# Patient Record
Sex: MALE | Race: WHITE | Employment: OTHER | URBAN - METROPOLITAN AREA
[De-identification: names, ages, dates, MRNs, and addresses within clinical notes are randomized per-mention and may not be internally consistent; named-entity substitution may affect disease eponyms.]

---

## 2017-02-07 RX ORDER — PANTOPRAZOLE SODIUM 40 MG/1
TABLET, DELAYED RELEASE ORAL
Qty: 30 TAB | Refills: 5 | Status: SHIPPED | OUTPATIENT
Start: 2017-02-07 | End: 2017-08-16 | Stop reason: SDUPTHER

## 2017-02-23 DIAGNOSIS — I10 ESSENTIAL HYPERTENSION: ICD-10-CM

## 2017-02-23 RX ORDER — LISINOPRIL 20 MG/1
TABLET ORAL
Qty: 30 TAB | Refills: 5 | Status: SHIPPED | OUTPATIENT
Start: 2017-02-23 | End: 2017-08-21 | Stop reason: SDUPTHER

## 2017-04-17 RX ORDER — ALPRAZOLAM 0.5 MG/1
TABLET ORAL
Qty: 20 TAB | Refills: 2 | OUTPATIENT
Start: 2017-04-17 | End: 2017-11-30 | Stop reason: SDUPTHER

## 2017-05-04 ENCOUNTER — APPOINTMENT (OUTPATIENT)
Dept: GENERAL RADIOLOGY | Age: 62
End: 2017-05-04
Attending: EMERGENCY MEDICINE
Payer: COMMERCIAL

## 2017-05-04 ENCOUNTER — HOSPITAL ENCOUNTER (EMERGENCY)
Age: 62
Discharge: HOME | End: 2017-05-04
Attending: EMERGENCY MEDICINE
Payer: COMMERCIAL

## 2017-05-04 VITALS
RESPIRATION RATE: 20 BRPM | OXYGEN SATURATION: 95 % | TEMPERATURE: 99.4 F | BODY MASS INDEX: 24.59 KG/M2 | DIASTOLIC BLOOD PRESSURE: 78 MMHG | WEIGHT: 191.58 LBS | HEIGHT: 74 IN | SYSTOLIC BLOOD PRESSURE: 164 MMHG | HEART RATE: 107 BPM

## 2017-05-04 DIAGNOSIS — J18.9 CAP (COMMUNITY ACQUIRED PNEUMONIA): Primary | ICD-10-CM

## 2017-05-04 DIAGNOSIS — R50.9 FEVER, UNSPECIFIED FEVER CAUSE: ICD-10-CM

## 2017-05-04 DIAGNOSIS — R11.10 VOMITING, INTRACTABILITY OF VOMITING NOT SPECIFIED, PRESENCE OF NAUSEA NOT SPECIFIED, UNSPECIFIED VOMITING TYPE: ICD-10-CM

## 2017-05-04 LAB
ALBUMIN SERPL BCP-MCNC: 3.8 G/DL (ref 3.5–5)
ALBUMIN/GLOB SERPL: 1.2 {RATIO} (ref 1.1–2.2)
ALP SERPL-CCNC: 63 U/L (ref 45–117)
ALT SERPL-CCNC: 22 U/L (ref 12–78)
ANION GAP BLD CALC-SCNC: 8 MMOL/L (ref 5–15)
APPEARANCE UR: CLEAR
AST SERPL W P-5'-P-CCNC: 20 U/L (ref 15–37)
BACTERIA URNS QL MICRO: NEGATIVE /HPF
BASOPHILS # BLD AUTO: 0 K/UL (ref 0–0.1)
BASOPHILS # BLD: 0 % (ref 0–1)
BILIRUB SERPL-MCNC: 0.6 MG/DL (ref 0.2–1)
BILIRUB UR QL: NEGATIVE
BUN SERPL-MCNC: 21 MG/DL (ref 6–20)
BUN/CREAT SERPL: 18 (ref 12–20)
CALCIUM SERPL-MCNC: 8.4 MG/DL (ref 8.5–10.1)
CHLORIDE SERPL-SCNC: 106 MMOL/L (ref 97–108)
CO2 SERPL-SCNC: 28 MMOL/L (ref 21–32)
COLOR UR: ABNORMAL
CREAT SERPL-MCNC: 1.14 MG/DL (ref 0.7–1.3)
DIFFERENTIAL METHOD BLD: ABNORMAL
EOSINOPHIL # BLD: 0 K/UL (ref 0–0.4)
EOSINOPHIL NFR BLD: 1 % (ref 0–7)
EPITH CASTS URNS QL MICRO: ABNORMAL /LPF
ERYTHROCYTE [DISTWIDTH] IN BLOOD BY AUTOMATED COUNT: 13.2 % (ref 11.5–14.5)
FLUAV AG NPH QL IA: NEGATIVE
FLUBV AG NOSE QL IA: NEGATIVE
GLOBULIN SER CALC-MCNC: 3.2 G/DL (ref 2–4)
GLUCOSE SERPL-MCNC: 118 MG/DL (ref 65–100)
GLUCOSE UR STRIP.AUTO-MCNC: NEGATIVE MG/DL
HCT VFR BLD AUTO: 43.6 % (ref 36.6–50.3)
HGB BLD-MCNC: 14.4 G/DL (ref 12.1–17)
HGB UR QL STRIP: ABNORMAL
KETONES UR QL STRIP.AUTO: ABNORMAL MG/DL
LACTATE SERPL-SCNC: 0.7 MMOL/L (ref 0.4–2)
LEUKOCYTE ESTERASE UR QL STRIP.AUTO: NEGATIVE
LIPASE SERPL-CCNC: 48 U/L (ref 73–393)
LYMPHOCYTES # BLD AUTO: 6 % (ref 12–49)
LYMPHOCYTES # BLD: 0.3 K/UL
MCH RBC QN AUTO: 30.1 PG (ref 26–34)
MCHC RBC AUTO-ENTMCNC: 33 G/DL (ref 30–36.5)
MCV RBC AUTO: 91.2 FL (ref 80–99)
MONOCYTES # BLD: 0.2 K/UL (ref 0–1)
MONOCYTES NFR BLD AUTO: 4 % (ref 5–13)
NEUTS SEG # BLD: 3.7 K/UL (ref 1.8–8)
NEUTS SEG NFR BLD AUTO: 89 % (ref 32–75)
NITRITE UR QL STRIP.AUTO: NEGATIVE
PH UR STRIP: 7 [PH] (ref 5–8)
PLATELET # BLD AUTO: 133 K/UL (ref 150–400)
POTASSIUM SERPL-SCNC: 3.9 MMOL/L (ref 3.5–5.1)
PROT SERPL-MCNC: 7 G/DL (ref 6.4–8.2)
PROT UR STRIP-MCNC: ABNORMAL MG/DL
RBC # BLD AUTO: 4.78 M/UL (ref 4.1–5.7)
RBC #/AREA URNS HPF: ABNORMAL /HPF (ref 0–5)
RBC MORPH BLD: ABNORMAL
SODIUM SERPL-SCNC: 142 MMOL/L (ref 136–145)
SP GR UR REFRACTOMETRY: 1.01 (ref 1–1.03)
UA: UC IF INDICATED,UAUC: ABNORMAL
UROBILINOGEN UR QL STRIP.AUTO: 1 EU/DL (ref 0.2–1)
WBC # BLD AUTO: 4.2 K/UL (ref 4.1–11.1)
WBC URNS QL MICRO: ABNORMAL /HPF (ref 0–4)

## 2017-05-04 PROCEDURE — 80053 COMPREHEN METABOLIC PANEL: CPT | Performed by: EMERGENCY MEDICINE

## 2017-05-04 PROCEDURE — 99285 EMERGENCY DEPT VISIT HI MDM: CPT

## 2017-05-04 PROCEDURE — 94640 AIRWAY INHALATION TREATMENT: CPT

## 2017-05-04 PROCEDURE — 74011250636 HC RX REV CODE- 250/636: Performed by: EMERGENCY MEDICINE

## 2017-05-04 PROCEDURE — 81001 URINALYSIS AUTO W/SCOPE: CPT | Performed by: EMERGENCY MEDICINE

## 2017-05-04 PROCEDURE — 96375 TX/PRO/DX INJ NEW DRUG ADDON: CPT

## 2017-05-04 PROCEDURE — 96374 THER/PROPH/DIAG INJ IV PUSH: CPT

## 2017-05-04 PROCEDURE — 74011250637 HC RX REV CODE- 250/637: Performed by: EMERGENCY MEDICINE

## 2017-05-04 PROCEDURE — 87804 INFLUENZA ASSAY W/OPTIC: CPT | Performed by: EMERGENCY MEDICINE

## 2017-05-04 PROCEDURE — 85025 COMPLETE CBC W/AUTO DIFF WBC: CPT | Performed by: EMERGENCY MEDICINE

## 2017-05-04 PROCEDURE — 83605 ASSAY OF LACTIC ACID: CPT | Performed by: EMERGENCY MEDICINE

## 2017-05-04 PROCEDURE — 83690 ASSAY OF LIPASE: CPT | Performed by: EMERGENCY MEDICINE

## 2017-05-04 PROCEDURE — 36415 COLL VENOUS BLD VENIPUNCTURE: CPT | Performed by: EMERGENCY MEDICINE

## 2017-05-04 PROCEDURE — 96361 HYDRATE IV INFUSION ADD-ON: CPT

## 2017-05-04 PROCEDURE — 77030013140 HC MSK NEB VYRM -A

## 2017-05-04 PROCEDURE — 87040 BLOOD CULTURE FOR BACTERIA: CPT | Performed by: EMERGENCY MEDICINE

## 2017-05-04 PROCEDURE — 71020 XR CHEST PA LAT: CPT

## 2017-05-04 PROCEDURE — 74011000250 HC RX REV CODE- 250: Performed by: EMERGENCY MEDICINE

## 2017-05-04 RX ORDER — ACETAMINOPHEN 500 MG
1000 TABLET ORAL
Status: COMPLETED | OUTPATIENT
Start: 2017-05-04 | End: 2017-05-04

## 2017-05-04 RX ORDER — LEVOFLOXACIN 750 MG/1
750 TABLET ORAL
Status: COMPLETED | OUTPATIENT
Start: 2017-05-04 | End: 2017-05-04

## 2017-05-04 RX ORDER — PROMETHAZINE HYDROCHLORIDE 25 MG/1
25 TABLET ORAL
Qty: 12 TAB | Refills: 0 | Status: SHIPPED | OUTPATIENT
Start: 2017-05-04 | End: 2017-05-30 | Stop reason: ALTCHOICE

## 2017-05-04 RX ORDER — LEVOFLOXACIN 750 MG/1
750 TABLET ORAL DAILY
Qty: 5 TAB | Refills: 0 | Status: SHIPPED | OUTPATIENT
Start: 2017-05-04 | End: 2017-05-09

## 2017-05-04 RX ORDER — ONDANSETRON 2 MG/ML
8 INJECTION INTRAMUSCULAR; INTRAVENOUS
Status: COMPLETED | OUTPATIENT
Start: 2017-05-04 | End: 2017-05-04

## 2017-05-04 RX ORDER — KETOROLAC TROMETHAMINE 30 MG/ML
30 INJECTION, SOLUTION INTRAMUSCULAR; INTRAVENOUS
Status: COMPLETED | OUTPATIENT
Start: 2017-05-04 | End: 2017-05-04

## 2017-05-04 RX ORDER — ALBUTEROL SULFATE 90 UG/1
2 AEROSOL, METERED RESPIRATORY (INHALATION)
Qty: 1 INHALER | Refills: 0 | Status: SHIPPED | OUTPATIENT
Start: 2017-05-04 | End: 2017-05-30 | Stop reason: ALTCHOICE

## 2017-05-04 RX ADMIN — ACETAMINOPHEN 1000 MG: 500 TABLET, FILM COATED ORAL at 07:36

## 2017-05-04 RX ADMIN — SODIUM CHLORIDE 1000 ML: 900 INJECTION, SOLUTION INTRAVENOUS at 06:20

## 2017-05-04 RX ADMIN — SODIUM CHLORIDE 1000 ML: 900 INJECTION, SOLUTION INTRAVENOUS at 07:40

## 2017-05-04 RX ADMIN — ONDANSETRON 8 MG: 2 INJECTION INTRAMUSCULAR; INTRAVENOUS at 06:20

## 2017-05-04 RX ADMIN — LEVOFLOXACIN 750 MG: 750 TABLET, FILM COATED ORAL at 07:36

## 2017-05-04 RX ADMIN — ALBUTEROL SULFATE 1 DOSE: 2.5 SOLUTION RESPIRATORY (INHALATION) at 07:40

## 2017-05-04 RX ADMIN — KETOROLAC TROMETHAMINE 30 MG: 30 INJECTION, SOLUTION INTRAMUSCULAR at 06:22

## 2017-05-04 NOTE — DISCHARGE INSTRUCTIONS
Pneumonia: Care Instructions  Your Care Instructions    Pneumonia is an infection of the lungs. Most cases are caused by infections from bacteria or viruses. Pneumonia may be mild or very severe. If it is caused by bacteria, you will be treated with antibiotics. It may take a few weeks to a few months to recover fully from pneumonia, depending on how sick you were and whether your overall health is good. Follow-up care is a key part of your treatment and safety. Be sure to make and go to all appointments, and call your doctor if you are having problems. Its also a good idea to know your test results and keep a list of the medicines you take. How can you care for yourself at home? · Take your antibiotics exactly as directed. Do not stop taking the medicine just because you are feeling better. You need to take the full course of antibiotics. · Take your medicines exactly as prescribed. Call your doctor if you think you are having a problem with your medicine. · Get plenty of rest and sleep. You may feel weak and tired for a while, but your energy level will improve with time. · To prevent dehydration, drink plenty of fluids, enough so that your urine is light yellow or clear like water. Choose water and other caffeine-free clear liquids until you feel better. If you have kidney, heart, or liver disease and have to limit fluids, talk with your doctor before you increase the amount of fluids you drink. · Take care of your cough so you can rest. A cough that brings up mucus from your lungs is common with pneumonia. It is one way your body gets rid of the infection. But if coughing keeps you from resting or causes severe fatigue and chest-wall pain, talk to your doctor. He or she may suggest that you take a medicine to reduce the cough. · Use a vaporizer or humidifier to add moisture to your bedroom. Follow the directions for cleaning the machine. · Do not smoke or allow others to smoke around you.  Smoke will make your cough last longer. If you need help quitting, talk to your doctor about stop-smoking programs and medicines. These can increase your chances of quitting for good. · Take an over-the-counter pain medicine, such as acetaminophen (Tylenol), ibuprofen (Advil, Motrin), or naproxen (Aleve). Read and follow all instructions on the label. · Do not take two or more pain medicines at the same time unless the doctor told you to. Many pain medicines have acetaminophen, which is Tylenol. Too much acetaminophen (Tylenol) can be harmful. · If you were given a spirometer to measure how well your lungs are working, use it as instructed. This can help your doctor tell how your recovery is going. · To prevent pneumonia in the future, talk to your doctor about getting a flu vaccine (once a year) and a pneumococcal vaccine (one time only for most people). When should you call for help? Call 911 anytime you think you may need emergency care. For example, call if:  · You have severe trouble breathing. Call your doctor now or seek immediate medical care if:  · You cough up dark brown or bloody mucus (sputum). · You have new or worse trouble breathing. · You are dizzy or lightheaded, or you feel like you may faint. Watch closely for changes in your health, and be sure to contact your doctor if:  · You have a new or higher fever. · You are coughing more deeply or more often. · You are not getting better after 2 days (48 hours). · You do not get better as expected. Where can you learn more? Go to http://haydee-trina.info/. Enter [id number] in the search box to learn more about \"Pneumonia: Care Instructions. \"  Current as of: May 23, 2016  Content Version: 11.2  © 8215-9910 [employer] Care instructions adapted under license by [employer] (which disclaims liability or warranty for this information).  If you have questions about a medical condition or this instruction, always ask your healthcare professional. [place] [address] any warranty or liability for your use of this information. Nausea and Vomiting: Care Instructions  Your Care Instructions    When you are nauseated, you may feel weak and sweaty and notice a lot of saliva in your mouth. Nausea often leads to vomiting. Most of the time you do not need to worry about nausea and vomiting, but they can be signs of other illnesses. Two common causes of nausea and vomiting are stomach flu and food poisoning. Nausea and vomiting from viral stomach flu will usually start to improve within 24 hours. Nausea and vomiting from food poisoning may last from 12 to 48 hours. The doctor has checked you carefully, but problems can develop later. If you notice any problems or new symptoms, get medical treatment right away. Follow-up care is a key part of your treatment and safety. Be sure to make and go to all appointments, and call your doctor if you are having problems. It's also a good idea to know your test results and keep a list of the medicines you take. How can you care for yourself at home? · To prevent dehydration, drink plenty of fluids, enough so that your urine is light yellow or clear like water. Choose water and other caffeine-free clear liquids until you feel better. If you have kidney, heart, or liver disease and have to limit fluids, talk with your doctor before you increase the amount of fluids you drink. · Rest in bed until you feel better. · When you are able to eat, try clear soups, mild foods, and liquids until all symptoms are gone for 12 to 48 hours. Other good choices include dry toast, crackers, cooked cereal, and gelatin dessert, such as Jell-O. When should you call for help? Call 911 anytime you think you may need emergency care. For example, call if:  · You passed out (lost consciousness).   Call your doctor now or seek immediate medical care if:  · You have symptoms of dehydration, such as:  ¨ Dry eyes and a dry mouth. ¨ Passing only a little dark urine. ¨ Feeling thirstier than usual.  · You have new or worsening belly pain. · You have a new or higher fever. · You vomit blood or what looks like coffee grounds. Watch closely for changes in your health, and be sure to contact your doctor if:  · You have ongoing nausea and vomiting. · Your vomiting is getting worse. · Your vomiting lasts longer than 2 days. · You are not getting better as expected. Where can you learn more? Go to http://haydee-trina.info/. Enter 25 [id number] in the search box to learn more about \"Nausea and Vomiting: Care Instructions. \"  Current as of: May 27, 2016  Content Version: 11.2  © 1776-8355 [employer]. Care instructions adapted under license by [employer] (which disclaims liability or warranty for this information). If you have questions about a medical condition or this instruction, always ask your healthcare professional. [place] [address] any warranty or liability for your use of this information. We hope that we have addressed all of your medical concerns. The examination and treatment you received in the Emergency Department were for an emergent problem and were not intended as complete care. It is important that you follow up with your healthcare provider(s) for ongoing care. If your symptoms worsen or do not improve as expected, and you are unable to reach your usual health care provider(s), you should return to the Emergency Department. Today's healthcare is undergoing tremendous change, and patient satisfaction surveys are one of the many tools to assess the quality of medical care. You may receive a survey from the [facility] regarding your experience in the Emergency Department. I hope that your experience has been completely positive, particularly the medical care that I provided.   As such, please participate in the survey; anything less than excellent does not meet my expectations or intentions. [facility] and [facility] participate in nationally recognized quality of care measures. If your blood pressure is greater than 120/80, as reported below, we urge that you seek medical care to address the potential of high blood pressure, commonly known as hypertension. Hypertension can be hereditary or can be caused by certain medical conditions, pain, stress, or \"white coat syndrome. \"       Please make an appointment with your health care provider(s) for follow up of your Emergency Department visit. VITALS:   Patient Vitals for the past 8 hrs:   Temp Pulse Resp BP SpO2   05/04/17 0645 - (!) 107 - 149/77 92 %   05/04/17 0633 - - - - 90 %   05/04/17 0632 - (!) 106 20 155/75 90 %   05/04/17 0553 99.4 °F (37.4 °C) (!) 104 16 162/77 92 %          Thank you for allowing us to provide you with medical care today. We realize that you have many choices for your emergency care needs. Please choose us in the future for any continued health care needs. Adarsh Hernandez, [address] [place] [address] [address]   Office: [phone]            Recent Results (from the past 24 hour(s))   INFLUENZA A & B AG (RAPID TEST)    Collection Time: 05/04/17  5:59 AM   Result Value Ref Range    Influenza A Antigen NEGATIVE  NEG      Influenza B Antigen NEGATIVE  NEG     CBC WITH AUTOMATED DIFF    Collection Time: 05/04/17  5:59 AM   Result Value Ref Range    WBC 4.2 4.1 - 11.1 K/uL    RBC 4.78 4.10 - 5.70 M/uL    HGB 14.4 12.1 - 17.0 g/dL    HCT 43.6 36.6 - 50.3 %    MCV 91.2 80.0 - 99.0 FL    MCH 30.1 26.0 - 34.0 PG    MCHC 33.0 30.0 - 36.5 g/dL    RDW 13.2 11.5 - 14.5 %    PLATELET 583 (L) 182 - 400 K/uL    NEUTROPHILS 89 (H) 32 - 75 %    LYMPHOCYTES 6 (L) 12 - 49 %    MONOCYTES 4 (L) 5 - 13 %    EOSINOPHILS 1 0 - 7 %    BASOPHILS 0 0 - 1 %    ABS.  NEUTROPHILS 3.7 1.8 - 8.0 K/UL    ABS. LYMPHOCYTES 0.3 K/UL    ABS. MONOCYTES 0.2 0.0 - 1.0 K/UL    ABS. EOSINOPHILS 0.0 0.0 - 0.4 K/UL    ABS. BASOPHILS 0.0 0.0 - 0.1 K/UL    DF AUTOMATED      RBC COMMENTS NORMOCYTIC, NORMOCHROMIC     METABOLIC PANEL, COMPREHENSIVE    Collection Time: 05/04/17  5:59 AM   Result Value Ref Range    Sodium 142 136 - 145 mmol/L    Potassium 3.9 3.5 - 5.1 mmol/L    Chloride 106 97 - 108 mmol/L    CO2 28 21 - 32 mmol/L    Anion gap 8 5 - 15 mmol/L    Glucose 118 (H) 65 - 100 mg/dL    BUN 21 (H) 6 - 20 MG/DL    Creatinine 1.14 0.70 - 1.30 MG/DL    BUN/Creatinine ratio 18 12 - 20      GFR est AA >60 >60 ml/min/1.73m2    GFR est non-AA >60 >60 ml/min/1.73m2    Calcium 8.4 (L) 8.5 - 10.1 MG/DL    Bilirubin, total 0.6 0.2 - 1.0 MG/DL    ALT (SGPT) 22 12 - 78 U/L    AST (SGOT) 20 15 - 37 U/L    Alk. phosphatase 63 45 - 117 U/L    Protein, total 7.0 6.4 - 8.2 g/dL    Albumin 3.8 3.5 - 5.0 g/dL    Globulin 3.2 2.0 - 4.0 g/dL    A-G Ratio 1.2 1.1 - 2.2     LACTIC ACID, PLASMA    Collection Time: 05/04/17  5:59 AM   Result Value Ref Range    Lactic acid 0.7 0.4 - 2.0 MMOL/L   LIPASE    Collection Time: 05/04/17  5:59 AM   Result Value Ref Range    Lipase 48 (L) 73 - 393 U/L   URINALYSIS W/ REFLEX CULTURE    Collection Time: 05/04/17  6:19 AM   Result Value Ref Range    Color YELLOW/STRAW      Appearance CLEAR CLEAR      Specific gravity 1.015 1.003 - 1.030      pH (UA) 7.0 5.0 - 8.0      Protein TRACE (A) NEG mg/dL    Glucose NEGATIVE  NEG mg/dL    Ketone TRACE (A) NEG mg/dL    Bilirubin NEGATIVE  NEG      Blood TRACE (A) NEG      Urobilinogen 1.0 0.2 - 1.0 EU/dL    Nitrites NEGATIVE  NEG      Leukocyte Esterase NEGATIVE  NEG      WBC 0-4 0 - 4 /hpf    RBC 0-5 0 - 5 /hpf    Epithelial cells FEW FEW /lpf    Bacteria NEGATIVE  NEG /hpf    UA:UC IF INDICATED CULTURE NOT INDICATED BY UA RESULT CNI         Xr Chest Pa Lat    Result Date: 5/4/2017  Chest PA and lateral History: Fever and cough Comparison: 1/1/2014 Findings:   The lungs are well expanded. No focal consolidation, pleural effusion, or pneumothorax. The cardiomediastinal silhouette is unremarkable. There is chronic deformity of several right-sided ribs. There is multilevel mild spondylosis in the visualized spine. Impression: No acute cardiopulmonary process.

## 2017-05-04 NOTE — ED NOTES
Pt and spouse given discharge instructions by Dr Julio Lance, he verbalizes an understanding, pt stable at time of discharge ambulates to lobby with spouse

## 2017-05-04 NOTE — ED NOTES
Plan of care explained to pt and all questions answered. Pt given blanket and pillow for comfort. Wife at beside.

## 2017-05-04 NOTE — ED NOTES
Pt returned from xray. Pulse Ox noted at 88-89 % on room air. Dr Carlos Herman made aware.  O2 NC placed at 2L per MD order
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
## 2017-05-04 NOTE — ED NOTES
Pt signed out to me by Dr. George Reyna. Pt with cough, fever, vomiting. Coarse breath sounds in LLL. CXR reviewed by myself with airspace process on LLL on lateral xray. Will tx for pneumonia. Oxygen sats >92% after neb treatment. VSS. Mild tachycardia after neb. Given tylenol for low grade fever. Pt nontoxic, no increased wob or accessory muscle use. F/u with pcp or return to ED for worsening symptoms. Given levaquin in ED. Will d/c with levaquin, phenergan and albuterol.

## 2017-05-05 ENCOUNTER — PATIENT OUTREACH (OUTPATIENT)
Dept: INTERNAL MEDICINE CLINIC | Age: 62
End: 2017-05-05

## 2017-05-05 ENCOUNTER — PATIENT OUTREACH (OUTPATIENT)
Dept: OTHER | Age: 62
End: 2017-05-05

## 2017-05-05 NOTE — PROGRESS NOTES
RUPA NOTE:     Mr. Tamara Rosario  has been contacted regarding recent ED visit for LLL Pnx. Verified  and address for HIPAA security. Explained role and verified PCP. Discharge medications were reviewed with the patient by telephone. Mr. Tamara Rosario reports that he has been sick this week and saw some progression of illness with SOB when working and \"just not feeling right. \"        Date of ED Admission:    2017   Facility patient visited:   [facility]   Reason for Visit:   Fever/ SOB   LLL Pnx   Reviewed scripts given by ED? Yes :  Taking levoquin. Hasn't needed phenergan and has used albuterol only a few times. Able to obtain prescribed medication? Yes   How is the patient feeling? Pt stated about the same, but no worse. Able to lie down without SOB. Does the patient have any questions or problems? Not at this time   Any barriers with transportation? no   Follow-up Appointment date: Yes -       Reviewed Discharge instructions and Red Flags:  He is drinking fluids/ hydrating, but not eating well. Suggested soups and soft foods. Discussed temperature sensitivities with cold and reactive airway. He has a humidifier system in his home, I suggested increasing humidity for airway. His wife works in his PCP's office and will coordinate for office visit Monday. Provided patient with my name and contact information and instructed patient if there are any question to call. No further needs at this time. Next outreach - Wed 5/10      ED Prescriptions   Medication Sig Dispense Start Date End Date Auth. Provider   levoFLOXacin (LEVAQUIN) 750 mg tablet Take 1 Tab by mouth daily for 5 days. 5 Tab 2017 Jackie Sharp MD   promethazine (PHENERGAN) 25 mg tablet Take 1 Tab by mouth every six (6) hours as needed for Nausea.  12 Tab 2017  Jackie Sharp MD   albuterol (PROVENTIL HFA, VENTOLIN HFA, PROAIR HFA) 90 mcg/actuation inhaler Take 2 Puffs by inhalation every four (4) hours as needed for Wheezing.  1 Inhaler 5/4/2017  Celestino Lisa MD   Follow-up Information   Follow up With Details Comments [address], [place] Schedule an appointment as soon as possible for a visit in 1 day  [address] [place] [address]   [phone]

## 2017-05-05 NOTE — PROGRESS NOTES
NNTOCED Call    Patient discharged on 5/4/17 from [facility]. Diagnoses   CAP (community acquired pneumonia)    Fever, unspecified fever cause    Vomiting, intractability of vomiting not specified, presence of nausea not specified, unspecified vomiting type      RUPA call was completed by below nurse, see note in [address]. This NN will f/u at a later time. Rajinder Mims, RN HQLEP Benefit Case Mgt       Future Appointments      Provider Sarah Crawley   5/8/2017 10:15 AM Benitez France MD [facility]     This note will not be viewable in [address]

## 2017-05-10 LAB
BACTERIA SPEC CULT: NORMAL
BACTERIA SPEC CULT: NORMAL
SERVICE CMNT-IMP: NORMAL
SERVICE CMNT-IMP: NORMAL

## 2017-05-11 ENCOUNTER — PATIENT OUTREACH (OUTPATIENT)
Dept: OTHER | Age: 62
End: 2017-05-11

## 2017-05-12 ENCOUNTER — HOSPITAL ENCOUNTER (OUTPATIENT)
Dept: MRI IMAGING | Age: 62
Discharge: HOME | End: 2017-05-12
Attending: ORTHOPAEDIC SURGERY
Payer: COMMERCIAL

## 2017-05-12 DIAGNOSIS — M75.121 COMPLETE TEAR OF RIGHT ROTATOR CUFF: ICD-10-CM

## 2017-05-12 PROCEDURE — 73221 MRI JOINT UPR EXTREM W/O DYE: CPT

## 2017-05-25 ENCOUNTER — PATIENT OUTREACH (OUTPATIENT)
Dept: OTHER | Age: 62
End: 2017-05-25

## 2017-05-25 NOTE — PROGRESS NOTES
Patient on report as with discharge from ED visit for pneumonia. Mr. Ranulfo Singleton reports that he is doing well, no breathing problems, no fever. He is planning for rotator cuff repair on 6/29 and hopes for an earlier surgical date due to his work load in the summer. He has no identified needs. Asked about follow up with his PCP, but he declines. He will go for a pre-op exam for his shoulder surgery. He is fine with me following him after his next surgical event.

## 2017-05-30 ENCOUNTER — OFFICE VISIT (OUTPATIENT)
Dept: INTERNAL MEDICINE CLINIC | Age: 62
End: 2017-05-30

## 2017-05-30 ENCOUNTER — HOSPITAL ENCOUNTER (OUTPATIENT)
Dept: GENERAL RADIOLOGY | Age: 62
Discharge: HOME | End: 2017-05-30
Attending: INTERNAL MEDICINE
Payer: COMMERCIAL

## 2017-05-30 VITALS
TEMPERATURE: 98.1 F | DIASTOLIC BLOOD PRESSURE: 79 MMHG | WEIGHT: 192 LBS | BODY MASS INDEX: 24.64 KG/M2 | SYSTOLIC BLOOD PRESSURE: 135 MMHG | HEIGHT: 74 IN | OXYGEN SATURATION: 98 % | HEART RATE: 78 BPM | RESPIRATION RATE: 16 BRPM

## 2017-05-30 DIAGNOSIS — J15.9 COMMUNITY ACQUIRED BACTERIAL PNEUMONIA: ICD-10-CM

## 2017-05-30 DIAGNOSIS — J15.9 COMMUNITY ACQUIRED BACTERIAL PNEUMONIA: Primary | ICD-10-CM

## 2017-05-30 DIAGNOSIS — F51.01 PRIMARY INSOMNIA: ICD-10-CM

## 2017-05-30 PROCEDURE — 71020 XR CHEST PA LAT: CPT

## 2017-05-30 RX ORDER — TRAZODONE HYDROCHLORIDE 50 MG/1
50 TABLET ORAL
Qty: 30 TAB | Refills: 4 | Status: SHIPPED | OUTPATIENT
Start: 2017-05-30 | End: 2017-06-27

## 2017-05-30 NOTE — PROGRESS NOTES
HISTORY OF PRESENT ILLNESS  Missy Meléndez is a 62 y.o. male. HPI  Aiyana De Leon had a terrible cough and fever May 4th, went to ER. Chest xray was negative, but clinically was felt to have pneumonia. He was treated with Levaquin 500 mg daily for five days and given Albuterol. He feels much better. He has minimal cough. No fever, no shortness of breath, no wheezing. He's trying to get clearance for rotator cuff surgery with Dr. Zena Ramirez and needs a follow up chest xray. He is no longer needing cough syrup. He sees Dr. Fernando Levine and is five years sober. He notes Zoloft did not help with sleep and would like alternative. Will try Trazodone. Non addicting. Review of Systems   Constitutional: Negative. Negative for chills, diaphoresis, fever and malaise/fatigue. HENT: Negative for congestion, ear discharge, ear pain, nosebleeds and sore throat. Eyes: Negative for pain and discharge. Respiratory: Negative for cough, hemoptysis, sputum production, shortness of breath and wheezing. Cardiovascular: Negative for chest pain. Neurological: Negative for headaches. Psychiatric/Behavioral: Negative for depression. The patient has insomnia. Physical Exam   Constitutional: He appears well-developed and well-nourished. HENT:   Head: Normocephalic. Right Ear: Tympanic membrane, external ear and ear canal normal.   Left Ear: Tympanic membrane, external ear and ear canal normal.   Nose: Nose normal. Right sinus exhibits no maxillary sinus tenderness and no frontal sinus tenderness. Left sinus exhibits no maxillary sinus tenderness and no frontal sinus tenderness. Mouth/Throat: Oropharynx is clear and moist and mucous membranes are normal. No oropharyngeal exudate. Eyes: Conjunctivae are normal. Pupils are equal, round, and reactive to light. Right eye exhibits no discharge. Left eye exhibits no discharge. Neck: Normal range of motion. Neck supple.    Cardiovascular: Normal rate, regular rhythm and normal heart sounds. Pulmonary/Chest: Breath sounds normal. No respiratory distress. He has no wheezes. He has no rales. Lymphadenopathy:     He has no cervical adenopathy. Nursing note and vitals reviewed. ASSESSMENT and PLAN  Cindy Rothman was seen today for cough. Diagnoses and all orders for this visit:    Community acquired bacterial pneumonia-treated may 4-now all better clinically  Suspect fine for rotator cuff surgery-will check cxr to be sure  -     XR CHEST PA LAT; Future    Primary insomnia  -     traZODone (DESYREL) 50 mg tablet; Take 1 Tab by mouth nightly.

## 2017-05-30 NOTE — MR AVS SNAPSHOT
Visit Information Date & Time Provider Department Dept. Phone Encounter #  
 5/30/2017  9:15 AM Melinda Barrios MD [facility] [address] [id number] Upcoming Health Maintenance Date Due Hepatitis C Screening 1955 COLONOSCOPY 9/11/1974 ZOSTER VACCINE AGE 60> 9/11/2016 INFLUENZA AGE 9 TO ADULT 8/1/2017 DTaP/Tdap/Td series (2 - Td) 10/17/2024 Allergies as of 5/30/2017  Review Complete On: 5/30/2017 By: Eddi Feliciano LPN Severity Noted Reaction Type Reactions Codeine  04/12/2012    Rash Current Immunizations  Reviewed on 11/29/2016 Name Date Influenza Vaccine (Quad) PF 11/29/2016 Influenza Vaccine PF 10/17/2014 Tdap 10/17/2014 Not reviewed this visit You Were Diagnosed With   
  
 Codes Comments Community acquired bacterial pneumonia    -  Primary ICD-10-CM: J15.9 ICD-9-CM: 482.9 Primary insomnia     ICD-10-CM: F51.01 
ICD-9-CM: 307.42 Vitals BP Pulse Temp Resp Height(growth percentile) Weight(growth percentile) 135/79 (BP 1 Location: Left arm, BP Patient Position: Sitting) 78 98.1 °F (36.7 °C) (Oral) 16 6' 2\" (1.88 m) 192 lb (87.1 kg) SpO2 BMI Smoking Status 98% 24.65 kg/m2 Never Smoker Vitals History BMI and BSA Data Body Mass Index Body Surface Area  
 24.65 kg/m 2 2.13 m 2 [facility] [address] [facility] [phone] Your Updated Medication List  
  
   
This list is accurate as of: 5/30/17  9:49 AM.  Always use your most recent med list.  
  
  
  
  
 ALPRAZolam 0.5 mg tablet Commonly known as:  XANAX  
TAKE 1 TABLET BY MOUTH EVERY 8 HOURS AS NEEDED FOR ANXIETY  
  
 atorvastatin 10 mg tablet Commonly known as:  LIPITOR  
TAKE ONE TABLET BY MOUTH EVERY DAY  
  
 lisinopril 20 mg tablet Commonly known as:  Litzy Zaragoza  
 TAKE ONE TABLET BY MOUTH EVERY DAY  
  
 pantoprazole 40 mg tablet Commonly known as:  PROTONIX  
TAKE 1 TABLET BY MOUTH EVERY DAY  
  
 SUBOXONE 8-2 mg Film sublingaul film Generic drug:  buprenorphine-naloxone  
by SubLINGual route daily. testosterone cypionate 100 mg/mL injection 100 mg/mL by IntraMUSCular route every seven (7) days. traZODone 50 mg tablet Commonly known as:  Krzysztof Cushing Take 1 Tab by mouth nightly. VIAGRA 100 mg tablet Generic drug:  sildenafil citrate Take 1 Tab by mouth as needed. Prescriptions Sent to Pharmacy Refills  
 traZODone (DESYREL) 50 mg tablet 4 Sig: Take 1 Tab by mouth nightly. Class: Normal  
 Pharmacy: [address] #: [phone] Route: Oral  
  
To-Do List   
 05/30/2017 Imaging:  XR CHEST PA LAT [facility] SERVICES! Dear Richard Richards: Thank you for requesting a [email or web address] Textbook [employer] account. Our records indicate that you have previously registered for a [email or web address] Textbook Time account but its currently inactive. Please call our [email or web address] support line at [phone]. Additional Information If you have questions, please visit the Frequently Asked Questions section of the [email or web address] Textbook [employer] website at [email or web address] Remember, [email or web address] is NOT to be used for urgent needs. For medical emergencies, dial 911. Now available from your iPhone and Android! Please provide this summary of care documentation to your next provider. Your primary care clinician is listed as Lorene Hawkins. If you have any questions after today's visit, please call [phone].

## 2017-06-05 ENCOUNTER — PATIENT OUTREACH (OUTPATIENT)
Dept: OTHER | Age: 62
End: 2017-06-05

## 2017-06-05 NOTE — PROGRESS NOTES
Resolving current episode (Transitions of care complete). No further ED/UC or hospital admissions within 30 days post discharge. Patient attended follow-up appointment with PCP 5/31. FU CXR clear. Final call with Mr. Peter Pisano finds him well. Offered extended CM support. Mr. Peter Pisano has no self identified CM needs at this time. Low Risk with illness burden noted as 588. Anticipating rotator cuff repair as outpatient surgery on 6/29. He will expect my calls as FU postoperative RUPA. support.

## 2017-06-19 DIAGNOSIS — E78.5 DYSLIPIDEMIA, GOAL LDL BELOW 100: ICD-10-CM

## 2017-06-19 RX ORDER — ATORVASTATIN CALCIUM 10 MG/1
TABLET, FILM COATED ORAL
Qty: 30 TAB | Refills: 0 | Status: SHIPPED | OUTPATIENT
Start: 2017-06-19 | End: 2017-08-21 | Stop reason: SDUPTHER

## 2017-06-27 ENCOUNTER — HOSPITAL ENCOUNTER (OUTPATIENT)
Dept: PREADMISSION TESTING | Age: 62
Discharge: HOME | End: 2017-06-27
Payer: COMMERCIAL

## 2017-06-27 VITALS
HEIGHT: 74 IN | HEART RATE: 88 BPM | OXYGEN SATURATION: 99 % | SYSTOLIC BLOOD PRESSURE: 154 MMHG | RESPIRATION RATE: 16 BRPM | BODY MASS INDEX: 24.17 KG/M2 | TEMPERATURE: 98.3 F | DIASTOLIC BLOOD PRESSURE: 80 MMHG | WEIGHT: 188.31 LBS

## 2017-06-27 LAB
ALBUMIN SERPL BCP-MCNC: 4.1 G/DL (ref 3.5–5)
ALBUMIN/GLOB SERPL: 1.5 {RATIO} (ref 1.1–2.2)
ALP SERPL-CCNC: 64 U/L (ref 45–117)
ALT SERPL-CCNC: 23 U/L (ref 12–78)
ANION GAP BLD CALC-SCNC: 4 MMOL/L (ref 5–15)
AST SERPL W P-5'-P-CCNC: 15 U/L (ref 15–37)
ATRIAL RATE: 61 BPM
BASOPHILS # BLD AUTO: 0 K/UL (ref 0–0.1)
BASOPHILS # BLD: 0 % (ref 0–1)
BILIRUB SERPL-MCNC: 0.6 MG/DL (ref 0.2–1)
BUN SERPL-MCNC: 19 MG/DL (ref 6–20)
BUN/CREAT SERPL: 17 (ref 12–20)
CALCIUM SERPL-MCNC: 8.7 MG/DL (ref 8.5–10.1)
CALCULATED P AXIS, ECG09: 50 DEGREES
CALCULATED R AXIS, ECG10: 53 DEGREES
CALCULATED T AXIS, ECG11: 44 DEGREES
CHLORIDE SERPL-SCNC: 104 MMOL/L (ref 97–108)
CO2 SERPL-SCNC: 31 MMOL/L (ref 21–32)
CREAT SERPL-MCNC: 1.13 MG/DL (ref 0.7–1.3)
DIAGNOSIS, 93000: NORMAL
EOSINOPHIL # BLD: 0.2 K/UL (ref 0–0.4)
EOSINOPHIL NFR BLD: 5 % (ref 0–7)
ERYTHROCYTE [DISTWIDTH] IN BLOOD BY AUTOMATED COUNT: 12.4 % (ref 11.5–14.5)
GLOBULIN SER CALC-MCNC: 2.8 G/DL (ref 2–4)
GLUCOSE SERPL-MCNC: 86 MG/DL (ref 65–100)
HCT VFR BLD AUTO: 47.8 % (ref 36.6–50.3)
HGB BLD-MCNC: 15.7 G/DL (ref 12.1–17)
LYMPHOCYTES # BLD AUTO: 27 % (ref 12–49)
LYMPHOCYTES # BLD: 1.3 K/UL (ref 0.8–3.5)
MCH RBC QN AUTO: 30 PG (ref 26–34)
MCHC RBC AUTO-ENTMCNC: 32.8 G/DL (ref 30–36.5)
MCV RBC AUTO: 91.2 FL (ref 80–99)
MONOCYTES # BLD: 0.6 K/UL (ref 0–1)
MONOCYTES NFR BLD AUTO: 11 % (ref 5–13)
NEUTS SEG # BLD: 2.8 K/UL (ref 1.8–8)
NEUTS SEG NFR BLD AUTO: 57 % (ref 32–75)
P-R INTERVAL, ECG05: 164 MS
PLATELET # BLD AUTO: 192 K/UL (ref 150–400)
POTASSIUM SERPL-SCNC: 4.9 MMOL/L (ref 3.5–5.1)
PROT SERPL-MCNC: 6.9 G/DL (ref 6.4–8.2)
Q-T INTERVAL, ECG07: 404 MS
QRS DURATION, ECG06: 104 MS
QTC CALCULATION (BEZET), ECG08: 406 MS
RBC # BLD AUTO: 5.24 M/UL (ref 4.1–5.7)
SODIUM SERPL-SCNC: 139 MMOL/L (ref 136–145)
VENTRICULAR RATE, ECG03: 61 BPM
WBC # BLD AUTO: 5 K/UL (ref 4.1–11.1)

## 2017-06-27 NOTE — H&P
PAT Pre-Op History & Physical    Patient: Tommy Pereira                  MRN: [id number]          SSN: [id number]  YOB: 1955          Age: 62 y.o. Sex: male                Subjective:   Patient is a 62 y.o.  male who presents with history of right shoulder pain that began 3-4 months ago. Patient states at that time he slipped and caught himself with his right arm and felt immediate discomfort in his right shoulder. Rates his right shoulder pain 10/10 at times and describes it as an intermittent ache- worse at night when trying to sleep. Patient is left hand dominant. MRI of right shoulder done 5/12/2017 showed:    Diffuse bursal surface tearing of the supraspinatus and anterior infraspinatus with probable small full-thickness tear of the mid supraspinatus with less than 1 cm of retraction. There is edema in both muscles and mild atrophy of the infraspinatus. Has failed steroid injections and NSAIDs. Patient has a history of narcotic abuse so does not take narcotic pain medication. Had previous right shoulder arthroscopic procedure in 2007. The patient was evaluated in the surgeon's office and it was determined that the most appropriate plan of care is to proceed with surgical intervention.   Patient's PCP José Miguel Gómez MD      Past Medical History:   Diagnosis Date    Abnormal cardiovascular stress test 2011?    abnormal stress test, cath normal per pt    Arthritis     Chronic back pain     s/p surgery    Chronic pain     spinal stenosis    GERD (gastroesophageal reflux disease)     Hypertension     Hypogonadism, male 8/21/2013    total 97, free 1.8  5/13/13    Opiate addiction ([facility] [address]     Dr. Byers Jobs disorder     panic attack (last attack 3 month ago), anxiety    Thoracic back pain 8/21/2013      Past Surgical History:   Procedure Laterality Date    HX BLEPHAROPLASTY  2011    HX COLONOSCOPY      Dr. Elisa Andrade?    HX HEART CATHETERIZATION 2011?    normal    HX ORTHOPAEDIC  2014    right knee scope    HX ORTHOPAEDIC  2007    right shoulder scope    HX ORTHOPAEDIC  1990    right wrist    HX ORTHOPAEDIC  2015    left knee scopes x 5    HX ORTHOPAEDIC Right 2015    ankle surger      Prior to Admission medications    Medication Sig Start Date End Date Taking? Authorizing Provider   atorvastatin (LIPITOR) 10 mg tablet TAKE ONE TABLET BY MOUTH EVERY DAY 6/19/17  Yes Emerita Beebe MD   ALPRAZolam Emily Nest) 0.5 mg tablet TAKE 1 TABLET BY MOUTH EVERY 8 HOURS AS NEEDED FOR ANXIETY 4/17/17  Yes Gina Childs MD   lisinopril (PRINIVIL, ZESTRIL) 20 mg tablet TAKE ONE TABLET BY MOUTH EVERY DAY 2/23/17  Yes Gina Childs MD   pantoprazole (PROTONIX) 40 mg tablet TAKE 1 TABLET BY MOUTH EVERY DAY 2/7/17  Yes Gina Childs MD   buprenorphine-naloxone (SUBOXONE) 8-2 mg film sublingaul film by SubLINGual route daily. Instructed to stop by Luigi Pap. Yes Historical Provider   VIAGRA 100 mg tablet Take 1 Tab by mouth as needed. 9/14/16  Yes Gina Childs MD   testosterone cypionate 100 mg/mL injection 100 mg/mL by IntraMUSCular route every seven (7) days. Every Wednesday   Yes Historical Provider     Current Outpatient Prescriptions   Medication Sig    atorvastatin (LIPITOR) 10 mg tablet TAKE ONE TABLET BY MOUTH EVERY DAY    ALPRAZolam (XANAX) 0.5 mg tablet TAKE 1 TABLET BY MOUTH EVERY 8 HOURS AS NEEDED FOR ANXIETY    lisinopril (PRINIVIL, ZESTRIL) 20 mg tablet TAKE ONE TABLET BY MOUTH EVERY DAY    pantoprazole (PROTONIX) 40 mg tablet TAKE 1 TABLET BY MOUTH EVERY DAY    buprenorphine-naloxone (SUBOXONE) 8-2 mg film sublingaul film by SubLINGual route daily. Instructed to stop by Luigi Pap.  VIAGRA 100 mg tablet Take 1 Tab by mouth as needed.  testosterone cypionate 100 mg/mL injection 100 mg/mL by IntraMUSCular route every seven (7) days.  Every Wednesday     No current facility-administered medications for this encounter. Allergies   Allergen Reactions    Codeine Rash      Social History   Substance Use Topics    Smoking status: Never Smoker    Smokeless tobacco: Never Used    Alcohol use 1.2 oz/week     2 Glasses of wine per week      History   Drug Use No     Comment: 5 years - Percocet     Family History   Problem Relation Age of Onset    Heart Disease Father 78     CABG    Diabetes Sister     Heart Disease Sister      afib    No Known Problems Brother     No Known Problems Brother          Review of Systems    Patient denies difficulty swallowing, mouth sores, or loose teeth. Patient denies any recent dental procedures or any planned prior to surgery. Patient denies chest pain, tightness, pain radiating down left arm, palpitations. Denies dizziness, visual disturbances, or lightheadedness. Patient denies shortness of breath, wheezing, cough, fever, or chills. Patient denies diarrhea, constipation, or abdominal pain. Patient denies urinary problems including dysuria, hesitancy, urgency, or incontinence. Denies skin breakdown, rashes, insect bites or open area. Objective:   Patient Vitals for the past 24 hrs:   Temp Pulse Resp BP SpO2   17 1023 - - - 154/80 -   17 0926 98.3 °F (36.8 °C) 88 16 177/83 99 %     Temp (24hrs), Av.3 °F (36.8 °C), Min:98.3 °F (36.8 °C), Max:98.3 °F (36.8 °C)    Body mass index is 24.18 kg/(m^2). Wt Readings from Last 1 Encounters:   17 85.4 kg (188 lb 5 oz)        Physical Exam:     General: Pleasant,  cooperative, no apparent distress, appears stated age. Eyes: Conjunctivae/corneas clear. EOMs intact. Nose: Nares normal.   Mouth/Throat: Lips, mucosa, and tongue normal. Teeth and gums normal.   Neck: Supple, symmetrical, trachea midline. Back: Symmetric   Lungs: Clear to auscultation bilaterally. Heart: Regular rate and rhythm, S1, S2 normal. No murmur, click, rub or gallop. Abdomen: Soft, non-tender.  Bowel sounds normal. No distention. Musculoskeletal:  Right shoulder ROM limited by discomfort. Extremities:  Extremities normal, atraumatic, no cyanosis or edema. Calves                                 supple, non tender to palpation. Pulses: 2+ and symmetric bilateral upper extremities. Cap. refill <2 seconds   Skin: Skin color, texture, turgor normal.  No rashes or lesions. Neurologic: CN II-XII grossly intact. Alert and oriented x3. Labs:   Recent Results (from the past 72 hour(s))   CBC WITH AUTOMATED DIFF    Collection Time: 06/27/17 10:17 AM   Result Value Ref Range    WBC 5.0 4.1 - 11.1 K/uL    RBC 5.24 4.10 - 5.70 M/uL    HGB 15.7 12.1 - 17.0 g/dL    HCT 47.8 36.6 - 50.3 %    MCV 91.2 80.0 - 99.0 FL    MCH 30.0 26.0 - 34.0 PG    MCHC 32.8 30.0 - 36.5 g/dL    RDW 12.4 11.5 - 14.5 %    PLATELET 749 273 - 195 K/uL    NEUTROPHILS 57 32 - 75 %    LYMPHOCYTES 27 12 - 49 %    MONOCYTES 11 5 - 13 %    EOSINOPHILS 5 0 - 7 %    BASOPHILS 0 0 - 1 %    ABS. NEUTROPHILS 2.8 1.8 - 8.0 K/UL    ABS. LYMPHOCYTES 1.3 0.8 - 3.5 K/UL    ABS. MONOCYTES 0.6 0.0 - 1.0 K/UL    ABS. EOSINOPHILS 0.2 0.0 - 0.4 K/UL    ABS. BASOPHILS 0.0 0.0 - 0.1 K/UL   METABOLIC PANEL, COMPREHENSIVE    Collection Time: 06/27/17 10:17 AM   Result Value Ref Range    Sodium 139 136 - 145 mmol/L    Potassium 4.9 3.5 - 5.1 mmol/L    Chloride 104 97 - 108 mmol/L    CO2 31 21 - 32 mmol/L    Anion gap 4 (L) 5 - 15 mmol/L    Glucose 86 65 - 100 mg/dL    BUN 19 6 - 20 MG/DL    Creatinine 1.13 0.70 - 1.30 MG/DL    BUN/Creatinine ratio 17 12 - 20      GFR est AA >60 >60 ml/min/1.73m2    GFR est non-AA >60 >60 ml/min/1.73m2    Calcium 8.7 8.5 - 10.1 MG/DL    Bilirubin, total 0.6 0.2 - 1.0 MG/DL    ALT (SGPT) 23 12 - 78 U/L    AST (SGOT) 15 15 - 37 U/L    Alk.  phosphatase 64 45 - 117 U/L    Protein, total 6.9 6.4 - 8.2 g/dL    Albumin 4.1 3.5 - 5.0 g/dL    Globulin 2.8 2.0 - 4.0 g/dL    A-G Ratio 1.5 1.1 - 2.2     EKG, 12 LEAD, INITIAL    Collection Time: 06/27/17 10:42 AM Result Value Ref Range    Ventricular Rate 61 BPM    Atrial Rate 61 BPM    P-R Interval 164 ms    QRS Duration 104 ms    Q-T Interval 404 ms    QTC Calculation (Bezet) 406 ms    Calculated P Axis 50 degrees    Calculated R Axis 53 degrees    Calculated T Axis 44 degrees    Diagnosis       Normal sinus rhythm  Normal ECG  When compared with ECG of 11-MAY-2016 10:22,  MANUAL COMPARISON REQUIRED, DATA IS UNCONFIRMED         Assessment:     OA RIGHT SHOULDER, ROTATOR CUFF TEAR    Plan:     Scheduled for right arthroscopic rotator cuff repair, subacromial decompression, distal clavicle excision. CMP, CBC, and EKG done- results reviewed: unremarkable.      Zacarias Cunha NP

## 2017-06-27 NOTE — PERIOP NOTES
[facility]  PREOPERATIVE INSTRUCTIONS    Surgery Date:   6/29/17  Surgery arrival time given by surgeon: NO   If no,[facility] [address] staff will call you between 4 PM- 8 PM the day before surgery with your arrival time. If your surgery is on a Monday, we will call you the preceding Friday. Please call [phone] after 8 PM if you did not receive your arrival time. 1. Please report at the designated time to the 2[address] [facility]. Bring your insurance card, photo identification, and any copayment ( if applicable). 2. You must have a responsible adult to drive you home. You need to have a responsible adult to stay with you the first 24 hours after surgery if you are going home the same day of your surgery and you should not drive a car for 24 hours following your surgery. 3. Nothing to eat or drink after midnight the night before surgery. This includes no water, gum, mints, coffee, juice, etc.  Please note special instructions, if applicable, below for medications. 4. MEDICATIONS TO TAKE THE MORNING OF SURGERY WITH A SIP OF WATER: Protonix  5. No alcoholic beverages 24 hours before or after your surgery. 6. If you are being admitted to the hospital,please leave personal belongings/luggage in your car until you have an assigned hospital room number. 7. Stop Aspirin and/or any non-steroidal anti-inflammatory drugs (i.e. Ibuprofen, Naproxen, Advil, Aleve) as directed by your surgeon. You may take Tylenol. Stop herbal supplements 1 week prior to  surgery. 8. If you are currently taking Plavix, Coumadin,or any other blood-thinning/anticoagulant medication contact your surgeon for instructions. 9. Please wear comfortable clothes. Wear your glasses instead of contacts. We ask that all money, jewelry and valuables be left at home. Wear no make up, particularly mascara, the day of surgery. 10.  All body piercings, rings,and jewelry need to be removed and left at home. Please wear your hair loose or down. Please no pony-tails, buns, or any metal hair accessories. If you shower the morning of surgery, please do not apply any lotions, powders, or deodorants afterwards. Do not shave any body area within 24 hours of your surgery. 11. Please follow all instructions to avoid any potential surgical cancellation. 12.  Should your physical condition change, (i.e. fever, cold, flu, etc.) please notify your surgeon as soon as possible. 13. It is important to be on time. If a situation occurs where you may be delayed, please call: / [phone] on the day of surgery. 14. The Preadmission Testing staff can be reached at 21 [phone]. .  15. Special instructions: free  parking,     The patient was contacted  in person. He  verbalize  understanding of all instructions does not  need reinforcement.

## 2017-06-28 ENCOUNTER — ANESTHESIA EVENT (OUTPATIENT)
Dept: SURGERY | Age: 62
End: 2017-06-28
Payer: COMMERCIAL

## 2017-06-28 NOTE — PERIOP NOTES
Orders for surgery have not been signed by surgeon. Spoke with Brice Dubon from Dr. Molly Levine' office regarding this. Dr. Molly Levine to sign orders the morning of surgery.   DOS: 6/29/2017

## 2017-06-29 ENCOUNTER — HOSPITAL ENCOUNTER (OUTPATIENT)
Age: 62
Setting detail: OUTPATIENT SURGERY
Discharge: HOME | End: 2017-06-29
Attending: ORTHOPAEDIC SURGERY | Admitting: ORTHOPAEDIC SURGERY
Payer: COMMERCIAL

## 2017-06-29 ENCOUNTER — ANESTHESIA (OUTPATIENT)
Dept: SURGERY | Age: 62
End: 2017-06-29
Payer: COMMERCIAL

## 2017-06-29 VITALS
WEIGHT: 183.42 LBS | BODY MASS INDEX: 23.54 KG/M2 | TEMPERATURE: 98.5 F | OXYGEN SATURATION: 98 % | SYSTOLIC BLOOD PRESSURE: 138 MMHG | DIASTOLIC BLOOD PRESSURE: 68 MMHG | HEART RATE: 85 BPM | HEIGHT: 74 IN | RESPIRATION RATE: 12 BRPM

## 2017-06-29 PROCEDURE — 76060000062 HC AMB SURG ANES 1 TO 1.5 HR: Performed by: ORTHOPAEDIC SURGERY

## 2017-06-29 PROCEDURE — 77030011930 HC WND ARTHRO ABLT S&N -C: Performed by: ORTHOPAEDIC SURGERY

## 2017-06-29 PROCEDURE — 77030002916 HC SUT ETHLN J&J -A: Performed by: ORTHOPAEDIC SURGERY

## 2017-06-29 PROCEDURE — 77030006878 HC BLD SHV FLL RAD S&N -C: Performed by: ORTHOPAEDIC SURGERY

## 2017-06-29 PROCEDURE — 77030013837 HC NERV BLK KT BBMI -B

## 2017-06-29 PROCEDURE — 77030018673: Performed by: ORTHOPAEDIC SURGERY

## 2017-06-29 PROCEDURE — 77030020143 HC AIRWY LARYN INTUB CGAS -A: Performed by: ANESTHESIOLOGY

## 2017-06-29 PROCEDURE — 74011250636 HC RX REV CODE- 250/636

## 2017-06-29 PROCEDURE — 77030029710 HC ANCHR SUT JGERKNT BIOM -E: Performed by: ORTHOPAEDIC SURGERY

## 2017-06-29 PROCEDURE — 77030019974 HC FRCP GRSP SUT J&J -C: Performed by: ORTHOPAEDIC SURGERY

## 2017-06-29 PROCEDURE — 74011250636 HC RX REV CODE- 250/636: Performed by: ANESTHESIOLOGY

## 2017-06-29 PROCEDURE — 64415 NJX AA&/STRD BRCH PLXS IMG: CPT

## 2017-06-29 PROCEDURE — 77030032490 HC SLV COMPR SCD KNE COVD -B: Performed by: ORTHOPAEDIC SURGERY

## 2017-06-29 PROCEDURE — 76210000034 HC AMBSU PH I REC 0.5 TO 1 HR: Performed by: ORTHOPAEDIC SURGERY

## 2017-06-29 PROCEDURE — 77030010507 HC ADH SKN DERMBND J&J -B

## 2017-06-29 PROCEDURE — 77030018835 HC SOL IRR LR ICUM -A: Performed by: ORTHOPAEDIC SURGERY

## 2017-06-29 PROCEDURE — 77030010816: Performed by: ORTHOPAEDIC SURGERY

## 2017-06-29 PROCEDURE — 74011000250 HC RX REV CODE- 250: Performed by: ANESTHESIOLOGY

## 2017-06-29 PROCEDURE — 77030011640 HC PAD GRND REM COVD -A: Performed by: ORTHOPAEDIC SURGERY

## 2017-06-29 PROCEDURE — 77030020782 HC GWN BAIR PAWS FLX 3M -B

## 2017-06-29 PROCEDURE — 77030008496 HC TBNG ARTHSC IRR S&N -B: Performed by: ORTHOPAEDIC SURGERY

## 2017-06-29 PROCEDURE — 74011250636 HC RX REV CODE- 250/636: Performed by: ORTHOPAEDIC SURGERY

## 2017-06-29 PROCEDURE — 77030014154 HC WRP CLD THER BREG -C: Performed by: ORTHOPAEDIC SURGERY

## 2017-06-29 PROCEDURE — 77030016677 HC BUR SHV ABRD S&N -B: Performed by: ORTHOPAEDIC SURGERY

## 2017-06-29 PROCEDURE — 77030032497 HC WRP SHLDR WO BGS SOLM -B

## 2017-06-29 PROCEDURE — 76030000001 HC AMB SURG OR TIME 1 TO 1.5: Performed by: ORTHOPAEDIC SURGERY

## 2017-06-29 PROCEDURE — 77030013234 HC TRACT SHLDR KT BIOM -B: Performed by: ORTHOPAEDIC SURGERY

## 2017-06-29 PROCEDURE — 76210000057 HC AMBSU PH II REC 1 TO 1.5 HR: Performed by: ORTHOPAEDIC SURGERY

## 2017-06-29 DEVICE — ANCHOR SUT DIA2.9MM NO2 MAXBRAID DBL LD JUGGERKNOT: Type: IMPLANTABLE DEVICE | Site: SHOULDER | Status: FUNCTIONAL

## 2017-06-29 RX ORDER — SODIUM CHLORIDE 0.9 % (FLUSH) 0.9 %
5-10 SYRINGE (ML) INJECTION EVERY 8 HOURS
Status: DISCONTINUED | OUTPATIENT
Start: 2017-06-29 | End: 2017-06-29 | Stop reason: HOSPADM

## 2017-06-29 RX ORDER — ONDANSETRON 2 MG/ML
INJECTION INTRAMUSCULAR; INTRAVENOUS AS NEEDED
Status: DISCONTINUED | OUTPATIENT
Start: 2017-06-29 | End: 2017-06-29 | Stop reason: HOSPADM

## 2017-06-29 RX ORDER — MIDAZOLAM HYDROCHLORIDE 1 MG/ML
INJECTION, SOLUTION INTRAMUSCULAR; INTRAVENOUS AS NEEDED
Status: DISCONTINUED | OUTPATIENT
Start: 2017-06-29 | End: 2017-06-29 | Stop reason: HOSPADM

## 2017-06-29 RX ORDER — HYDROMORPHONE HYDROCHLORIDE 1 MG/ML
.25-1 INJECTION, SOLUTION INTRAMUSCULAR; INTRAVENOUS; SUBCUTANEOUS
Status: DISCONTINUED | OUTPATIENT
Start: 2017-06-29 | End: 2017-06-29 | Stop reason: HOSPADM

## 2017-06-29 RX ORDER — ROPIVACAINE HYDROCHLORIDE 5 MG/ML
INJECTION, SOLUTION EPIDURAL; INFILTRATION; PERINEURAL AS NEEDED
Status: DISCONTINUED | OUTPATIENT
Start: 2017-06-29 | End: 2017-06-29 | Stop reason: HOSPADM

## 2017-06-29 RX ORDER — SODIUM CHLORIDE, SODIUM LACTATE, POTASSIUM CHLORIDE, CALCIUM CHLORIDE 600; 310; 30; 20 MG/100ML; MG/100ML; MG/100ML; MG/100ML
100 INJECTION, SOLUTION INTRAVENOUS CONTINUOUS
Status: DISCONTINUED | OUTPATIENT
Start: 2017-06-29 | End: 2017-06-29 | Stop reason: HOSPADM

## 2017-06-29 RX ORDER — PROPOFOL 10 MG/ML
INJECTION, EMULSION INTRAVENOUS AS NEEDED
Status: DISCONTINUED | OUTPATIENT
Start: 2017-06-29 | End: 2017-06-29 | Stop reason: HOSPADM

## 2017-06-29 RX ORDER — SODIUM CHLORIDE 0.9 % (FLUSH) 0.9 %
5-10 SYRINGE (ML) INJECTION AS NEEDED
Status: DISCONTINUED | OUTPATIENT
Start: 2017-06-29 | End: 2017-06-29 | Stop reason: HOSPADM

## 2017-06-29 RX ORDER — DEXAMETHASONE SODIUM PHOSPHATE 4 MG/ML
INJECTION, SOLUTION INTRA-ARTICULAR; INTRALESIONAL; INTRAMUSCULAR; INTRAVENOUS; SOFT TISSUE AS NEEDED
Status: DISCONTINUED | OUTPATIENT
Start: 2017-06-29 | End: 2017-06-29 | Stop reason: HOSPADM

## 2017-06-29 RX ORDER — CEFAZOLIN SODIUM IN 0.9 % NACL 2 G/50 ML
2 INTRAVENOUS SOLUTION, PIGGYBACK (ML) INTRAVENOUS ONCE
Status: COMPLETED | OUTPATIENT
Start: 2017-06-29 | End: 2017-06-29

## 2017-06-29 RX ORDER — FENTANYL CITRATE 50 UG/ML
INJECTION, SOLUTION INTRAMUSCULAR; INTRAVENOUS AS NEEDED
Status: DISCONTINUED | OUTPATIENT
Start: 2017-06-29 | End: 2017-06-29 | Stop reason: HOSPADM

## 2017-06-29 RX ORDER — ACETAMINOPHEN 325 MG/1
650 TABLET ORAL
COMMUNITY
End: 2017-06-29

## 2017-06-29 RX ORDER — LIDOCAINE HYDROCHLORIDE 10 MG/ML
0.1 INJECTION, SOLUTION EPIDURAL; INFILTRATION; INTRACAUDAL; PERINEURAL AS NEEDED
Status: DISCONTINUED | OUTPATIENT
Start: 2017-06-29 | End: 2017-06-29 | Stop reason: HOSPADM

## 2017-06-29 RX ADMIN — MIDAZOLAM HYDROCHLORIDE 1 MG: 1 INJECTION, SOLUTION INTRAMUSCULAR; INTRAVENOUS at 11:51

## 2017-06-29 RX ADMIN — BUPIVACAINE HYDROCHLORIDE 6 ML/HR: 7.5 INJECTION, SOLUTION EPIDURAL; RETROBULBAR at 14:14

## 2017-06-29 RX ADMIN — MIDAZOLAM HYDROCHLORIDE 1 MG: 1 INJECTION, SOLUTION INTRAMUSCULAR; INTRAVENOUS at 11:50

## 2017-06-29 RX ADMIN — SODIUM CHLORIDE, SODIUM LACTATE, POTASSIUM CHLORIDE, AND CALCIUM CHLORIDE 100 ML/HR: 600; 310; 30; 20 INJECTION, SOLUTION INTRAVENOUS at 10:33

## 2017-06-29 RX ADMIN — FENTANYL CITRATE 25 MCG: 50 INJECTION, SOLUTION INTRAMUSCULAR; INTRAVENOUS at 12:45

## 2017-06-29 RX ADMIN — FENTANYL CITRATE 50 MCG: 50 INJECTION, SOLUTION INTRAMUSCULAR; INTRAVENOUS at 11:52

## 2017-06-29 RX ADMIN — FENTANYL CITRATE 50 MCG: 50 INJECTION, SOLUTION INTRAMUSCULAR; INTRAVENOUS at 12:36

## 2017-06-29 RX ADMIN — FENTANYL CITRATE 50 MCG: 50 INJECTION, SOLUTION INTRAMUSCULAR; INTRAVENOUS at 11:46

## 2017-06-29 RX ADMIN — DEXAMETHASONE SODIUM PHOSPHATE 8 MG: 4 INJECTION, SOLUTION INTRA-ARTICULAR; INTRALESIONAL; INTRAMUSCULAR; INTRAVENOUS; SOFT TISSUE at 12:51

## 2017-06-29 RX ADMIN — MIDAZOLAM HYDROCHLORIDE 2 MG: 1 INJECTION, SOLUTION INTRAMUSCULAR; INTRAVENOUS at 11:46

## 2017-06-29 RX ADMIN — ONDANSETRON 4 MG: 2 INJECTION INTRAMUSCULAR; INTRAVENOUS at 13:44

## 2017-06-29 RX ADMIN — CEFAZOLIN 2 G: 1 INJECTION, POWDER, FOR SOLUTION INTRAMUSCULAR; INTRAVENOUS; PARENTERAL at 12:44

## 2017-06-29 RX ADMIN — LIDOCAINE HYDROCHLORIDE 0.1 ML: 10 INJECTION, SOLUTION EPIDURAL; INFILTRATION; INTRACAUDAL; PERINEURAL at 10:33

## 2017-06-29 RX ADMIN — FENTANYL CITRATE 50 MCG: 50 INJECTION, SOLUTION INTRAMUSCULAR; INTRAVENOUS at 13:59

## 2017-06-29 RX ADMIN — MIDAZOLAM HYDROCHLORIDE 1 MG: 1 INJECTION, SOLUTION INTRAMUSCULAR; INTRAVENOUS at 11:49

## 2017-06-29 RX ADMIN — SODIUM CHLORIDE, SODIUM LACTATE, POTASSIUM CHLORIDE, AND CALCIUM CHLORIDE: 600; 310; 30; 20 INJECTION, SOLUTION INTRAVENOUS at 13:08

## 2017-06-29 RX ADMIN — PROPOFOL 200 MG: 10 INJECTION, EMULSION INTRAVENOUS at 12:37

## 2017-06-29 RX ADMIN — ROPIVACAINE HYDROCHLORIDE 30 ML: 5 INJECTION, SOLUTION EPIDURAL; INFILTRATION; PERINEURAL at 11:58

## 2017-06-29 NOTE — ANESTHESIA PROCEDURE NOTES
Peripheral Block    Start time: 6/29/2017 11:46 AM  End time: 6/29/2017 12:00 PM  Performed by: Juan Siddiqi  Authorized by: Juan Siddiqi       Pre-procedure: Indications: at surgeon's request and post-op pain management    Preanesthetic Checklist: patient identified, risks and benefits discussed, site marked, timeout performed, anesthesia consent given and patient being monitored    Timeout Time: 11:46          Block Type:   Block Type:   Interscalene  Laterality:  Right  Monitoring:  Continuous pulse ox, frequent vital sign checks, heart rate, responsive to questions and oxygen  Injection Technique:  Continuous  Procedures: ultrasound guided    Patient Position: supine  Prep: chlorhexidine    Location:  Interscalene  Needle Type:  Tuohy  Needle Gauge:  18 G  Needle Localization:  Nerve stimulator and ultrasound guidance  Medication Injected:  0.5%  ropivacaine  Volume (mL):  30    Assessment:  Number of attempts:  1  Injection Assessment:  Incremental injection every 5 mL, local visualized surrounding nerve on ultrasound, negative aspiration for blood and no intravascular symptoms  Patient tolerance:  Patient tolerated the procedure well with no immediate complications

## 2017-06-29 NOTE — PERIOP NOTES
Reviewed DC instructions with pt and wife including ice packs, shoulder immobilizer and on-Q. Both verbalize understanding; taking po without c/o N/V, no c/o pain. VSS.

## 2017-06-29 NOTE — H&P
Date of Surgery Update:  Luna Pierre was seen and examined. History and physical has been reviewed. The patient has been examined.  There have been no significant clinical changes since the completion of the originally dated History and Physical.    Signed By: Belvie Schwab, MD     June 29, 2017 11:51 AM

## 2017-06-29 NOTE — BRIEF OP NOTE
BRIEF OPERATIVE NOTE    Date of Procedure: 6/29/2017   Preoperative Diagnosis: OA RIGHT SHOULDER, ROTATOR CUFF TEAR  Postoperative Diagnosis: OA RIGHT SHOULDER, ROTATOR CUFF TEAR, impingement, AC joint OA, labral tear, bursitis  Procedure: Procedure(s):  RIGHT ARTHROSCOPIC ROTATOR CUFF REPAIR, SUBACROMIAL DECOMPRESSION, DISTAL CLAVICLE EXCISION  (GEN W/BLK), extensive debridement    Surgeon: Isabella Bucio MD  Assistant(s): Yin Hanks PA-C, ATC  Anesthesia: General   Estimated Blood Loss: minimal  Specimens: * No specimens in log *   Findings: RCT  Complications: None  Implants:   Implant Name Type Inv.  Item Serial No.  Lot No. LRB No. Used Action   ANCHOR SUT JUGGERKNOT 2.9MM --  - SN/A   ANCHOR SUT JUGGERKNOT 2.9MM --  N/A [employer] [id number] 3 Implanted

## 2017-06-29 NOTE — IP AVS SNAPSHOT
Ricocarissa Lagosgordon 
 
 
 [address] [place] [address] 
[phone] Patient: Arcenio Chew MRN: [id number] You are allergic to the following Allergen Reactions Codeine Rash Recent Documentation Height Weight BMI Smoking Status 1.88 m 83.2 kg 23.55 kg/m2 Never Smoker Emergency Contacts Name Discharge Info Relation Home Work Mobile [facility] DISCHARGE CAREGIVER [3] Spouse [3]   [phone] About your hospitalization You were admitted on:  June 29, 2017 You last received care in the:  [facility] You were discharged on:  June 29, 2017 Unit phone number:  [phone] Why you were hospitalized Your primary diagnosis was:  Not on File Providers Seen During Your Hospitalizations Provider Role Specialty Primary office phone Vladimir Be MD Attending Provider Orthopedic Surgery [phone] Your Primary Care Physician (PCP) Primary Care Physician Office Phone Office Fax Liza Pereira [phone] Follow-up Information Follow up With Details Comments Contact Info MD [place] [address] [address] [place] [address] 
[phone] Current Discharge Medication List  
  
CONTINUE these medications which have NOT CHANGED Dose & Instructions Dispensing Information Comments Morning Noon Evening Bedtime ADVIL PM PO Your last dose was: Your next dose is: Take  by mouth. Refills:  0 ALPRAZolam 0.5 mg tablet Commonly known as:  Adilia Bates Your last dose was: Your next dose is: TAKE 1 TABLET BY MOUTH EVERY 8 HOURS AS NEEDED FOR ANXIETY Quantity:  20 Tab Refills:  2  
     
   
   
   
  
 atorvastatin 10 mg tablet Commonly known as:  LIPITOR Your last dose was: Your next dose is: TAKE ONE TABLET BY MOUTH EVERY DAY Quantity:  30 Tab Refills:  0  
     
   
   
   
  
 lisinopril 20 mg tablet Commonly known as:  London Dougie Your last dose was: Your next dose is: TAKE ONE TABLET BY MOUTH EVERY DAY Quantity:  30 Tab Refills:  5  
     
   
   
   
  
 pantoprazole 40 mg tablet Commonly known as:  PROTONIX Your last dose was: Your next dose is: TAKE 1 TABLET BY MOUTH EVERY DAY Quantity:  30 Tab Refills:  5 SUBOXONE 8-2 mg Film sublingaul film Generic drug:  buprenorphine-naloxone Your last dose was: Your next dose is:    
   
   
 by SubLINGual route daily. Instructed to stop by Darleen Funk. Refills:  0  
     
   
   
   
  
 testosterone cypionate 100 mg/mL injection Your last dose was: Your next dose is:    
   
   
 Dose:  100 mg/mL 100 mg/mL by IntraMUSCular route every seven (7) days. Every Wednesday Refills:  0 VIAGRA 100 mg tablet Generic drug:  sildenafil citrate Your last dose was: Your next dose is:    
   
   
 Dose:  100 mg Take 1 Tab by mouth as needed. Quantity:  8 Tab Refills:  5 Pt's plan will only cover 8 tabs per 30 days STOP taking these medications TYLENOL 325 mg tablet Generic drug:  acetaminophen Discharge Instructions Arthroscopic Rotator Cuff Repair Discharge Instructions Dr. Oanh Patel Please take the time to review the following instructions before you leave the hospital and use them as guidelines during your recovery from surgery. If you have any questions, you may contact my office at [phone]. Wound Care / Dressing Change: You may change your dressings as needed.  Beginning 2 days after you are discharged from the hospital, you should change your dressings daily. A big, bulky dressing isnt necessary as long as there is no drainage from the incisions. You can put a band-aid or piece of gauze over each incision. Do not apply antibiotic ointment to your incisions. Yarelis  / Bathing: You may shower 2 days after your surgery. Your dressing may be removed for showering. You may get your incisions wet in the shower. Do not vigorously scrub your incisions. Apply a clean, dry dressing after you have dried your incisions. Do not take a bath or get into a swimming pool or [employer] until you follow up with Dr. Jenni Llamas. Do not soak your incisions under water. Sling: 
Keep your arm in the immobilizer/sling at all times except when showering, changing your clothes, or doing the exercises shown to you by Dr. Jenni Llamas or your physical/occupational therapist prior to your discharge from the hospital. Keep your arm at your side when changing your clothes and showering. Do not move it away from your body. Ice and Elevation: 
Continue ice consistently for 48 hours after surgery. After 48 hours, you should ice 3 times per day for 20 minutes at a time for the next 5 days. After 1 week from surgery, you may use ice as needed for pain. Diet: 
You may advance your regular diet as tolerated. Increase your clear liquid intake for the next 2-3 days. Medications: 
1. You will be given a prescriptions for pain medication when you are discharged from the hospital. Please use the medication as prescribed. Pain medications may cause constipation. Please take over the counter stool softeners while you are taking narcotic pain medication. Stool softeners, warm prune juice, and increasing your fluid and fiber intake can help prevent constipation. Do not take laxatives.   Other possible side effects of pain medications are dizziness, headache, nausea, vomiting, and urinary retention. Discontinue the pain medication if you develop itching, rash, shortness of breath, or difficulties swallowing. If these symptoms become severe or arent relieved by discontinuing the medication, you should seek immediate medical attention. Refills of pain medication are authorized during office hours only (8AM  5PM Monday through Friday) 2. If you were prescribed Percocet/Oxycodone, Norco/Lortab/Vicodin/Hydrocodone or Dilaudid/Hydromorphone you must have a written prescription. These medications cannot be leagally called into the pharmacy. 3. Do not take Tylenol/Acetaminophen in addition to your pain medication, as most pain medications already contain this. Do not exceed 3000mg of Tylenol/Acetaminophen per day. 4. You may resume the medications you were taking prior to your surgery. Pain medication may change the effects of any antidepressant medication you may be taking. If you have any questions about possible interactions between your regular medication and the pain medication, you should consult the physician who prescribes your regular medications. Follow up appointment: 
Please follow up at your scheduled appointment 7-10 days from the day of your surgery. If you do not already have an appointment, please call our office at [phone] for your follow up appointment. Your appointment will likely be with Kristina Hernandez PA-C. Aruna Richard assists Dr. Thomas Quick in surgery and will be able to review your operation and answer your questions. Physical Therapy: 
You MUST begin physical therapy 2-3 days after surgery. If you already have a therapy appointment, please be sure to attend your sessions as scheduled. If you do not have physical therapy scheduled, please call Dr. Mavis Spence office to set up your first appointment as soon as possible. Important Signs and Symptoms: If any of the following signs and symptoms occurs, you should contact Dr. Pennie Jeronimo office. Please be advised if a problem arises which you feel requires immediate medical attention or you are unable to contact Dr. Pennie Jeronimo office, you should seek immediate medical attention. Signs and symptoms to watch for include: 1. A sudden increase in swelling and/or redness or warmth at the area of your surgery which isnt relieved by rest, ice, and elevation. 2. Oral temperature greater than 101 degrees for 12 hours or more which isnt relieved by an increase in fluid intake and takingTylenol. 3. Excessive drainage from your incisions, or drainage which hasnt stopped by 72 hours after your surgery. 4. Calf pain, fever, chills, shortness of breath, chest pain, nausea, vomiting or other signs and symptoms which are of concern to you. DISCHARGE SUMMARY from your Nurse The following personal items collected during your admission are returned to you:  
Dental Appliance: Dental Appliances: None Vision: Visual Aid: None Hearing Aid:   
Jewelry: Jewelry: None Clothing: Clothing: Pants, Footwear, Shirt, Socks, Undergarments, [employer] Other Valuables: Other Valuables: None Valuables sent to safe:   
 
PATIENT INSTRUCTIONS: 
 
After general anesthesia or intravenous sedation, for 24 hours or while taking prescription Narcotics: · Limit your activities · Do not drive and operate hazardous machinery · Do not make important personal or business decisions · Do  not drink alcoholic beverages · If you have not urinated within 8 hours after discharge, please contact your surgeon on call. Report the following to your surgeon: 
· Excessive pain, swelling, redness or odor of or around the surgical area · Temperature over 100.5 · Nausea and vomiting lasting longer than 4 hours or if unable to take medications · Any signs of decreased circulation or nerve impairment to extremity: change in color, persistent  numbness, tingling, coldness or increase pain · Any questions [address] Breathing deep and coughing are very important exercises to do after surgery. Deep breathing and coughing open the little air tubes and air sacks in your lungs. You take deep breaths every day. You may not even notice - it is just something you do when you sigh or yawn. It is a natural exercise you do to keep these air passages open. After surgery, take deep breaths and cough, on purpose. Coughing and deep breathing help prevent bronchitis and pneumonia after surgery. If you had chest or belly surgery, use a pillow as a \"hug carrie\" and hold it tightly to your chest or belly when you cough. DIRECTIONS: 
6. Take 10 to 15 slow deep breaths every hour while awake. 7. Breathe in deeply, and hold it for 2 seconds. 8. Exhale slowly through puckered lips, like blowing up a balloon. 9. After every 4th or 5th deep breath, hug your pillow to your chest or belly and give a hard, deep cough. Yes, it will probably hurt. But doing this exercise is very important part of healing after surgery. Take your pain medicine to help you do this exercise without too much pain. IF YOU HAVE BEEN DIAGNOSED WITH SLEEP APNEA, PLEASE USE YOUR SLEEIFP APNEA DEVICE OR CPAP MACHINE WHEN YOU INTEND TO NAP AFTER TAKING PAIN MEDICATION. Ankle Pumps Ankle pumps increase the circulation of oxygenated blood to your lower extremities and decrease your risk for circulation problems such as blood clots. They also stretch the muscles, tendons and ligaments in your foot and ankle, and prevent joint contracture in the ankle and foot, especially after surgeries on the legs. It is important to do ankle pump exercises regularly after surgery because immobility increases your risk for developing a blood clot. Your doctor may also have you take an Aspirin for the next few days as well. If your doctor did not ask you to take an Aspirin, consult with him before starting Aspirin therapy on your own. Slowly point your foot forward, feeling the muscles on the top of your lower leg stretch, and hold this position for 5 seconds. Next, pull your foot back toward you as far as possible, stretching the calf muscles, and hold that position for 5 seconds. Repeat with the other foot. Perform 10 repetitions every hour while awake for both ankles if possible (down and then up with the foot once is one repetition). You should feel gentle stretching of the muscles in your lower leg when doing this exercise. If you feel pain, or your range of motion is limited, don't  Push too hard. Only go the limit your joint and muscles will let you go. If you have increasing pain, progressively worsening leg warmth or swelling, STOP the exercise and call your doctor. Below is information about the medications your doctor is prescribing after your visit: 
 
 
 
[facility] MEDICATION AND SIDE EFFECT GUIDE 
 
 The [address] [facility] [place] EFFECT GUIDE was provided to the PATIENT AND CARE PROVIDER. Information provided includes instruction about drug purpose and common side effects for the following medications: 
 
· PERCOCET · ASPIRIN Your Peripheral Nerve Catheter Patient Information The anesthesiology team has provided for your pain control through a technique called regional anesthesia. As the name implies, anesthesia (decreased or no pain, sensation, or motor control) has been provided to a specific region, whether that be an arm or a leg. How does this happen?  you might ask. While you were sleepy, the anesthesia provider provided medicine to a specific group of nerves either in the neck/shoulder region or in the groin and/or buttock region, similar to the way a dentist might numb a tooth (teeth.)  They typically use an ultrasound machine to know where the medicine is placed in relation to the nerves they wish to numb up or block.   In some cases a catheter is also placed through the needle before the needle is pulled out of the skin so that after surgery, medicine may be continually infused to provide continued pain relief. What this means is that for the next few hours, you should expect to have a numb limb. Below are some things we wish for you to read and be familiar with concerning your anesthetized limb. Caring for Your Peripheral Nerve Catheter ? Avoid pulling or tugging on the catheter as this may dislodge or change the placement location of the catheter which can decrease its effectiveness. ? Do not soak the dressing covering the catheter. Besides the risk of loosing the dressing and increasing the chances of an infection, this can also result in dislodging the catheter and decreasing its effectiveness. Keep the catheter site dry while bathing. ? Avoid kinking or pinching the catheter and tubing ? The Flow Dial is set for a specific rate prescribed by the anesthesiologist.  DO NOT remove the zip-tie and adjust the rate. Check Your Dressing Site ? If the dressing is wet but you are still experiencing pain relief, simply reinforce the site with additional gauze. DO NOT remove the original dressing. ? If the dressing is wet and you are not getting adequate pain relief, page the anesthesiologist on call; the number is at the bottom of the On-Q® C-Bloc® information. (Note the catheter location for a shoulder/arm block.) Caring For a Blocked Body Part General Considerations: ? The numbness may last up to 24 hours ? You must protect your arm or leg. The blocked extremity is numb, weak, and difficult for your brain to locate and communicate with. To do this you should: 
o Pay attention to the position of the blocked limb at all times. o Be very careful when placing hot or cod items on a numb limb. You could cause tissue damage like burns or frostbite if you are unable to feel temperature. Carefully follow your discharge instructions regarding the use of these therapies in you post-operative care. o Carefully pad the affected limb. Normally we continually move and adjust the position of our bodies without thinking about it. This movement and continuous repositioning helps to prevent injuries from immobility. When a limb is numb it still requires this care 
o Be extremely careful not to bump or hit the numb body part. This can result in an unrecognized injury, at lease until the blocked limb wakes up. It can also result in worse pain of your already post-surgical limb. Arm/Shoulder Blocks: 
? You may experience a droopy eyelid, nasal stuffiness, and redness of the eye after receiving an arm/shoulder block. This is called Valentes Syndrome, and is very common. There is no need for concern.   You may also experience mild hoarseness, but all of these symptoms should resolve within 24 hours. ? Some patients may experience mild shortness of breath. A sitting position will help alleviate this and it should resolve within 24 hours. If you experience significant or progressive worsening of the shortness of breath, close the clamp on the pump tubing (see picture above, found between the [facility]; note arrow) and seek medical attention immediately. Knee/Foot Blocks: 
? DO NOT use the blocked leg to walk, balance or support yourself. Your leg will not be able to balance your weight properly while any part of your leg is numb. You are at a RISK for FALLING. ? Be careful not to drag your foot along the ground or stub your toes. What Should I Call About? If the following occur, CLOSE THE CLAMP IMMEDIATELY AND CALL 911! 
? Severe Shortness of Breath ? Seizures If you develop any of the following, you should close the clamp on the tubing IMMEDIATELY and call the anesthesiologist on-call (that number is found at the end of these instructions.) ? Numb Lips ? Increased anxiety ? Redness, pain, or swelling around the catheter site where it enters the skin ? Metallic taste in mouth ? Shortness of breath ? Ringing in the ears ? Sudden unexplained sleepiness ? Dizziness ? Tremors, shaking, twitching Removing Your Catheter Ambulatory Surgery patients discontinue (remove) the catheter at home once the pump reservoir is empty and you can feel the hard center core. Depending on the prescribed set rate, this usually happens in approximately 1.5 to 2.5 days. Removing the catheter is very easy to do. Generally the most uncomfortable part is removing the dressing which secures the catheter to your skin. To remove the catheter, follow these steps: 
1. Wash your hands for 15 seconds with warm, soapy water and dry on a clean towel.  
2. Gently remove the clear dressing and tape that cover the catheter on your skin. 3. Firmly grasp the catheter 5-10cm (2 to 4 inches) from where the catheter enters the skin. 4. In a smooth and gentle way, slowly pull on the catheter. Often there is a dot of skin glue right at the site where the catheter enters the skin. You may need to break the bond of the glue, but once done the catheter should come out with little to no resistance. DO NOT TRY TO REMOVE IT WITH FORCE. If the catheter had a lot of resistance to being removed, call the anesthesiologist on-call for further instructions. 5. The catheter tip is colored blue or black. Check to see that the tip of the catheter is present. If it isnt. call the anesthesiologist on-call. 6. The catheter and medication delivery pump are all single use and can be discarded in the trash. 7. Apply a small bandage if necessary. A small amount of fluid or blood from the catheter site is normal and nothing to be greatly concerned about. If it continues for more than 24 hours notify the anesthesiologist on-call. You will want to continue to monitor the catheter site until healed for signs of infection (refer to the nursing instructions regarding wound care.) 8. Wash your hands. Contact Phone Numbers CALL 911 IN CASE OF SEIZURE OR BREATHING EMERGENCY. For questions concerning the On-Q pump or its operation, call the On-Q 24 hour clinical hotline at  
[phone]. For all other non-emergency inquiries call the "[employer]"  at 
[phone] and ask for the anesthesiologist on call to be paged. Discharge Orders None Introducing [facility] SERVICES! Dear Cyndi Ramos: Thank you for requesting a [employer] account. Our records indicate that you have previously registered for a [employer] account but its currently inactive. Please call our [employer] support line at [phone]. Additional Information If you have questions, please visit the Frequently Asked Questions section of the [employer] website at [email or web address] Remember, MyChart is NOT to be used for urgent needs. For medical emergencies, dial 911. Now available from your iPhone and Android! General Information Please provide this summary of care documentation to your next provider. Patient Signature:  ____________________________________________________________ Date:  ____________________________________________________________  
  
Ramos Hayden Provider Signature:  ____________________________________________________________ Date:  ____________________________________________________________

## 2017-06-29 NOTE — ANESTHESIA POSTPROCEDURE EVALUATION
Post-Anesthesia Evaluation and Assessment    Patient: Maryann Lennon MRN: [id number]  SSN: [id number]    YOB: 1955  Age: 62 y.o. Sex: male       Cardiovascular Function/Vital Signs  Visit Vitals    /69    Pulse 85    Temp 37.3 °C (99.2 °F)    Resp 11    Ht 6' 2\" (1.88 m)    Wt 83.2 kg (183 lb 6.8 oz)    SpO2 94%    BMI 23.55 kg/m2       Patient is status post general anesthesia for Procedure(s):  RIGHT ARTHROSCOPIC ROTATOR CUFF REPAIR, SUBACROMIAL DECOMPRESSION, DISTAL CLAVICLE EXCISION  (GEN W/BLK). Nausea/Vomiting: None    Postoperative hydration reviewed and adequate. Pain:  Pain Scale 1: Numeric (0 - 10) (06/29/17 1425)  Pain Intensity 1: 0 (06/29/17 1425)   Managed    Neurological Status:   Neuro (WDL): Exceptions to WDL (06/29/17 1425)  Neuro  Neurologic State: Alert;Eyes open spontaneously (06/29/17 1425)  Orientation Level: Oriented to person;Oriented to place;Oriented to situation (06/29/17 1425)  Cognition: Follows commands (06/29/17 1425)  Speech: Clear (06/29/17 1425)  LUE Motor Response: Purposeful;Spontaneous  (06/29/17 1410)  LLE Motor Response: Purposeful;Spontaneous  (06/29/17 1410)  RUE Motor Response: Spontaneous ; Purposeful;Weak (06/29/17 1425)  RLE Motor Response: Purposeful;Spontaneous  (06/29/17 1410)   At baseline    Mental Status and Level of Consciousness: Arousable    Pulmonary Status:   O2 Device: Room air (06/29/17 1425)   Adequate oxygenation and airway patent    Complications related to anesthesia: None    Post-anesthesia assessment completed.  No concerns    Signed By: Maryellen Stock MD     June 29, 2017

## 2017-06-29 NOTE — OP NOTES
ARTHROSCOPIC ROTATOR CUFF REPAIR OP NOTE       Date of Procedure: 6/29/2017   Preoperative Diagnosis: OA RIGHT SHOULDER, ROTATOR CUFF TEAR  Postoperative Diagnosis: OA RIGHT SHOULDER, ROTATOR CUFF TEAR    Procedure: Procedure(s):  RIGHT ARTHROSCOPIC ROTATOR CUFF REPAIR, SUBACROMIAL DECOMPRESSION, DISTAL CLAVICLE EXCISION  (GEN W/BLK)  Surgeon: Fouzia Guerrero MD  Assistant(s): Celia Hogue PA-C, ATC  Anesthesia: General   Estimated Blood Loss: <50cc  Specimens: * No specimens in log *   Complications: None  Implants:  Implant Name Type Inv. Item Serial No.  Lot No. LRB No. Used Action   ANCHOR SUT JUGGERKNOT 2.9MM --  - SN/A   ANCHOR SUT JUGGERKNOT 2.9MM --  N/A [employer] [facility] [id number] Right 3 Implanted         INDICATIONS:  Roma Carlson  is a 62 y.o., male  who has complained of a long history of shoulder pain. After failure of conservative treatment the patient presents for definitive operative care. DESCRIPTION OF PROCEDURE:  After being informed of the risks and benefits, which include, but are not limited to, bleeding, infection, neurovascular damage, wound complications, pain and stiffness in the shoulder, the patient consented for the procedure. After adequate general anesthesia, the involved shoulder was prepped and draped in a sterile fashion. A standard posterior arthroscopic portal was established and the shoulder was serially inspected. The subscapularis revealed Degenerative fraying of the intraarticular portion The labrum revealed Degenerative fraying of the superior labrum. The humeral head articular surface revealed No significant abnormalities. The glenoid articular surface revealed No significant abnormalities. The superior rotator cuff revealed a tear involving the supraspinatus and infraspinatus  The posterior rotator cuff revealed Degenerative fraying involving less than 50% of the tendon thickness.  All intraarticular abnormalities were debrided appropriately and articular cartilage lesions were appropriately stabilized if present. The biceps tendon was degeneratively frayed and debrided. The scope was then placed in the subacromial space where a lateral portal was established and a bursectomy was performed. The tear was debrided and the tuberosity was abraded to a bleeding bed of bone. A superior stab incision was then made and a corkscrew anchor was inserted at the articular margin. Using a Nevaiser portal, the sutures were passed through the medial aspect of the cuff in a mattress fashion. These sutures were then shuttled out of the anterior portal.  A second anchor was placed anterolaterally, the sutures were passed in a simple fashion and then tied down using a modified Wolf knot. Subsequent anchors were placed laterally and the sutures were passed and tied in a similar fashion until a water tight lateral repair was completed. The medial sutures were then tied down finalizing the repair. The patient had an impingement lesion and the scope was then placed laterally and a bur posteriorly and a subacromial decompression was performed using the cutting block technique. The distal clavicle was visualized and A distal claviculectomy was performed based on the preoperative history  of AC joint tenderness and well_visualized distal clavicular osteoarthritic changes. A final bursectomy was performed and the scope was removed. The wounds were closed. Sterile dressings were applied and the patient was awakened and taken to the recovery room in stable condition.

## 2017-06-29 NOTE — PERIOP NOTES
Pt on Suboxone SL daily for opiate treatment - Stopped 2d prior (SEE MED REC) and Treatment MD aware per pt regarding surgery, and pt states he is aware of treatment plan for post op pain conrol - will notify treatment MD if post op pain opiates change per treatment plan/contract.

## 2017-06-29 NOTE — PERIOP NOTES
Post-Block/Spinal Note    Post Pain Block / Spinal report received from  [facility],  Anesthesia RN. Patient is status-post    Interscalene  Block   With    regional nerve catheter. Regional nerve catheter (if applicable) dressed and capped - note LDA. Patient on assessment is   Awake and alert. Airway is patent on  Supplemental O2 at  2LPM.    Respiratory pattern is:    Even, Non-labored. Sensory below block   Intact. Motor below block      Intact   -  Dr Catarino Blake aware and will reassess after more time has passed in hopes the block will strengthen/deepen    Patient on monitor; note vital signs recorded in doc flow-sheet. Bed in low position, side rails up, call bell in reach,     family at bedside   .

## 2017-06-29 NOTE — DISCHARGE INSTRUCTIONS
Arthroscopic Rotator Cuff Repair  Discharge Instructions  Dr. Walker Bass    Please take the time to review the following instructions before you leave the hospital and use them as guidelines during your recovery from surgery. If you have any questions, you may contact my office at [phone]. Wound Care / Dressing Change: You may change your dressings as needed. Beginning 2 days after you are discharged from the hospital, you should change your dressings daily. A big, bulky dressing isnt necessary as long as there is no drainage from the incisions. You can put a band-aid or piece of gauze over each incision. Do not apply antibiotic ointment to your incisions. Sharren Litter / Bathing: You may shower 2 days after your surgery. Your dressing may be removed for showering. You may get your incisions wet in the shower. Do not vigorously scrub your incisions. Apply a clean, dry dressing after you have dried your incisions. Do not take a bath or get into a swimming pool or Jacuzzi until you follow up with Dr. Henriette Osler. Do not soak your incisions under water. Sling:  Keep your arm in the immobilizer/sling at all times except when showering, changing your clothes, or doing the exercises shown to you by Dr. Henriette Osler or your physical/occupational therapist prior to your discharge from the hospital. Keep your arm at your side when changing your clothes and showering. Do not move it away from your body. Ice and Elevation:  Continue ice consistently for 48 hours after surgery. After 48 hours, you should ice 3 times per day for 20 minutes at a time for the next 5 days. After 1 week from surgery, you may use ice as needed for pain. Diet:  You may advance your regular diet as tolerated. Increase your clear liquid intake for the next 2-3 days. Medications:  1.  You will be given a prescriptions for pain medication when you are discharged from the hospital. Please use the medication as prescribed. Pain medications may cause constipation. Please take over the counter stool softeners while you are taking narcotic pain medication. Stool softeners, warm prune juice, and increasing your fluid and fiber intake can help prevent constipation. Do not take laxatives. Other possible side effects of pain medications are dizziness, headache, nausea, vomiting, and urinary retention. Discontinue the pain medication if you develop itching, rash, shortness of breath, or difficulties swallowing. If these symptoms become severe or arent relieved by discontinuing the medication, you should seek immediate medical attention. Refills of pain medication are authorized during office hours only   (8AM - 5PM Monday through Friday)    2. If you were prescribed Percocet/Oxycodone, Norco/Lortab/Vicodin/Hydrocodone or Dilaudid/Hydromorphone you must have a written prescription. These medications cannot be leagally called into the pharmacy. 3. Do not take Tylenol/Acetaminophen in addition to your pain medication, as most pain medications already contain this. Do not exceed 3000mg of Tylenol/Acetaminophen per day. 4. You may resume the medications you were taking prior to your surgery. Pain medication may change the effects of any antidepressant medication you may be taking. If you have any questions about possible interactions between your regular medication and the pain medication, you should consult the physician who prescribes your regular medications. Follow up appointment:  Please follow up at your scheduled appointment 7-10 days from the day of your surgery. If you do not already have an appointment, please call our office at [phone] for your follow up appointment. Your appointment will likely be with DELFINO Lacey assists Dr. Erfem Kay in surgery and will be able to review your operation and answer your questions.     Physical Therapy:  You MUST begin physical therapy 2-3 days after surgery. If you already have a therapy appointment, please be sure to attend your sessions as scheduled. If you do not have physical therapy scheduled, please call Dr. Mark Duran office to set up your first appointment as soon as possible. Important Signs and Symptoms:  If any of the following signs and symptoms occurs, you should contact Dr. Mark Duran office. Please be advised if a problem arises which you feel requires immediate medical attention or you are unable to contact Dr. Mark Duran office, you should seek immediate medical attention. Signs and symptoms to watch for include:    1. A sudden increase in swelling and/or redness or warmth at the area of your surgery which isnt relieved by rest, ice, and elevation. 2. Oral temperature greater than 101 degrees for 12 hours or more which isnt relieved by an increase in fluid intake and takingTylenol. 3. Excessive drainage from your incisions, or drainage which hasnt stopped by 72 hours after your surgery. 4. Calf pain, fever, chills, shortness of breath, chest pain, nausea, vomiting or other signs and symptoms which are of concern to you. DISCHARGE SUMMARY from your Nurse    The following personal items collected during your admission are returned to you:   Dental Appliance: Dental Appliances: None  Vision: Visual Aid: None  Hearing Aid:    Jewelry: Jewelry: None  Clothing: Clothing: Pants, Footwear, Shirt, Socks, Undergarments, [place]  Other Valuables: Other Valuables: None  Valuables sent to safe:      PATIENT INSTRUCTIONS:    After general anesthesia or intravenous sedation, for 24 hours or while taking prescription Narcotics:  · Limit your activities  · Do not drive and operate hazardous machinery  · Do not make important personal or business decisions  · Do  not drink alcoholic beverages  · If you have not urinated within 8 hours after discharge, please contact your surgeon on call.     Report the following to your surgeon:  · Excessive pain, swelling, redness or odor of or around the surgical area  · Temperature over 100.5  · Nausea and vomiting lasting longer than 4 hours or if unable to take medications  · Any signs of decreased circulation or nerve impairment to extremity: change in color, persistent  numbness, tingling, coldness or increase pain  · Any questions    COUGH AND DEEP BREATHE    Breathing deep and coughing are very important exercises to do after surgery. Deep breathing and coughing open the little air tubes and air sacks in your lungs. You take deep breaths every day. You may not even notice - it is just something you do when you sigh or yawn. It is a natural exercise you do to keep these air passages open. After surgery, take deep breaths and cough, on purpose. Coughing and deep breathing help prevent bronchitis and pneumonia after surgery. If you had chest or belly surgery, use a pillow as a \"hug carrie\" and hold it tightly to your chest or belly when you cough. DIRECTIONS:  6. Take 10 to 15 slow deep breaths every hour while awake. 7. Breathe in deeply, and hold it for 2 seconds. 8. Exhale slowly through puckered lips, like blowing up a balloon. 9. After every 4th or 5th deep breath, hug your pillow to your chest or belly and give a hard, deep cough. Yes, it will probably hurt. But doing this exercise is very important part of healing after surgery. Take your pain medicine to help you do this exercise without too much pain. IF YOU HAVE BEEN DIAGNOSED WITH SLEEP APNEA, PLEASE USE YOUR SLEEIFP APNEA DEVICE OR CPAP MACHINE WHEN YOU INTEND TO NAP AFTER TAKING PAIN MEDICATION. Ankle Pumps    Ankle pumps increase the circulation of oxygenated blood to your lower extremities and decrease your risk for circulation problems such as blood clots.  They also stretch the muscles, tendons and ligaments in your foot and ankle, and prevent joint contracture in the ankle and foot, especially after surgeries on the legs. It is important to do ankle pump exercises regularly after surgery because immobility increases your risk for developing a blood clot. Your doctor may also have you take an Aspirin for the next few days as well. If your doctor did not ask you to take an Aspirin, consult with him before starting Aspirin therapy on your own. Slowly point your foot forward, feeling the muscles on the top of your lower leg stretch, and hold this position for 5 seconds. Next, pull your foot back toward you as far as possible, stretching the calf muscles, and hold that position for 5 seconds. Repeat with the other foot. Perform 10 repetitions every hour while awake for both ankles if possible (down and then up with the foot once is one repetition). You should feel gentle stretching of the muscles in your lower leg when doing this exercise. If you feel pain, or your range of motion is limited, don't  Push too hard. Only go the limit your joint and muscles will let you go. If you have increasing pain, progressively worsening leg warmth or swelling, STOP the exercise and call your doctor. Below is information about the medications your doctor is prescribing after your visit:    Other information in your discharge envelope:  []     PRESCRIPTIONS  []     PHYSICAL THERAPY PRESCRIPTION  []     APPOINTMENT CARDS  []     Regional Anesthesia Pamphlet for block or block with On-Q Catheter from Anesthesia Service  []     Medical device information sheets/pamphlets from their    []     School/work excuse note. []     /parent work excuse note. These are general instructions for a healthy lifestyle:    *  Please give a list of your current medications to your Primary Care Provider.   *  Please update this list whenever your medications are discontinued, doses are      changed, or new medications (including over-the-counter products) are added. *  Please carry medication information at all times in case of emergency situations. About Smoking  No smoking / No tobacco products / Avoid exposure to second hand smoke    Surgeon General's Warning:  Quitting smoking now greatly reduces serious risk to your health. Obesity, smoking, and sedentary lifestyle greatly increases your risk for illness and disease. A healthy diet, regular physical exercise & weight monitoring are important for maintaining a healthy lifestyle. Congestive Heart Failure  You may be retaining fluid if you have a history of heart failure or if you experience any of the following symptoms:  Weight gain of 3 pounds or more overnight or 5 pounds in a week, increased swelling in our hands or feet or shortness of breath while lying flat in bed. Please call your doctor as soon as you notice any of these symptoms; do not wait until your next office visit. Recognize signs and symptoms of STROKE:  F - face looks uneven  A - arms unable to move or move even  S - speech slurred or non-existent  T - time-call 911 as soon as signs and symptoms begin-DO NOT go         Back to bed or wait to see if you get better-TIME IS BRAIN. Warning signs of HEART ATTACK  Call 911 if you have these symptoms    · Chest discomfort. Most heart attacks involve discomfort in the center of the chest that lasts more than a few minutes, or that goes away and comes back. It can feel like uncomfortable pressure, squeezing, fullness, or pain. · Discomfort in other areas of the upper body. Symptoms can include pain or discomfort in one or both        Arms, the back, neck, jaw, or stomach. ·  Shortness of breath with or without chest discomfort. · Other signs may include breaking out in a cold sweat, nausea, or lightheadedness    Don't wait more than five minutes to call 911 - MINUTES MATTER! Fast action can save your life.   Calling 911 is almost always the fastest way to get lifesaving treatment. Emergency Medical Services staff can begin treatment when they arrive - up to an hour sooner than if someone gets to the hospital by car. DARIEL MANRIQUE MEDICATION AND SIDE EFFECT GUIDE    The Karla Anderson MEDICATION AND SIDE EFFECT GUIDE was provided to the PATIENT AND CARE PROVIDER. Information provided includes instruction about drug purpose and common side effects for the following medications:    · PERCOCET   · ASPIRIN                                       Your Peripheral Nerve Catheter  Patient Information    The anesthesiology team has provided for your pain control through a technique called regional anesthesia. As the name implies, anesthesia (decreased or no pain, sensation, or motor control) has been provided to a specific region, whether that be an arm or a leg. How does this happen?  you might ask. While you were sleepy, the anesthesia provider provided medicine to a specific group of nerves either in the neck/shoulder region or in the groin and/or buttock region, similar to the way a dentist might numb a tooth (teeth.)  They typically use an ultrasound machine to know where the medicine is placed in relation to the nerves they wish to numb up or block.   In some cases a catheter is also placed through the needle before the needle is pulled out of the skin so that after surgery, medicine may be continually infused to provide continued pain relief. What this means is that for the next few hours, you should expect to have a numb limb. Below are some things we wish for you to read and be familiar with concerning your anesthetized limb. Caring for Your Peripheral Nerve Catheter   Avoid pulling or tugging on the catheter as this may dislodge or change the placement location of the catheter which can decrease its effectiveness.  Do not soak the dressing covering the catheter.  Besides the risk of loosing the dressing and increasing the chances of an infection, this can also result in dislodging the catheter and decreasing its effectiveness. Keep the catheter site dry while bathing.  Avoid kinking or pinching the catheter and tubing   The Flow Dial is set for a specific rate prescribed by the anesthesiologist.  DO NOT remove the zip-tie and adjust the rate. Check Your Dressing Site   If the dressing is wet but you are still experiencing pain relief, simply reinforce the site with additional gauze. DO NOT remove the original dressing.  If the dressing is wet and you are not getting adequate pain relief, page the anesthesiologist on call; the number is at the bottom of the On-Q® C-Bloc® information. (Note the catheter location for a shoulder/arm block.)    Caring For a Blocked Body Part  General Considerations:   The numbness may last up to 24 hours   You must protect your arm or leg. The blocked extremity is numb, weak, and difficult for your brain to locate and communicate with. To do this you should:  o Pay attention to the position of the blocked limb at all times. o Be very careful when placing hot or cod items on a numb limb. You could cause tissue damage like burns or frostbite if you are unable to feel temperature. Carefully follow your discharge instructions regarding the use of these therapies in you post-operative care. o Carefully pad the affected limb. Normally we continually move and adjust the position of our bodies without thinking about it. This movement and continuous repositioning helps to prevent injuries from immobility. When a limb is numb it still requires this care  o Be extremely careful not to bump or hit the numb body part. This can result in an unrecognized injury, at lease until the blocked limb wakes up. It can also result in worse pain of your already post-surgical limb.      Arm/Shoulder Blocks:   You may experience a droopy eyelid, nasal stuffiness, and redness of the eye after receiving an arm/shoulder block. This is called Valentes Syndrome, and is very common. There is no need for concern. You may also experience mild hoarseness, but all of these symptoms should resolve within 24 hours.  Some patients may experience mild shortness of breath. A sitting position will help alleviate this and it should resolve within 24 hours. If you experience significant or progressive worsening of the shortness of breath, close the clamp on the pump tubing (see picture above, found between the [facility]; note arrow) and seek medical attention immediately. Knee/Foot Blocks:   DO NOT use the blocked leg to walk, balance or support yourself. Your leg will not be able to balance your weight properly while any part of your leg is numb. You are at a RISK for FALLING.  Be careful not to drag your foot along the ground or stub your toes. What Should I Call About? If the following occur, CLOSE THE CLAMP IMMEDIATELY AND CALL 911!  Severe Shortness of Breath   Seizures  If you develop any of the following, you should close the clamp on the tubing IMMEDIATELY and call the anesthesiologist on-call (that number is found at the end of these instructions.)   Numb Lips   Increased anxiety   Redness, pain, or swelling around the catheter site where it enters the skin   Metallic taste in mouth   Shortness of breath   Ringing in the ears   Sudden unexplained sleepiness   Dizziness   Tremors, shaking, twitching    Removing Your Catheter  Ambulatory Surgery patients discontinue (remove) the catheter at home once the pump reservoir is empty and you can feel the hard center core. Depending on the prescribed set rate, this usually happens in approximately 1.5 to 2.5 days. Removing the catheter is very easy to do. Generally the most uncomfortable part is removing the dressing which secures the catheter to your skin. To remove the catheter, follow these steps:  1.  Wash your hands for 15 seconds with warm, soapy water and dry on a clean towel. 2. Gently remove the clear dressing and tape that cover the catheter on your skin. 3. Firmly grasp the catheter 5-10cm (2 to 4 inches) from where the catheter enters the skin. 4. In a smooth and gentle way, slowly pull on the catheter. Often there is a dot of skin glue right at the site where the catheter enters the skin. You may need to break the bond of the glue, but once done the catheter should come out with little to no resistance. DO NOT TRY TO REMOVE IT WITH FORCE. If the catheter had a lot of resistance to being removed, call the anesthesiologist on-call for further instructions. 5. The catheter tip is colored blue or black. Check to see that the tip of the catheter is present. If it isnt. call the anesthesiologist on-call. 6. The catheter and medication delivery pump are all single use and can be discarded in the trash. 7. Apply a small bandage if necessary. A small amount of fluid or blood from the catheter site is normal and nothing to be greatly concerned about. If it continues for more than 24 hours notify the anesthesiologist on-call. You will want to continue to monitor the catheter site until healed for signs of infection (refer to the nursing instructions regarding wound care.)  8. Wash your hands. Contact Phone Numbers  CALL 911 IN CASE OF SEIZURE OR BREATHING EMERGENCY. For questions concerning the On-Q pump or its operation, call the On-Q 24 hour clinical hotline at   [phone]. For all other non-emergency inquiries call the [facility]   at  [phone] and ask for the anesthesiologist on call to be paged.

## 2017-06-30 ENCOUNTER — PATIENT OUTREACH (OUTPATIENT)
Dept: OTHER | Age: 62
End: 2017-06-30

## 2017-06-30 NOTE — PROGRESS NOTES
RUPA NOTE:     Mr. Raymond Tucker  has been contacted regarding recent outpatient surgical stay for rotator cuff repair. Prior to surgery, Mr. Raymond Tucker gave verbal permission to talk to his wife postop. Has askeed me to use phone number [phone]. Mrs. Warren answered and verified  and address for HIPAA security. Explained role and verified PCP. Discharge medications were reviewed with the patient by telephone. Pain level is high today. Periferal pain block is in place and bulb is deflating. Ice is in place  as ordered. No position change has been attempted. Advised to prop with pillows to raise head and move off of the surgical side. Mrs. Raymond Tucker will attempt this. Call is into the office re: Pain management. Advil will be taken after we end the call. No HH is ordered, but plan is to start PT next week. Mrs. Raymond Tucker is concerned that he cannot drive and she will be back to work. Encouraged her to ask about home health when office calls back re: pain level. Date of  Admission:    2017   Facility patient visited:  [facility]   Reason for Visit:   Elective rotator cuff repair. Reviewed scripts given by ED? Yes      Able to obtain prescribed medication? Yes   How is the patient feeling? Pain level is high, but tolerating food and drink well. Exprlained that surgical pain is unavoidable, but can be managed. Encouraged breathing, position change, changing ice more frequently. Does the patient have any questions or problems? Not at this time   Any barriers with transportation? No - wife will drive. Concerned re: next week when she returns to work. Follow-up Appointment date: Yes       Reviewed Discharge instructions:      1. Red Flags:    2. A sudden increase in swelling and/or redness or warmth at the area of your surgery which isnt relieved by rest, ice, and elevation.   3. Oral temperature greater than 101 degrees for 12 hours or more which isnt relieved by an increase in fluid intake and takingTylenol. 4. Excessive drainage from your incisions, or drainage which hasnt stopped by 72 hours after your surgery. 5. Calf pain, fever, chills, shortness of breath, chest pain, nausea, vomiting or other signs and symptoms which are of concern to you.       Provided patient with my name and contact information and instructed patient if there are any question to call. No further needs at this time. Next call in one week. DC instructions:    Dr. Edgardo Nieves     Please take the time to review the following instructions before you leave the hospital and use them as guidelines during your recovery from surgery. If you have any questions, you may contact my office at [phone].     Wound Care / Dressing Change: You may change your dressings as needed. Beginning 2 days after you are discharged from the hospital, you should change your dressings daily. A big, bulky dressing isnt necessary as long as there is no drainage from the incisions. You can put a band-aid or piece of gauze over each incision. Do not apply antibiotic ointment to your incisions.      Showering / Bathing: You may shower 2 days after your surgery. Your dressing may be removed for showering. You may get your incisions wet in the shower. Do not vigorously scrub your incisions. Apply a clean, dry dressing after you have dried your incisions. Do not take a bath or get into a swimming pool or [employer] until you follow up with Dr. Mumtaz Sierra. Do not soak your incisions under water.      Sling:  Keep your arm in the immobilizer/sling at all times except when showering, changing your clothes, or doing the exercises shown to you by Dr. Mumtaz Sierra or your physical/occupational therapist prior to your discharge from the hospital. Keep your arm at your side when changing your clothes and showering. Do not move it away from your body.      Ice and Elevation:  Continue ice consistently for 48 hours after surgery.  After 48 hours, you should ice 3 times per day for 20 minutes at a time for the next 5 days. After 1 week from surgery, you may use ice as needed for pain.         Diet:  You may advance your regular diet as tolerated. Increase your clear liquid intake for the next 2-3 days.

## 2017-07-06 ENCOUNTER — PATIENT OUTREACH (OUTPATIENT)
Dept: OTHER | Age: 62
End: 2017-07-06

## 2017-07-06 NOTE — PROGRESS NOTES
RUPA follow up. Patient on with surgical stay for right rotator cuff repair  2017. Verified  and address for HIPAA security. Mr. Sterling Martinez reports no problems in healing from surgery. Sutures C/D/I  Therapy in progress,  COLE SILVA appt tomorrow. Pain is well controlled. No identified needs or concerns. Chart review:  PT apt for tomorrow . Mr. Sterling Martinez has my contact information for any further needs or health concerns. Will follow for [facility] services.

## 2017-07-07 ENCOUNTER — HOSPITAL ENCOUNTER (OUTPATIENT)
Dept: PHYSICAL THERAPY | Age: 62
Discharge: HOME | End: 2017-07-07
Payer: COMMERCIAL

## 2017-07-07 PROCEDURE — 97110 THERAPEUTIC EXERCISES: CPT | Performed by: PHYSICAL THERAPIST

## 2017-07-07 PROCEDURE — 97140 MANUAL THERAPY 1/> REGIONS: CPT | Performed by: PHYSICAL THERAPIST

## 2017-07-07 PROCEDURE — 97161 PT EVAL LOW COMPLEX 20 MIN: CPT | Performed by: PHYSICAL THERAPIST

## 2017-07-07 PROCEDURE — 97016 VASOPNEUMATIC DEVICE THERAPY: CPT | Performed by: PHYSICAL THERAPIST

## 2017-07-07 NOTE — PROGRESS NOTES
[address] [facility] Christina Tyson  Phone: [phone]  Fax: [phone]    Plan of Care/ Statement of Necessity for Physical Therapy Services 2-15    Patient name: Estefani Feng  : 1955  Provider#: [id number]  Referral source: Ellie Rice MD      Medical/Treatment Diagnosis: Right shoulder pain [M25.511]     Prior Hospitalization: see medical history     Comorbidities: None  Prior Level of Function: see initial eval  Medications: Verified on Patient Summary List    Start of Care: 17      Onset Date: 17       The Plan of Care and following information is based on the information from the initial evaluation. Assessment/ key information: Patient is 8 d post-op R RTC repair with subacromial decompression. Today he demonstrated excellent progress so far along rehab protocol. ROM goals are improving very well towards phase I goals and he has little to no pain. He should continue to progress well along rehab protocol towards next surgeon f/u in 3 weeks. Evaluation Complexity History LOW Complexity : Zero comorbidities / personal factors that will impact the outcome / POC; Examination LOW Complexity : 1-2 Standardized tests and measures addressing body structure, function, activity limitation and / or participation in recreation  ;Presentation MEDIUM Complexity : Evolving with changing characteristics  ; Clinical Decision Making MEDIUM Complexity : FOTO score of 26-74  Overall Complexity Rating: LOW     Problem List: pain affecting function, decrease ROM, decrease strength, edema affecting function, decrease ADL/ functional abilitiies, decrease activity tolerance, decrease flexibility/ joint mobility and decrease transfer abilities   Treatment Plan may include any combination of the following: Therapeutic exercise, Therapeutic activities, Neuromuscular re-education, Physical agent/modality, Manual therapy, Patient education, Self Care training, Functional mobility training and Home safety training  Patient / Family readiness to learn indicated by: asking questions, trying to perform skills and interest  Persons(s) to be included in education: patient (P)  Barriers to Learning/Limitations: None  Patient Goal (s): I want to be able to get back to work as soon as possible.   Patient Self Reported Health Status: excellent  Rehabilitation Potential: excellent    Short Term Goals: To be accomplished in 8 treatments:  1. Patient will be able to achieve WNL PROM at the shoulder into flexion, abduction, and ER. 2. Patient will be able to sleep through the night in a recliner with sling with <2/10 shoulder pain. 3. Patient will be independent in his initial home exercise program focused on PROM to allow for decreased risk of developing post-op scar tissue. Long Term Goals: To be accomplished in 16 treatments:  1. Patient will be able to achieve full AROM into flexion, abduction, and ER. 2. Patient will be able to tuck in his shirt with no pain or limitation. 3. Patient will be able to drive 30 minutes with no pain or limitation. Frequency / Duration: Patient to be seen 2 times per week for 8 weeks. Patient/ Caregiver education and instruction: self care, activity modification and exercises    [x]  Plan of care has been reviewed with PASCUAL Lees, PT , DPT, OCS, Cert. ZURI   7/7/2017 11:30 AM    ________________________________________________________________________    I certify that the above Therapy Services are being furnished while the patient is under my care. I agree with the treatment plan and certify that this therapy is necessary.     [facility] Signature:____________________  Date:____________Time: _________

## 2017-07-11 ENCOUNTER — HOSPITAL ENCOUNTER (OUTPATIENT)
Dept: PHYSICAL THERAPY | Age: 62
Discharge: HOME | End: 2017-07-11
Payer: COMMERCIAL

## 2017-07-11 PROCEDURE — 97016 VASOPNEUMATIC DEVICE THERAPY: CPT

## 2017-07-11 PROCEDURE — 97140 MANUAL THERAPY 1/> REGIONS: CPT

## 2017-07-11 PROCEDURE — 97110 THERAPEUTIC EXERCISES: CPT

## 2017-07-11 NOTE — PROGRESS NOTES
PT DAILY TREATMENT NOTE 2-15    Patient Name: Dany Jay  Date:2017  : 1955  [x]  Patient  Verified  Payor: Remi Clement / Plan: Hermelindo Poster / Product Type: PPO /    In time:505p  Out time:605p  Total Treatment Time (min): 60  Visit #: 2     Treatment Area: Right shoulder pain [M25.511]    SUBJECTIVE  Pain Level (0-10 scale): 1  Any medication changes, allergies to medications, adverse drug reactions, diagnosis change, or new procedure performed?: [x] No    [] Yes (see summary sheet for update)  Subjective functional status/changes:   [] No changes reported  Patient reports his arm is feeling good today, but he has not taken it out of the sling all day. OBJECTIVE    Modality rationale: decrease edema, decrease inflammation and decrease pain to improve the patients ability to reach overhead. Min Type Additional Details    [] Estim: []Att   []Unatt        []TENS instruct                  []IFC  []Premod   []NMES                     []Other:  []w/US   []w/ice   []w/heat  Position:  Location:    []  Traction: [] Cervical       []Lumbar                       [] Prone          []Supine                       []Intermittent   []Continuous Lbs:  [] before manual  [] after manual  []w/heat    []  Ultrasound: []Continuous   [] Pulsed at:                            []1MHz   []3MHz Location:  W/cm2:    []  Paraffin         Location:  []w/heat    []  Ice     []  Heat  []  Ice massage Position:  Location:    []  Laser  []  Other: Position:  Location:   15 [x]  Vasopneumatic Device Pressure:       [x] lo [] med [] hi   Temperature:    [x] Skin assessment post-treatment:  [x]intact []redness- no adverse reaction    []redness - adverse reaction:     30 min Therapeutic Exercise:  [x] See flow sheet :   Rationale: increase ROM to improve the patients ability to reach overhead.     15 min Manual Therapy:  Shoulder PROM: flexion, abduction, ER    Rationale: decrease pain, increase ROM, increase tissue extensibility and decrease trigger points  to improve the patients ability to reach overhead. With   [] TE   [] TA   [] neuro   [] other: Patient Education: [x] Review HEP    [] Progressed/Changed HEP based on:   [] positioning   [] body mechanics   [] transfers   [] heat/ice application    [] other:      Other Objective/Functional Measures:      Pain Level (0-10 scale) post treatment: 0    ASSESSMENT/Changes in Function:     Patient will continue to benefit from skilled PT services to modify and progress therapeutic interventions, address functional mobility deficits, address ROM deficits, address strength deficits, analyze and address soft tissue restrictions, analyze and cue movement patterns, analyze and modify body mechanics/ergonomics and assess and modify postural abnormalities to attain remaining goals. []  See Plan of Care  []  See progress note/recertification  []  See Discharge Summary         Progress towards goals / Updated goals:  Patient demonstrates good tolerance with all PROM exercises. Patient able to tolerate manual PROM and required verbal cues to ensure no muscle activation.      PLAN  [x]  Upgrade activities as tolerated     [x]  Continue plan of care  []  Update interventions per flow sheet       []  Discharge due to:_  []  Other:_      Eze Pat PTA   7/11/2017  5:08 PM

## 2017-07-14 ENCOUNTER — APPOINTMENT (OUTPATIENT)
Dept: PHYSICAL THERAPY | Age: 62
End: 2017-07-14
Payer: COMMERCIAL

## 2017-07-18 ENCOUNTER — HOSPITAL ENCOUNTER (OUTPATIENT)
Dept: PHYSICAL THERAPY | Age: 62
Discharge: HOME | End: 2017-07-18
Payer: COMMERCIAL

## 2017-07-18 PROCEDURE — 97140 MANUAL THERAPY 1/> REGIONS: CPT

## 2017-07-18 PROCEDURE — 97016 VASOPNEUMATIC DEVICE THERAPY: CPT

## 2017-07-18 PROCEDURE — 97110 THERAPEUTIC EXERCISES: CPT

## 2017-07-20 ENCOUNTER — HOSPITAL ENCOUNTER (OUTPATIENT)
Dept: PHYSICAL THERAPY | Age: 62
End: 2017-07-20
Payer: COMMERCIAL

## 2017-08-01 ENCOUNTER — HOSPITAL ENCOUNTER (OUTPATIENT)
Dept: PHYSICAL THERAPY | Age: 62
Discharge: HOME | End: 2017-08-01
Payer: COMMERCIAL

## 2017-08-01 PROCEDURE — 97016 VASOPNEUMATIC DEVICE THERAPY: CPT

## 2017-08-01 PROCEDURE — 97140 MANUAL THERAPY 1/> REGIONS: CPT

## 2017-08-01 PROCEDURE — 97110 THERAPEUTIC EXERCISES: CPT

## 2017-08-01 NOTE — PROGRESS NOTES
PT DAILY TREATMENT NOTE 2-15    Patient Name: Arcenio Chew  Date:2017  : 1955  [x]  Patient  Verified  Payor: Amanda Martins / Plan: Loy Profit / Product Type: PPO /    In time: 840a  Out time:940a  Total Treatment Time (min): 60  Visit #: 4     Treatment Area: Right shoulder pain [M25.511]    SUBJECTIVE  Pain Level (0-10 scale): 1-2  Any medication changes, allergies to medications, adverse drug reactions, diagnosis change, or new procedure performed?: [x] No    [] Yes (see summary sheet for update)  Subjective functional status/changes:   [] No changes reported  Patient reports he went to the doctor on Friday and he said to continue to follow protocol. Patient states he does not have to sleep with the abduction pillow anymore and his shoulder is a little sore. OBJECTIVE    Modality rationale: decrease edema, decrease inflammation and decrease pain to improve the patients ability to reach overhead. Min Type Additional Details    [] Estim: []Att   []Unatt        []TENS instruct                  []IFC  []Premod   []NMES                     []Other:  []w/US   []w/ice   []w/heat  Position:  Location:    []  Traction: [] Cervical       []Lumbar                       [] Prone          []Supine                       []Intermittent   []Continuous Lbs:  [] before manual  [] after manual  []w/heat    []  Ultrasound: []Continuous   [] Pulsed at:                            []1MHz   []3MHz Location:  W/cm2:    []  Paraffin         Location:  []w/heat    []  Ice     []  Heat  []  Ice massage Position:  Location:    []  Laser  []  Other: Position:  Location:   15 [x]  Vasopneumatic Device Pressure:       [x] lo [] med [] hi   Temperature: 34   [x] Skin assessment post-treatment:  [x]intact []redness- no adverse reaction    []redness - adverse reaction:     30 min Therapeutic Exercise:  [x] See flow sheet :   Rationale: increase ROM to improve the patients ability to reach overhead.     15 min Manual Therapy:  Shoulder PROM: flexion, abduction, ER    Rationale: decrease pain, increase ROM, increase tissue extensibility and decrease trigger points  to improve the patients ability to reach overhead. With   [] TE   [] TA   [] neuro   [] other: Patient Education: [x] Review HEP    [] Progressed/Changed HEP based on:   [] positioning   [] body mechanics   [] transfers   [] heat/ice application    [] other:      Other Objective/Functional Measures:      Pain Level (0-10 scale) post treatment: 0    ASSESSMENT/Changes in Function:     Patient will continue to benefit from skilled PT services to modify and progress therapeutic interventions, address functional mobility deficits, address ROM deficits, address strength deficits, analyze and address soft tissue restrictions, analyze and cue movement patterns, analyze and modify body mechanics/ergonomics and assess and modify postural abnormalities to attain remaining goals. []  See Plan of Care  []  See progress note/recertification  []  See Discharge Summary         Progress towards goals / Updated goals:  Patient making great progress towards goals within protocol and continues to gain ROM.     PLAN  [x]  Upgrade activities as tolerated     [x]  Continue plan of care  []  Update interventions per flow sheet       []  Discharge due to:_  []  Other:_      Mele Bateman, PTA   8/1/2017  9:08 AM

## 2017-08-04 ENCOUNTER — APPOINTMENT (OUTPATIENT)
Dept: PHYSICAL THERAPY | Age: 62
End: 2017-08-04
Payer: COMMERCIAL

## 2017-08-04 ENCOUNTER — PATIENT OUTREACH (OUTPATIENT)
Dept: OTHER | Age: 62
End: 2017-08-04

## 2017-08-08 ENCOUNTER — APPOINTMENT (OUTPATIENT)
Dept: PHYSICAL THERAPY | Age: 62
End: 2017-08-08
Payer: COMMERCIAL

## 2017-08-10 ENCOUNTER — HOSPITAL ENCOUNTER (OUTPATIENT)
Dept: PHYSICAL THERAPY | Age: 62
Discharge: HOME | End: 2017-08-10
Payer: COMMERCIAL

## 2017-08-10 PROCEDURE — 97140 MANUAL THERAPY 1/> REGIONS: CPT

## 2017-08-10 PROCEDURE — 97016 VASOPNEUMATIC DEVICE THERAPY: CPT

## 2017-08-10 PROCEDURE — 97110 THERAPEUTIC EXERCISES: CPT

## 2017-08-10 NOTE — PROGRESS NOTES
PT DAILY TREATMENT NOTE 2-15    Patient Name: Kymberly Shah  Date:8/10/2017  : 1955  [x]  Patient  Verified  Payor: Sanya Coe / Plan: Juan Francisco Galvan / Product Type: PPO /    In time: 840a  Out time: 9:50a  Total Treatment Time (min): 70  Visit #: 5     Treatment Area: Right shoulder pain [M25.511]    SUBJECTIVE  Pain Level (0-10 scale): 0  Any medication changes, allergies to medications, adverse drug reactions, diagnosis change, or new procedure performed?: [x] No    [] Yes (see summary sheet for update)  Subjective functional status/changes:   [] No changes reported  Patient reports he started sleeping without the sling on and has occasional sharp pain. Patient reports he has been feeling good otherwise. OBJECTIVE    Modality rationale: decrease edema, decrease inflammation and decrease pain to improve the patients ability to reach overhead. Min Type Additional Details    [] Estim: []Att   []Unatt        []TENS instruct                  []IFC  []Premod   []NMES                     []Other:  []w/US   []w/ice   []w/heat  Position:  Location:    []  Traction: [] Cervical       []Lumbar                       [] Prone          []Supine                       []Intermittent   []Continuous Lbs:  [] before manual  [] after manual  []w/heat    []  Ultrasound: []Continuous   [] Pulsed at:                            []1MHz   []3MHz Location:  W/cm2:    []  Paraffin         Location:  []w/heat    []  Ice     []  Heat  []  Ice massage Position:  Location:    []  Laser  []  Other: Position:  Location:   10 [x]  Vasopneumatic Device Pressure:       [x] lo [] med [] hi   Temperature: 34   [x] Skin assessment post-treatment:  [x]intact []redness- no adverse reaction    []redness - adverse reaction:     45 min Therapeutic Exercise:  [x] See flow sheet :   Rationale: increase ROM to improve the patients ability to reach overhead.     15 min Manual Therapy:  Shoulder PROM: flexion, abduction, ER Rationale: decrease pain, increase ROM, increase tissue extensibility and decrease trigger points  to improve the patients ability to reach overhead. With   [] TE   [] TA   [] neuro   [] other: Patient Education: [x] Review HEP    [] Progressed/Changed HEP based on:   [] positioning   [] body mechanics   [] transfers   [] heat/ice application    [] other:      Other Objective/Functional Measures: Patient reports some increased soreness after new exercises with no pain throughout     Pain Level (0-10 scale) post treatment: 0    ASSESSMENT/Changes in Function:     Patient will continue to benefit from skilled PT services to modify and progress therapeutic interventions, address functional mobility deficits, address ROM deficits, address strength deficits, analyze and address soft tissue restrictions, analyze and cue movement patterns, analyze and modify body mechanics/ergonomics and assess and modify postural abnormalities to attain remaining goals. []  See Plan of Care  []  See progress note/recertification  []  See Discharge Summary         Progress towards goals / Updated goals:  Patient able to tolerate advancing exercises under therapeutic protocol with no increased pain. Patient requires verbal cues for postural awareness and proper mechanics and is making good progress towards goals following protocol.       PLAN  [x]  Upgrade activities as tolerated     [x]  Continue plan of care  []  Update interventions per flow sheet       []  Discharge due to:_  []  Other:_      Preethi Alexander PTA   8/10/2017  9:08 AM

## 2017-08-11 DIAGNOSIS — E29.1 HYPOGONADISM, MALE: ICD-10-CM

## 2017-08-11 RX ORDER — SILDENAFIL CITRATE 100 MG/1
TABLET, FILM COATED ORAL
Qty: 8 TAB | Refills: 3 | Status: SHIPPED | OUTPATIENT
Start: 2017-08-11 | End: 2017-11-20

## 2017-08-15 ENCOUNTER — HOSPITAL ENCOUNTER (OUTPATIENT)
Dept: PHYSICAL THERAPY | Age: 62
Discharge: HOME | End: 2017-08-15
Payer: COMMERCIAL

## 2017-08-15 PROCEDURE — 97016 VASOPNEUMATIC DEVICE THERAPY: CPT

## 2017-08-15 PROCEDURE — 97110 THERAPEUTIC EXERCISES: CPT

## 2017-08-15 PROCEDURE — 97140 MANUAL THERAPY 1/> REGIONS: CPT

## 2017-08-15 NOTE — PROGRESS NOTES
PT DAILY TREATMENT NOTE 2-15    Patient Name: Ave Castañeda  Date:8/15/2017  : 1955  [x]  Patient  Verified  Payor: Jose Payer / Plan: Maurice Mendez / Product Type: PPO /    In time: 840a  Out time: 9:55a  Total Treatment Time (min): 75  Visit #: 6     Treatment Area: Right shoulder pain [M25.511]    SUBJECTIVE  Pain Level (0-10 scale): 0  Any medication changes, allergies to medications, adverse drug reactions, diagnosis change, or new procedure performed?: [x] No    [] Yes (see summary sheet for update)  Subjective functional status/changes:   [] No changes reported  Patient reports a little tightness and stiffness this morning. Patient states he is doing well with the new exercises. OBJECTIVE    Modality rationale: decrease edema, decrease inflammation and decrease pain to improve the patients ability to reach overhead. Min Type Additional Details    [] Estim: []Att   []Unatt        []TENS instruct                  []IFC  []Premod   []NMES                     []Other:  []w/US   []w/ice   []w/heat  Position:  Location:    []  Traction: [] Cervical       []Lumbar                       [] Prone          []Supine                       []Intermittent   []Continuous Lbs:  [] before manual  [] after manual  []w/heat    []  Ultrasound: []Continuous   [] Pulsed at:                            []1MHz   []3MHz Location:  W/cm2:    []  Paraffin         Location:  []w/heat    []  Ice     []  Heat  []  Ice massage Position:  Location:    []  Laser  []  Other: Position:  Location:   15 [x]  Vasopneumatic Device Pressure:       [x] lo [] med [] hi   Temperature: 34   [x] Skin assessment post-treatment:  [x]intact []redness- no adverse reaction    []redness - adverse reaction:     50 min Therapeutic Exercise:  [x] See flow sheet :   Rationale: increase ROM to improve the patients ability to reach overhead.     10 min Manual Therapy:  Shoulder PROM: flexion, abduction, ER    Rationale: decrease pain, increase ROM, increase tissue extensibility and decrease trigger points  to improve the patients ability to reach overhead. With   [] TE   [] TA   [] neuro   [] other: Patient Education: [x] Review HEP    [] Progressed/Changed HEP based on:   [] positioning   [] body mechanics   [] transfers   [] heat/ice application    [] other:      Other Objective/Functional Measures: Patient continues to demonstrate improved PROM and AROM     Pain Level (0-10 scale) post treatment: 0    ASSESSMENT/Changes in Function:     Patient will continue to benefit from skilled PT services to modify and progress therapeutic interventions, address functional mobility deficits, address ROM deficits, address strength deficits, analyze and address soft tissue restrictions, analyze and cue movement patterns, analyze and modify body mechanics/ergonomics and assess and modify postural abnormalities to attain remaining goals. []  See Plan of Care  []  See progress note/recertification  []  See Discharge Summary         Progress towards goals / Updated goals:  Patient with good tolerance for therapeutic exercises and is making good progress towards full ROM.      PLAN  [x]  Upgrade activities as tolerated     [x]  Continue plan of care  []  Update interventions per flow sheet       []  Discharge due to:_  []  Other:_      Amada Prasad PTA   8/15/2017  9:08 AM

## 2017-08-16 RX ORDER — PANTOPRAZOLE SODIUM 40 MG/1
TABLET, DELAYED RELEASE ORAL
Qty: 30 TAB | Refills: 0 | Status: SHIPPED | OUTPATIENT
Start: 2017-08-16 | End: 2017-09-19 | Stop reason: SDUPTHER

## 2017-08-18 ENCOUNTER — HOSPITAL ENCOUNTER (OUTPATIENT)
Dept: PHYSICAL THERAPY | Age: 62
Discharge: HOME | End: 2017-08-18
Payer: COMMERCIAL

## 2017-08-18 PROCEDURE — 97110 THERAPEUTIC EXERCISES: CPT

## 2017-08-18 PROCEDURE — 97016 VASOPNEUMATIC DEVICE THERAPY: CPT

## 2017-08-18 PROCEDURE — 97140 MANUAL THERAPY 1/> REGIONS: CPT

## 2017-08-18 NOTE — PROGRESS NOTES
PT DAILY TREATMENT NOTE 2-15    Patient Name: Kina Lyon  Date:2017  : 1955  [x]  Patient  Verified  Payor: Lalo Perdue / Plan: Shaun Pisano / Product Type: PPO /    In time: 9:30a  Out time: 10:30a  Total Treatment Time (min): 60  Visit #: 7     Treatment Area: Right shoulder pain [M25.511]    SUBJECTIVE  Pain Level (0-10 scale): 0  Any medication changes, allergies to medications, adverse drug reactions, diagnosis change, or new procedure performed?: [x] No    [] Yes (see summary sheet for update)  Subjective functional status/changes:   [] No changes reported  Patient reports he is doing well and his motion is getting better. OBJECTIVE    Modality rationale: decrease edema, decrease inflammation and decrease pain to improve the patients ability to reach overhead. Min Type Additional Details    [] Estim: []Att   []Unatt        []TENS instruct                  []IFC  []Premod   []NMES                     []Other:  []w/US   []w/ice   []w/heat  Position:  Location:    []  Traction: [] Cervical       []Lumbar                       [] Prone          []Supine                       []Intermittent   []Continuous Lbs:  [] before manual  [] after manual  []w/heat    []  Ultrasound: []Continuous   [] Pulsed at:                            []1MHz   []3MHz Location:  W/cm2:    []  Paraffin         Location:  []w/heat    []  Ice     []  Heat  []  Ice massage Position:  Location:    []  Laser  []  Other: Position:  Location:   15 [x]  Vasopneumatic Device Pressure:       [x] lo [] med [] hi   Temperature: 34   [x] Skin assessment post-treatment:  [x]intact []redness- no adverse reaction    []redness - adverse reaction:     30 min Therapeutic Exercise:  [x] See flow sheet :   Rationale: increase ROM to improve the patients ability to reach overhead.     10 min Manual Therapy:  Shoulder PROM: flexion, abduction, ER    Rationale: decrease pain, increase ROM, increase tissue extensibility and decrease trigger points  to improve the patients ability to reach overhead. With   [] TE   [] TA   [] neuro   [] other: Patient Education: [x] Review HEP    [] Progressed/Changed HEP based on:   [] positioning   [] body mechanics   [] transfers   [] heat/ice application    [] other:      Other Objective/Functional Measures: Patient continues to demonstrate improved PROM and AROM. Patient educated on multidirectional AROM for reaching overhead and given for HEP. Pain Level (0-10 scale) post treatment: 0 \"tired\"    ASSESSMENT/Changes in Function:     Patient will continue to benefit from skilled PT services to modify and progress therapeutic interventions, address functional mobility deficits, address ROM deficits, address strength deficits, analyze and address soft tissue restrictions, analyze and cue movement patterns, analyze and modify body mechanics/ergonomics and assess and modify postural abnormalities to attain remaining goals. []  See Plan of Care  []  See progress note/recertification  []  See Discharge Summary         Progress towards goals / Updated goals:  Patient demonstrates increased fatigue with new AROM exercises and is making good progress toward full AROM.     PLAN  [x]  Upgrade activities as tolerated     [x]  Continue plan of care  []  Update interventions per flow sheet       []  Discharge due to:_  []  Other:_      Florencia Bernard PTA   8/18/2017  9:08 AM

## 2017-08-21 DIAGNOSIS — E78.5 DYSLIPIDEMIA, GOAL LDL BELOW 100: ICD-10-CM

## 2017-08-21 DIAGNOSIS — I10 ESSENTIAL HYPERTENSION: ICD-10-CM

## 2017-08-21 RX ORDER — LISINOPRIL 20 MG/1
TABLET ORAL
Qty: 30 TAB | Refills: 0 | Status: SHIPPED | OUTPATIENT
Start: 2017-08-21 | End: 2017-09-20 | Stop reason: SDUPTHER

## 2017-08-22 RX ORDER — ATORVASTATIN CALCIUM 10 MG/1
TABLET, FILM COATED ORAL
Qty: 30 TAB | Refills: 0 | Status: SHIPPED | OUTPATIENT
Start: 2017-08-22 | End: 2017-09-27 | Stop reason: SDUPTHER

## 2017-08-23 ENCOUNTER — HOSPITAL ENCOUNTER (OUTPATIENT)
Dept: PHYSICAL THERAPY | Age: 62
Discharge: HOME | End: 2017-08-23
Payer: COMMERCIAL

## 2017-08-23 PROCEDURE — 97016 VASOPNEUMATIC DEVICE THERAPY: CPT

## 2017-08-23 PROCEDURE — 97110 THERAPEUTIC EXERCISES: CPT

## 2017-08-23 PROCEDURE — 97140 MANUAL THERAPY 1/> REGIONS: CPT

## 2017-08-23 NOTE — PROGRESS NOTES
PT DAILY TREATMENT NOTE 2-15    Patient Name: Nicolás Uribe  Date:2017  : 1955  [x]  Patient  Verified  Payor: Phil Ahumada / Plan: [employer] / Product Type: PPO /    In time: 9:30a  Out time: 10: 30a  Total Treatment Time (min): 60  Visit #: 8     Treatment Area: Right shoulder pain [M25.511]    SUBJECTIVE  Pain Level (0-10 scale): \"sore\"  Any medication changes, allergies to medications, adverse drug reactions, diagnosis change, or new procedure performed?: [x] No    [] Yes (see summary sheet for update)  Subjective functional status/changes:   [] No changes reported  Patient reports he did more activities yesterday with his arm and is a little more sore. OBJECTIVE    Modality rationale: decrease edema, decrease inflammation and decrease pain to improve the patients ability to reach overhead. Min Type Additional Details    [] Estim: []Att   []Unatt        []TENS instruct                  []IFC  []Premod   []NMES                     []Other:  []w/US   []w/ice   []w/heat  Position:  Location:    []  Traction: [] Cervical       []Lumbar                       [] Prone          []Supine                       []Intermittent   []Continuous Lbs:  [] before manual  [] after manual  []w/heat    []  Ultrasound: []Continuous   [] Pulsed at:                            []1MHz   []3MHz Location:  W/cm2:    []  Paraffin         Location:  []w/heat    []  Ice     []  Heat  []  Ice massage Position:  Location:    []  Laser  []  Other: Position:  Location:   15 [x]  Vasopneumatic Device Pressure:       [x] lo [] med [] hi   Temperature: 34   [x] Skin assessment post-treatment:  [x]intact []redness- no adverse reaction    []redness - adverse reaction:     35 min Therapeutic Exercise:  [x] See flow sheet :   Rationale: increase ROM to improve the patients ability to reach overhead.     10 min Manual Therapy:  Shoulder PROM: flexion, abduction, ER    Rationale: decrease pain, increase ROM, increase tissue extensibility and decrease trigger points  to improve the patients ability to reach overhead. With   [] TE   [] TA   [] neuro   [] other: Patient Education: [x] Review HEP    [] Progressed/Changed HEP based on:   [] positioning   [] body mechanics   [] transfers   [] heat/ice application    [] other:      Other Objective/Functional Measures:     AROM, PROM shoulder:  Flexion: 160 deg, 170 deg  Abduction: 160 deg, 170 deg  ER: 55 deg seated/80 deg supine, 90 deg    Pain Level (0-10 scale) post treatment: 0    ASSESSMENT/Changes in Function:     Patient will continue to benefit from skilled PT services to modify and progress therapeutic interventions, address functional mobility deficits, address ROM deficits, address strength deficits, analyze and address soft tissue restrictions, analyze and cue movement patterns, analyze and modify body mechanics/ergonomics and assess and modify postural abnormalities to attain remaining goals. []  See Plan of Care  []  See progress note/recertification  []  See Discharge Summary         Progress towards goals / Updated goals:  Patient continues to make good progress toward full AROM and PROM. Patient demonstrates increased fatigue with AROM flexion and scaption today.     PLAN  [x]  Upgrade activities as tolerated     [x]  Continue plan of care  []  Update interventions per flow sheet       []  Discharge due to:_  []  Other:_      Mele Bateman, PTA   8/23/2017  9:08 AM

## 2017-08-31 ENCOUNTER — APPOINTMENT (OUTPATIENT)
Dept: PHYSICAL THERAPY | Age: 62
End: 2017-08-31
Payer: COMMERCIAL

## 2017-09-14 ENCOUNTER — HOSPITAL ENCOUNTER (OUTPATIENT)
Dept: PHYSICAL THERAPY | Age: 62
Discharge: HOME | End: 2017-09-14
Payer: COMMERCIAL

## 2017-09-14 PROCEDURE — 97110 THERAPEUTIC EXERCISES: CPT

## 2017-09-14 PROCEDURE — 97016 VASOPNEUMATIC DEVICE THERAPY: CPT

## 2017-09-14 NOTE — PROGRESS NOTES
PT DAILY TREATMENT NOTE 2-15    Patient Name: Bryan Sue  Date:2017  : 1955  [x]  Patient  Verified  Payor: Debria Gaucher / Plan: Karli Shipman / Product Type: PPO /    In time: 12:30p  Out time: 1:30p  Total Treatment Time (min): 60  Visit #: 9     Treatment Area: Right shoulder pain [M25.511]    SUBJECTIVE  Pain Level (0-10 scale): 0  Any medication changes, allergies to medications, adverse drug reactions, diagnosis change, or new procedure performed?: [x] No    [] Yes (see summary sheet for update)  Subjective functional status/changes:   [] No changes reported  Patient reports he has been doing well, he has had some soreness by the end of the day. Patient states he saw the doctor earlier this week and he is pleased with his progress. OBJECTIVE    Modality rationale: decrease edema, decrease inflammation and decrease pain to improve the patients ability to reach overhead. Min Type Additional Details    [] Estim: []Att   []Unatt        []TENS instruct                  []IFC  []Premod   []NMES                     []Other:  []w/US   []w/ice   []w/heat  Position:  Location:    []  Traction: [] Cervical       []Lumbar                       [] Prone          []Supine                       []Intermittent   []Continuous Lbs:  [] before manual  [] after manual  []w/heat    []  Ultrasound: []Continuous   [] Pulsed at:                            []1MHz   []3MHz Location:  W/cm2:    []  Paraffin         Location:  []w/heat    []  Ice     []  Heat  []  Ice massage Position:  Location:    []  Laser  []  Other: Position:  Location:   15 [x]  Vasopneumatic Device Pressure:       [x] lo [] med [] hi   Temperature: 34   [x] Skin assessment post-treatment:  [x]intact []redness- no adverse reaction    []redness - adverse reaction:     45 min Therapeutic Exercise:  [x] See flow sheet :   Rationale: increase ROM to improve the patients ability to reach overhead.     With   [] TE   [] TA   [] neuro   [] other: Patient Education: [x] Review HEP    [] Progressed/Changed HEP based on:   [] positioning   [] body mechanics   [] transfers   [] heat/ice application    [] other:      Other Objective/Functional Measures:     Pain Level (0-10 scale) post treatment: 0    ASSESSMENT/Changes in Function:     Patient will continue to benefit from skilled PT services to modify and progress therapeutic interventions, address functional mobility deficits, address ROM deficits, address strength deficits, analyze and address soft tissue restrictions, analyze and cue movement patterns, analyze and modify body mechanics/ergonomics and assess and modify postural abnormalities to attain remaining goals. []  See Plan of Care  []  See progress note/recertification  []  See Discharge Summary         Progress towards goals / Updated goals:  Patient able to advance several exercises with protocol and is making good progress towards the next phase. Patient continues to require verbal cues for postural awareness.     PLAN  [x]  Upgrade activities as tolerated     [x]  Continue plan of care  []  Update interventions per flow sheet       []  Discharge due to:_  []  Other:_      Amauri Geller PTA   9/14/2017  9:08 AM

## 2017-09-20 DIAGNOSIS — I10 ESSENTIAL HYPERTENSION: ICD-10-CM

## 2017-09-20 RX ORDER — PANTOPRAZOLE SODIUM 40 MG/1
TABLET, DELAYED RELEASE ORAL
Qty: 30 TAB | Refills: 0 | Status: SHIPPED | OUTPATIENT
Start: 2017-09-20 | End: 2017-10-23 | Stop reason: SDUPTHER

## 2017-09-20 RX ORDER — LISINOPRIL 20 MG/1
TABLET ORAL
Qty: 30 TAB | Refills: 5 | Status: SHIPPED | OUTPATIENT
Start: 2017-09-20 | End: 2018-01-30 | Stop reason: SDUPTHER

## 2017-09-22 ENCOUNTER — HOSPITAL ENCOUNTER (OUTPATIENT)
Dept: PHYSICAL THERAPY | Age: 62
Discharge: HOME | End: 2017-09-22
Payer: COMMERCIAL

## 2017-09-22 PROCEDURE — 97016 VASOPNEUMATIC DEVICE THERAPY: CPT

## 2017-09-22 PROCEDURE — 97110 THERAPEUTIC EXERCISES: CPT

## 2017-09-22 NOTE — PROGRESS NOTES
PT DAILY TREATMENT NOTE 2-15    Patient Name: Andrade Wheeler  Date:2017  : 1955  [x]  Patient  Verified  Payor: Rosalia Goldsmith / Plan: Binta Holley / Product Type: PPO /    In time: 9:10a  Out time: 10:10a  Total Treatment Time (min): 60  Visit #: 10     Treatment Area: Right shoulder pain [M25.511]    SUBJECTIVE  Pain Level (0-10 scale): 0  Any medication changes, allergies to medications, adverse drug reactions, diagnosis change, or new procedure performed?: [x] No    [] Yes (see summary sheet for update)  Subjective functional status/changes:   [] No changes reported  Patient reports he has been doing well and felt good after last session. OBJECTIVE    Modality rationale: decrease edema, decrease inflammation and decrease pain to improve the patients ability to reach overhead. Min Type Additional Details    [] Estim: []Att   []Unatt        []TENS instruct                  []IFC  []Premod   []NMES                     []Other:  []w/US   []w/ice   []w/heat  Position:  Location:    []  Traction: [] Cervical       []Lumbar                       [] Prone          []Supine                       []Intermittent   []Continuous Lbs:  [] before manual  [] after manual  []w/heat    []  Ultrasound: []Continuous   [] Pulsed at:                            []1MHz   []3MHz Location:  W/cm2:    []  Paraffin         Location:  []w/heat    []  Ice     []  Heat  []  Ice massage Position:  Location:    []  Laser  []  Other: Position:  Location:   15 [x]  Vasopneumatic Device Pressure:       [x] lo [] med [] hi   Temperature: 34   [x] Skin assessment post-treatment:  [x]intact []redness- no adverse reaction    []redness - adverse reaction:     45 min Therapeutic Exercise:  [x] See flow sheet :   Rationale: increase ROM to improve the patients ability to reach overhead.     With   [] TE   [] TA   [] neuro   [] other: Patient Education: [x] Review HEP    [] Progressed/Changed HEP based on:   [] positioning   [] body mechanics   [] transfers   [] heat/ice application    [] other:      Other Objective/Functional Measures:     Pain Level (0-10 scale) post treatment: 0    ASSESSMENT/Changes in Function:     Patient will continue to benefit from skilled PT services to modify and progress therapeutic interventions, address functional mobility deficits, address ROM deficits, address strength deficits, analyze and address soft tissue restrictions, analyze and cue movement patterns, analyze and modify body mechanics/ergonomics and assess and modify postural abnormalities to attain remaining goals. []  See Plan of Care  []  See progress note/recertification  []  See Discharge Summary         Progress towards goals / Updated goals:  Patient able to advance several exercises within protocol and is making good progress towards the next phase. Patient continues to require verbal cues for postural awareness.     PLAN  [x]  Upgrade activities as tolerated     [x]  Continue plan of care  []  Update interventions per flow sheet       []  Discharge due to:_  []  Other:_      Hosea Toscano PTA   9/22/2017  9:08 AM

## 2017-09-27 DIAGNOSIS — E78.5 DYSLIPIDEMIA, GOAL LDL BELOW 100: ICD-10-CM

## 2017-09-27 RX ORDER — ATORVASTATIN CALCIUM 10 MG/1
TABLET, FILM COATED ORAL
Qty: 30 TAB | Refills: 0 | Status: SHIPPED | OUTPATIENT
Start: 2017-09-27 | End: 2017-10-23 | Stop reason: SDUPTHER

## 2017-10-06 ENCOUNTER — HOSPITAL ENCOUNTER (OUTPATIENT)
Dept: PHYSICAL THERAPY | Age: 62
End: 2017-10-06
Payer: COMMERCIAL

## 2017-10-13 ENCOUNTER — HOSPITAL ENCOUNTER (OUTPATIENT)
Dept: PHYSICAL THERAPY | Age: 62
Discharge: HOME | End: 2017-10-13
Payer: COMMERCIAL

## 2017-10-13 PROCEDURE — 97110 THERAPEUTIC EXERCISES: CPT

## 2017-10-13 PROCEDURE — 97016 VASOPNEUMATIC DEVICE THERAPY: CPT

## 2017-10-13 NOTE — PROGRESS NOTES
PT DAILY TREATMENT NOTE 2-15    Patient Name: Oanh Sanchez  Date:10/13/2017  : 1955  [x]  Patient  Verified  Payor: Gary Alert / Plan: Magda Richardson / Product Type: PPO /    In time: 9:40a  Out time: 10:50a  Total Treatment Time (min): 70  Visit #: 11     Treatment Area: Right shoulder pain [M25.511]    SUBJECTIVE  Pain Level (0-10 scale): 0  Any medication changes, allergies to medications, adverse drug reactions, diagnosis change, or new procedure performed?: [x] No    [] Yes (see summary sheet for update)  Subjective functional status/changes:   [] No changes reported  Patient reports his shoulder has been doing well and he has been keeping up with his exercises. Patient states driving is getting better and is not have as much irritation with turning. OBJECTIVE    Modality rationale: decrease edema, decrease inflammation and decrease pain to improve the patients ability to reach overhead. Min Type Additional Details    [] Estim: []Att   []Unatt        []TENS instruct                  []IFC  []Premod   []NMES                     []Other:  []w/US   []w/ice   []w/heat  Position:  Location:    []  Traction: [] Cervical       []Lumbar                       [] Prone          []Supine                       []Intermittent   []Continuous Lbs:  [] before manual  [] after manual  []w/heat    []  Ultrasound: []Continuous   [] Pulsed at:                            []1MHz   []3MHz Location:  W/cm2:    []  Paraffin         Location:  []w/heat    []  Ice     []  Heat  []  Ice massage Position:  Location:    []  Laser  []  Other: Position:  Location:   15 [x]  Vasopneumatic Device Pressure:       [x] lo [] med [] hi   Temperature: 34   [x] Skin assessment post-treatment:  [x]intact []redness- no adverse reaction    []redness - adverse reaction:     55 min Therapeutic Exercise:  [x] See flow sheet :   Rationale: increase ROM to improve the patients ability to reach overhead.     With   [] TE   [] TA   [] neuro   [] other: Patient Education: [x] Review HEP    [] Progressed/Changed HEP based on:   [] positioning   [] body mechanics   [] transfers   [] heat/ice application    [] other:      Other Objective/Functional Measures:     Pain Level (0-10 scale) post treatment: 0     ASSESSMENT/Changes in Function:     Patient will continue to benefit from skilled PT services to modify and progress therapeutic interventions, address functional mobility deficits, address ROM deficits, address strength deficits, analyze and address soft tissue restrictions, analyze and cue movement patterns, analyze and modify body mechanics/ergonomics and assess and modify postural abnormalities to attain remaining goals. []  See Plan of Care  []  See progress note/recertification  []  See Discharge Summary         Progress towards goals / Updated goals:   Patient able to advance exercises with focus on scapular stabilization with increased fatigue noted. Patient requires mod to max verbal cues for postural awareness.     PLAN  [x]  Upgrade activities as tolerated     [x]  Continue plan of care  []  Update interventions per flow sheet       []  Discharge due to:_  []  Other:_      Hubert Powers PTA   10/13/2017  9:08 AM

## 2017-10-20 ENCOUNTER — APPOINTMENT (OUTPATIENT)
Dept: PHYSICAL THERAPY | Age: 62
End: 2017-10-20
Payer: COMMERCIAL

## 2017-10-23 DIAGNOSIS — E78.5 DYSLIPIDEMIA, GOAL LDL BELOW 100: ICD-10-CM

## 2017-10-23 RX ORDER — ATORVASTATIN CALCIUM 10 MG/1
TABLET, FILM COATED ORAL
Qty: 30 TAB | Refills: 0 | Status: SHIPPED | OUTPATIENT
Start: 2017-10-23 | End: 2017-12-04 | Stop reason: SDUPTHER

## 2017-10-23 RX ORDER — PANTOPRAZOLE SODIUM 40 MG/1
TABLET, DELAYED RELEASE ORAL
Qty: 30 TAB | Refills: 0 | Status: SHIPPED | OUTPATIENT
Start: 2017-10-23 | End: 2017-11-22 | Stop reason: SDUPTHER

## 2017-10-27 ENCOUNTER — HOSPITAL ENCOUNTER (OUTPATIENT)
Dept: PHYSICAL THERAPY | Age: 62
Discharge: HOME | End: 2017-10-27
Payer: COMMERCIAL

## 2017-10-27 PROCEDURE — 97110 THERAPEUTIC EXERCISES: CPT

## 2017-10-27 NOTE — PROGRESS NOTES
PT DAILY TREATMENT NOTE 2-15    Patient Name: Chico Pressley  Date:10/27/2017  : 1955  [x]  Patient  Verified  Payor: Vi Castaneda / Plan: Imani Longoria / Product Type: PPO /    In time: 9:15a  Out time: 10:15a  Total Treatment Time (min): 60  Visit #: 12     Treatment Area: Right shoulder pain [M25.511]    SUBJECTIVE  Pain Level (0-10 scale): 0  Any medication changes, allergies to medications, adverse drug reactions, diagnosis change, or new procedure performed?: [x] No    [] Yes (see summary sheet for update)  Subjective functional status/changes:   [] No changes reported  Patient reports he has been doing good and has not had any issues with ADL's. Patient reports he see MD on . OBJECTIVE    Modality rationale: declined   Min Type Additional Details    [] Estim: []Att   []Unatt        []TENS instruct                  []IFC  []Premod   []NMES                     []Other:  []w/US   []w/ice   []w/heat  Position:  Location:    []  Traction: [] Cervical       []Lumbar                       [] Prone          []Supine                       []Intermittent   []Continuous Lbs:  [] before manual  [] after manual  []w/heat    []  Ultrasound: []Continuous   [] Pulsed at:                            []1MHz   []3MHz Location:  W/cm2:    []  Paraffin         Location:  []w/heat    []  Ice     []  Heat  []  Ice massage Position:  Location:    []  Laser  []  Other: Position:  Location:    [x]  Vasopneumatic Device Pressure:       [x] lo [] med [] hi   Temperature: 34   [x] Skin assessment post-treatment:  [x]intact []redness- no adverse reaction    []redness - adverse reaction:     60 min Therapeutic Exercise:  [x] See flow sheet :   Rationale: increase ROM to improve the patients ability to reach overhead.     With   [] TE   [] TA   [] neuro   [] other: Patient Education: [x] Review HEP    [] Progressed/Changed HEP based on:   [] positioning   [] body mechanics   [] transfers   [] heat/ice application    [] other:      Other Objective/Functional Measures:   AROM Shoulder:  Flexion: 175 deg  Abduction: 173 deg  ER: 65 deg  FIR: T7    MMT:  Flexion: 4+/5  ER:4+/5  IR: 3+/5    Pain Level (0-10 scale) post treatment: 0     ASSESSMENT/Changes in Function:     Patient will continue to benefit from skilled PT services to modify and progress therapeutic interventions, address functional mobility deficits, address ROM deficits, address strength deficits, analyze and address soft tissue restrictions, analyze and cue movement patterns, analyze and modify body mechanics/ergonomics and assess and modify postural abnormalities to attain remaining goals. []  See Plan of Care  []  See progress note/recertification  []  See Discharge Summary         Progress towards goals / Updated goals:   Patient able to advance exercises with focus on scapular stabilization with increased fatigue noted. Patient requires mod to max verbal cues for postural awareness.      PLAN  [x]  Upgrade activities as tolerated     [x]  Continue plan of care  []  Update interventions per flow sheet       []  Discharge due to:_  []  Other:_      Ingrid Figueroa PTA   10/27/2017  9:08 AM

## 2017-11-03 ENCOUNTER — HOSPITAL ENCOUNTER (OUTPATIENT)
Dept: PHYSICAL THERAPY | Age: 62
Discharge: HOME | End: 2017-11-03
Payer: COMMERCIAL

## 2017-11-03 PROCEDURE — 97110 THERAPEUTIC EXERCISES: CPT

## 2017-11-03 NOTE — PROGRESS NOTES
PT DAILY TREATMENT NOTE 2-15    Patient Name: Lew Cruz  Date:11/3/2017  : 1955  [x]  Patient  Verified  Payor: Mary Carmen Faustin / Plan: Claudette Lujanivers / Product Type: PPO /    In time: 9:15a  Out time: 10:15a  Total Treatment Time (min): 60  Visit #: 13     Treatment Area: Right shoulder pain [M25.511]    SUBJECTIVE  Pain Level (0-10 scale): 0  Any medication changes, allergies to medications, adverse drug reactions, diagnosis change, or new procedure performed?: [x] No    [] Yes (see summary sheet for update)  Subjective functional status/changes:   [] No changes reported  Patient reports he has not been having any issues since last session. Pt reports some tightness with AROM, but no pain and is happy with his progress so far. Patient reports he see MD on . OBJECTIVE    Modality rationale: declined   Min Type Additional Details    [] Estim: []Att   []Unatt        []TENS instruct                  []IFC  []Premod   []NMES                     []Other:  []w/US   []w/ice   []w/heat  Position:  Location:    []  Traction: [] Cervical       []Lumbar                       [] Prone          []Supine                       []Intermittent   []Continuous Lbs:  [] before manual  [] after manual  []w/heat    []  Ultrasound: []Continuous   [] Pulsed at:                            []1MHz   []3MHz Location:  W/cm2:    []  Paraffin         Location:  []w/heat    []  Ice     []  Heat  []  Ice massage Position:  Location:    []  Laser  []  Other: Position:  Location:    [x]  Vasopneumatic Device Pressure:       [x] lo [] med [] hi   Temperature: 34   [x] Skin assessment post-treatment:  [x]intact []redness- no adverse reaction    []redness - adverse reaction:     60 min Therapeutic Exercise:  [x] See flow sheet :   Rationale: increase ROM and increase strength to improve the patients ability to reach overhead.     With   [x] TE   [] TA   [] neuro   [] other: Patient Education: [x] Review HEP [x] Progressed/Changed HEP based on:   [] positioning   [x] body mechanics   [] transfers   [] heat/ice application    [] other:      Other Objective/Functional Measures:   AROM Shoulder:  Flexion: 175 deg  Abduction: 173 deg  ER: 65 deg  FIR: T7    MMT:  Flexion: 4+/5  ER:4+/5  IR: 4+/5    Pain Level (0-10 scale) post treatment: 0     ASSESSMENT/Changes in Function:     Patient will continue to benefit from skilled PT services to modify and progress therapeutic interventions, address functional mobility deficits, address ROM deficits, address strength deficits, analyze and address soft tissue restrictions, analyze and cue movement patterns, analyze and modify body mechanics/ergonomics and assess and modify postural abnormalities to attain remaining goals. []  See Plan of Care  [x]  See progress note/recertification  []  See Discharge Summary         Progress towards goals / Updated goals:   Patient has made great progress with rehab program and demonstrates full PROM. He has made great gains in strength following protocols with verbal cues required for postural awareness.       PLAN  [x]  Upgrade activities as tolerated     [x]  Continue plan of care  []  Update interventions per flow sheet       []  Discharge due to:_  []  Other:_      Hang Velazquez, PASCUAL   11/3/2017  9:08 AM

## 2017-11-20 RX ORDER — TADALAFIL 10 MG/1
10 TABLET ORAL DAILY
Qty: 8 TAB | Refills: 3 | Status: SHIPPED | OUTPATIENT
Start: 2017-11-20 | End: 2018-01-30 | Stop reason: ALTCHOICE

## 2017-11-22 RX ORDER — PANTOPRAZOLE SODIUM 40 MG/1
TABLET, DELAYED RELEASE ORAL
Qty: 30 TAB | Refills: 0 | Status: SHIPPED | OUTPATIENT
Start: 2017-11-22 | End: 2017-12-19 | Stop reason: SDUPTHER

## 2017-12-01 RX ORDER — ALPRAZOLAM 0.5 MG/1
TABLET ORAL
Qty: 20 TAB | Refills: 0 | OUTPATIENT
Start: 2017-12-01 | End: 2018-03-21 | Stop reason: SDUPTHER

## 2017-12-04 DIAGNOSIS — E78.5 DYSLIPIDEMIA, GOAL LDL BELOW 100: ICD-10-CM

## 2017-12-04 RX ORDER — ATORVASTATIN CALCIUM 10 MG/1
TABLET, FILM COATED ORAL
Qty: 30 TAB | Refills: 0 | Status: SHIPPED | OUTPATIENT
Start: 2017-12-04 | End: 2018-01-05 | Stop reason: SDUPTHER

## 2017-12-08 ENCOUNTER — APPOINTMENT (OUTPATIENT)
Dept: PHYSICAL THERAPY | Age: 62
End: 2017-12-08

## 2017-12-15 ENCOUNTER — APPOINTMENT (OUTPATIENT)
Dept: PHYSICAL THERAPY | Age: 62
End: 2017-12-15

## 2017-12-19 RX ORDER — PANTOPRAZOLE SODIUM 40 MG/1
TABLET, DELAYED RELEASE ORAL
Qty: 30 TAB | Refills: 0 | Status: SHIPPED | OUTPATIENT
Start: 2017-12-19 | End: 2018-01-19 | Stop reason: SDUPTHER

## 2017-12-28 NOTE — PROGRESS NOTES
[address] [facility], [address] [place] [address] Drive  Phone: [phone]  Fax: [phone]    Discharge Summary  2-15    Patient name: Tiara Leonard  : 1955  Provider#: [id number]  Referral source: Rolando Evans MD      Medical/Treatment Diagnosis: Right shoulder pain [M25.511]     Prior Hospitalization: see medical history     Comorbidities: See Plan of Care  Prior Level of Function:See Plan of Care  Medications: Verified on Patient Summary List    Start of Care: 17      Onset Date:17   Visits from Start of Care: 13     Missed Visits: 0  Reporting Period : 17 to 11/3/17      ASSESSMENT/SUMMARY OF CARE: Mr. Romana Doyne did very well in his last 4 weeks of PT and advanced through the last phase of rehab protocol. He achieved full ROM, full strength, and achieved all long term goals. He has not needed to return to the clinic since his last visit on 11/3/17 and will be discharged. Short Term Goals: To be accomplished in 8 treatments:  1. Patient will be able to achieve WNL PROM at the shoulder into flexion, abduction, and ER. Met.  2. Patient will be able to sleep through the night in a recliner with sling with <2/10 shoulder pain. Met.  3. Patient will be independent in his initial home exercise program focused on PROM to allow for decreased risk of developing post-op scar tissue. Met. Long Term Goals: To be accomplished in 16 treatments:  1. Patient will be able to achieve full AROM into flexion, abduction, and ER. Met.  2. Patient will be able to tuck in his shirt with no pain or limitation. Met.  3. Patient will be able to drive 30 minutes with no pain or limitation. Met. RECOMMENDATIONS:  [x]Discontinue therapy: [x]Patient has reached or is progressing toward set goals      []Patient is non-compliant or has abdicated      []Due to lack of appreciable progress towards set goals      []Other    Rand Nurse, PT , DPT, OCS, Cert.  DN 12/28/2017 11:23 AM

## 2018-01-05 DIAGNOSIS — E78.5 DYSLIPIDEMIA, GOAL LDL BELOW 100: ICD-10-CM

## 2018-01-05 RX ORDER — ATORVASTATIN CALCIUM 10 MG/1
10 TABLET, FILM COATED ORAL DAILY
Qty: 30 TAB | Refills: 0 | Status: SHIPPED | OUTPATIENT
Start: 2018-01-05 | End: 2018-01-30 | Stop reason: SDUPTHER

## 2018-01-21 RX ORDER — PANTOPRAZOLE SODIUM 40 MG/1
TABLET, DELAYED RELEASE ORAL
Qty: 30 TAB | Refills: 0 | Status: SHIPPED | OUTPATIENT
Start: 2018-01-21 | End: 2018-01-29 | Stop reason: SDUPTHER

## 2018-01-29 RX ORDER — PANTOPRAZOLE SODIUM 40 MG/1
TABLET, DELAYED RELEASE ORAL
Qty: 30 TAB | Refills: 0 | Status: SHIPPED | OUTPATIENT
Start: 2018-01-29 | End: 2018-01-30 | Stop reason: SDUPTHER

## 2018-01-30 ENCOUNTER — OFFICE VISIT (OUTPATIENT)
Dept: INTERNAL MEDICINE CLINIC | Age: 63
End: 2018-01-30

## 2018-01-30 VITALS
TEMPERATURE: 97.9 F | HEART RATE: 69 BPM | BODY MASS INDEX: 24.46 KG/M2 | WEIGHT: 190.6 LBS | RESPIRATION RATE: 18 BRPM | SYSTOLIC BLOOD PRESSURE: 131 MMHG | OXYGEN SATURATION: 99 % | HEIGHT: 74 IN | DIASTOLIC BLOOD PRESSURE: 76 MMHG

## 2018-01-30 DIAGNOSIS — E78.5 DYSLIPIDEMIA, GOAL LDL BELOW 100: ICD-10-CM

## 2018-01-30 DIAGNOSIS — I10 ESSENTIAL HYPERTENSION: Primary | ICD-10-CM

## 2018-01-30 DIAGNOSIS — N52.1 ERECTILE DYSFUNCTION DUE TO DISEASES CLASSIFIED ELSEWHERE: ICD-10-CM

## 2018-01-30 DIAGNOSIS — Z12.11 COLON CANCER SCREENING: ICD-10-CM

## 2018-01-30 RX ORDER — SILDENAFIL CITRATE 100 MG/1
1 TABLET, FILM COATED ORAL AS NEEDED
Refills: 3 | COMMUNITY
Start: 2017-11-18 | End: 2018-01-30 | Stop reason: SDUPTHER

## 2018-01-30 RX ORDER — ATORVASTATIN CALCIUM 10 MG/1
10 TABLET, FILM COATED ORAL DAILY
Qty: 30 TAB | Refills: 5 | Status: SHIPPED | OUTPATIENT
Start: 2018-01-30 | End: 2018-08-07 | Stop reason: SDUPTHER

## 2018-01-30 RX ORDER — SILDENAFIL CITRATE 100 MG/1
100 TABLET, FILM COATED ORAL AS NEEDED
Qty: 15 TAB | Refills: 5 | Status: ON HOLD | OUTPATIENT
Start: 2018-01-30 | End: 2018-12-17

## 2018-01-30 RX ORDER — PANTOPRAZOLE SODIUM 40 MG/1
TABLET, DELAYED RELEASE ORAL
Qty: 30 TAB | Refills: 5 | Status: SHIPPED | OUTPATIENT
Start: 2018-01-30 | End: 2018-05-02 | Stop reason: SDUPTHER

## 2018-01-30 RX ORDER — LISINOPRIL 20 MG/1
TABLET ORAL
Qty: 30 TAB | Refills: 5 | Status: SHIPPED | OUTPATIENT
Start: 2018-01-30 | End: 2018-08-07 | Stop reason: SDUPTHER

## 2018-01-30 NOTE — MR AVS SNAPSHOT
[facility] 
 
 
 [address] [facility] 1007 [facility] 
[phone] Patient: Canelo Walker MRN: [id number] QFT:9/98/1537 Visit Information Date & Time Provider Department Dept. Phone Encounter #  
 1/30/2018  9:00 AM Yuri Penaloza MD [facility] [address]  [id number] Upcoming Health Maintenance Date Due Hepatitis C Screening 1955 COLONOSCOPY 9/11/1974 ZOSTER VACCINE AGE 60> 7/11/2016 Influenza Age 5 to Adult 8/1/2017 DTaP/Tdap/Td series (2 - Td) 10/17/2024 Allergies as of 1/30/2018  Review Complete On: 1/30/2018 By: Yuri Penaloza MD  
  
 Severity Noted Reaction Type Reactions Codeine  04/12/2012    Rash Current Immunizations  Reviewed on 1/30/2018 Name Date Influenza Vaccine (Quad) PF 11/29/2016 Influenza Vaccine PF 10/17/2014 Tdap 10/17/2014 Reviewed by Yuri Penaloza MD on 1/30/2018 at  9:10 AM  
You Were Diagnosed With   
  
 Codes Comments Erectile dysfunction due to diseases classified elsewhere    -  Primary ICD-10-CM: N52.1 ICD-9-CM: 607.84 Dyslipidemia, goal LDL below 100     ICD-10-CM: E78.5 ICD-9-CM: 272.4 Essential hypertension     ICD-10-CM: I10 
ICD-9-CM: 401.9 Colon cancer screening     ICD-10-CM: Z12.11 ICD-9-CM: V76.51 Vitals BP Pulse Temp Resp Height(growth percentile) Weight(growth percentile) 131/76 (BP 1 Location: Left arm, BP Patient Position: Sitting) 69 97.9 °F (36.6 °C) (Oral) 18 6' 2\" (1.88 m) 190 lb 9.6 oz (86.5 kg) SpO2 BMI Smoking Status 99% 24.47 kg/m2 Never Smoker Vitals History BMI and BSA Data Body Mass Index Body Surface Area  
 24.47 kg/m 2 2.13 m 2 Preferred Pharmacy Pharmacy Name Phone [facility] - [address] [phone] Your Updated Medication List  
  
   
 This list is accurate as of: 1/30/18  9:16 AM.  Always use your most recent med list. ADVIL PM PO Take  by mouth. ALPRAZolam 0.5 mg tablet Commonly known as:  XANAX  
TAKE 1 TABLET BY MOUTH EVERY 8 HOURS AS NEEDED FOR ANXIETY  
  
 atorvastatin 10 mg tablet Commonly known as:  LIPITOR Take 1 Tab by mouth daily. lisinopril 20 mg tablet Commonly known as:  PRINIVIL, ZESTRIL  
TAKE ONE TABLET BY MOUTH EVERY DAY  
  
 pantoprazole 40 mg tablet Commonly known as:  PROTONIX  
TAKE 1 TABLET BY MOUTH EVERY DAY  
  
 SUBOXONE 8-2 mg Film sublingaul film Generic drug:  buprenorphine-naloxone  
by SubLINGual route daily. Instructed to stop by Lianna Roque. testosterone cypionate 100 mg/mL injection 100 mg/mL by IntraMUSCular route every seven (7) days. Every Wednesday VIAGRA 100 mg tablet Generic drug:  sildenafil citrate Take 1 Tab by mouth as needed. Prescriptions Printed Refills VIAGRA 100 mg tablet 5 Sig: Take 1 Tab by mouth as needed. Class: Print Route: Oral  
  
Prescriptions Sent to Pharmacy Refills  
 pantoprazole (PROTONIX) 40 mg tablet 5 Sig: TAKE 1 TABLET BY MOUTH EVERY DAY Class: Normal  
 Pharmacy: [address] [place] [address] Ph #: [phone]  
 atorvastatin (LIPITOR) 10 mg tablet 5 Sig: Take 1 Tab by mouth daily. Class: Normal  
 Pharmacy: [address] Ph #: [phone] Route: Oral  
 lisinopril (PRINIVIL, ZESTRIL) 20 mg tablet 5 Sig: TAKE ONE TABLET BY MOUTH EVERY DAY Class: Normal  
 Pharmacy: [address] Ph #: [phone] We Performed the Following CBC WITH AUTOMATED DIFF [48604 CPT(R)] LIPID PANEL [75308 CPT(R)] METABOLIC PANEL, COMPREHENSIVE [88757 CPT(R)] PSA, DIAGNOSTIC (PROSTATE SPECIFIC AG) [id number] CPT(R)] REFERRAL TO GASTROENTEROLOGY [LRT41 Custom] Referral Information Referral ID Referred By Referred To  
  
 [id number] Mireya SANTOS. 41   
   [address]  [place], [address] [facility] Visits Status Start Date End Date 1 New Request 1/30/18 1/30/19 If your referral has a status of pending review or denied, additional information will be sent to support the outcome of this decision. Introducing [address]! Dear Jluis Kidney: Thank you for requesting a [employer] account. Our records indicate that you have previously registered for a SandLinks account but its currently inactive. Please call our SandLinks support line at [phone]. Additional Information If you have questions, please visit the Frequently Asked Questions section of the Sand[employer] website at [email or web address] Remember, SandLinks is NOT to be used for urgent needs. For medical emergencies, dial 911. Now available from your iPhone and Android! Please provide this summary of care documentation to your next provider. Your primary care clinician is listed as Lorene 68. If you have any questions after today's visit, please call [phone].

## 2018-01-30 NOTE — PROGRESS NOTES
HISTORY OF PRESENT ILLNESS  Salma Mendez is a 65 y.o. male. HPI  Follow up for med check. Still seeing Dr. Harley Grewal and Dr. Ugo Crane from urology. Sees me for Lisinopril and Lipitor. Has not had side effects from Lipitor 10. No myalgias. No cardiac symptoms. Due for labs. Goal LDL is below 100. Tolerates Lisinopril. No chronic cough. His blood pressure is in the 130s. Would like to try the Viagra again, did not feel Cialis was particularly effective. Did not have perceived side effects. Review of Systems   Constitutional: Negative for chills, fever and weight loss. Respiratory: Negative for cough, shortness of breath and wheezing. Cardiovascular: Negative for chest pain, palpitations, orthopnea, leg swelling and PND. Gastrointestinal: Negative for abdominal pain, constipation, diarrhea, heartburn and nausea. Musculoskeletal: Negative for myalgias. Neurological: Negative for dizziness and headaches. Physical Exam   Constitutional: He is oriented to person, place, and time. He appears well-developed and well-nourished. HENT:   Head: Normocephalic and atraumatic. Right Ear: External ear normal.   Left Ear: External ear normal.   Mouth/Throat: Oropharynx is clear and moist.   Neck: Normal range of motion. Neck supple. Carotid bruit is not present. No thyromegaly present. Cardiovascular: Normal rate, regular rhythm, normal heart sounds and intact distal pulses. Pulmonary/Chest: Effort normal and breath sounds normal. No respiratory distress. He has no wheezes. He has no rales. Musculoskeletal: He exhibits no edema. Neurological: He is alert and oriented to person, place, and time. Psychiatric: He has a normal mood and affect. His behavior is normal.   Nursing note and vitals reviewed. ASSESSMENT and PLAN  Diagnoses and all orders for this visit:    1. Erectile dysfunction due to diseases classified elsewhere  -     VIAGRA 100 mg tablet; Take 1 Tab by mouth as needed.   - PROSTATE SPECIFIC AG    2. Dyslipidemia, goal LDL below 100  -     atorvastatin (LIPITOR) 10 mg tablet; Take 1 Tab by mouth daily.  -     METABOLIC PANEL, COMPREHENSIVE  -     LIPID PANEL  -     CBC WITH AUTOMATED DIFF    3.  Essential hypertension  -     lisinopril (PRINIVIL, ZESTRIL) 20 mg tablet; TAKE ONE TABLET BY MOUTH EVERY DAY    4. Colon cancer screening  -     [facility]    Other orders  -     pantoprazole (PROTONIX) 40 mg tablet; TAKE 1 TABLET BY MOUTH EVERY DAY    aptp for well in 6 mo'  prevnar ordered

## 2018-01-31 LAB
ALBUMIN SERPL-MCNC: 4.6 G/DL (ref 3.6–4.8)
ALBUMIN/GLOB SERPL: 2.2 {RATIO} (ref 1.2–2.2)
ALP SERPL-CCNC: 60 IU/L (ref 39–117)
ALT SERPL-CCNC: 12 IU/L (ref 0–44)
AST SERPL-CCNC: 16 IU/L (ref 0–40)
BASOPHILS # BLD AUTO: 0 X10E3/UL (ref 0–0.2)
BASOPHILS NFR BLD AUTO: 0 %
BILIRUB SERPL-MCNC: 0.6 MG/DL (ref 0–1.2)
BUN SERPL-MCNC: 18 MG/DL (ref 8–27)
BUN/CREAT SERPL: 18 (ref 10–24)
CALCIUM SERPL-MCNC: 9.4 MG/DL (ref 8.6–10.2)
CHLORIDE SERPL-SCNC: 98 MMOL/L (ref 96–106)
CHOLEST SERPL-MCNC: 159 MG/DL (ref 100–199)
CO2 SERPL-SCNC: 29 MMOL/L (ref 18–29)
CREAT SERPL-MCNC: 1.02 MG/DL (ref 0.76–1.27)
EOSINOPHIL # BLD AUTO: 0.2 X10E3/UL (ref 0–0.4)
EOSINOPHIL NFR BLD AUTO: 3 %
ERYTHROCYTE [DISTWIDTH] IN BLOOD BY AUTOMATED COUNT: 13.8 % (ref 12.3–15.4)
GFR SERPLBLD CREATININE-BSD FMLA CKD-EPI: 79 ML/MIN/1.73
GFR SERPLBLD CREATININE-BSD FMLA CKD-EPI: 91 ML/MIN/1.73
GLOBULIN SER CALC-MCNC: 2.1 G/DL (ref 1.5–4.5)
GLUCOSE SERPL-MCNC: 89 MG/DL (ref 65–99)
HCT VFR BLD AUTO: 48.7 % (ref 37.5–51)
HDLC SERPL-MCNC: 45 MG/DL
HGB BLD-MCNC: 15.8 G/DL (ref 13–17.7)
IMM GRANULOCYTES # BLD: 0 X10E3/UL (ref 0–0.1)
IMM GRANULOCYTES NFR BLD: 0 %
INTERPRETATION, 910389: NORMAL
LDLC SERPL CALC-MCNC: 92 MG/DL (ref 0–99)
LYMPHOCYTES # BLD AUTO: 1.2 X10E3/UL (ref 0.7–3.1)
LYMPHOCYTES NFR BLD AUTO: 24 %
MCH RBC QN AUTO: 29 PG (ref 26.6–33)
MCHC RBC AUTO-ENTMCNC: 32.4 G/DL (ref 31.5–35.7)
MCV RBC AUTO: 89 FL (ref 79–97)
MONOCYTES # BLD AUTO: 0.5 X10E3/UL (ref 0.1–0.9)
MONOCYTES NFR BLD AUTO: 11 %
NEUTROPHILS # BLD AUTO: 3.1 X10E3/UL (ref 1.4–7)
NEUTROPHILS NFR BLD AUTO: 62 %
PLATELET # BLD AUTO: 214 X10E3/UL (ref 150–379)
POTASSIUM SERPL-SCNC: 4.8 MMOL/L (ref 3.5–5.2)
PROT SERPL-MCNC: 6.7 G/DL (ref 6–8.5)
PSA SERPL-MCNC: 1.1 NG/ML (ref 0–4)
RBC # BLD AUTO: 5.45 X10E6/UL (ref 4.14–5.8)
SODIUM SERPL-SCNC: 141 MMOL/L (ref 134–144)
TRIGL SERPL-MCNC: 109 MG/DL (ref 0–149)
VLDLC SERPL CALC-MCNC: 22 MG/DL (ref 5–40)
WBC # BLD AUTO: 5 X10E3/UL (ref 3.4–10.8)

## 2018-02-26 ENCOUNTER — HOSPITAL ENCOUNTER (OUTPATIENT)
Dept: MRI IMAGING | Age: 63
Discharge: HOME | End: 2018-02-26
Attending: ORTHOPAEDIC SURGERY
Payer: COMMERCIAL

## 2018-02-26 DIAGNOSIS — M75.122 COMPLETE ROTATOR CUFF TEAR OF LEFT SHOULDER: ICD-10-CM

## 2018-02-26 PROCEDURE — 73221 MRI JOINT UPR EXTREM W/O DYE: CPT

## 2018-03-21 DIAGNOSIS — F41.9 ANXIETY: Primary | ICD-10-CM

## 2018-03-21 RX ORDER — ALPRAZOLAM 0.5 MG/1
TABLET ORAL
Qty: 20 TAB | Refills: 0 | OUTPATIENT
Start: 2018-03-21 | End: 2018-06-11 | Stop reason: SDUPTHER

## 2018-04-13 ENCOUNTER — HOSPITAL ENCOUNTER (EMERGENCY)
Age: 63
Discharge: LEFT BEFORE DISCHARGE | End: 2018-04-13
Attending: EMERGENCY MEDICINE
Payer: COMMERCIAL

## 2018-04-13 VITALS
HEIGHT: 73 IN | RESPIRATION RATE: 26 BRPM | HEART RATE: 90 BPM | OXYGEN SATURATION: 100 % | DIASTOLIC BLOOD PRESSURE: 83 MMHG | SYSTOLIC BLOOD PRESSURE: 166 MMHG | BODY MASS INDEX: 25.18 KG/M2 | WEIGHT: 190 LBS

## 2018-04-13 LAB
ALBUMIN SERPL-MCNC: 4.1 G/DL (ref 3.5–5)
ALBUMIN/GLOB SERPL: 1.4 {RATIO} (ref 1.1–2.2)
ALP SERPL-CCNC: 57 U/L (ref 45–117)
ALT SERPL-CCNC: 24 U/L (ref 12–78)
ANION GAP SERPL CALC-SCNC: 6 MMOL/L (ref 5–15)
AST SERPL-CCNC: 17 U/L (ref 15–37)
BASOPHILS # BLD: 0 K/UL (ref 0–0.1)
BASOPHILS NFR BLD: 0 % (ref 0–1)
BILIRUB SERPL-MCNC: 0.8 MG/DL (ref 0.2–1)
BUN SERPL-MCNC: 23 MG/DL (ref 6–20)
BUN/CREAT SERPL: 20 (ref 12–20)
CALCIUM SERPL-MCNC: 9 MG/DL (ref 8.5–10.1)
CHLORIDE SERPL-SCNC: 108 MMOL/L (ref 97–108)
CO2 SERPL-SCNC: 26 MMOL/L (ref 21–32)
CREAT SERPL-MCNC: 1.14 MG/DL (ref 0.7–1.3)
DIFFERENTIAL METHOD BLD: NORMAL
EOSINOPHIL # BLD: 0.1 K/UL (ref 0–0.4)
EOSINOPHIL NFR BLD: 1 % (ref 0–7)
ERYTHROCYTE [DISTWIDTH] IN BLOOD BY AUTOMATED COUNT: 12.6 % (ref 11.5–14.5)
GLOBULIN SER CALC-MCNC: 2.9 G/DL (ref 2–4)
GLUCOSE SERPL-MCNC: 131 MG/DL (ref 65–100)
HCT VFR BLD AUTO: 43.3 % (ref 36.6–50.3)
HGB BLD-MCNC: 14.8 G/DL (ref 12.1–17)
IMM GRANULOCYTES # BLD: 0 K/UL (ref 0–0.04)
IMM GRANULOCYTES NFR BLD AUTO: 0 % (ref 0–0.5)
LYMPHOCYTES # BLD: 1.2 K/UL (ref 0.8–3.5)
LYMPHOCYTES NFR BLD: 23 % (ref 12–49)
MCH RBC QN AUTO: 30.3 PG (ref 26–34)
MCHC RBC AUTO-ENTMCNC: 34.2 G/DL (ref 30–36.5)
MCV RBC AUTO: 88.7 FL (ref 80–99)
MONOCYTES # BLD: 0.5 K/UL (ref 0–1)
MONOCYTES NFR BLD: 9 % (ref 5–13)
NEUTS SEG # BLD: 3.6 K/UL (ref 1.8–8)
NEUTS SEG NFR BLD: 67 % (ref 32–75)
NRBC # BLD: 0 K/UL (ref 0–0.01)
NRBC BLD-RTO: 0 PER 100 WBC
PLATELET # BLD AUTO: 175 K/UL (ref 150–400)
PMV BLD AUTO: 9.3 FL (ref 8.9–12.9)
POTASSIUM SERPL-SCNC: 4 MMOL/L (ref 3.5–5.1)
PROT SERPL-MCNC: 7 G/DL (ref 6.4–8.2)
RBC # BLD AUTO: 4.88 M/UL (ref 4.1–5.7)
SODIUM SERPL-SCNC: 140 MMOL/L (ref 136–145)
TROPONIN I SERPL-MCNC: <0.04 NG/ML
WBC # BLD AUTO: 5.4 K/UL (ref 4.1–11.1)

## 2018-04-13 PROCEDURE — 75810000275 HC EMERGENCY DEPT VISIT NO LEVEL OF CARE

## 2018-04-13 PROCEDURE — 85025 COMPLETE CBC W/AUTO DIFF WBC: CPT | Performed by: EMERGENCY MEDICINE

## 2018-04-13 PROCEDURE — 84484 ASSAY OF TROPONIN QUANT: CPT | Performed by: EMERGENCY MEDICINE

## 2018-04-13 PROCEDURE — 99282 EMERGENCY DEPT VISIT SF MDM: CPT

## 2018-04-13 PROCEDURE — 93005 ELECTROCARDIOGRAM TRACING: CPT

## 2018-04-13 PROCEDURE — 80053 COMPREHEN METABOLIC PANEL: CPT | Performed by: EMERGENCY MEDICINE

## 2018-04-13 PROCEDURE — 36415 COLL VENOUS BLD VENIPUNCTURE: CPT | Performed by: EMERGENCY MEDICINE

## 2018-04-13 NOTE — ED NOTES
States he takes suboxone and then drank Mylanta. Oziel Lowe if that could have caused this reaction.

## 2018-04-14 LAB
ATRIAL RATE: 80 BPM
CALCULATED P AXIS, ECG09: 24 DEGREES
CALCULATED R AXIS, ECG10: 6 DEGREES
CALCULATED T AXIS, ECG11: 16 DEGREES
DIAGNOSIS, 93000: NORMAL
P-R INTERVAL, ECG05: 162 MS
Q-T INTERVAL, ECG07: 358 MS
QRS DURATION, ECG06: 96 MS
QTC CALCULATION (BEZET), ECG08: 412 MS
VENTRICULAR RATE, ECG03: 80 BPM

## 2018-04-14 NOTE — ED NOTES
Patient left after triage, but before being seen. Charge RN aware. Patient states he had a family emergency and has to leave. Patient states he will follow up. Patient advised to return to the ED if symptoms persist or get worse. Patient verbalized understanding.

## 2018-04-16 ENCOUNTER — PATIENT OUTREACH (OUTPATIENT)
Dept: OTHER | Age: 63
End: 2018-04-16

## 2018-04-16 ENCOUNTER — TELEPHONE (OUTPATIENT)
Dept: INTERNAL MEDICINE CLINIC | Age: 63
End: 2018-04-16

## 2018-04-16 NOTE — PROGRESS NOTES
RUPA NOTE:     Mr. Joel Mark  has been contacted regarding recent ED visit  abd pain with sweats. Verified  and address for HIPAA security. Explained role and verified PCP. No Discharge medications were prescribed/  Reviewed med list with the patient by telephone. * Mr. Joel Mark remembers my voice from FU after rotator cuff repair. * Mr. Joel Mark states that he has monthly visits for Suboxone monitoring.    - No history of kidney or liver trouble/  Labs have been WNL. - Discussed working outside may influence dehydration with Suboxone use as contributing factor.     - Encouraged FU visit with PCP / monitoring BW.     - Raised awareness of need to hydrate. S/Sx of dehydration. * His understanding of his reaction was anxiety- with no Xanax available at that time. - He has supply available/ but didn't have it with him. * His wife works at PCP office/   Verified that PCP is aware of suboxone use. (not listed in last apt.)   Creatinine level in . with GFR 79.      - Encouraged FU apt/  He will call wife to arrange. - Reviewed importance of FU BW to monitor kidney an liver function. * Offered ongoing CM for care coordination.    - He declines at this time. He depends on his wife to assist with care coordination needs. Date of  Discharge:    18   Facility patient visited:   [facility]   Reason for Visit:   abd pain/  Sweats & Chills   Reviewed scripts given at [facility]? NA   Able to obtain prescribed medication? NA   How is the patient feeling? Pt stated he is doing much better this morning. Does the patient have any questions or problems? Not at this time   Any barriers with transportation? no   Follow-up Appointment date:  No - says he will call this morning. Reviewed Discharge instructions: & Red Flags:   Use of prescription medications/ Dehydration/; concerns of kidney and liver function monitoring with prescriptions.       I advised the patient to bring her medications in the original bottles and to seek emergency care if symptoms return before scheduled appointment. Offered to assist patient in scheduling a follow up with PCP and the patient declined at this time. Patient states he will do this himself but thanked me for offering to help. Will ask his wife if this is needed. Encouraged at least FU BW.       Provided patient with my name and contact information and instructed patient if there are any question to call. Advised that other people are on our team and follow up calls may be with myself or other staff. No further needs at this time. ED  4/13    ED Notes - ED Notes   ED Notes by Elvira Hernandez RN at 04/13/18 1930      States he takes suboxone and then drank Mylanta. Nathalie Rabago if that could have caused this reaction. ED Triage Notes - ED Notes   ED Triage Notes by Elvira Hernandez RN at 04/13/18 1925      C/o sudden onset sweating and cold chills this afternoon, followed by SOB and sharp abdominal pain. METABOLIC PANEL, COMPREHENSIVE [[id number]] (Abnormal) Collected: 04/13/18 1948   Order Status: Completed Specimen: Serum from Serum or Plasma Updated: 04/13/18 2030    Sodium 140 mmol/L     Potassium 4.0 mmol/L     Chloride 108 mmol/L     CO2 26 mmol/L     Anion gap 6 mmol/L     Glucose 131 (H) mg/dL     BUN 23 (H) MG/DL     Creatinine 1.14 MG/DL     BUN/Creatinine ratio 20       GFR est AA >60 ml/min/1.73m2     GFR est non-AA >60 ml/min/1.73m2      ASSESSMENT and PLAN  Diagnoses and all orders for this visit:     1. Erectile dysfunction due to diseases classified elsewhere  -     VIAGRA 100 mg tablet; Take 1 Tab by mouth as needed. -     PROSTATE SPECIFIC AG     2. Dyslipidemia, goal LDL below 100  -     atorvastatin (LIPITOR) 10 mg tablet;  Take 1 Tab by mouth daily.  -     METABOLIC PANEL, COMPREHENSIVE  -     LIPID PANEL  -     CBC WITH AUTOMATED DIFF     3. Essential hypertension  -     lisinopril (PRINIVIL, ZESTRIL) 20 mg tablet; TAKE ONE TABLET BY MOUTH EVERY DAY     4. Colon cancer screening  -     Alec [facility]     Other orders  -     pantoprazole (PROTONIX) 40 mg tablet; TAKE 1 TABLET BY MOUTH EVERY DAY

## 2018-04-16 NOTE — TELEPHONE ENCOUNTER
Please review Lab results from [facility] er last Friday, Received a call from Nurse Navigator regarding Kidney function Today. Who do you recommend for Colonoscopy?   You may reach patient @ cell number

## 2018-04-16 NOTE — TELEPHONE ENCOUNTER
Per PCP patient needs an appt in office for his ED f/u. Labs will be discussed then. Please assist with appt.

## 2018-04-23 ENCOUNTER — PATIENT OUTREACH (OUTPATIENT)
Dept: OTHER | Age: 63
End: 2018-04-23

## 2018-04-23 RX ORDER — PANTOPRAZOLE SODIUM 40 MG/1
TABLET, DELAYED RELEASE ORAL
Qty: 30 TAB | Refills: 0 | Status: SHIPPED | OUTPATIENT
Start: 2018-04-23 | End: 2018-05-02 | Stop reason: SDUPTHER

## 2018-04-23 RX ORDER — TADALAFIL 10 MG
TABLET ORAL
Qty: 8 TAB | Refills: 3 | Status: ON HOLD | OUTPATIENT
Start: 2018-04-23 | End: 2018-06-08

## 2018-04-23 NOTE — PROGRESS NOTES
RUPA follow up. Patient with ED visit  abd pain with sweats thought to be caused by anxiety. Chart review:  PCP appointment . No new lab work. Attempted to contact patient for transitions of care services. SW his wife, who verified  and Address. Mr. Baylee Metcalf has given permission to SW his wife in prior calls. * Discussion is brief and not condition specific for HIPAA purposes. * Ms. Baylee Metcalf states that her  is doing well. * Has FU apt for  with PCP.     - She notes he will get FU BW.    * Left my contact information for him to contact me if needed. Will follow for [facility] services.

## 2018-05-02 ENCOUNTER — OFFICE VISIT (OUTPATIENT)
Dept: INTERNAL MEDICINE CLINIC | Age: 63
End: 2018-05-02

## 2018-05-02 VITALS
BODY MASS INDEX: 25.18 KG/M2 | RESPIRATION RATE: 16 BRPM | SYSTOLIC BLOOD PRESSURE: 130 MMHG | HEIGHT: 73 IN | TEMPERATURE: 98 F | WEIGHT: 190 LBS | OXYGEN SATURATION: 98 % | DIASTOLIC BLOOD PRESSURE: 82 MMHG | HEART RATE: 75 BPM

## 2018-05-02 DIAGNOSIS — F41.0 PANIC ATTACK: ICD-10-CM

## 2018-05-02 DIAGNOSIS — K21.9 GASTROESOPHAGEAL REFLUX DISEASE WITHOUT ESOPHAGITIS: ICD-10-CM

## 2018-05-02 DIAGNOSIS — I10 HTN, GOAL BELOW 130/80: ICD-10-CM

## 2018-05-02 DIAGNOSIS — R73.09 ELEVATED GLUCOSE: Primary | ICD-10-CM

## 2018-05-02 RX ORDER — PANTOPRAZOLE SODIUM 40 MG/1
TABLET, DELAYED RELEASE ORAL
Qty: 30 TAB | Refills: 5 | Status: SHIPPED | OUTPATIENT
Start: 2018-05-02 | End: 2018-11-14 | Stop reason: SDUPTHER

## 2018-05-02 NOTE — PROGRESS NOTES
HISTORY OF PRESENT ILLNESS  Arcola Dakin is a 65 y.o. male. HPI  Alejandra Flood went on April 13th to [address] [place] [address] with an episode of abdominal pain. He'd gotten sweaty. He thinks it may have been a panic episode and did not have Xanax available at work. He did not have chest pain or shortness of breath or fevers. He's not had recurrent symptoms. His troponins and chemistries were normal.  EKG was normal.  Glucose was 131. Does have a family history of diabetes and will do an A1c. He's not yet had a colonoscopy and will refer for this. He notes that Xanax, even just a small bite, really helps with panic episodes. He's now feeling well and has had no further belly symptoms. Review of Systems   Constitutional: Negative for chills, fever, malaise/fatigue and weight loss. Respiratory: Negative for cough, shortness of breath and wheezing. Cardiovascular: Negative for chest pain, palpitations, orthopnea, leg swelling and PND. Gastrointestinal: Positive for abdominal pain. Negative for heartburn, nausea and vomiting. Musculoskeletal: Negative for myalgias. Neurological: Negative for dizziness and headaches. Psychiatric/Behavioral: The patient is nervous/anxious. Physical Exam   Constitutional: He is oriented to person, place, and time. He appears well-developed and well-nourished. HENT:   Head: Normocephalic and atraumatic. Neck: Normal range of motion. Neck supple. Carotid bruit is not present. No thyromegaly present. Cardiovascular: Normal rate, regular rhythm, normal heart sounds and intact distal pulses. Pulmonary/Chest: Effort normal and breath sounds normal. No respiratory distress. He has no wheezes. He has no rales. Abdominal: Soft. Bowel sounds are normal. He exhibits no distension and no mass. There is no tenderness. There is no rebound and no guarding. Musculoskeletal: He exhibits no edema. Neurological: He is alert and oriented to person, place, and time.    Skin: No rash noted.   Psychiatric: He has a normal mood and affect. His behavior is normal.   Nursing note and vitals reviewed. ASSESSMENT and PLAN  Diagnoses and all orders for this visit:    1. Elevated glucose  -     HEPATITIS C AB  -     HEMOGLOBIN A1C WITH EAG  -     METABOLIC PANEL, COMPREHENSIVE    2. Gastroesophageal reflux disease without esophagitis-cont ppi     3.  Panic attack-cont prn xanax  -     pantoprazole (PROTONIX) 40 mg tablet; TAKE 1 TABLET BY MOUTH DAILY    4. HTN, goal below 130/80  Cont lisinopril  Refer for colonoscopy

## 2018-05-02 NOTE — MR AVS SNAPSHOT
[facility] 
 
 
 [address] [place] 1007 [facility] 
[phone] Patient: Estefani Feng MRN: [id number] OBI:9[birth year] Visit Information Date & Time Provider Department Dept. Phone Encounter #  
 5/2/2018  9:00 AM Erika Rosales MD [facility] [address] [id number] Upcoming Health Maintenance Date Due Hepatitis C Screening 1955 COLONOSCOPY 9/11/1974 ZOSTER VACCINE AGE 60> 7/11/2016 Influenza Age 5 to Adult 8/1/2018 DTaP/Tdap/Td series (2 - Td) 10/17/2024 Allergies as of 5/2/2018  Review Complete On: 5/2/2018 By: Erika Rosales MD  
  
 Severity Noted Reaction Type Reactions Codeine  04/12/2012    Rash Current Immunizations  Reviewed on 1/30/2018 Name Date Influenza Vaccine (Quad) PF 11/29/2016 Influenza Vaccine PF 10/17/2014 Tdap 10/17/2014 Not reviewed this visit You Were Diagnosed With   
  
 Codes Comments Elevated glucose    -  Primary ICD-10-CM: R73.09 
ICD-9-CM: 790.29 Gastroesophageal reflux disease without esophagitis     ICD-10-CM: K21.9 ICD-9-CM: 530.81 Panic attack     ICD-10-CM: F41.0 ICD-9-CM: 300.01   
 HTN, goal below 130/80     ICD-10-CM: I10 
ICD-9-CM: 401.9 Vitals BP Pulse Temp Resp Height(growth percentile) Weight(growth percentile) 130/82 (BP 1 Location: Left arm, BP Patient Position: Sitting) 75 98 °F (36.7 °C) (Oral) 16 6' 1\" (1.854 m) 190 lb (86.2 kg) SpO2 BMI Smoking Status 98% 25.07 kg/m2 Never Smoker Vitals History BMI and BSA Data Body Mass Index Body Surface Area 25.07 kg/m 2 2.11 m 2 Preferred Pharmacy Pharmacy Name Phone [facility]  [phone] Your Updated Medication List  
  
   
This list is accurate as of 5/2/18  9:19 AM.  Always use your most recent med list.  ADVIL PM PO  
 Take  by mouth. ALPRAZolam 0.5 mg tablet Commonly known as:  XANAX  
TAKE 1 TABLET BY MOUTH EVERY 8 HOURS AS NEEDED FOR ANXIETY  
  
 atorvastatin 10 mg tablet Commonly known as:  LIPITOR Take 1 Tab by mouth daily. CIALIS 10 mg tablet Generic drug:  tadalafil TAKE 1 TAB BY MOUTH DAILY. lisinopril 20 mg tablet Commonly known as:  PRINIVIL, ZESTRIL  
TAKE ONE TABLET BY MOUTH EVERY DAY  
  
 pantoprazole 40 mg tablet Commonly known as:  PROTONIX  
TAKE 1 TABLET BY MOUTH DAILY SUBOXONE 8-2 mg Film sublingaul film Generic drug:  buprenorphine-naloxone  
by SubLINGual route daily. Instructed to stop by Koleen Maninder. testosterone cypionate 100 mg/mL injection 100 mg/mL by IntraMUSCular route every seven (7) days. Every Wednesday VIAGRA 100 mg tablet Generic drug:  sildenafil citrate Take 1 Tab by mouth as needed. Prescriptions Sent to Pharmacy Refills  
 pantoprazole (PROTONIX) 40 mg tablet 5 Sig: TAKE 1 TABLET BY MOUTH DAILY Class: Normal  
 Pharmacy: [address] #: [phone] We Performed the Following HEMOGLOBIN A1C WITH EAG [05654 CPT(R)] HEPATITIS C AB [00970 CPT(R)] METABOLIC PANEL, COMPREHENSIVE [68378 CPT(R)] Introducing [facility]! [facility] introduces [employer]o patient portal. Now you can access parts of your medical record, email your doctor's office, and request medication refills online. 1. In your internet browser, go to [email or web address] 2. Click on the First Time User? Click Here link in the Sign In box. You will see the New Member Sign Up page. 3. Enter your Salezeo Access Code exactly as it appears below. You will not need to use this code after youve completed the sign-up process. If you do not sign up before the expiration date, you must request a new code. · Shozu Access Code: [email or web address] Expires: 7/12/2018  7:24 PM 
 
4. Enter the last four digits of your Social Security Number (xxxx) and Date of Birth (mm/dd/yyyy) as indicated and click Submit. You will be taken to the next sign-up page. 5. Create a Shozu ID. This will be your Shozu login ID and cannot be changed, so think of one that is secure and easy to remember. 6. Create a Shozu password. You can change your password at any time. 7. Enter your Password Reset Question and Answer. This can be used at a later time if you forget your password. 8. Enter your e-mail address. You will receive e-mail notification when new information is available in [address] 9. Click Sign Up. You can now view and download portions of your medical record. 10. Click the Download Summary menu link to download a portable copy of your medical information. If you have questions, please visit the Frequently Asked Questions section of the Shozu website. Remember, Shozu is NOT to be used for urgent needs. For medical emergencies, dial 911. Now available from your iPhone and Android! Please provide this summary of care documentation to your next provider. Your primary care clinician is listed as Lorene 68. If you have any questions after today's visit, please call [phone].

## 2018-05-15 LAB
ALBUMIN SERPL-MCNC: 4.3 G/DL (ref 3.6–4.8)
ALBUMIN/GLOB SERPL: 2.3 {RATIO} (ref 1.2–2.2)
ALP SERPL-CCNC: 48 IU/L (ref 39–117)
ALT SERPL-CCNC: 21 IU/L (ref 0–44)
AST SERPL-CCNC: 16 IU/L (ref 0–40)
BILIRUB SERPL-MCNC: 0.8 MG/DL (ref 0–1.2)
BUN SERPL-MCNC: 21 MG/DL (ref 8–27)
BUN/CREAT SERPL: 18 (ref 10–24)
CALCIUM SERPL-MCNC: 8.9 MG/DL (ref 8.6–10.2)
CHLORIDE SERPL-SCNC: 103 MMOL/L (ref 96–106)
CO2 SERPL-SCNC: 26 MMOL/L (ref 18–29)
CREAT SERPL-MCNC: 1.14 MG/DL (ref 0.76–1.27)
EST. AVERAGE GLUCOSE BLD GHB EST-MCNC: 105 MG/DL
GFR SERPLBLD CREATININE-BSD FMLA CKD-EPI: 69 ML/MIN/1.73
GFR SERPLBLD CREATININE-BSD FMLA CKD-EPI: 80 ML/MIN/1.73
GLOBULIN SER CALC-MCNC: 1.9 G/DL (ref 1.5–4.5)
GLUCOSE SERPL-MCNC: 89 MG/DL (ref 65–99)
HBA1C MFR BLD: 5.3 % (ref 4.8–5.6)
HCV AB S/CO SERPL IA: <0.1 S/CO RATIO (ref 0–0.9)
POTASSIUM SERPL-SCNC: 4.8 MMOL/L (ref 3.5–5.2)
PROT SERPL-MCNC: 6.2 G/DL (ref 6–8.5)
SODIUM SERPL-SCNC: 143 MMOL/L (ref 134–144)

## 2018-05-17 ENCOUNTER — PATIENT OUTREACH (OUTPATIENT)
Dept: OTHER | Age: 63
End: 2018-05-17

## 2018-05-17 NOTE — PROGRESS NOTES
RUPA  Wrap Up  Resolving current episode (Transitions of care complete). No further ED/UC or hospital admissions within 30 days post discharge. Patient attended follow-up appointments as directed. Final attempt to contact patient for transitions of care. Left discreet message on voicemail with this CM contact information. Per prior calls:    * Patient offered further CCM - he is not interested at this time. * Open for further RUPA calls in the future. No outreach from patient to [address] [place] [address].

## 2018-06-08 ENCOUNTER — ANESTHESIA EVENT (OUTPATIENT)
Dept: ENDOSCOPY | Age: 63
End: 2018-06-08
Payer: COMMERCIAL

## 2018-06-08 ENCOUNTER — HOSPITAL ENCOUNTER (OUTPATIENT)
Age: 63
Setting detail: OUTPATIENT SURGERY
Discharge: HOME | End: 2018-06-08
Attending: INTERNAL MEDICINE | Admitting: INTERNAL MEDICINE
Payer: COMMERCIAL

## 2018-06-08 ENCOUNTER — ANESTHESIA (OUTPATIENT)
Dept: ENDOSCOPY | Age: 63
End: 2018-06-08
Payer: COMMERCIAL

## 2018-06-08 VITALS
RESPIRATION RATE: 16 BRPM | HEART RATE: 74 BPM | TEMPERATURE: 98.3 F | OXYGEN SATURATION: 98 % | WEIGHT: 190 LBS | DIASTOLIC BLOOD PRESSURE: 82 MMHG | SYSTOLIC BLOOD PRESSURE: 145 MMHG | HEIGHT: 73 IN | BODY MASS INDEX: 25.18 KG/M2

## 2018-06-08 PROCEDURE — 74011000250 HC RX REV CODE- 250

## 2018-06-08 PROCEDURE — 74011250636 HC RX REV CODE- 250/636: Performed by: INTERNAL MEDICINE

## 2018-06-08 PROCEDURE — 88305 TISSUE EXAM BY PATHOLOGIST: CPT | Performed by: INTERNAL MEDICINE

## 2018-06-08 PROCEDURE — 77030009426 HC FCPS BIOP ENDOSC BSC -B: Performed by: INTERNAL MEDICINE

## 2018-06-08 PROCEDURE — 76040000019: Performed by: INTERNAL MEDICINE

## 2018-06-08 PROCEDURE — 77030013992 HC SNR POLYP ENDOSC BSC -B: Performed by: INTERNAL MEDICINE

## 2018-06-08 PROCEDURE — 74011250636 HC RX REV CODE- 250/636

## 2018-06-08 PROCEDURE — 77030011640 HC PAD GRND REM COVD -A: Performed by: INTERNAL MEDICINE

## 2018-06-08 PROCEDURE — 76060000031 HC ANESTHESIA FIRST 0.5 HR: Performed by: INTERNAL MEDICINE

## 2018-06-08 RX ORDER — FLUMAZENIL 0.1 MG/ML
0.2 INJECTION INTRAVENOUS
Status: DISCONTINUED | OUTPATIENT
Start: 2018-06-08 | End: 2018-06-08 | Stop reason: HOSPADM

## 2018-06-08 RX ORDER — PROPOFOL 10 MG/ML
INJECTION, EMULSION INTRAVENOUS AS NEEDED
Status: DISCONTINUED | OUTPATIENT
Start: 2018-06-08 | End: 2018-06-08 | Stop reason: HOSPADM

## 2018-06-08 RX ORDER — ATROPINE SULFATE 0.1 MG/ML
0.4 INJECTION INTRAVENOUS
Status: DISCONTINUED | OUTPATIENT
Start: 2018-06-08 | End: 2018-06-08 | Stop reason: HOSPADM

## 2018-06-08 RX ORDER — NALOXONE HYDROCHLORIDE 0.4 MG/ML
0.4 INJECTION, SOLUTION INTRAMUSCULAR; INTRAVENOUS; SUBCUTANEOUS
Status: DISCONTINUED | OUTPATIENT
Start: 2018-06-08 | End: 2018-06-08 | Stop reason: HOSPADM

## 2018-06-08 RX ORDER — SODIUM CHLORIDE 9 MG/ML
50 INJECTION, SOLUTION INTRAVENOUS CONTINUOUS
Status: DISCONTINUED | OUTPATIENT
Start: 2018-06-08 | End: 2018-06-08 | Stop reason: HOSPADM

## 2018-06-08 RX ORDER — PROPOFOL 10 MG/ML
INJECTION, EMULSION INTRAVENOUS
Status: DISCONTINUED | OUTPATIENT
Start: 2018-06-08 | End: 2018-06-08 | Stop reason: HOSPADM

## 2018-06-08 RX ORDER — LIDOCAINE HYDROCHLORIDE 20 MG/ML
INJECTION, SOLUTION EPIDURAL; INFILTRATION; INTRACAUDAL; PERINEURAL AS NEEDED
Status: DISCONTINUED | OUTPATIENT
Start: 2018-06-08 | End: 2018-06-08 | Stop reason: HOSPADM

## 2018-06-08 RX ORDER — EPINEPHRINE 0.1 MG/ML
1 INJECTION INTRACARDIAC; INTRAVENOUS
Status: DISCONTINUED | OUTPATIENT
Start: 2018-06-08 | End: 2018-06-08 | Stop reason: HOSPADM

## 2018-06-08 RX ORDER — MIDAZOLAM HYDROCHLORIDE 1 MG/ML
.25-5 INJECTION, SOLUTION INTRAMUSCULAR; INTRAVENOUS
Status: DISCONTINUED | OUTPATIENT
Start: 2018-06-08 | End: 2018-06-08 | Stop reason: HOSPADM

## 2018-06-08 RX ORDER — DEXTROMETHORPHAN/PSEUDOEPHED 2.5-7.5/.8
1.2 DROPS ORAL
Status: DISCONTINUED | OUTPATIENT
Start: 2018-06-08 | End: 2018-06-08 | Stop reason: HOSPADM

## 2018-06-08 RX ADMIN — PROPOFOL 20 MG: 10 INJECTION, EMULSION INTRAVENOUS at 09:47

## 2018-06-08 RX ADMIN — PROPOFOL 20 MG: 10 INJECTION, EMULSION INTRAVENOUS at 09:54

## 2018-06-08 RX ADMIN — PROPOFOL 120 MCG/KG/MIN: 10 INJECTION, EMULSION INTRAVENOUS at 09:45

## 2018-06-08 RX ADMIN — PROPOFOL 40 MG: 10 INJECTION, EMULSION INTRAVENOUS at 09:46

## 2018-06-08 RX ADMIN — SODIUM CHLORIDE 50 ML/HR: 9 INJECTION, SOLUTION INTRAVENOUS at 09:13

## 2018-06-08 RX ADMIN — PROPOFOL 40 MG: 10 INJECTION, EMULSION INTRAVENOUS at 09:51

## 2018-06-08 RX ADMIN — LIDOCAINE HYDROCHLORIDE 80 MG: 20 INJECTION, SOLUTION EPIDURAL; INFILTRATION; INTRACAUDAL; PERINEURAL at 09:45

## 2018-06-08 RX ADMIN — PROPOFOL 40 MG: 10 INJECTION, EMULSION INTRAVENOUS at 09:45

## 2018-06-08 RX ADMIN — PROPOFOL 20 MG: 10 INJECTION, EMULSION INTRAVENOUS at 09:50

## 2018-06-08 NOTE — ROUTINE PROCESS
Tanya Kerrie  1955  [id number]    Situation:  Verbal report received from: Melanie Moon RN  Procedure: Procedure(s):  COLONOSCOPY  ENDOSCOPIC POLYPECTOMY    Background:    Preoperative diagnosis: FAMILY HISTORY POLYPS, SCREENING  Postoperative diagnosis: Diverticulosis, hemorrhoids, polyp.      :  Dr. Hong Pavon  Assistant(s): Endoscopy Technician-1: Alberta Sena  Endoscopy RN-1: Melanie Moon RN    Specimens:   ID Type Source Tests Collected by Time Destination   1 : rectal polyp Preservative Rectum  Bernadette Clifford MD 6/8/2018 1735 Pathology     H. Pylori  no    Assessment:  Intra-procedure medications   Anesthesia gave intra-procedure sedation and medications, see anesthesia flow sheet yes    Intravenous fluids: NS@ KVO     Vital signs stable     Abdominal assessment: round and soft     Recommendation:  Discharge patient per MD order  Family or Friend   Permission to share finding with family or friend yes

## 2018-06-08 NOTE — DISCHARGE INSTRUCTIONS
[address] [facility]. Aaron Mark M.D.  [phone]            COLON DISCHARGE INSTRUCTIONS       2018    Kina Lyon  :  1955  Jessica Medical Record Number:  [id number]      COLONOSCOPY FINDINGS:  Your colonoscopy showed one polyp in the rectum that was removed and sent to pathology, sigmoid diverticulosis and internal hemorrhoids. DISCOMFORT:  Redness at IV site- apply warm compress to area; if redness or soreness persist- contact your physician  There may be a slight amount of blood passed from the rectum  Gaseous discomfort- walking, belching will help relieve any discomfort  You may not operate a vehicle for 12 hours  You may not engage in an occupation involving machinery or appliances for rest of today  You may not drink alcoholic beverages for at least 12 hours  Avoid making any critical decisions for at least 24 hour  DIET:   High fiber diet. - however -  remember your colon is empty and a heavy meal will produce gas. Avoid these foods:  vegetables, fried / greasy foods, carbonated drinks for today     ACTIVITY:  You may resume your normal daily activities it is recommended that you spend the remainder of the day resting -  avoid any strenuous activity. CALL M.D. ANY SIGN OF:   Increasing pain, nausea, vomiting  Abdominal distension (swelling)  New increased bleeding (oral or rectal)  Fever (chills)  Pain in chest area  Bloody discharge from nose or mouth   Shortness of breath    Follow-up Instructions:   Call Dr. Franco Keen if any questions or problems. Telephone # [phone]  Biopsy results will be available in  5 to 7 days  Should have a repeat colonoscopy in 5 years if polyp shows adenoma.

## 2018-06-08 NOTE — ANESTHESIA POSTPROCEDURE EVALUATION
Post-Anesthesia Evaluation and Assessment    Patient: Tommy Pereira MRN: [id number]  SSN: [id number]    YOB: 1955  Age: 65 y.o. Sex: male       Cardiovascular Function/Vital Signs  Visit Vitals    /81    Pulse 83    Temp 36.7 °C (98 °F)    Resp 11    Ht 6' 1\" (1.854 m)    Wt 86.2 kg (190 lb)    SpO2 98%    BMI 25.07 kg/m2       Patient is status post MAC anesthesia for Procedure(s):  COLONOSCOPY  ENDOSCOPIC POLYPECTOMY. Nausea/Vomiting: None    Postoperative hydration reviewed and adequate. Pain:  Pain Scale 1: Numeric (0 - 10) (06/08/18 0854)  Pain Intensity 1: 2 (06/08/18 0854)   Managed    Neurological Status: At baseline    Mental Status and Level of Consciousness: Arousable    Pulmonary Status:   O2 Device: Nasal cannula (06/08/18 1007)   Adequate oxygenation and airway patent    Complications related to anesthesia: None    Post-anesthesia assessment completed.  No concerns    Signed By: Christy Rueda MD     June 8, 2018

## 2018-06-08 NOTE — H&P
Nicole Harper M.D.  [phone]            History and Physical       NAME:  Andrade Wheeler   :   1955   MRN:   [id number]       Referring Physician:  Dr. Mariana Garcia Date: 2018 9:38 AM    Chief Complaint:  Colon cancer screening    History of Present Illness:  Patient is a 65 y.o. who is seen for colon cancer screening. Denies any ongoing GI complaints. PMH:  Past Medical History:   Diagnosis Date    Abnormal cardiovascular stress test ?    abnormal stress test, cath normal per pt    Arthritis     Chronic back pain     s/p surgery    Chronic pain     spinal stenosis    GERD (gastroesophageal reflux disease)     Hypertension     Hypogonadism, male 2013    total 97, free 1.8  13    Opiate addiction ([facility] [address]     Dr. Miguel Ángel Lujan disorder     panic attack (last attack 3 month ago), anxiety    Thoracic back pain 2013       PSH:  Past Surgical History:   Procedure Laterality Date    HX BLEPHAROPLASTY      HX COLONOSCOPY      Dr. Iyer Left?    [address] [facility] [address]  ?    normal    HX ORTHOPAEDIC      right knee scope    HX ORTHOPAEDIC  2007    right shoulder scope    HX ORTHOPAEDIC  1990    right wrist    HX ORTHOPAEDIC  2015    left knee scopes x 5    HX ORTHOPAEDIC Right 2015    ankle surger    HX ORTHOPAEDIC Right 2017    shoulder rotator cuff       Allergies: Allergies   Allergen Reactions    Codeine Rash       Home Medications:  Prior to Admission Medications   Prescriptions Last Dose Informant Patient Reported? Taking? ALPRAZolam (XANAX) 0.5 mg tablet Unknown at Unknown time  No No   Sig: TAKE 1 TABLET BY MOUTH EVERY 8 HOURS AS NEEDED FOR ANXIETY   IBUPROFEN/DIPHENHYDRAMINE CIT (ADVIL PM PO) 2018 at Unknown time  Yes Yes   Sig: Take  by mouth. VIAGRA 100 mg tablet   No No   Sig: Take 1 Tab by mouth as needed. atorvastatin (LIPITOR) 10 mg tablet 2018  No No   Sig: Take 1 Tab by mouth daily. buprenorphine-naloxone (SUBOXONE) 8-2 mg film sublingaul film 2018  Yes No   Sig: by SubLINGual route daily. Instructed to stop by Luigi Cook. lisinopril (PRINIVIL, ZESTRIL) 20 mg tablet 2018  No No   Sig: TAKE ONE TABLET BY MOUTH EVERY DAY   pantoprazole (PROTONIX) 40 mg tablet 2018  No No   Sig: TAKE 1 TABLET BY MOUTH DAILY   testosterone cypionate 100 mg/mL injection 2018  Yes No   Si mg/mL by IntraMUSCular route every seven (7) days.  Every Wednesday      Facility-Administered Medications: None       Hospital Medications:  Current Facility-Administered Medications   Medication Dose Route Frequency    0.9% sodium chloride infusion  50 mL/hr IntraVENous CONTINUOUS    midazolam (VERSED) injection 0.25-5 mg  0.25-5 mg IntraVENous Multiple    naloxone (NARCAN) injection 0.4 mg  0.4 mg IntraVENous Multiple    flumazenil (ROMAZICON) 0.1 mg/mL injection 0.2 mg  0.2 mg IntraVENous Multiple    simethicone (MYLICON) 59FN/9.3AC oral drops 80 mg  1.2 mL Oral Multiple    atropine injection 0.4 mg  0.4 mg IntraVENous ONCE PRN    EPINEPHrine (ADRENALIN) 0.1 mg/mL syringe 1 mg  1 mg Endoscopically ONCE PRN       Social History:  Social History   Substance Use Topics    Smoking status: Never Smoker    Smokeless tobacco: Never Used    Alcohol use 1.2 oz/week     2 Glasses of wine per week       Family History:  Family History   Problem Relation Age of Onset    Heart Disease Father 78     CABG    Diabetes Sister     Heart Disease Sister      afib    No Known Problems Brother     No Known Problems Brother              Review of Systems:      Constitutional: negative fever, negative chills, negative weight loss  Eyes:   negative visual changes  ENT:   negative sore throat, tongue or lip swelling  Respiratory:  negative cough, negative dyspnea  Cards:  negative for chest pain, palpitations, lower extremity edema  GI:   See HPI  :  negative for frequency, dysuria  Integument:  negative for rash and pruritus  Heme:  negative for easy bruising and gum/nose bleeding  Musculoskel: negative for myalgias,  back pain and muscle weakness  Neuro: negative for headaches, dizziness, vertigo  Psych:  negative for feelings of anxiety, depression       Objective:   Patient Vitals for the past 8 hrs:   BP Temp Pulse Resp SpO2 Height Weight   06/08/18 0900 153/81 98 °F (36.7 °C) 83 11 98 % 6' 1\" (1.854 m) 86.2 kg (190 lb)             EXAM:     NEURO-a&o   HEENT-wnl   LUNGS-clear    COR-regular rate and rhythym     ABD-soft , no tenderness, no rebound, good bs     EXT-no edema     Data Review     No results for input(s): WBC, HGB, HCT, PLT, HGBEXT, HCTEXT, PLTEXT in the last 72 hours. No results for input(s): NA, K, CL, CO2, BUN, CREA, GLU, PHOS, CA in the last 72 hours. No results for input(s): SGOT, GPT, AP, TBIL, TP, ALB, GLOB, GGT, AML, LPSE in the last 72 hours. No lab exists for component: AMYP, HLPSE  No results for input(s): INR, PTP, APTT in the last 72 hours. No lab exists for component: INREXT    Patient Active Problem List   Diagnosis Code    Thoracic back pain M54.6    Hypogonadism, male E29.1    Abnormal cardiovascular stress test R94.39    Chronic back pain M54.9, G89.29      Assessment:   · Colon cancer screening   Plan:   · Colonoscopy today.      Signed By: Adenike Jain MD     6/8/2018  9:38 AM

## 2018-06-08 NOTE — ANESTHESIA PREPROCEDURE EVALUATION
Anesthetic History   No history of anesthetic complications            Review of Systems / Medical History  Patient summary reviewed, nursing notes reviewed and pertinent labs reviewed    Pulmonary  Within defined limits                 Neuro/Psych   Within defined limits           Cardiovascular  Within defined limits  Hypertension              Exercise tolerance: >4 METS     GI/Hepatic/Renal  Within defined limits   GERD           Endo/Other  Within defined limits      Arthritis     Other Findings   Comments: Hx opioid addiction           Physical Exam    Airway  Mallampati: II    Neck ROM: normal range of motion   Mouth opening: Normal     Cardiovascular  Regular rate and rhythm,  S1 and S2 normal,  no murmur, click, rub, or gallop  Rhythm: regular  Rate: normal         Dental  No notable dental hx       Pulmonary  Breath sounds clear to auscultation               Abdominal  GI exam deferred       Other Findings            Anesthetic Plan    ASA: 3  Anesthesia type: MAC          Induction: Intravenous  Anesthetic plan and risks discussed with: Patient

## 2018-06-08 NOTE — PERIOP NOTES
5667  Anesthesia staff at patient's bedside administering anesthesia and monitoring patients vital signs throughout procedure. See anesthesia note. 200  Endoscope was pre-cleaned at bedside immediately following procedure by Tiffany Hdz endo tech. 1009  Patient tolerated procedure without problems. Abdomen soft and patient arousable and voices no complaints Report received from CRNA, see anesthesia note. Patient transported to endoscopy recovery area. Report given to Galileo Garcia RN.

## 2018-06-08 NOTE — IP AVS SNAPSHOT
[address] 
[phone] Patient: Dany Jay MRN: [id number]:[id number] About your hospitalization You were admitted on:  June 8, 2018 You last received care in the:  [facility] ENDOSCOPY You were discharged on:  June 8, 2018 Why you were hospitalized Your primary diagnosis was:  Not on File Follow-up Information None Discharge Orders None A check monica indicates which time of day the medication should be taken. My Medications CONTINUE taking these medications Instructions Each Dose to Equal  
 Morning Noon Evening Bedtime ADVIL PM PO Your last dose was: Your next dose is: Take  by mouth. ALPRAZolam 0.5 mg tablet Commonly known as:  [place] Your last dose was: Your next dose is: TAKE 1 TABLET BY MOUTH EVERY 8 HOURS AS NEEDED FOR ANXIETY  
     
   
   
   
  
 atorvastatin 10 mg tablet Commonly known as:  LIPITOR Your last dose was: Your next dose is: Take 1 Tab by mouth daily. 10 mg  
    
   
   
   
  
 lisinopril 20 mg tablet Commonly known as:  Juliana Golder Your last dose was: Your next dose is: TAKE ONE TABLET BY MOUTH EVERY DAY  
     
   
   
   
  
 pantoprazole 40 mg tablet Commonly known as:  PROTONIX Your last dose was: Your next dose is: TAKE 1 TABLET BY MOUTH DAILY SUBOXONE 8-2 mg Film sublingaul film Generic drug:  buprenorphine-naloxone Your last dose was: Your next dose is:    
   
   
 by SubLINGual route daily. Instructed to stop by Luigi Cook. testosterone cypionate 100 mg/mL injection Your last dose was: Your next dose is: 100 mg/mL by IntraMUSCular route every seven (7) days. Every Wednesday 100 mg/mL VIAGRA 100 mg tablet Generic drug:  sildenafil citrate Your last dose was: Your next dose is: Take 1 Tab by mouth as needed. 100 mg Opioid Education Prescription Opioids: What You Need to Know: 
 
    
2018 Laureen Soto :  1955 Julio CNellielos Medical Record Number:  [id number] COLONOSCOPY FINDINGS: 
Your colonoscopy showed one polyp in the rectum that was removed and sent to pathology, sigmoid diverticulosis and internal hemorrhoids. DISCOMFORT: 
Redness at IV site- apply warm compress to area; if redness or soreness persist- contact your physician There may be a slight amount of blood passed from the rectum Gaseous discomfort- walking, belching will help relieve any discomfort You may not operate a vehicle for 12 hours You may not engage in an occupation involving machinery or appliances for rest of today You may not drink alcoholic beverages for at least 12 hours Avoid making any critical decisions for at least 24 hour DIET: 
 High fiber diet.  however -  remember your colon is empty and a heavy meal will produce gas. Avoid these foods:  vegetables, fried / greasy foods, carbonated drinks for today ACTIVITY: 
You may resume your normal daily activities it is recommended that you spend the remainder of the day resting -  avoid any strenuous activity. CALL M.D. ANY SIGN OF: Increasing pain, nausea, vomiting Abdominal distension (swelling) New increased bleeding (oral or rectal) Fever (chills) Pain in chest area Bloody discharge from nose or mouth Shortness of breath Follow-up Instructions: 
 Call Dr. Noni Kasper if any questions or problems. Telephone # [phone] Biopsy results will be available in  5 to 7 days Should have a repeat colonoscopy in 5 years if polyp shows adenoma. Introducing [facility] SERVICES! Tasia Turcios introduces Collaaj patient portal. Now you can access parts of your medical record, email your doctor's office, and request medication refills online. 1. In your internet browser, go to [email or web address] 2. Click on the First Time User? Click Here link in the Sign In box. You will see the New Member Sign Up page. 3. Enter your Collaaj Access Code exactly as it appears below. You will not need to use this code after youve completed the sign-up process. If you do not sign up before the expiration date, you must request a new code. · Collaaj Access Code: [email or web address] Expires: 7/12/2018  7:24 PM 
 
4. Enter the last four digits of your Social Security Number (xxxx) and Date of Birth (mm/dd/yyyy) as indicated and click Submit. You will be taken to the next sign-up page. 5. Create a Collaaj ID. This will be your Collaaj login ID and cannot be changed, so think of one that is secure and easy to remember. 6. Create a Collaaj password. You can change your password at any time. 7. Enter your Password Reset Question and Answer. This can be used at a later time if you forget your password. 8. Enter your e-mail address. You will receive e-mail notification when new information is available in [address] 9. Click Sign Up. You can now view and download portions of your medical record. 10. Click the Download Summary menu link to download a portable copy of your medical information.  
 
If you have questions, please visit the Frequently Asked Questions section of the Enevo. Remember, MyChart is NOT to be used for urgent needs. For medical emergencies, dial 911. Now available from your iPhone and Android! Introducing Harry Loomis As a Hugo Michelle patient, I wanted to make you aware of our electronic visit tool called Harry Loomis. [employer] 24/7 allows you to connect within minutes with a medical provider 24 hours a day, seven days a week via a mobile device or tablet or logging into a secure website from your computer. You can access Harry Waldenfin from anywhere in the [place]. A virtual visit might be right for you when you have a simple condition and feel like you just dont want to get out of bed, or cant get away from work for an appointment, when your regular [facility] provider is not available (evenings, weekends or holidays), or when youre out of town and need minor care. Electronic visits cost only $49 and if the [employer] 24/7 provider determines a prescription is needed to treat your condition, one can be electronically transmitted to a nearby pharmacy*. Please take a moment to enroll today if you have not already done so. The enrollment process is free and takes just a few minutes. To enroll, please download the Kadient 24/7 sofi to your tablet or phone, or visit [email or web address] to enroll on your computer. And, as an [address] [place] [address] patient with a [employer] account, the results of your visits will be scanned into your electronic medical record and your primary care provider will be able to view the scanned results. We urge you to continue to see your regular [facility] provider for your ongoing medical care. And while your primary care provider may not be the one available when you seek a Harry Loomis virtual visit, the peace of mind you get from getting a real diagnosis real time can be priceless. For more information on Harry Loomis, view our Frequently Asked Questions (FAQs) at [email or web address]. Sincerely, 
 
Monica Ralph MD 
Chief Medical Officer [employer] *:  certain medications cannot be prescribed via Harry Loomis Providers Seen During Your Hospitalization Provider Specialty Primary office phone Delicia Rogers MD Gastroenterology [phone] Your Primary Care Physician (PCP) Primary Care Physician Office Phone Office Fax Maurilio Dentoniner [phone] You are allergic to the following Allergen Reactions Codeine Rash Recent Documentation Height Weight BMI Smoking Status 1.854 m 86.2 kg 25.07 kg/m2 Never Smoker Emergency Contacts Name Discharge Info Relation Home Work Mobile [facility] DISCHARGE CAREGIVER [3] Spouse [3] [phone] Patient Belongings The following personal items are in your possession at time of discharge: 
  Dental Appliances: None  Visual Aid: None Please provide this summary of care documentation to your next provider. Signatures-by signing, you are acknowledging that this After Visit Summary has been reviewed with you and you have received a copy. Patient Signature:  ____________________________________________________________ Date:  ____________________________________________________________  
  
Brijesh Endo Provider Signature:  ____________________________________________________________ Date:  ____________________________________________________________

## 2018-06-11 DIAGNOSIS — F41.9 ANXIETY: ICD-10-CM

## 2018-06-11 RX ORDER — ALPRAZOLAM 0.5 MG/1
TABLET ORAL
Qty: 20 TAB | Refills: 2 | OUTPATIENT
Start: 2018-06-11 | End: 2018-11-30 | Stop reason: SDUPTHER

## 2018-08-07 DIAGNOSIS — E78.5 DYSLIPIDEMIA, GOAL LDL BELOW 100: ICD-10-CM

## 2018-08-07 DIAGNOSIS — I10 ESSENTIAL HYPERTENSION: ICD-10-CM

## 2018-08-08 RX ORDER — LISINOPRIL 20 MG/1
TABLET ORAL
Qty: 30 TAB | Refills: 4 | Status: SHIPPED | OUTPATIENT
Start: 2018-08-08 | End: 2019-01-14 | Stop reason: SDUPTHER

## 2018-08-08 RX ORDER — ATORVASTATIN CALCIUM 10 MG/1
TABLET, FILM COATED ORAL
Qty: 30 TAB | Refills: 4 | Status: SHIPPED | OUTPATIENT
Start: 2018-08-08 | End: 2019-01-14 | Stop reason: SDUPTHER

## 2018-08-16 ENCOUNTER — TELEPHONE (OUTPATIENT)
Dept: INTERNAL MEDICINE CLINIC | Age: 63
End: 2018-08-16

## 2018-08-16 RX ORDER — VALACYCLOVIR HYDROCHLORIDE 1 G/1
1000 TABLET, FILM COATED ORAL 3 TIMES DAILY
Qty: 21 TAB | Refills: 0 | Status: SHIPPED | OUTPATIENT
Start: 2018-08-16 | End: 2018-08-23

## 2018-09-23 RX ORDER — TADALAFIL 10 MG
TABLET ORAL
Qty: 8 TAB | Refills: 3 | Status: SHIPPED | OUTPATIENT
Start: 2018-09-23 | End: 2019-04-15

## 2018-10-18 ENCOUNTER — HOSPITAL ENCOUNTER (OUTPATIENT)
Dept: MRI IMAGING | Age: 63
Discharge: HOME | End: 2018-10-18
Attending: ORTHOPAEDIC SURGERY
Payer: COMMERCIAL

## 2018-10-18 DIAGNOSIS — M75.42 IMPINGEMENT SYNDROME OF LEFT SHOULDER: ICD-10-CM

## 2018-10-18 DIAGNOSIS — M77.8 TENDINITIS OF LEFT SHOULDER: ICD-10-CM

## 2018-10-18 PROCEDURE — 73221 MRI JOINT UPR EXTREM W/O DYE: CPT

## 2018-11-07 ENCOUNTER — HOSPITAL ENCOUNTER (OUTPATIENT)
Dept: PHYSICAL THERAPY | Age: 63
Discharge: HOME | End: 2018-11-07
Payer: COMMERCIAL

## 2018-11-07 PROCEDURE — 97016 VASOPNEUMATIC DEVICE THERAPY: CPT | Performed by: PHYSICAL THERAPIST

## 2018-11-07 PROCEDURE — 97161 PT EVAL LOW COMPLEX 20 MIN: CPT | Performed by: PHYSICAL THERAPIST

## 2018-11-07 PROCEDURE — 97110 THERAPEUTIC EXERCISES: CPT | Performed by: PHYSICAL THERAPIST

## 2018-11-07 PROCEDURE — 97162 PT EVAL MOD COMPLEX 30 MIN: CPT | Performed by: PHYSICAL THERAPIST

## 2018-11-07 NOTE — PROGRESS NOTES
[address] [facility] [address]ellee [address]  Phone: [phone]  Fax: [phone]    Plan of Care/ Statement of Necessity for Physical Therapy Services 2-15    Patient name: Katlin Roach  : 1955  Provider#: [id number]  Referral source: Ashley Matthew MD      Medical/Treatment Diagnosis: Left shoulder pain [M25.512]     Prior Hospitalization: see medical history     Comorbidities: R RC repair 1.5 years ago  Prior Level of Function: Pt works full time as a refrigerator   Medications: Verified on Patient Summary List    Start of Care: 19      Onset Date: 3 months ago       The Plan of Care and following information is based on the information from the initial evaluation. Assessment/ key information: The patient presents with signs and symptoms consistent with L shoulder impingement syndrome as indicated by positive resisted ER test, painful arc and positive mena test.  The patient's condition is complicated by chronic nature of condition and history of R RC repair. If the patient has no improvement in function in the first couple weeks of PT he will be referred back to his surgeon. Evaluation Complexity History MEDIUM  Complexity : 1-2 comorbidities / personal factors will impact the outcome/ POC ; Examination HIGH Complexity : 4+ Standardized tests and measures addressing body structure, function, activity limitation and / or participation in recreation  ;Presentation MEDIUM Complexity : Evolving with changing characteristics  ; Clinical Decision Making MEDIUM Complexity : FOTO score of 26-74  Overall Complexity Rating: MEDIUM    Problem List: pain affecting function, decrease ROM, decrease strength, edema affecting function, impaired gait/ balance, decrease ADL/ functional abilitiies, decrease activity tolerance, decrease flexibility/ joint mobility and decrease transfer abilities   Treatment Plan may include any combination of the following: Therapeutic exercise, Neuromuscular re-education, Physical agent/modality, Gait/balance training, Manual therapy, Patient education, Functional mobility training, Home safety training and Stair training  Patient / Family readiness to learn indicated by: asking questions, trying to perform skills and interest  Persons(s) to be included in education: patient (P)  Barriers to Learning/Limitations: None  Patient Goal (s): no pain  Patient Self Reported Health Status: good  Rehabilitation Potential: excellent    Short Term Goals: To be accomplished in 4 weeks:  1) The patient will be independent with introductory HEP  2) The patient will improve L shoulder flexion AROM to 150 deg to improve ease with reaching tasks  3) The patient will improve L shoulder IR AROM to T8 to to improve ease with dressing  Long Term Goals: To be accomplished in 12 weeks:  1) The patient will demonstrate pain free shoulder AROM WFL multidirectionally to improve ease with household chores  2) The patient will report ability to complete a full day of work without an increase in pain  3) The patient will demonstrate 5/5 L ER strength to improve ease with lifting tasks  Frequency / Duration: Patient to be seen 2 times per week for 12 weeks. Patient/ Caregiver education and instruction: self care, activity modification and exercises    [x]  Plan of care has been reviewed with PASCUAL Eller, PT 11/7/2018 3:25 PM    ________________________________________________________________________    I certify that the above Therapy Services are being furnished while the patient is under my care. I agree with the treatment plan and certify that this therapy is necessary.     [de-identified] Signature:____________________  Date:____________Time: _________

## 2018-11-07 NOTE — PROGRESS NOTES
PT INITIAL EVALUATION NOTE 2-15    Patient Name: Jovany Rios  Date:2018  : 1955  [x]  Patient  Verified  Payor: Serge Quinones / Plan: Nathaly Velazquez / Product Type: PPO /    In time:3:25 PM  Out time:4:25 PM  Total Treatment Time (min): 60  Visit #: 1     Treatment Area: Left shoulder pain [M25.512]     SUBJECTIVE  Pain Level (0-10 scale): 2-3/10  At worst 10/10, pain is increased by laying down in bed with his arm outstretched, reaching and turning his arm, put his belt on, carrying the ladder  At best 1-2/10, pain is decreased by ice, heat and advil  Any medication changes, allergies to medications, adverse drug reactions, diagnosis change, or new procedure performed?: [] No    [x] Yes (see summary sheet for update)  Subjective:     Pt c/o L shoulder pain which began 3 months ago. \"I was doing work and it felt like something popped. \" Pain has worsened since onset. Pt reports he has to do 2 PT visits before he is cleared for surgery. Pain is sharp. Pt denies numbness/ tingling. Pt c/o intermittent neck pain. Pt is L handed  PLOF: Pt enjoys playing golf and working full time as a   Mechanism of Injury: Work injury possibly  Previous Treatment/Compliance: None  PMHx/Surgical Hx: R shoulder repair 1.5 years ago  Work Hx: Electrical work  Living Situation: Pt lives with his wife   Pt Goals: \"pain free\"  Barriers: Chronic nature of condition  Motivation: Good  Cognition: A & O x 3        OBJECTIVE/EXAMINATION  Posture:   Forward head  Palpation: No TTP    Upper Extremity AROM:         R   L  Shoulder Flexion  160   145   Shoulder Abduction  180   165  Shoulder Extension  75   50  Shoulder ER   60   65  Shoulder IR   T4   T10        UPPER QUARTER   MUSCLE STRENGTH  KEY       R  L  0 - No Contraction  C1, C2 Neck Flex 5    1 - Trace   C3 Side Flex  5  5    2 - Poor   C4 Sh Elev  5  5    3 - Fair    C5 Deltoid/Biceps 5  5    4 - Good   C6 Wrist Ext  5  5    5 - Normal   C7 Triceps  5  5        C8 Thumb Ext  5  5      T1 Hand Inst  5  5    MMT: See above  Shoulder flexion  Shoulder ER R 5/5 L 4/5 P!   Shoulder IR 5/5 B  Neurological: Sensations: Negative    Special Tests:   Butt: R- L+    Modality rationale: decrease inflammation and decrease pain to improve the patients ability to sit, reach, lift, carry, perform ADLs   Type Additional Details   [] Estim: []Att   []Unatt        []TENS instruct                  []IFC  []Premod   []NMES                     []Other:  []w/US   []w/ice   []w/heat  Position:  Location:   []  Traction: [] Cervical       []Lumbar                       [] Prone          []Supine                       []Intermittent   []Continuous Lbs:  [] before manual  [] after manual  []w/heat   []  Ultrasound: []Continuous   [] Pulsed at:                            []1MHz   []3MHz Location:  W/cm2:   []  Paraffin         Location:  []w/heat   []  Ice     []  Heat  []  Ice massage Position:  Location:   []  Laser  []  Other: Position:  Location:   [x]  Vasopneumatic Device Pressure:       [] lo [x] med [] hi   Temperature: 34   [x] Skin assessment post-treatment:  [x]intact []redness- no adverse reaction    []redness - adverse reaction:     15 min Therapeutic Exercise:  [x] See flow sheet :   Rationale: increase ROM, increase strength and improve coordination to improve the patients ability to sit, stand, transfer, ambulate, lift, carry, reach, complete ADLs       With   [x] TE   [] TA   [] neuro   [] other: Patient Education: [x] Review HEP    [x] Progressed/Changed HEP based on:   [x] positioning   [x] body mechanics   [] transfers   [x] heat/ice application    [] other:        Other Objective/Functional Measures:  Pain with scap squeezes and pulleys into IR    Pain Level (0-10 scale) post treatment: 1    ASSESSMENT:      [x]  See Plan of 2 [facility], PT 11/7/2018  3:25 PM

## 2018-11-12 ENCOUNTER — APPOINTMENT (OUTPATIENT)
Dept: PHYSICAL THERAPY | Age: 63
End: 2018-11-12
Payer: COMMERCIAL

## 2018-11-13 ENCOUNTER — HOSPITAL ENCOUNTER (OUTPATIENT)
Dept: PHYSICAL THERAPY | Age: 63
Discharge: HOME | End: 2018-11-13
Payer: COMMERCIAL

## 2018-11-13 PROCEDURE — 97140 MANUAL THERAPY 1/> REGIONS: CPT | Performed by: PHYSICAL THERAPIST

## 2018-11-13 PROCEDURE — 97110 THERAPEUTIC EXERCISES: CPT | Performed by: PHYSICAL THERAPIST

## 2018-11-13 PROCEDURE — 97016 VASOPNEUMATIC DEVICE THERAPY: CPT | Performed by: PHYSICAL THERAPIST

## 2018-11-13 NOTE — PROGRESS NOTES
PT DAILY TREATMENT NOTE 2-15    Patient Name: Nirav Sweeney  Date:2018  : 1955  [x]  Patient  Verified  Payor: Rafael López / [facility] / Product Type: PPO /    In time:4:05 PM  Out time:4:55 PM  Total Treatment Time (min): 50  Visit #: 2     Treatment Area: Pain in left shoulder [M25.512]    SUBJECTIVE  Pain Level (0-10 scale): \"sore after work and driving\"  Any medication changes, allergies to medications, adverse drug reactions, diagnosis change, or new procedure performed?: [x] No    [] Yes (see summary sheet for update)  Subjective functional status/changes:   [] No changes reported  Pt reports he had a lot of pain after last visit    OBJECTIVE    Modality rationale: decrease inflammation and decrease pain to improve the patients ability to sit, reach, lift, carry, perform ADLs   Type Additional Details   [] Estim: []Att   []Unatt    []TENS instruct                  []IFC  []Premod   []NMES                     []Other:  []w/US   []w/ice   []w/heat  Position:  Location:   []  Traction: [] Cervical       []Lumbar                       [] Prone          []Supine                       []Intermittent   []Continuous Lbs:  [] before manual  [] after manual  []w/heat   []  Ultrasound: []Continuous   [] Pulsed                       at: []1MHz   []3MHz Location:  W/cm2:   [] Paraffin         Location:   []w/heat   []  Ice     []  Heat  []  Ice massage Position:  Location:   []  Laser  []  Other: Position:  Location:     [x]  Vasopneumatic Device x15 Pressure:       [] lo [x] med [] hi   Temperature: 34   [x] Skin assessment post-treatment:  [x]intact []redness- no adverse reaction    []redness - adverse reaction:     30 min Therapeutic Exercise:  [x] See flow sheet :   Rationale: increase ROM, increase strength and improve coordination to improve the patients ability to sit, reach, lift, carry, perform ADLs    15 min Manual Therapy:  Shoulder PROM to tolerance, GH joint mobs grade III-IV, L pec stretch   Rationale: decrease pain, increase ROM, increase tissue extensibility and decrease trigger points  to improve the patients ability to sit, reach, lift, carry, perform ADLs        With   [x] TE   [] TA   [] neuro   [] other: Patient Education: [x] Review HEP    [] Progressed/Changed HEP based on:   [x] positioning   [x] body mechanics   [] transfers   [x] heat/ice application    [] other:      Other Objective/Functional Measures: Full shoulder PROM, pain at end range shoulder flexion and ER  Pain with scap squeezes, this was decreased when told to leave arms by side     Pain Level (0-10 scale) post treatment: 0    ASSESSMENT/Changes in Function:   Exercises not advanced due to increase in pain after last visit  Patient will continue to benefit from skilled PT services to modify and progress therapeutic interventions, address functional mobility deficits, address ROM deficits, address strength deficits, analyze and address soft tissue restrictions, analyze and cue movement patterns, analyze and modify body mechanics/ergonomics, assess and modify postural abnormalities, address imbalance/dizziness and instruct in home and community integration to attain remaining goals. []  See Plan of Care  []  See progress note/recertification  []  See Discharge Summary         Progress towards goals / Updated goals:  Patient continues to require verbal cues to complete exercises with correct form and postural awareness. Patient was able to advance several exercises this visit and is progressing well towards goals.     PLAN  [x]  Upgrade activities as tolerated     [x]  Continue plan of care  [x]  Update interventions per flow sheet       []  Discharge due to:_  []  Other:_      Hakeem Dupree, PT 11/13/2018  4:14 PM

## 2018-11-14 DIAGNOSIS — F41.0 PANIC ATTACK: ICD-10-CM

## 2018-11-14 RX ORDER — PANTOPRAZOLE SODIUM 40 MG/1
TABLET, DELAYED RELEASE ORAL
Qty: 30 TAB | Refills: 0 | Status: SHIPPED | OUTPATIENT
Start: 2018-11-14 | End: 2019-01-14 | Stop reason: SDUPTHER

## 2018-11-14 NOTE — ANCILLARY DISCHARGE INSTRUCTIONS
[address] [place] [address] Kellee Tyson 58  Phone: [phone]  Fax: [phone]    Discharge Summary  2-15    Patient name: Justo Mead  : 1955  Provider#: [id number]  Referral source: Milton Reich MD      Medical/Treatment Diagnosis: Pain in left shoulder [M25.512]     Prior Hospitalization: see medical history     Comorbidities: See Plan of Care  Prior Level of Function:See Plan of Care  Medications: Verified on Patient Summary List    Start of Care: 18      Onset Date:3 months ago   Visits from Start of Care: 2     Missed Visits: 1  Reporting Period : 18 to 18      ASSESSMENT/SUMMARY OF CARE: The patient has completed 2 PT visits and requests discharge due to high levels of pain.         RECOMMENDATIONS:  [x]Discontinue therapy: []Patient has reached or is progressing toward set goals      []Patient is non-compliant or has abdicated      [x]Due to lack of appreciable progress towards set goals      []Other    Jeimy Boland, PT 2018 6:48 PM

## 2018-11-15 ENCOUNTER — APPOINTMENT (OUTPATIENT)
Dept: PHYSICAL THERAPY | Age: 63
End: 2018-11-15
Payer: COMMERCIAL

## 2018-11-19 ENCOUNTER — APPOINTMENT (OUTPATIENT)
Dept: PHYSICAL THERAPY | Age: 63
End: 2018-11-19
Payer: COMMERCIAL

## 2018-11-21 ENCOUNTER — APPOINTMENT (OUTPATIENT)
Dept: PHYSICAL THERAPY | Age: 63
End: 2018-11-21
Payer: COMMERCIAL

## 2018-11-26 ENCOUNTER — APPOINTMENT (OUTPATIENT)
Dept: PHYSICAL THERAPY | Age: 63
End: 2018-11-26
Payer: COMMERCIAL

## 2018-11-28 ENCOUNTER — APPOINTMENT (OUTPATIENT)
Dept: PHYSICAL THERAPY | Age: 63
End: 2018-11-28
Payer: COMMERCIAL

## 2018-11-30 DIAGNOSIS — F41.9 ANXIETY: ICD-10-CM

## 2018-11-30 RX ORDER — ALPRAZOLAM 0.5 MG/1
TABLET ORAL
Qty: 20 TAB | Refills: 0 | OUTPATIENT
Start: 2018-11-30 | End: 2019-01-08 | Stop reason: SDUPTHER

## 2018-12-03 ENCOUNTER — HOSPITAL ENCOUNTER (OUTPATIENT)
Dept: PREADMISSION TESTING | Age: 63
Discharge: HOME | End: 2018-12-03
Payer: COMMERCIAL

## 2018-12-03 ENCOUNTER — APPOINTMENT (OUTPATIENT)
Dept: PHYSICAL THERAPY | Age: 63
End: 2018-12-03
Payer: COMMERCIAL

## 2018-12-03 VITALS
BODY MASS INDEX: 25.96 KG/M2 | WEIGHT: 202.31 LBS | SYSTOLIC BLOOD PRESSURE: 166 MMHG | RESPIRATION RATE: 18 BRPM | OXYGEN SATURATION: 96 % | HEART RATE: 80 BPM | TEMPERATURE: 97.9 F | HEIGHT: 74 IN | DIASTOLIC BLOOD PRESSURE: 89 MMHG

## 2018-12-03 LAB
ANION GAP SERPL CALC-SCNC: 7 MMOL/L (ref 5–15)
ATRIAL RATE: 65 BPM
BUN SERPL-MCNC: 19 MG/DL (ref 6–20)
BUN/CREAT SERPL: 18 (ref 12–20)
CALCIUM SERPL-MCNC: 8.8 MG/DL (ref 8.5–10.1)
CALCULATED P AXIS, ECG09: 41 DEGREES
CALCULATED R AXIS, ECG10: 33 DEGREES
CALCULATED T AXIS, ECG11: 26 DEGREES
CHLORIDE SERPL-SCNC: 104 MMOL/L (ref 97–108)
CO2 SERPL-SCNC: 28 MMOL/L (ref 21–32)
CREAT SERPL-MCNC: 1.06 MG/DL (ref 0.7–1.3)
DIAGNOSIS, 93000: NORMAL
GLUCOSE SERPL-MCNC: 90 MG/DL (ref 65–100)
P-R INTERVAL, ECG05: 172 MS
POTASSIUM SERPL-SCNC: 4.3 MMOL/L (ref 3.5–5.1)
Q-T INTERVAL, ECG07: 370 MS
QRS DURATION, ECG06: 100 MS
QTC CALCULATION (BEZET), ECG08: 384 MS
SODIUM SERPL-SCNC: 139 MMOL/L (ref 136–145)
VENTRICULAR RATE, ECG03: 65 BPM

## 2018-12-03 PROCEDURE — 80048 BASIC METABOLIC PNL TOTAL CA: CPT

## 2018-12-03 PROCEDURE — 93005 ELECTROCARDIOGRAM TRACING: CPT

## 2018-12-03 PROCEDURE — 36415 COLL VENOUS BLD VENIPUNCTURE: CPT

## 2018-12-03 NOTE — H&P
PAT Pre-Op History & Physical    Patient: Almaz Walker                  MRN: [id number]          SSN: [id number]  YOB: 1955          Age: 59 y.o. Sex: male                Subjective:   Patient is a 59 y.o.  male who presents with history of left shoulder pain that has been a problem for 4-5 months- denies any injury or trauma that preceded the onset of his shoulder pain. Rates his left shoulder pain 2/10-10/10 and describes it as a constant aching pain that can be sharp at times. States that at times the pain radiates from his shoulder down his left arm to his elbow and also up the left side of his neck. Activity exacerbates his pain and he is having difficulty sleeping through the night due to the pain. Patient is left hand dominant. MRI of his left shoulder done 110/18/2018 showed:    IMPRESSION:   1. Tendinopathy of the supraspinatus and infraspinatus with shallow partial-thickness interstitial tearing. No full-thickness tear  2. Subchondral degenerative changes of the greater tuberosity with associated reactive marrow change  3. Biceps long head tendon is torn and retracted distally     Has failed steroid joint injections, NSAIDs, PT, and application of heat/ice. The patient was evaluated in the surgeon's office and it was determined that the most appropriate plan of care is to proceed with surgical intervention.   Patient's PCP Kenna Altamirano MD        Past Medical History:   Diagnosis Date    Abnormal cardiovascular stress test 2011?    abnormal stress test, cath normal per pt    Arthritis     Chronic back pain     s/p surgery    Chronic pain     spinal stenosis    GERD (gastroesophageal reflux disease)     Hyperlipidemia     Hypertension     Hypogonadism, male 8/21/2013    total 97, free 1.8  5/13/13    Opiate addiction ([facility] [address]     Dr. Luis M Rider disorder     panic attack (last attack 3 month ago), anxiety    Thoracic back pain 8/21/2013 Past Surgical History:   Procedure Laterality Date    COLONOSCOPY N/A 6/8/2018    COLONOSCOPY performed by Trevor Perez MD at [address] [facility] HX BLEPHAROPLASTY  2011    HX COLONOSCOPY      Dr. Roshan White?    [address] [facility] [address]  2011?    normal    HX ORTHOPAEDIC  2014    right knee scope    HX ORTHOPAEDIC  2007    right shoulder scope    HX ORTHOPAEDIC  1990    right wrist    HX ORTHOPAEDIC  2015    left knee scopes x 5    HX ORTHOPAEDIC Right 2015    ankle surgery    HX ORTHOPAEDIC Right 2017    shoulder rotator cuff    HX ORTHOPAEDIC Right     clavicle repair      Prior to Admission medications    Medication Sig Start Date End Date Taking? Authorizing Provider   ALPRAZolam Royetta Field) 0.5 mg tablet TAKE 1 TABLET BY MOUTH EVERY 8 HOURS AS NEEDED FOR ANXIETY 11/30/18  Yes Jasiel Ling MD   pantoprazole (PROTONIX) 40 mg tablet TAKE 1 TABLET BY MOUTH DAILY  Patient taking differently: TAKE 1 TABLET BY MOUTH bedtime 11/14/18  Yes Jasiel Ling MD   lisinopril (PRINIVIL, ZESTRIL) 20 mg tablet TAKE ONE TABLET BY MOUTH DAILY  Patient taking differently: TAKE ONE TABLET BY MOUTH bedtime 8/8/18  Yes Jasiel Ling MD   atorvastatin (LIPITOR) 10 mg tablet TAKE ONE TABLET BY MOUTH DAILY  Patient taking differently: TAKE ONE TABLET BY MOUTH BEDTIME 8/8/18  Yes Jasiel Ling MD   CIALIS 10 mg tablet TAKE 1 TAB BY MOUTH DAILY. Patient taking differently: TAKE 1 TAB BY MOUTH DAILY. AS NEEDED 9/23/18   Jasiel Ling MD   VIAGRA 100 mg tablet Take 1 Tab by mouth as needed. 1/30/18   Jasiel Ling MD   IBUPROFEN/DIPHENHYDRAMINE CIT (ADVIL PM PO) Take 600 mg by mouth. Provider, Historical   buprenorphine-naloxone (SUBOXONE) 8-2 mg film sublingaul film by SubLINGual route daily. Instructed to stop by Merline Jumper. Provider, Historical   testosterone cypionate 100 mg/mL injection 100 mg/mL by IntraMUSCular route every seven (7) days.  Every Wednesday    Provider, Historical Current Outpatient Medications   Medication Sig    ALPRAZolam (XANAX) 0.5 mg tablet TAKE 1 TABLET BY MOUTH EVERY 8 HOURS AS NEEDED FOR ANXIETY    pantoprazole (PROTONIX) 40 mg tablet TAKE 1 TABLET BY MOUTH DAILY (Patient taking differently: TAKE 1 TABLET BY MOUTH bedtime)    lisinopril (PRINIVIL, ZESTRIL) 20 mg tablet TAKE ONE TABLET BY MOUTH DAILY (Patient taking differently: TAKE ONE TABLET BY MOUTH bedtime)    atorvastatin (LIPITOR) 10 mg tablet TAKE ONE TABLET BY MOUTH DAILY (Patient taking differently: TAKE ONE TABLET BY MOUTH BEDTIME)    CIALIS 10 mg tablet TAKE 1 TAB BY MOUTH DAILY. (Patient taking differently: TAKE 1 TAB BY MOUTH DAILY. AS NEEDED)    VIAGRA 100 mg tablet Take 1 Tab by mouth as needed.  IBUPROFEN/DIPHENHYDRAMINE CIT (ADVIL PM PO) Take 600 mg by mouth.  buprenorphine-naloxone (SUBOXONE) 8-2 mg film sublingaul film by SubLINGual route daily. Instructed to stop by Matthew Billingsley.  testosterone cypionate 100 mg/mL injection 100 mg/mL by IntraMUSCular route every seven (7) days. Every Wednesday     No current facility-administered medications for this encounter. Allergies   Allergen Reactions    Codeine Rash      Social History     Tobacco Use    Smoking status: Never Smoker    Smokeless tobacco: Never Used   Substance Use Topics    Alcohol use: Yes     Alcohol/week: 0.6 oz     Types: 1 Glasses of wine per week     Comment: Rare glass of wine      Social History     Substance and Sexual Activity   Drug Use No    Comment: 5 years - Percocet     Family History   Problem Relation Age of Onset    Heart Disease Father 46        CABG x5    Coronary Artery Disease Father         stents    Diabetes Sister     Heart Disease Sister         afib    Heart Failure Mother     No Known Problems Brother     No Known Problems Brother          Review of Systems    Patient denies difficulty swallowing, mouth sores, or loose teeth.  Patient denies any recent dental procedures or any planned prior to surgery. Patient denies chest pain, tightness, pain radiating down left arm, palpitations. Denies dizziness, visual disturbances, or lightheadedness. Patient denies shortness of breath, wheezing, cough, fever, or chills. Patient denies diarrhea, constipation, or abdominal pain. Patient denies urinary problems including dysuria, hesitancy, urgency, or incontinence. Denies skin breakdown, rashes, insect bites or open area. C/o left shoulder pain. Objective:     Patient Vitals for the past 24 hrs:   Temp Pulse Resp BP SpO2   18 0809 97.9 °F (36.6 °C) 80 18 166/89 96 %     Temp (24hrs), Av.9 °F (36.6 °C), Min:97.9 °F (36.6 °C), Max:97.9 °F (36.6 °C)    Body mass index is 25.98 kg/m². Wt Readings from Last 1 Encounters:   18 91.8 kg (202 lb 5 oz)        Physical Exam:     General: Pleasant,  cooperative, no apparent distress, appears stated age. Anxious. Eyes: Conjunctivae/corneas clear. EOMs intact. Nose: Nares normal.   Mouth/Throat: Lips, mucosa, and tongue normal. Teeth and gums normal.   Neck: Supple, symmetrical, trachea midline. Back: Symmetric   Lungs: Clear to auscultation bilaterally. Heart: Regular rate and rhythm, S1, S2 normal. No murmur, click, rub or gallop. Abdomen: Soft, non-tender. Bowel sounds normal. No distention. Musculoskeletal:  Left shoulder ROM limited by discomfort   Extremities:  Extremities normal, atraumatic, no cyanosis or edema. Calves                                 supple, non tender to palpation. Pulses: 2+ and symmetric bilateral upper extremities. Cap. refill <2 seconds   Skin: Skin color, texture, turgor normal.  No visible rashes or lesions. Neurologic: CN II-XII grossly intact. Alert and oriented x3.     Labs:   Recent Results (from the past 72 hour(s))   METABOLIC PANEL, BASIC    Collection Time: 18  8:44 AM   Result Value Ref Range    Sodium 139 136 - 145 mmol/L    Potassium 4.3 3.5 - 5.1 mmol/L    Chloride 104 97 - 108 mmol/L    CO2 28 21 - 32 mmol/L    Anion gap 7 5 - 15 mmol/L    Glucose 90 65 - 100 mg/dL    BUN 19 6 - 20 MG/DL    Creatinine 1.06 0.70 - 1.30 MG/DL    BUN/Creatinine ratio 18 12 - 20      GFR est AA >60 >60 ml/min/1.73m2    GFR est non-AA >60 >60 ml/min/1.73m2    Calcium 8.8 8.5 - 10.1 MG/DL   EKG, 12 LEAD, INITIAL    Collection Time: 12/03/18  8:54 AM   Result Value Ref Range    Ventricular Rate 65 BPM    Atrial Rate 65 BPM    P-R Interval 172 ms    QRS Duration 100 ms    Q-T Interval 370 ms    QTC Calculation (Bezet) 384 ms    Calculated P Axis 41 degrees    Calculated R Axis 33 degrees    Calculated T Axis 26 degrees    Diagnosis       Normal sinus rhythm  Normal ECG  When compared with ECG of 13-APR-2018 19:40,  Criteria for Inferior infarct are no longer present  Confirmed by Lexus Santillan M.D., [facility] ([id number]) on 12/3/2018 4:25:21 PM         Assessment:     Impingement syndrome -of left shoulder. Plan:     Scheduled for left arthroscopic subacromial decompression and distal clavicle excision. BMP and EKG done per anesthesia protocol. Lab results and EKG reviewed- unremarkable.         Katherin Madison NP

## 2018-12-03 NOTE — PROGRESS NOTES
[address] [address] [facility]                            MAIN OR                                  [phone]   MAIN PRE OP                          [phone]                                                                                AMBULATORY PRE OP          [phone]  PRE-ADMISSION TESTING    [phone]     Surgery Date:   Monday 12/17/18        Is surgery arrival time given by surgeon? NO  If NO, [facility] staff will call you between 3 and 7pm the day before your surgery with your arrival time. (If your surgery is on a Monday, we will call you the Friday before.)    Call [phone] after 7pm Monday-Friday if you did not receive your arrival time. INSTRUCTIONS BEFORE YOUR SURGERY   When You  Arrive   Arrive at the [address] [facility] on the day of your surgery  Have your insurance card, photo ID, and any copayment (if needed)     Food   and   Drink   NO food or drink after midnight the night before surgery    This means NO water, gum, mints, coffee, juice, etc.  No alcohol (beer, wine, liquor) 24 hours before and after surgery     Medications to   TAKE   Morning of Surgery   MEDICATIONS TO TAKE THE MORNING OF SURGERY WITH A SIP OF WATER:   NONE     Medications  To  STOP      7 days before surgery    Non-Steroidal anti-inflammatory Drugs (NSAID's): for example, Ibuprofen (Advil, Motrin), Naproxen (Aleve)   Aspirin, if taking for pain    Herbal supplements, vitamins, and fish oil   Other:     Blood  Thinners    If you take  Aspirin, Plavix, Coumadin, or any blood-thinning or anti-blood clot medicine, talk to the doctor who prescribed the medications for pre-operative instructions.      Bathing Clothing  Jewelry  Valuables       If you shower the morning of surgery, please do not apply anything to your skin (lotions, powders, deodorant)   Do not shave or trim anywhere 24 hours before surgery   Wear loose, comfortable, clean clothes   Wear glasses instead of contacts  [address], valuables, and jewelry, including body piercings, at home     Going Home       or Spending the Night    SAME-DAY SURGERY: You must have a responsible adult drive you home and stay with you 24 hours after surgery   ADMITS: If your doctor is keeping you into the hospital after surgery, leave personal belongings/luggage in your car until you have a hospital room number. Hospital discharge time is 12 noon  Drivers must be here before 12 noon unless you are told differently   Special Instructions Free  parking 7a-5pm. Bring completed medication list on day of surgery. Follow all instructions so your surgery wont be cancelled. Please, be on time. If a situation occurs and you are delayed the day of surgery, call [phone] or  [phone]. If your physical condition changes (like a fever, cold, flu, etc.) call your surgeon. The patient was contacted  in person. The patient verbalizes understanding of all instructions and does not  need reinforcement.

## 2018-12-03 NOTE — H&P (VIEW-ONLY)
PAT Pre-Op History & Physical    Patient: Debra Donohue                  MRN: [id number]          SSN: [id number]  YOB: 1955          Age: 59 y.o. Sex: male                Subjective:   Patient is a 59 y.o.  male who presents with history of left shoulder pain that has been a problem for 4-5 months- denies any injury or trauma that preceded the onset of his shoulder pain. Rates his left shoulder pain 2/10-10/10 and describes it as a constant aching pain that can be sharp at times. States that at times the pain radiates from his shoulder down his left arm to his elbow and also up the left side of his neck. Activity exacerbates his pain and he is having difficulty sleeping through the night due to the pain. Patient is left hand dominant. MRI of his left shoulder done 110/18/2018 showed:    IMPRESSION:   1. Tendinopathy of the supraspinatus and infraspinatus with shallow partial-thickness interstitial tearing. No full-thickness tear  2. Subchondral degenerative changes of the greater tuberosity with associated reactive marrow change  3. Biceps long head tendon is torn and retracted distally     Has failed steroid joint injections, NSAIDs, PT, and application of heat/ice. The patient was evaluated in the surgeon's office and it was determined that the most appropriate plan of care is to proceed with surgical intervention.   Patient's PCP Gerson Huddleston MD        Past Medical History:   Diagnosis Date    Abnormal cardiovascular stress test 2011?    abnormal stress test, cath normal per pt    Arthritis     Chronic back pain     s/p surgery    Chronic pain     spinal stenosis    GERD (gastroesophageal reflux disease)     Hyperlipidemia     Hypertension     Hypogonadism, male 8/21/2013    total 97, free 1.8  5/13/13    Opiate addiction ([facility] [address]     Dr. Miles Huang disorder     panic attack (last attack 3 month ago), anxiety    Thoracic back pain 8/21/2013 Past Surgical History:   Procedure Laterality Date    COLONOSCOPY N/A 6/8/2018    COLONOSCOPY performed by Dunia Rodas MD at [address] HX BLEPHAROPLASTY  2011    HX COLONOSCOPY      Dr. Mary South?    [address] [facility] [address]  2011?    normal    HX ORTHOPAEDIC  2014    right knee scope    HX ORTHOPAEDIC  2007    right shoulder scope    HX ORTHOPAEDIC  1990    right wrist    HX ORTHOPAEDIC  2015    left knee scopes x 5    HX ORTHOPAEDIC Right 2015    ankle surgery    HX ORTHOPAEDIC Right 2017    shoulder rotator cuff    HX ORTHOPAEDIC Right     clavicle repair      Prior to Admission medications    Medication Sig Start Date End Date Taking? Authorizing Provider   ALPRAZolam Dalton Nath) 0.5 mg tablet TAKE 1 TABLET BY MOUTH EVERY 8 HOURS AS NEEDED FOR ANXIETY 11/30/18  Yes Edson Shah MD   pantoprazole (PROTONIX) 40 mg tablet TAKE 1 TABLET BY MOUTH DAILY  Patient taking differently: TAKE 1 TABLET BY MOUTH bedtime 11/14/18  Yes Edson Shah MD   lisinopril (PRINIVIL, ZESTRIL) 20 mg tablet TAKE ONE TABLET BY MOUTH DAILY  Patient taking differently: TAKE ONE TABLET BY MOUTH bedtime 8/8/18  Yes Edson Shah MD   atorvastatin (LIPITOR) 10 mg tablet TAKE ONE TABLET BY MOUTH DAILY  Patient taking differently: TAKE ONE TABLET BY MOUTH BEDTIME 8/8/18  Yes Edson Shah MD   CIALIS 10 mg tablet TAKE 1 TAB BY MOUTH DAILY. Patient taking differently: TAKE 1 TAB BY MOUTH DAILY. AS NEEDED 9/23/18   Edson Shah MD   VIAGRA 100 mg tablet Take 1 Tab by mouth as needed. 1/30/18   Edson Shah MD   IBUPROFEN/DIPHENHYDRAMINE CIT (ADVIL PM PO) Take 600 mg by mouth. Provider, Historical   buprenorphine-naloxone (SUBOXONE) 8-2 mg film sublingaul film by SubLINGual route daily. Instructed to stop by Cheri Cuenca. Provider, Historical   testosterone cypionate 100 mg/mL injection 100 mg/mL by IntraMUSCular route every seven (7) days.  Every Wednesday    Provider, Historical Current Outpatient Medications   Medication Sig    ALPRAZolam (XANAX) 0.5 mg tablet TAKE 1 TABLET BY MOUTH EVERY 8 HOURS AS NEEDED FOR ANXIETY    pantoprazole (PROTONIX) 40 mg tablet TAKE 1 TABLET BY MOUTH DAILY (Patient taking differently: TAKE 1 TABLET BY MOUTH bedtime)    lisinopril (PRINIVIL, ZESTRIL) 20 mg tablet TAKE ONE TABLET BY MOUTH DAILY (Patient taking differently: TAKE ONE TABLET BY MOUTH bedtime)    atorvastatin (LIPITOR) 10 mg tablet TAKE ONE TABLET BY MOUTH DAILY (Patient taking differently: TAKE ONE TABLET BY MOUTH BEDTIME)    CIALIS 10 mg tablet TAKE 1 TAB BY MOUTH DAILY. (Patient taking differently: TAKE 1 TAB BY MOUTH DAILY. AS NEEDED)    VIAGRA 100 mg tablet Take 1 Tab by mouth as needed.  IBUPROFEN/DIPHENHYDRAMINE CIT (ADVIL PM PO) Take 600 mg by mouth.  buprenorphine-naloxone (SUBOXONE) 8-2 mg film sublingaul film by SubLINGual route daily. Instructed to stop by Honey Hernández.  testosterone cypionate 100 mg/mL injection 100 mg/mL by IntraMUSCular route every seven (7) days. Every Wednesday     No current facility-administered medications for this encounter. Allergies   Allergen Reactions    Codeine Rash      Social History     Tobacco Use    Smoking status: Never Smoker    Smokeless tobacco: Never Used   Substance Use Topics    Alcohol use: Yes     Alcohol/week: 0.6 oz     Types: 1 Glasses of wine per week     Comment: Rare glass of wine      Social History     Substance and Sexual Activity   Drug Use No    Comment: 5 years - Percocet     Family History   Problem Relation Age of Onset    Heart Disease Father 46        CABG x5    Coronary Artery Disease Father         stents    Diabetes Sister     Heart Disease Sister         afib    Heart Failure Mother     No Known Problems Brother     No Known Problems Brother          Review of Systems    Patient denies difficulty swallowing, mouth sores, or loose teeth.  Patient denies any recent dental procedures or any planned prior to surgery. Patient denies chest pain, tightness, pain radiating down left arm, palpitations. Denies dizziness, visual disturbances, or lightheadedness. Patient denies shortness of breath, wheezing, cough, fever, or chills. Patient denies diarrhea, constipation, or abdominal pain. Patient denies urinary problems including dysuria, hesitancy, urgency, or incontinence. Denies skin breakdown, rashes, insect bites or open area. C/o left shoulder pain. Objective:     Patient Vitals for the past 24 hrs:   Temp Pulse Resp BP SpO2   18 0809 97.9 °F (36.6 °C) 80 18 166/89 96 %     Temp (24hrs), Av.9 °F (36.6 °C), Min:97.9 °F (36.6 °C), Max:97.9 °F (36.6 °C)    Body mass index is 25.98 kg/m². Wt Readings from Last 1 Encounters:   18 91.8 kg (202 lb 5 oz)        Physical Exam:     General: Pleasant,  cooperative, no apparent distress, appears stated age. Anxious. Eyes: Conjunctivae/corneas clear. EOMs intact. Nose: Nares normal.   Mouth/Throat: Lips, mucosa, and tongue normal. Teeth and gums normal.   Neck: Supple, symmetrical, trachea midline. Back: Symmetric   Lungs: Clear to auscultation bilaterally. Heart: Regular rate and rhythm, S1, S2 normal. No murmur, click, rub or gallop. Abdomen: Soft, non-tender. Bowel sounds normal. No distention. Musculoskeletal:  Left shoulder ROM limited by discomfort   Extremities:  Extremities normal, atraumatic, no cyanosis or edema. Calves                                 supple, non tender to palpation. Pulses: 2+ and symmetric bilateral upper extremities. Cap. refill <2 seconds   Skin: Skin color, texture, turgor normal.  No visible rashes or lesions. Neurologic: CN II-XII grossly intact. Alert and oriented x3.     Labs:   Recent Results (from the past 72 hour(s))   METABOLIC PANEL, BASIC    Collection Time: 18  8:44 AM   Result Value Ref Range    Sodium 139 136 - 145 mmol/L    Potassium 4.3 3.5 - 5.1 mmol/L    Chloride 104 97 - 108 mmol/L    CO2 28 21 - 32 mmol/L    Anion gap 7 5 - 15 mmol/L    Glucose 90 65 - 100 mg/dL    BUN 19 6 - 20 MG/DL    Creatinine 1.06 0.70 - 1.30 MG/DL    BUN/Creatinine ratio 18 12 - 20      GFR est AA >60 >60 ml/min/1.73m2    GFR est non-AA >60 >60 ml/min/1.73m2    Calcium 8.8 8.5 - 10.1 MG/DL   EKG, 12 LEAD, INITIAL    Collection Time: 12/03/18  8:54 AM   Result Value Ref Range    Ventricular Rate 65 BPM    Atrial Rate 65 BPM    P-R Interval 172 ms    QRS Duration 100 ms    Q-T Interval 370 ms    QTC Calculation (Bezet) 384 ms    Calculated P Axis 41 degrees    Calculated R Axis 33 degrees    Calculated T Axis 26 degrees    Diagnosis       Normal sinus rhythm  Normal ECG  When compared with ECG of 13-APR-2018 19:40,  Criteria for Inferior infarct are no longer present  Confirmed by Cezar Bosch M.D., Adarsh Mask ([id number]) on 12/3/2018 4:25:21 PM         Assessment:     Impingement syndrome -of left shoulder. Plan:     Scheduled for left arthroscopic subacromial decompression and distal clavicle excision. BMP and EKG done per anesthesia protocol. Lab results and EKG reviewed- unremarkable.         Elenita Del Rio NP

## 2018-12-06 ENCOUNTER — APPOINTMENT (OUTPATIENT)
Dept: PHYSICAL THERAPY | Age: 63
End: 2018-12-06
Payer: COMMERCIAL

## 2018-12-14 ENCOUNTER — ANESTHESIA EVENT (OUTPATIENT)
Dept: SURGERY | Age: 63
End: 2018-12-14
Payer: COMMERCIAL

## 2018-12-17 ENCOUNTER — ANESTHESIA (OUTPATIENT)
Dept: SURGERY | Age: 63
End: 2018-12-17
Payer: COMMERCIAL

## 2018-12-17 ENCOUNTER — HOSPITAL ENCOUNTER (OUTPATIENT)
Age: 63
Setting detail: OUTPATIENT SURGERY
Discharge: HOME | End: 2018-12-17
Attending: ORTHOPAEDIC SURGERY | Admitting: ORTHOPAEDIC SURGERY
Payer: COMMERCIAL

## 2018-12-17 VITALS
BODY MASS INDEX: 25.96 KG/M2 | TEMPERATURE: 98 F | WEIGHT: 202.16 LBS | OXYGEN SATURATION: 96 % | DIASTOLIC BLOOD PRESSURE: 83 MMHG | RESPIRATION RATE: 12 BRPM | HEART RATE: 72 BPM | SYSTOLIC BLOOD PRESSURE: 150 MMHG

## 2018-12-17 DIAGNOSIS — M54.6 CHRONIC MIDLINE THORACIC BACK PAIN: Primary | ICD-10-CM

## 2018-12-17 DIAGNOSIS — G89.29 CHRONIC MIDLINE THORACIC BACK PAIN: Primary | ICD-10-CM

## 2018-12-17 PROCEDURE — 77030004451 HC BUR SHV S&N -B: Performed by: ORTHOPAEDIC SURGERY

## 2018-12-17 PROCEDURE — 74011250636 HC RX REV CODE- 250/636: Performed by: ANESTHESIOLOGY

## 2018-12-17 PROCEDURE — 74011000250 HC RX REV CODE- 250

## 2018-12-17 PROCEDURE — 77030018835 HC SOL IRR LR ICUM -A: Performed by: ORTHOPAEDIC SURGERY

## 2018-12-17 PROCEDURE — 74011250637 HC RX REV CODE- 250/637: Performed by: ORTHOPAEDIC SURGERY

## 2018-12-17 PROCEDURE — 76010000153 HC OR TIME 1.5 TO 2 HR: Performed by: ORTHOPAEDIC SURGERY

## 2018-12-17 PROCEDURE — 77030006877 HC BLD SHV FLL RAD S&N -B: Performed by: ORTHOPAEDIC SURGERY

## 2018-12-17 PROCEDURE — 77030012711 HC WND ARTHRO ABLT S&N -D: Performed by: ORTHOPAEDIC SURGERY

## 2018-12-17 PROCEDURE — 74011250636 HC RX REV CODE- 250/636: Performed by: ORTHOPAEDIC SURGERY

## 2018-12-17 PROCEDURE — 77030010507 HC ADH SKN DERMBND J&J -B

## 2018-12-17 PROCEDURE — 74011250636 HC RX REV CODE- 250/636

## 2018-12-17 PROCEDURE — 77030029710 HC ANCHR SUT JGERKNT BIOM -E: Performed by: ORTHOPAEDIC SURGERY

## 2018-12-17 PROCEDURE — 74011250637 HC RX REV CODE- 250/637: Performed by: ANESTHESIOLOGY

## 2018-12-17 PROCEDURE — 77030013234 HC TRACT SHLDR KT BIOM -B: Performed by: ORTHOPAEDIC SURGERY

## 2018-12-17 PROCEDURE — 77030019974 HC FRCP GRSP SUT J&J -C: Performed by: ORTHOPAEDIC SURGERY

## 2018-12-17 PROCEDURE — 77030020143 HC AIRWY LARYN INTUB CGAS -A: Performed by: ANESTHESIOLOGY

## 2018-12-17 PROCEDURE — 77030013837 HC NERV BLK KT BBMI -B

## 2018-12-17 PROCEDURE — 77030002916 HC SUT ETHLN J&J -A: Performed by: ORTHOPAEDIC SURGERY

## 2018-12-17 PROCEDURE — 76210000026 HC REC RM PH II 1 TO 1.5 HR: Performed by: ORTHOPAEDIC SURGERY

## 2018-12-17 PROCEDURE — 77030008496 HC TBNG ARTHSC IRR S&N -B: Performed by: ORTHOPAEDIC SURGERY

## 2018-12-17 PROCEDURE — 76060000034 HC ANESTHESIA 1.5 TO 2 HR: Performed by: ORTHOPAEDIC SURGERY

## 2018-12-17 PROCEDURE — 64415 NJX AA&/STRD BRCH PLXS IMG: CPT

## 2018-12-17 PROCEDURE — 77030016677 HC BUR SHV ABRD S&N -B: Performed by: ORTHOPAEDIC SURGERY

## 2018-12-17 PROCEDURE — 76210000006 HC OR PH I REC 0.5 TO 1 HR: Performed by: ORTHOPAEDIC SURGERY

## 2018-12-17 PROCEDURE — 77030020782 HC GWN BAIR PAWS FLX 3M -B

## 2018-12-17 DEVICE — IMPLANTABLE DEVICE
Type: IMPLANTABLE DEVICE | Site: SHOULDER | Status: FUNCTIONAL
Brand: JUGGERKNOT SOFT ANCHORS

## 2018-12-17 RX ORDER — ROPIVACAINE HYDROCHLORIDE 5 MG/ML
INJECTION, SOLUTION EPIDURAL; INFILTRATION; PERINEURAL AS NEEDED
Status: DISCONTINUED | OUTPATIENT
Start: 2018-12-17 | End: 2018-12-17 | Stop reason: HOSPADM

## 2018-12-17 RX ORDER — ACETAMINOPHEN 325 MG/1
975 TABLET ORAL ONCE
Status: COMPLETED | OUTPATIENT
Start: 2018-12-17 | End: 2018-12-17

## 2018-12-17 RX ORDER — OXYCODONE AND ACETAMINOPHEN 5; 325 MG/1; MG/1
2 TABLET ORAL
Status: DISCONTINUED | OUTPATIENT
Start: 2018-12-17 | End: 2018-12-17 | Stop reason: HOSPADM

## 2018-12-17 RX ORDER — LIDOCAINE HYDROCHLORIDE 10 MG/ML
0.1 INJECTION, SOLUTION EPIDURAL; INFILTRATION; INTRACAUDAL; PERINEURAL AS NEEDED
Status: DISCONTINUED | OUTPATIENT
Start: 2018-12-17 | End: 2018-12-17 | Stop reason: HOSPADM

## 2018-12-17 RX ORDER — ONDANSETRON 2 MG/ML
4 INJECTION INTRAMUSCULAR; INTRAVENOUS AS NEEDED
Status: DISCONTINUED | OUTPATIENT
Start: 2018-12-17 | End: 2018-12-17 | Stop reason: HOSPADM

## 2018-12-17 RX ORDER — OXYCODONE HYDROCHLORIDE 5 MG/1
5-10 TABLET ORAL
Qty: 42 TAB | Refills: 0 | Status: SHIPPED | OUTPATIENT
Start: 2018-12-17 | End: 2019-04-15

## 2018-12-17 RX ORDER — EPHEDRINE SULFATE 50 MG/ML
INJECTION, SOLUTION INTRAVENOUS AS NEEDED
Status: DISCONTINUED | OUTPATIENT
Start: 2018-12-17 | End: 2018-12-17 | Stop reason: HOSPADM

## 2018-12-17 RX ORDER — CEFAZOLIN SODIUM/WATER 2 G/20 ML
2 SYRINGE (ML) INTRAVENOUS ONCE
Status: COMPLETED | OUTPATIENT
Start: 2018-12-17 | End: 2018-12-17

## 2018-12-17 RX ORDER — PROPOFOL 10 MG/ML
INJECTION, EMULSION INTRAVENOUS AS NEEDED
Status: DISCONTINUED | OUTPATIENT
Start: 2018-12-17 | End: 2018-12-17 | Stop reason: HOSPADM

## 2018-12-17 RX ORDER — OXYCODONE HCL 10 MG/1
10 TABLET, FILM COATED, EXTENDED RELEASE ORAL ONCE
Status: COMPLETED | OUTPATIENT
Start: 2018-12-17 | End: 2018-12-17

## 2018-12-17 RX ORDER — SODIUM CHLORIDE, SODIUM LACTATE, POTASSIUM CHLORIDE, CALCIUM CHLORIDE 600; 310; 30; 20 MG/100ML; MG/100ML; MG/100ML; MG/100ML
125 INJECTION, SOLUTION INTRAVENOUS CONTINUOUS
Status: DISCONTINUED | OUTPATIENT
Start: 2018-12-17 | End: 2018-12-17 | Stop reason: HOSPADM

## 2018-12-17 RX ORDER — SODIUM CHLORIDE, SODIUM LACTATE, POTASSIUM CHLORIDE, CALCIUM CHLORIDE 600; 310; 30; 20 MG/100ML; MG/100ML; MG/100ML; MG/100ML
150 INJECTION, SOLUTION INTRAVENOUS CONTINUOUS
Status: DISCONTINUED | OUTPATIENT
Start: 2018-12-17 | End: 2018-12-17 | Stop reason: HOSPADM

## 2018-12-17 RX ORDER — FENTANYL CITRATE 50 UG/ML
INJECTION, SOLUTION INTRAMUSCULAR; INTRAVENOUS AS NEEDED
Status: DISCONTINUED | OUTPATIENT
Start: 2018-12-17 | End: 2018-12-17 | Stop reason: HOSPADM

## 2018-12-17 RX ORDER — ALBUTEROL SULFATE 0.83 MG/ML
2.5 SOLUTION RESPIRATORY (INHALATION) AS NEEDED
Status: DISCONTINUED | OUTPATIENT
Start: 2018-12-17 | End: 2018-12-17 | Stop reason: HOSPADM

## 2018-12-17 RX ORDER — LIDOCAINE HYDROCHLORIDE 20 MG/ML
INJECTION, SOLUTION EPIDURAL; INFILTRATION; INTRACAUDAL; PERINEURAL AS NEEDED
Status: DISCONTINUED | OUTPATIENT
Start: 2018-12-17 | End: 2018-12-17 | Stop reason: HOSPADM

## 2018-12-17 RX ORDER — OXYCODONE HYDROCHLORIDE 5 MG/1
10 TABLET ORAL
Status: DISCONTINUED | OUTPATIENT
Start: 2018-12-17 | End: 2018-12-17 | Stop reason: HOSPADM

## 2018-12-17 RX ORDER — HYDROMORPHONE HYDROCHLORIDE 2 MG/ML
0.5 INJECTION, SOLUTION INTRAMUSCULAR; INTRAVENOUS; SUBCUTANEOUS
Status: ACTIVE | OUTPATIENT
Start: 2018-12-17 | End: 2018-12-17

## 2018-12-17 RX ORDER — MIDAZOLAM HYDROCHLORIDE 1 MG/ML
INJECTION, SOLUTION INTRAMUSCULAR; INTRAVENOUS AS NEEDED
Status: DISCONTINUED | OUTPATIENT
Start: 2018-12-17 | End: 2018-12-17 | Stop reason: HOSPADM

## 2018-12-17 RX ORDER — ONDANSETRON 2 MG/ML
INJECTION INTRAMUSCULAR; INTRAVENOUS AS NEEDED
Status: DISCONTINUED | OUTPATIENT
Start: 2018-12-17 | End: 2018-12-17 | Stop reason: HOSPADM

## 2018-12-17 RX ORDER — DIPHENHYDRAMINE HYDROCHLORIDE 50 MG/ML
12.5 INJECTION, SOLUTION INTRAMUSCULAR; INTRAVENOUS AS NEEDED
Status: DISCONTINUED | OUTPATIENT
Start: 2018-12-17 | End: 2018-12-17 | Stop reason: HOSPADM

## 2018-12-17 RX ORDER — DEXAMETHASONE SODIUM PHOSPHATE 4 MG/ML
INJECTION, SOLUTION INTRA-ARTICULAR; INTRALESIONAL; INTRAMUSCULAR; INTRAVENOUS; SOFT TISSUE AS NEEDED
Status: DISCONTINUED | OUTPATIENT
Start: 2018-12-17 | End: 2018-12-17 | Stop reason: HOSPADM

## 2018-12-17 RX ADMIN — Medication 2 G: at 11:32

## 2018-12-17 RX ADMIN — MIDAZOLAM HYDROCHLORIDE 1 MG: 1 INJECTION, SOLUTION INTRAMUSCULAR; INTRAVENOUS at 10:56

## 2018-12-17 RX ADMIN — MIDAZOLAM HYDROCHLORIDE 2 MG: 1 INJECTION, SOLUTION INTRAMUSCULAR; INTRAVENOUS at 10:52

## 2018-12-17 RX ADMIN — ROPIVACAINE HYDROCHLORIDE 30 ML: 5 INJECTION, SOLUTION EPIDURAL; INFILTRATION; PERINEURAL at 11:02

## 2018-12-17 RX ADMIN — FENTANYL CITRATE 100 MCG: 50 INJECTION, SOLUTION INTRAMUSCULAR; INTRAVENOUS at 10:50

## 2018-12-17 RX ADMIN — DEXAMETHASONE SODIUM PHOSPHATE 4 MG: 4 INJECTION, SOLUTION INTRA-ARTICULAR; INTRALESIONAL; INTRAMUSCULAR; INTRAVENOUS; SOFT TISSUE at 11:37

## 2018-12-17 RX ADMIN — OXYCODONE AND ACETAMINOPHEN 2 TABLET: 5; 325 TABLET ORAL at 15:15

## 2018-12-17 RX ADMIN — ACETAMINOPHEN 975 MG: 325 TABLET, FILM COATED ORAL at 08:59

## 2018-12-17 RX ADMIN — PROPOFOL 200 MG: 10 INJECTION, EMULSION INTRAVENOUS at 11:29

## 2018-12-17 RX ADMIN — MIDAZOLAM HYDROCHLORIDE 2 MG: 1 INJECTION, SOLUTION INTRAMUSCULAR; INTRAVENOUS at 10:54

## 2018-12-17 RX ADMIN — SODIUM CHLORIDE, SODIUM LACTATE, POTASSIUM CHLORIDE, AND CALCIUM CHLORIDE: 600; 310; 30; 20 INJECTION, SOLUTION INTRAVENOUS at 12:03

## 2018-12-17 RX ADMIN — SODIUM CHLORIDE, SODIUM LACTATE, POTASSIUM CHLORIDE, AND CALCIUM CHLORIDE 150 ML/HR: 600; 310; 30; 20 INJECTION, SOLUTION INTRAVENOUS at 09:02

## 2018-12-17 RX ADMIN — MIDAZOLAM HYDROCHLORIDE 2 MG: 1 INJECTION, SOLUTION INTRAMUSCULAR; INTRAVENOUS at 10:50

## 2018-12-17 RX ADMIN — OXYCODONE HYDROCHLORIDE 10 MG: 10 TABLET, FILM COATED, EXTENDED RELEASE ORAL at 08:59

## 2018-12-17 RX ADMIN — ONDANSETRON 4 MG: 2 INJECTION INTRAMUSCULAR; INTRAVENOUS at 12:41

## 2018-12-17 RX ADMIN — LIDOCAINE HYDROCHLORIDE 40 MG: 20 INJECTION, SOLUTION EPIDURAL; INFILTRATION; INTRACAUDAL; PERINEURAL at 11:29

## 2018-12-17 RX ADMIN — EPHEDRINE SULFATE 5 MG: 50 INJECTION, SOLUTION INTRAVENOUS at 12:21

## 2018-12-17 NOTE — INTERVAL H&P NOTE
H&P Update:  Steffany Miranda was seen and examined. History and physical has been reviewed. The patient has been examined.  There have been no significant clinical changes since the completion of the originally dated History and Physical.    Signed By: Jonathan Muñoz MD     December 17, 2018 10:29 AM

## 2018-12-17 NOTE — DISCHARGE INSTRUCTIONS
Arthroscopic Rotator Cuff Repair  Discharge Instructions  Dr. Malika Almeida    Please take the time to review the following instructions before you leave the hospital.  Katie Jain should use them as guidelines during your recovery from surgery. If you have any questions, you may contact my office at [phone]. Wound Care / Dressing Changes:  Beginning 2 days after your surgery, you should remove your bulky dressing. A big, bulky dressing isnt necessary as long as there is no drainage from the incisions. You can put a band-aid over each incision. Do not apply antibiotic ointment to your incisions. [place] / Bathing: You may shower 2 days after your surgery. Your band-aids may be removed for showering. You may get your incisions wet in the shower. Do not vigorously scrub your incisions. Do not take a bath or get into a swimming pool or Jacuzzi until you follow up with Dr. Katy Conley. Do not soak your incisions under water. Sling:  Keep your arm in the sling at all times while it is numb. Once your arm is no longer numb you should discontinue the sling and slowly progress your activities. Ice and Elevation:  Ice therapy should be used consistently for 48 hours after surgery. Subsequently, you should ice 3 times per day for 20 minutes at a time. After the first week, you may use ice as needed for pain and swelling. Please ensure there is protective barrier between your skin and the ice. Diet:  You may advance your regular diet as tolerated. Increase your clear liquid intake for the next 2-3 days. Physical Therapy:  You should begin the exercises on the attached sheet the day after your surgery. Medications:    1. You were prescribed Aspirin 325mg twice daily for 14 days to prevent a blood clot. Do not take additional NSAIDs while taking the Aspirin. 2. You were prescribed a narcotic medication for pain control. Please use the medications as prescribed. Pain medications may cause constipation - Colace or Milk of Magnesia may be used as needed. Other possible side effects of pain medications are dizziness, headache, nausea, vomiting, and urinary retention. Discontinue the pain medication if you develop itching, rash, shortness of breath, or difficulties swallowing. If these symptoms become severe or arent relieved by discontinuing the medication, you should seek immediate medical attention. You cannot drive or operate machinery while taking narcotic medication. Refills of pain medication are authorized during office hours only   (8AM - 5PM Monday through Friday)    3. Some pain medications already contain Tylenol/Acetaminophen. Please read your prescription pain medicine bottle prior to taking additional Tylenol. Do not exceed 3000mg of Tylenol/Acetaminophen in a 24 hour period. 4. You may resume other medications that you were taking prior to your surgery. Pain medication may change the effects of any antidepressant medication you may be taking. If you have any questions about possible interactions between your regular medication and the pain medication, you should consult the physician who prescribes your regular medications. Follow up appointment:  Please follow up at your scheduled appointment 10-14 days from the day of your surgery. If you do not already have an appointment, please call our office at [phone] for your follow up appointment. Your appointment will likely be with Rodrigo Jordan PA-C. Andrade Kasper assists Dr. Silvia Angelo in surgery and will be able to review your operation and answer your questions. Important Signs and Symptoms:  If you experience any of the following signs and symptoms, you should contact Dr. Oliver Kennedy office.   Please be advised if a problem arises which you feel requires immediate medical attention or you are unable to contact Dr. Oliver Kennedy office, you should seek immediate medical attention. If it is after 5pm, please call the main office number [phone] for medical emergencies. Signs and symptoms to watch for include:  1. A sudden increase in swelling and/or redness or warmth at the area of your surgery which isnt relieved by rest, ice, and elevation. 2. Oral temperature greater than 101degrees for 12 hours or more which isnt relieved by an increase in fluid intake and taking Tylenol. 3. Excessive drainage from your incisions or drainage which hasnt stopped by 72 hours after your surgery. 4. Calf pain, fever, chills, shortness of breath, chest pain, nausea, vomiting or other signs and symptoms which are of concern to you. Exercises after Shoulder Surgery  1. Passive Forward Flexion:                                  Instructions:  - Lay on your back  - Take your non surgical arm and grab the wrist of your surgical arm from the bottom  - Use your non surgical arm to lift your surgical arm over your head with the elbow straight   - Slowly return your arm to your side using your non surgical arm  - Repeat 20 times in the morning and 20 times in the evening  Remember:  - Passive motion: Your arm is lifted with the help of your other hand. o The repair is protected when you do passive motion  - Active motion: You lift your arm by using your shoulder muscles. o DO NOT DO ANY ACTIVE MOTION FOR NOW      2.  Codman Pendulum Exercises                                     Instructions:  - Bend forward about 90° at the waist and support yourself on a sturdy object with your non surgical arm  (bend slightly at the knees to protect your back)  - Gently allow your surgical arm to hang towards the ground  - Move your hips forward and backward allowing your arm to swing slightly  - Arm can move forward, backward, and in small circles (clockwise and counterclockwise)  o Remember: let your body move your arm, do not use your arm muscles  - Begin performing the exercise for about 30 seconds. Progress to 2 minutes. - Repeat 2-3 times per day    DISCHARGE SUMMARY from your Nurse    The following personal items collected during your admission are returned to you:   Dental Appliance: Dental Appliances: None  Vision: Visual Aid: None  Hearing Aid:    Jewelry: Jewelry: None  Clothing: Clothing: Other (comment)(street clothes to locker)  Other Valuables: Other Valuables: None  Valuables sent to safe:      PATIENT INSTRUCTIONS:    After general anesthesia or intravenous sedation, for 24 hours or while taking prescription Narcotics:  · Limit your activities  · Do not drive and operate hazardous machinery  · Do not make important personal or business decisions  · Do  not drink alcoholic beverages  · If you have not urinated within 8 hours after discharge, please contact your surgeon on call. Report the following to your surgeon:  · Excessive pain, swelling, redness or odor of or around the surgical area  · Temperature over 100.5  · Nausea and vomiting lasting longer than 4 hours or if unable to take medications  · Any signs of decreased circulation or nerve impairment to extremity: change in color, persistent  numbness, tingling, coldness or increase pain  · Any questions    COUGH AND DEEP BREATHE    Breathing deep and coughing are very important exercises to do after surgery. Deep breathing and coughing open the little air tubes and air sacks in your lungs. You take deep breaths every day. You may not even notice - it is just something you do when you sigh or yawn. It is a natural exercise you do to keep these air passages open. After surgery, take deep breaths and cough, on purpose. Coughing and deep breathing help prevent bronchitis and pneumonia after surgery. If you had chest or belly surgery, use a pillow as a \"hug buddy\" and hold it tightly to your chest or belly when you cough. DIRECTIONS:  6.  Take 10 to 15 slow deep breaths every hour while awake. 7. Breathe in deeply, and hold it for 2 seconds. 8. Exhale slowly through puckered lips, like blowing up a balloon. 9. After every 4th or 5th deep breath, hug your pillow to your chest or belly and give a hard, deep cough. Yes, it will probably hurt. But doing this exercise is very important part of healing after surgery. Take your pain medicine to help you do this exercise without too much pain. IF YOU HAVE BEEN DIAGNOSED WITH SLEEP APNEA, PLEASE USE YOUR SLEEP APNEA DEVICE OR CPAP MACHINE WHEN YOU INTEND TO NAP AFTER TAKING PAIN MEDICATION. Ankle Pumps    Ankle pumps increase the circulation of oxygenated blood to your lower extremities and decrease your risk for circulation problems such as blood clots. They also stretch the muscles, tendons and ligaments in your foot and ankle, and prevent joint contracture in the ankle and foot, especially after surgeries on the legs. It is important to do ankle pump exercises regularly after surgery because immobility increases your risk for developing a blood clot. Your doctor may also have you take an Aspirin for the next few days as well. If your doctor did not ask you to take an Aspirin, consult with him before starting Aspirin therapy on your own. Slowly point your foot forward, feeling the muscles on the top of your lower leg stretch, and hold this position for 5 seconds. Next, pull your foot back toward you as far as possible, stretching the calf muscles, and hold that position for 5 seconds. Repeat with the other foot. Perform 10 repetitions every hour while awake for both ankles if possible (down and then up with the foot once is one repetition). You should feel gentle stretching of the muscles in your lower leg when doing this exercise. If you feel pain, or your range of motion is limited, don't  Push too hard. Only go the limit your joint and muscles will let you go.   If you have increasing pain, progressively worsening leg warmth or swelling, STOP the exercise and call your doctor. Below is information about the medications your doctor is prescribing after your visit:    Other information in your discharge envelope:  []     PRESCRIPTIONS  []     PHYSICAL THERAPY PRESCRIPTION  []     APPOINTMENT CARDS  []     Regional Anesthesia Pamphlet for block or block with On-Q Catheter from Anesthesia Service  []     Medical device information sheets/pamphlets from their    []     School/work excuse note. []     /parent work excuse note. These are general instructions for a healthy lifestyle:    *  Please give a list of your current medications to your Primary Care Provider. *  Please update this list whenever your medications are discontinued, doses are      changed, or new medications (including over-the-counter products) are added. *  Please carry medication information at all times in case of emergency situations. About Smoking  No smoking / No tobacco products / Avoid exposure to second hand smoke    Surgeon General's Warning:  Quitting smoking now greatly reduces serious risk to your health. Obesity, smoking, and sedentary lifestyle greatly increases your risk for illness and disease. A healthy diet, regular physical exercise & weight monitoring are important for maintaining a healthy lifestyle. Congestive Heart Failure  You may be retaining fluid if you have a history of heart failure or if you experience any of the following symptoms:  Weight gain of 3 pounds or more overnight or 5 pounds in a week, increased swelling in our hands or feet or shortness of breath while lying flat in bed. Please call your doctor as soon as you notice any of these symptoms; do not wait until your next office visit.     Recognize signs and symptoms of STROKE:  F - face looks uneven  A - arms unable to move or move even  S - speech slurred or non-existent  T - time-call 911 as soon as signs and symptoms begin-DO NOT go         Back to bed or wait to see if you get better-TIME IS BRAIN. Warning signs of HEART ATTACK  Call 911 if you have these symptoms    · Chest discomfort. Most heart attacks involve discomfort in the center of the chest that lasts more than a few minutes, or that goes away and comes back. It can feel like uncomfortable pressure, squeezing, fullness, or pain. · Discomfort in other areas of the upper body. Symptoms can include pain or discomfort in one or both        Arms, the back, neck, jaw, or stomach. ·  Shortness of breath with or without chest discomfort. · Other signs may include breaking out in a cold sweat, nausea, or lightheadedness    Don't wait more than five minutes to call 911 - MINUTES MATTER! Fast action can save your life. Calling 911 is almost always the fastest way to get lifesaving treatment. Emergency Medical Services staff can begin treatment when they arrive - up to an hour sooner than if someone gets to the hospital by car.

## 2018-12-17 NOTE — ANESTHESIA POSTPROCEDURE EVALUATION
Procedure(s):  LEFT SHOULDER ARTHROSCOPY, Rotator Cuff Repair, Subacromial decompression, Distal Clavicle Excision. Anesthesia Post Evaluation      Multimodal analgesia: multimodal analgesia not used between 6 hours prior to anesthesia start to PACU discharge  Patient location during evaluation: PACU  Patient participation: complete - patient participated  Level of consciousness: awake  Pain score: 0  Pain management: adequate  Airway patency: patent  Anesthetic complications: no  Cardiovascular status: hemodynamically stable and acceptable  Respiratory status: acceptable  Hydration status: acceptable  Comments: Patient seen and evaluated; no concerns.   Post anesthesia nausea and vomiting:  none      Visit Vitals  /86   Pulse 79   Temp 36.6 °C (97.9 °F)   Resp 14   Wt 91.7 kg (202 lb 2.6 oz)   SpO2 99%   BMI 25.96 kg/m²

## 2018-12-17 NOTE — BRIEF OP NOTE
BRIEF OPERATIVE NOTE    Date of Procedure: 12/17/2018   Preoperative Diagnosis: LEFT SHOUILDER AC JOINT OSTEOARTHRITIS, IMPINGEMENT, ROTATOR CUFF TEAR  Postoperative Diagnosis: LEFT SHOUILDER AC JOINT OSTEOARTHRITIS, IMPINGEMENT, ROTATOR CUFF TEAR    Procedure: Procedure(s):  LEFT SHOULDER ARTHROSCOPY, Rotator Cuff Repair, Subacromial decompression, Distal Clavicle Excision    Surgeon: Gina Dumas MD  Assistant(s): Katherine Mcallister PA-C, ATC  Anesthesia: General   Estimated Blood Loss: minimal  Specimens: * No specimens in log *   Findings: RCT  Complications: None  Implants:   Implant Name Type Inv.  Item Serial No.  Lot No. LRB No. Used Action   ANCHOR SUT JUGGERKNOT 2.9MM --  - SNA  ANCHOR SUT JUGGERKNOT 2.9MM --  [employer] [id number] Left 1 Implanted   ANCHOR SUT JUGGERKNOT 2.9MM --  - SNA  ANCHOR SUT JUGGERKNOT 2.9MM --  [employer] [id number] Left 1 Implanted   ANCHOR SUT JUGGERKNOT 2.9MM --  - SNA  ANCHOR SUT JUGGERKNOT 2.9MM --  [employer] [id number] Left 1 Implanted

## 2018-12-17 NOTE — ANESTHESIA PROCEDURE NOTES
Peripheral Block    Start time: 12/17/2018 10:49 AM  End time: 12/17/2018 11:07 AM  Performed by: Johnathon Oropeza CRNA  Authorized by: Tapan Gar DO       Pre-procedure: Indications: at surgeon's request and post-op pain management    Preanesthetic Checklist: patient identified, risks and benefits discussed, site marked, timeout performed, anesthesia consent given and patient being monitored    Timeout Time: 10:49          Block Type:   Block Type: Interscalene  Laterality:  Left  Monitoring:  Continuous pulse ox, frequent vital sign checks, heart rate, responsive to questions and oxygen  Injection Technique:  Continuous  Procedures: ultrasound guided    Patient Position: supine  Prep: chlorhexidine    Location:  Interscalene  Needle Type:  Tuohy  Needle Gauge:  18 G  Needle Localization:  Nerve stimulator and ultrasound guidance    Assessment:  Number of attempts:  1  Injection Assessment:  Incremental injection every 5 mL, local visualized surrounding nerve on ultrasound, negative aspiration for blood and no intravascular symptoms  Patient tolerance:  Patient tolerated the procedure well with no immediate complications  Heme noted with aspiration. Negative test dose. Called Dr Naren Degroot to inform of heme positive with negative test dose. Dr Padmini Grimm confirmed and to continue with catheter.

## 2018-12-17 NOTE — ANESTHESIA PREPROCEDURE EVALUATION
Anesthetic History   No history of anesthetic complications            Review of Systems / Medical History  Patient summary reviewed, nursing notes reviewed and pertinent labs reviewed    Pulmonary  Within defined limits                 Neuro/Psych             Comments: Spinal stenosis  Anxiety/depression Cardiovascular  Within defined limits  Hypertension: well controlled              Exercise tolerance: >4 METS     GI/Hepatic/Renal  Within defined limits   GERD: well controlled           Endo/Other  Within defined limits      Arthritis     Other Findings   Comments: Hx opioid addiction: chronic pain           Physical Exam    Airway  Mallampati: II    Neck ROM: normal range of motion   Mouth opening: Normal     Cardiovascular  Regular rate and rhythm,  S1 and S2 normal,  no murmur, click, rub, or gallop  Rhythm: regular  Rate: normal         Dental  No notable dental hx       Pulmonary  Breath sounds clear to auscultation               Abdominal  GI exam deferred       Other Findings            Anesthetic Plan    ASA: 3  Anesthesia type: general and regional      Post-op pain plan if not by surgeon: peripheral nerve block continuous    Induction: Intravenous  Anesthetic plan and risks discussed with: Patient      Informed consent obtained.

## 2018-12-17 NOTE — OP NOTES
ARTHROSCOPIC ROTATOR CUFF REPAIR OP NOTE       Date of Procedure: 12/17/2018   Preoperative Diagnosis: LEFT SHOUILDER AC JOINT OSTEOARTHRITIS, IMPINGEMENT, ROTATOR CUFF TEAR  Postoperative Diagnosis: LEFT SHOUILDER AC JOINT OSTEOARTHRITIS, IMPINGEMENT, ROTATOR CUFF TEAR    Procedure: Procedure(s):  LEFT SHOULDER ARTHROSCOPY, Rotator Cuff Repair, Subacromial decompression, Distal Clavicle Excision  Surgeon: Guero Peterson MD  Assistant(s): Gurwinder Chicas PA-C, ATC  Anesthesia: General   Estimated Blood Loss: <50cc  Specimens: * No specimens in log *   Complications: None  Implants:  Implant Name Type Inv. Item Serial No.  Lot No. LRB No. Used Action   ANCHOR SUT JUGGERKNOT 2.9MM --  - SNA  ANCHOR SUT JUGGERKNOT 2.9MM --  [employer] [id number] Left 1 Implanted   ANCHOR SUT JUGGERKNOT 2.9MM --  - SNA  ANCHOR SUT JUGGERKNOT 2.9MM --  [employer] [id number] Left 1 Implanted   ANCHOR SUT JUGGERKNOT 2.9MM --  - SNA  ANCHOR SUT JUGGERKNOT 2.9MM --  [employer] [id number] Left 1 Implanted         INDICATIONS:  Daisha Caputo  is a 59 y.o., male  who has complained of a long history of shoulder pain. After failure of conservative treatment the patient presents for definitive operative care. DESCRIPTION OF PROCEDURE:  After being informed of the risks and benefits, which include, but are not limited to, bleeding, infection, neurovascular damage, wound complications, pain and stiffness in the shoulder, the patient consented for the procedure. After adequate general anesthesia, the involved shoulder was prepped and draped in a sterile fashion. A standard posterior arthroscopic portal was established and the shoulder was serially inspected. The subscapularis revealed Degenerative fraying of the intraarticular portion The labrum revealed No significant abnormalities. The humeral head articular surface revealed No significant abnormalities.  The glenoid articular surface revealed No significant abnormalities. The superior rotator cuff revealed a tear involving the supraspinatus and infraspinatus  The posterior rotator cuff revealed Degenerative fraying involving less than 50% of the tendon thickness. All intraarticular abnormalities were debrided appropriately and articular cartilage lesions were appropriately stabilized if present. The scope was then placed in the subacromial space where a lateral portal was established and a bursectomy was performed. The tear was debrided and the tuberosity was abraded to a bleeding bed of bone. A superior stab incision was then made and a corkscrew anchor was inserted at the articular margin. Using a Nevaiser portal, the sutures were passed through the medial aspect of the cuff in a mattress fashion. These sutures were then shuttled out of the anterior portal.  A second anchor was placed anterolaterally, the sutures were passed in a simple fashion and then tied down using a modified Wolf knot. Subsequent anchors were placed laterally and the sutures were passed and tied in a similar fashion until a water tight lateral repair was completed. The medial sutures were then tied down finalizing the repair. The patient had an impingement lesion and the scope was then placed laterally and a bur posteriorly and a subacromial decompression was performed using the cutting block technique. The distal clavicle was visualized and A distal claviculectomy was not performed as determined by the preoperative history and physical.  A final bursectomy was performed and the scope was removed. The wounds were closed. Sterile dressings were applied and the patient was awakened and taken to the recovery room in stable condition.

## 2018-12-18 ENCOUNTER — PATIENT OUTREACH (OUTPATIENT)
Dept: OTHER | Age: 63
End: 2018-12-18

## 2018-12-18 RX ORDER — ASPIRIN 325 MG
325 TABLET ORAL DAILY
COMMUNITY
Start: 2018-12-18 | End: 2019-01-01

## 2018-12-18 NOTE — ACP (ADVANCE CARE PLANNING)
Advanced Directive:  Patient offered information on development of Advanced Care Planning. * Patient defers this topic to another time.
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
## 2018-12-26 ENCOUNTER — HOSPITAL ENCOUNTER (OUTPATIENT)
Dept: PHYSICAL THERAPY | Age: 63
Discharge: HOME | End: 2018-12-26
Payer: COMMERCIAL

## 2018-12-26 PROCEDURE — 97140 MANUAL THERAPY 1/> REGIONS: CPT | Performed by: PHYSICAL MEDICINE & REHABILITATION

## 2018-12-26 PROCEDURE — 97110 THERAPEUTIC EXERCISES: CPT | Performed by: PHYSICAL MEDICINE & REHABILITATION

## 2018-12-26 PROCEDURE — 97162 PT EVAL MOD COMPLEX 30 MIN: CPT | Performed by: PHYSICAL MEDICINE & REHABILITATION

## 2018-12-26 PROCEDURE — 97016 VASOPNEUMATIC DEVICE THERAPY: CPT | Performed by: PHYSICAL MEDICINE & REHABILITATION

## 2018-12-26 NOTE — PROGRESS NOTES
PT INITIAL EVALUATION NOTE 2-15    Patient Name: Karla Nguyen  Date:2018  : 1955  [x]  Patient  Verified  Payor: Baldomero Olmstead / Plan: Shereen Craig / Product Type: PPO /    In time: 1000  Out time: 1100  Total Treatment Time (min): 60  Visit #: 1     Treatment Area: Left shoulder pain [M25.512]    SUBJECTIVE  Pain Level (0-10 scale): 2  Any medication changes, allergies to medications, adverse drug reactions, diagnosis change, or new procedure performed?: [] No    [x] Yes (see summary sheet for update)  Subjective:     Patient is a 59year old male s/p L shoulder arthroscopic partial acromioplasty, distal claviculectomy, rotator cuff repair 18. He wore a sling 2 days, was instructed to remove, is doing preliminary AAROM exs at home per discharge instructions. Pain is well controlled with Advil. He is not performing reaching, dressing, lifting, carry ADL's due to post surgical restrictions and pain. He is struggling to position comfortably for sleep. Patient owns a refrigeration service company, requiring lifting, pushing, pulling, climbing, crawling. His goal is to return to work full capacity. He reports he is planning to have L knee arthroscopy (meniscus repair?) in January. PMH:   ORIF R clavicle 7 years ago  R RTC repair   R ankle tendon repair   R wrist external fixator 25 years ago  L knee open ACL/med collateral repair 30 years ago  Multiple B knee arthroscopic procedures. PLOF: L shoulder impingement  Mechanism of Injury: chronic  Previous Treatment/Compliance: fair compliance with attendance previous PT admissions  Living Situation: 2 story home, spouse  Pt Goals: return to full duty, golf  Barriers: work schedule  Motivation: good  Substance use: none   FABQ Score:   Cognition: A & O x 4       OBJECTIVE/EXAMINATION    Posture: Forward rounded shoulder, forward head, decreased lumbar lordosis in standing, slump sitting.    Observation: bruising/discoloration noted from shoulder to wrist, portal site stitches intact, mild swelling. Palpation: Mild soft tissue tenderness in areas of contusion   Joint Mobility: Decreased inferior and posterior humeral head glide, guarded. Shoulder AROM, PROM:         R AROM  L PROM  Shoulder Flexion  180   140           Shoulder Abduction  175   110   Shoulder ER     85   45 at 45deg abd  Shoulder IR   Thumb to T7  NT    L shoulder GMMT not tested, no focal weakness noted below shoulder. Neurological: Sensations: Denies parasthesias. Modality rationale: decrease edema, decrease inflammation and decrease pain to improve the patients ability to position comfortably for sleep. Type Additional Details   [] Estim: []Att   []Unatt        []TENS instruct                  []IFC  []Premod   []NMES                     []Other:  []w/US   []w/ice   []w/heat  Position:  Location:   []  Traction: [] Cervical       []Lumbar                       [] Prone          []Supine                       []Intermittent   []Continuous Lbs:  [] before manual  [] after manual  []w/heat   []  Ultrasound: []Continuous   [] Pulsed at:                            []1MHz   []3MHz Location:  W/cm2:   []  Paraffin         Location:  []w/heat   []  Ice     []  Heat  []  Ice massage Position:  Location:   []  Laser  []  Other: Position:  Location:   [x]  Vasopneumatic Device  15 mins Pressure:       [x] lo [] med [] hi   Temperature: 34   [x] Skin assessment post-treatment:  [x]intact []redness- no adverse reaction    []redness  adverse reaction:     15 min Therapeutic Exercise:  [x] See flow sheet :  PROM, scapular mobility, Posture correction. Rationale: increase ROM and improve coordination to improve the patients ability to progress toward AAROM exs.       15 min Manual Therapy:  PROM all directions L shoulder, inferior/post humeral head glides grade 2-3,    Rationale: decrease pain, increase ROM and decrease edema   to improve the patients ability to progress toward full reach ROM. With   [x] TE   [] TA   [] neuro   [] other: Patient Education: [x] Review HEP    [] Progressed/Changed HEP based on:   [] positioning   [] body mechanics   [] transfers   [] heat/ice application    [x] other:  Discussed impact of posture on shoulder mechanics and post surgical healing, used lumbar seat wedge and lumbar roll during cold therapy to practice proper sitting posture, shoulder positioning. Other Objective/Functional Measures:  Pt state he had initiate pulleys at home on his own at home. I had him demonstrate here in the clinic, pt is using passive technique, remaining below pain threshold. Advised patient to stop pulleys until 6 weeks per protocol. Demonstrated good understanding of HEP exs provided    Pain Level (0-10 scale) post treatment: 0    ASSESSMENT:  Pt s/p L RTC repair, bone spur removal, presents with decreased ROM, strength, endurance, mild swelling, moderate bruising as expected post surgically. He will benefit from skilled PT services to address his limitations and achieve his functional goals as stated on the plan of care.       [x]  See Plan of Waldo Bhakta PT, MSPT  12/26/2018  10:14 AM

## 2018-12-27 ENCOUNTER — PATIENT OUTREACH (OUTPATIENT)
Dept: OTHER | Age: 63
End: 2018-12-27

## 2018-12-27 NOTE — PROGRESS NOTES
[facility] CM  follow up. Patient with SDS stay for L rotator cuff repair 12/17/2018. OP procedure. .      Chart review:  Therapy session      Attempted to contact patient for transitions of care services. Unable to leave message on voicemail \"Mailbox full. \"  Will attempt to contact again. * Reports droopy L eyelid with last call. * Surgery FU visit for 12/28. Will follow for [facility] services.

## 2018-12-27 NOTE — PROGRESS NOTES
[address] [place] [address]  Phone: [phone]  Fax: [phone]    Plan of Care/ Statement of Necessity for Physical Therapy Services 2-15    Patient name: Inez De Los Santos  : 1955  Provider#: [id number]  Referral source: Jewel Jane MD      Medical/Treatment Diagnosis: Left shoulder pain [M25.512]     Prior Hospitalization: see medical history     Comorbidities: see chart  Prior Level of Function: L shoulder impingement pain with reaching ADLs  Medications: Verified on Patient Summary List    Start of Care: 18      Onset Date: Surgery 18       The Plan of Care and following information is based on the information from the initial evaluation. Assessment/ key information: Patient is a 59year old male s/p L shoulder arthroscopic partial acromioplasty, distal claviculectomy, rotator cuff repair 18. Pt presents with decreased ROM, strength, endurance, mild swelling, moderate bruising as expected post surgically. He will benefit from skilled PT services to address his limitations and achieve his functional goals as stated on the plan of care  Per Rx: Progress per Dr. Deshaun Marcus' arthroscopic rotator cuff repair instructions (see copy placed in chart). Evaluation Complexity History MEDIUM  Complexity : 1-2 comorbidities / personal factors will impact the outcome/ POC ; Examination MEDIUM Complexity : 3 Standardized tests and measures addressing body structure, function, activity limitation and / or participation in recreation  ;Presentation MEDIUM Complexity : Evolving with changing characteristics  ; Clinical Decision Making MEDIUM Complexity : FOTO score of 26-74  Overall Complexity Rating: MEDIUM    Problem List: pain affecting function, decrease ROM, decrease strength, edema affecting function, decrease ADL/ functional abilitiies, decrease activity tolerance and decrease flexibility/ joint mobility   Treatment Plan may include any combination of the following: Therapeutic exercise, Therapeutic activities, Neuromuscular re-education, Physical agent/modality, Manual therapy, Patient education, Self Care training, Functional mobility training and Home safety training  Patient / Family readiness to learn indicated by: asking questions and trying to perform skills  Persons(s) to be included in education: patient (P)  Barriers to Learning/Limitations: None  Patient Goal (s): return to full duty work  Patient Self Reported Health Status: good  Rehabilitation Potential: good    Short Term Goals: To be accomplished in 6 weeks:  Pt will be independent with HEP per protocol  Pt will demonstrate improved posture for correct GH alignment with ADLs  Pt will demonstrate PROM L shoulder per protocol   Pt will position for sleep without difficulty    Long Term Goals: To be accomplished in 16  weeks:  Pt will demonstrate full L shoulder PROM   Demonstrate good periscapular control with AROM  Progress AROM/RAROM per protocol. Frequency / Duration: Patient to be seen 2 times per week for 16 weeks. Patient/ Caregiver education and instruction: self care, activity modification and exercises    [x]  Plan of care has been reviewed with PASCUAL Ram PT, MSPT  12/27/2018 12:35 PM    ________________________________________________________________________    I certify that the above Therapy Services are being furnished while the patient is under my care. I agree with the treatment plan and certify that this therapy is necessary.     [de-identified] Signature:____________________  Date:____________Time: _________

## 2018-12-28 ENCOUNTER — HOSPITAL ENCOUNTER (OUTPATIENT)
Dept: PHYSICAL THERAPY | Age: 63
Discharge: HOME | End: 2018-12-28
Payer: COMMERCIAL

## 2018-12-28 PROCEDURE — 97016 VASOPNEUMATIC DEVICE THERAPY: CPT | Performed by: PHYSICAL THERAPIST

## 2018-12-28 PROCEDURE — 97110 THERAPEUTIC EXERCISES: CPT | Performed by: PHYSICAL THERAPIST

## 2018-12-28 NOTE — PROGRESS NOTES
PT DAILY TREATMENT NOTE 2-15 Patient Name: Ewa Lin Date:2018 : 1955 [x]  Patient  Verified Payor: Anna Echavarria / Plan: Everette Crews / Product Type: PPO / In time:10:00 am  Out time:11:00 am 
Total Treatment Time (min): 60 Visit #: 2 Treatment Area: Left shoulder pain [M25.512] SUBJECTIVE Pain Level (0-10 scale): 1/10 Any medication changes, allergies to medications, adverse drug reactions, diagnosis change, or new procedure performed?: [x] No    [] Yes (see summary sheet for update) Subjective functional status/changes:   [] No changes reported Pt reports that he was told he didn't need to be wearing his sling. He reports that the physician told his wife this after surgery. He has not been wearing his sling since he went home from the hospital. 
 
OBJECTIVE Modality rationale: decrease edema, decrease inflammation, decrease pain and increase tissue extensibility to improve the patients ability to tolerate sleeping without pain Min Type Additional Details  
 
 [] Estim: []Att   []Unatt    []TENS instruct []IFC  []Premod   []NMES []Other:  []w/US   []w/ice   []w/heat Position: Location:  
 
 []  Traction: [] Cervical       []Lumbar 
                     [] Prone          []Supine []Intermittent   []Continuous Lbs: 
[] before manual 
[] after manual 
[]w/heat  
 []  Ultrasound: []Continuous   [] Pulsed  
                    at: []1MHz   []3MHz Location: 
W/cm2:  
 [] Paraffin Location:  
[]w/heat  
 []  Ice     [x]  Heat 
[]  Ice massage Position:supine Location: L Shoulder  
 []  Laser 
[]  Other: Position: Location:  
 
 15  Vasopneumatic Device Pressure:       [x] lo [] med [] hi  
Temperature: 34 degrees [x] Skin assessment post-treatment:  [x]intact [x]redness- no adverse reaction 
  []redness  adverse reaction: 45 min Therapeutic Exercise:  [x] See flow sheet : and including L shoulder PROM in all planes of movement Rationale: increase ROM and increase strength to improve the patients ability to tolerate sitting positions With 
 [] TE 
 [] TA 
 [] neuro [x] other: Patient Education: [x] Review HEP [] Progressed/Changed HEP based on:  
[] positioning   [] body mechanics   [] transfers   [] heat/ice application   
[x] other: protocol and precautions reviewed with patient Other Objective/Functional Measures: PROM L shoulder with significant mm guarding present during all classical ROM Pain Level (0-10 scale) post treatment: 0/10 ASSESSMENT/Changes in Function:  
Pt has not been following protocol and recommended precautions prior to today's visit. He has been compliant with what he believes that his family was told following the surgery performed. Patient will continue to benefit from skilled PT services to modify and progress therapeutic interventions, address functional mobility deficits, address ROM deficits, address strength deficits, analyze and address soft tissue restrictions, analyze and cue movement patterns, analyze and modify body mechanics/ergonomics, assess and modify postural abnormalities, address imbalance/dizziness and instruct in home and community integration to attain remaining goals. [x]  See Plan of Care 
[]  See progress note/recertification 
[]  See Discharge Summary Progress towards goals / Updated goals: No noted progress at this time. Pt has not been adhering to current protocol secondary to miscommunications with physicians office post operatively. I do not believe that there is a disruption to the surgical repair at this time. PLAN 
[]  Upgrade activities as tolerated     [x]  Continue plan of care [x]  Update interventions per flow sheet      
[]  Discharge due to:_ 
[x]  Other: pt will be called by nursing and the protocol will be reinforced with the pt. Pt will f/u with MD 1/2/2019 and is to stick to protocol as presented wearing the sling at all times.    
 
Madan Box, PT, DPT, [facility] 12/28/2018  11:58 AM

## 2019-01-02 ENCOUNTER — APPOINTMENT (OUTPATIENT)
Dept: PHYSICAL THERAPY | Age: 64
End: 2019-01-02
Payer: COMMERCIAL

## 2019-01-04 ENCOUNTER — HOSPITAL ENCOUNTER (OUTPATIENT)
Dept: PHYSICAL THERAPY | Age: 64
Discharge: HOME | End: 2019-01-04
Payer: COMMERCIAL

## 2019-01-04 PROCEDURE — 97140 MANUAL THERAPY 1/> REGIONS: CPT

## 2019-01-04 PROCEDURE — 97110 THERAPEUTIC EXERCISES: CPT

## 2019-01-04 PROCEDURE — 97016 VASOPNEUMATIC DEVICE THERAPY: CPT

## 2019-01-04 NOTE — PROGRESS NOTES
PT DAILY TREATMENT NOTE 2-15 Patient Name: Handy Deutsch Date:2019 : 1955 [x]  Patient  Verified Payor: Ye Barnes / Plan: Donell Lock / Product Type: PPO / [facility] Total Treatment Time (min): 60 Visit #: 3 Treatment Area: Left shoulder pain [M25.512] SUBJECTIVE Pain Level (0-10 scale): 1 Any medication changes, allergies to medications, adverse drug reactions, diagnosis change, or new procedure performed?: [x] No    [] Yes (see summary sheet for update) Subjective functional status/changes:   [] No changes reported Patient reports he was told to start wearing sling again. Patient states he saw MD on  and had an xray, but has not gotten the results back yet. OBJECTIVE Modality rationale: decrease edema, decrease inflammation and decrease pain to improve the patients ability to tolerate sleeping without pain Type Additional Details  
[] Estim: []Att   []Unatt    []TENS instruct []IFC  []Premod   []NMES []Other:  []w/US   []w/ice   []w/heat Position: Location:  
[]  Traction: [] Cervical       []Lumbar 
                     [] Prone          []Supine []Intermittent   []Continuous Lbs: 
[] before manual 
[] after manual 
[]w/heat  
[]  Ultrasound: []Continuous   [] Pulsed  
                    at: []1MHz   []3MHz Location: 
W/cm2:  
[] Paraffin Location:  
[]w/heat  
[]  Ice     []  Heat 
[]  Ice massage Position: Location:  
[]  Laser 
[]  Other: Position: Location:  
15[x]  Vasopneumatic Device Pressure:       [x] lo [] med [] hi  
Temperature: 34  
[x] Skin assessment post-treatment:  [x]intact []redness- no adverse reaction 
  []redness  adverse reaction:  
 
30 min Therapeutic Exercise:  [x] See flow sheet :  
Rationale: increase ROM to improve the patients ability to tolerate sitting and sleeping tolerance 15 min Manual Therapy:  PROM all directions to tolerance Rationale: decrease pain, increase ROM, increase tissue extensibility and increase postural awareness  to improve the patients ability to progress toward full ROM With 
 [] TE 
 [] TA 
 [] neuro 
 [] other: Patient Education: [x] Review HEP [] Progressed/Changed HEP based on:  
[x] positioning   [x] body mechanics   [] transfers   [x] heat/ice application   
[] other:   
 
Other Objective/Functional Measures: reviewed HEP to ensure proper exercises per protocol, pt required mod verbal cues for decreased muscle guarding with PROM. Pain Level (0-10 scale) post treatment: 0 
 
ASSESSMENT/Changes in Function:  
 
Patient will continue to benefit from skilled PT services to modify and progress therapeutic interventions, address functional mobility deficits, address ROM deficits, address strength deficits, analyze and address soft tissue restrictions, analyze and cue movement patterns, analyze and modify body mechanics/ergonomics and assess and modify postural abnormalities to attain remaining goals. []  See Plan of Care 
[]  See progress note/recertification 
[]  See Discharge Summary Progress towards goals / Updated goals: 
Patient making good progress with PROM and toward goals within protocol. PLAN 
[]  Upgrade activities as tolerated     [x]  Continue plan of care [x]  Update interventions per flow sheet      
[]  Discharge due to:_ 
[]  Other:_ Tomas Pryor 1/4/2019  8:42 AM

## 2019-01-08 ENCOUNTER — APPOINTMENT (OUTPATIENT)
Dept: PHYSICAL THERAPY | Age: 64
End: 2019-01-08
Payer: COMMERCIAL

## 2019-01-08 DIAGNOSIS — F41.9 ANXIETY: ICD-10-CM

## 2019-01-08 RX ORDER — ALPRAZOLAM 0.5 MG/1
TABLET ORAL
Qty: 20 TAB | Refills: 0 | OUTPATIENT
Start: 2019-01-08 | End: 2019-04-23 | Stop reason: SDUPTHER

## 2019-01-08 NOTE — TELEPHONE ENCOUNTER
Rx called in as written in connect care. Patient will need an appt for further refills per PCP. Please notify.

## 2019-01-09 ENCOUNTER — PATIENT OUTREACH (OUTPATIENT)
Dept: OTHER | Age: 64
End: 2019-01-09

## 2019-01-11 ENCOUNTER — HOSPITAL ENCOUNTER (OUTPATIENT)
Dept: PHYSICAL THERAPY | Age: 64
Discharge: HOME | End: 2019-01-11
Payer: COMMERCIAL

## 2019-01-11 PROCEDURE — 97016 VASOPNEUMATIC DEVICE THERAPY: CPT

## 2019-01-11 PROCEDURE — 97110 THERAPEUTIC EXERCISES: CPT

## 2019-01-11 PROCEDURE — 97140 MANUAL THERAPY 1/> REGIONS: CPT

## 2019-01-11 NOTE — PROGRESS NOTES
PT DAILY TREATMENT NOTE 2-15 Patient Name: Hermila Sanchez Date:2019 : 1955 [x]  Patient  Verified Payor: Alea Sneed / Plan: Carito Comber / Product Type: PPO / In time:10:30a  Out time:11:30a Total Treatment Time (min): 60 Visit #: 4 Treatment Area: Left shoulder pain [M25.512] SUBJECTIVE Pain Level (0-10 scale): 0 Any medication changes, allergies to medications, adverse drug reactions, diagnosis change, or new procedure performed?: [x] No    [] Yes (see summary sheet for update) Subjective functional status/changes:   [] No changes reported Patient reports he has been feeling good and has nothad any issues since last session. Benna Him OBJECTIVE Modality rationale: decrease edema, decrease inflammation and decrease pain to improve the patients ability to tolerate sleeping without pain Type Additional Details  
[] Estim: []Att   []Unatt    []TENS instruct []IFC  []Premod   []NMES []Other:  []w/US   []w/ice   []w/heat Position: Location:  
[]  Traction: [] Cervical       []Lumbar 
                     [] Prone          []Supine []Intermittent   []Continuous Lbs: 
[] before manual 
[] after manual 
[]w/heat  
[]  Ultrasound: []Continuous   [] Pulsed  
                    at: []1MHz   []3MHz Location: 
W/cm2:  
[] Paraffin Location:  
[]w/heat  
[]  Ice     []  Heat 
[]  Ice massage Position: Location:  
[]  Laser 
[]  Other: Position: Location:  
15[x]  Vasopneumatic Device Pressure:       [x] lo [] med [] hi  
Temperature: 34  
[x] Skin assessment post-treatment:  [x]intact []redness- no adverse reaction 
  []redness  adverse reaction:  
 
30 min Therapeutic Exercise:  [x] See flow sheet :  
Rationale: increase ROM to improve the patients ability to tolerate sitting and sleeping tolerance 15 min Manual Therapy:  PROM all directions to tolerance, scapular gliding Rationale: decrease pain, increase ROM, increase tissue extensibility and increase postural awareness  to improve the patients ability to progress toward full ROM With 
 [] TE 
 [] TA 
 [] neuro 
 [] other: Patient Education: [x] Review HEP [] Progressed/Changed HEP based on:  
[x] positioning   [x] body mechanics   [] transfers   [x] heat/ice application   
[] other:   
 
Other Objective/Functional Measures: pt required mod verbal cues for decreased muscle guarding with PROM, mod verbal cues for postural awareness Pain Level (0-10 scale) post treatment: 0 
 
ASSESSMENT/Changes in Function:  
 
Patient will continue to benefit from skilled PT services to modify and progress therapeutic interventions, address functional mobility deficits, address ROM deficits, address strength deficits, analyze and address soft tissue restrictions, analyze and cue movement patterns, analyze and modify body mechanics/ergonomics and assess and modify postural abnormalities to attain remaining goals. []  See Plan of Care 
[]  See progress note/recertification 
[]  See Discharge Summary Progress towards goals / Updated goals: 
Patient demonstrates improved PROM all directions and is making good progress within protocol. PLAN 
[]  Upgrade activities as tolerated     [x]  Continue plan of care [x]  Update interventions per flow sheet      
[]  Discharge due to:_ 
[]  Other:Obed Munroe 1/11/2019  8:42 AM

## 2019-01-14 DIAGNOSIS — E78.5 DYSLIPIDEMIA, GOAL LDL BELOW 100: ICD-10-CM

## 2019-01-14 DIAGNOSIS — F41.0 PANIC ATTACK: ICD-10-CM

## 2019-01-14 DIAGNOSIS — I10 ESSENTIAL HYPERTENSION: ICD-10-CM

## 2019-01-14 RX ORDER — LISINOPRIL 20 MG/1
TABLET ORAL
Qty: 30 TAB | Refills: 0 | Status: SHIPPED | OUTPATIENT
Start: 2019-01-14 | End: 2019-02-12 | Stop reason: SDUPTHER

## 2019-01-14 RX ORDER — ATORVASTATIN CALCIUM 10 MG/1
TABLET, FILM COATED ORAL
Qty: 30 TAB | Refills: 0 | Status: SHIPPED | OUTPATIENT
Start: 2019-01-14 | End: 2019-02-19 | Stop reason: SDUPTHER

## 2019-01-14 RX ORDER — PANTOPRAZOLE SODIUM 40 MG/1
TABLET, DELAYED RELEASE ORAL
Qty: 30 TAB | Refills: 0 | Status: SHIPPED | OUTPATIENT
Start: 2019-01-14 | End: 2019-02-19 | Stop reason: SDUPTHER

## 2019-01-15 ENCOUNTER — APPOINTMENT (OUTPATIENT)
Dept: PHYSICAL THERAPY | Age: 64
End: 2019-01-15
Payer: COMMERCIAL

## 2019-01-18 ENCOUNTER — HOSPITAL ENCOUNTER (OUTPATIENT)
Dept: PHYSICAL THERAPY | Age: 64
Discharge: HOME | End: 2019-01-18
Payer: COMMERCIAL

## 2019-01-18 PROCEDURE — 97110 THERAPEUTIC EXERCISES: CPT

## 2019-01-18 PROCEDURE — 97140 MANUAL THERAPY 1/> REGIONS: CPT

## 2019-01-18 PROCEDURE — 97016 VASOPNEUMATIC DEVICE THERAPY: CPT

## 2019-01-18 NOTE — PROGRESS NOTES
PT DAILY TREATMENT NOTE 2-15 Patient Name: Ernestina  Date:2019 : 1955 [x]  Patient  Verified Payor: Rubia Ugarte / Plan: Wong Lopez / Product Type: PPO / In time:10:30a  Out time:11:30a Total Treatment Time (min): 60 Visit #: 6 Treatment Area: Left shoulder pain [M25.512] SUBJECTIVE Pain Level (0-10 scale): 0 Any medication changes, allergies to medications, adverse drug reactions, diagnosis change, or new procedure performed?: [x] No    [] Yes (see summary sheet for update) Subjective functional status/changes:   [] No changes reported Patient reports he has been feeling good and has not had any issues since last session, but is feeling a little more stiff this morning as he has not done HEP yet today. OBJECTIVE Modality rationale: decrease edema, decrease inflammation and decrease pain to improve the patients ability to tolerate sleeping without pain Type Additional Details  
[] Estim: []Att   []Unatt    []TENS instruct []IFC  []Premod   []NMES []Other:  []w/US   []w/ice   []w/heat Position: Location:  
[]  Traction: [] Cervical       []Lumbar 
                     [] Prone          []Supine []Intermittent   []Continuous Lbs: 
[] before manual 
[] after manual 
[]w/heat  
[]  Ultrasound: []Continuous   [] Pulsed  
                    at: []1MHz   []3MHz Location: 
W/cm2:  
[] Paraffin Location:  
[]w/heat  
[]  Ice     []  Heat 
[]  Ice massage Position: Location:  
[]  Laser 
[]  Other: Position: Location:  
15[x]  Vasopneumatic Device Pressure:       [x] lo [] med [] hi  
Temperature: 34  
[x] Skin assessment post-treatment:  [x]intact []redness- no adverse reaction 
  []redness  adverse reaction:  
 
30 min Therapeutic Exercise:  [x] See flow sheet :  
Rationale: increase ROM to improve the patients ability to tolerate sitting and sleeping tolerance 15 min Manual Therapy:  PROM all directions to tolerance, scapular gliding Rationale: decrease pain, increase ROM, increase tissue extensibility and increase postural awareness  to improve the patients ability to progress toward full ROM With 
 [] TE 
 [] TA 
 [] neuro 
 [] other: Patient Education: [x] Review HEP [] Progressed/Changed HEP based on:  
[x] positioning   [x] body mechanics   [] transfers   [x] heat/ice application   
[] other:   
 
Other Objective/Functional Measures: pt required min verbal cues for decreased muscle guarding with PROM, mod verbal cues for postural awareness PROM: 
Flexion: 170 ER: 70 Abd:155 Pain Level (0-10 scale) post treatment: 0 
 
ASSESSMENT/Changes in Function:  
 
Patient will continue to benefit from skilled PT services to modify and progress therapeutic interventions, address functional mobility deficits, address ROM deficits, address strength deficits, analyze and address soft tissue restrictions, analyze and cue movement patterns, analyze and modify body mechanics/ergonomics and assess and modify postural abnormalities to attain remaining goals. []  See Plan of Care 
[]  See progress note/recertification 
[]  See Discharge Summary Progress towards goals / Updated goals: 
Patient demonstrates improved PROM all directions and is making good progress towards goals within protocol. PLAN 
[]  Upgrade activities as tolerated     [x]  Continue plan of care [x]  Update interventions per flow sheet      
[]  Discharge due to:_ 
[]  Other:_ Vincent Zamudio 1/18/2019  8:42 AM

## 2019-01-22 ENCOUNTER — APPOINTMENT (OUTPATIENT)
Dept: PHYSICAL THERAPY | Age: 64
End: 2019-01-22
Payer: COMMERCIAL

## 2019-01-23 ENCOUNTER — PATIENT OUTREACH (OUTPATIENT)
Dept: OTHER | Age: 64
End: 2019-01-23

## 2019-01-25 ENCOUNTER — HOSPITAL ENCOUNTER (OUTPATIENT)
Dept: PHYSICAL THERAPY | Age: 64
Discharge: HOME | End: 2019-01-25
Payer: COMMERCIAL

## 2019-01-25 PROCEDURE — 97140 MANUAL THERAPY 1/> REGIONS: CPT

## 2019-01-25 PROCEDURE — 97016 VASOPNEUMATIC DEVICE THERAPY: CPT

## 2019-01-25 PROCEDURE — 97110 THERAPEUTIC EXERCISES: CPT

## 2019-01-25 NOTE — PROGRESS NOTES
PT DAILY TREATMENT NOTE 2-15 Patient Name: Tanya Sy Date:2019 : 1955 [x]  Patient  Verified Payor: Eliana Ruiz / Plan: Carlyn Lacey / Product Type: PPO / In time:10:30a  Out time:11:40a Total Treatment Time (min): 70 Visit #: 6 Treatment Area: Left shoulder pain [M25.512] SUBJECTIVE Pain Level (0-10 scale): 0 Any medication changes, allergies to medications, adverse drug reactions, diagnosis change, or new procedure performed?: [x] No    [] Yes (see summary sheet for update) Subjective functional status/changes:   [] No changes reported Patient reports he feels like he is getting more ROM and does not feel as tight today. Patient reports he sees MD on Tuesday for hopeful clearance to be out of sling and progress to next phase of protocols. OBJECTIVE Modality rationale: decrease edema, decrease inflammation and decrease pain to improve the patients ability to tolerate sleeping without pain Type Additional Details  
[] Estim: []Att   []Unatt    []TENS instruct []IFC  []Premod   []NMES []Other:  []w/US   []w/ice   []w/heat Position: Location:  
[]  Traction: [] Cervical       []Lumbar 
                     [] Prone          []Supine []Intermittent   []Continuous Lbs: 
[] before manual 
[] after manual 
[]w/heat  
[]  Ultrasound: []Continuous   [] Pulsed  
                    at: []1MHz   []3MHz Location: 
W/cm2:  
[] Paraffin Location:  
[]w/heat  
[]  Ice     []  Heat 
[]  Ice massage Position: Location:  
[]  Laser 
[]  Other: Position: Location:  
15[x]  Vasopneumatic Device Pressure:       [x] lo [] med [] hi  
Temperature: 34  
[x] Skin assessment post-treatment:  [x]intact []redness- no adverse reaction 
  []redness  adverse reaction:  
 
40 min Therapeutic Exercise:  [x] See flow sheet :  
Rationale: increase ROM to improve the patients ability to tolerate sitting and sleeping tolerance 15 min Manual Therapy:  PROM all directions to tolerance, scapular gliding, grade I-II GH jt mobs Rationale: decrease pain, increase ROM, increase tissue extensibility and increase postural awareness  to improve the patients ability to progress toward full ROM With 
 [] TE 
 [] TA 
 [] neuro 
 [] other: Patient Education: [x] Review HEP [] Progressed/Changed HEP based on:  
[x] positioning   [x] body mechanics   [] transfers   [x] heat/ice application   
[] other:   
 
Other Objective/Functional Measures: pt  demonstrates decreased muscle guarding compared to last visit and is making good improvements with PROM   
 
PROM: 
Flexion: 175 ER: 70 Abd:170 Pain Level (0-10 scale) post treatment: 0 
 
ASSESSMENT/Changes in Function:  
 
Patient will continue to benefit from skilled PT services to modify and progress therapeutic interventions, address functional mobility deficits, address ROM deficits, address strength deficits, analyze and address soft tissue restrictions, analyze and cue movement patterns, analyze and modify body mechanics/ergonomics and assess and modify postural abnormalities to attain remaining goals. []  See Plan of Care 
[]  See progress note/recertification 
[]  See Discharge Summary Progress towards goals / Updated goals: 
Patient making good progress towards goals and continues to show improvements with PROM. Patient will do well with continued progression into phase II of protocol next week per MD protocols and clearance. PLAN 
[]  Upgrade activities as tolerated     [x]  Continue plan of care [x]  Update interventions per flow sheet      
[]  Discharge due to:_ 
[]  Other:_ Norris Cortés 1/25/2019  8:42 AM

## 2019-01-28 NOTE — PROGRESS NOTES
[facility] [address], [address] [place] [address] [address] Phone: [phone]      Fax:  [phone] Progress Note Name: Iza Leiva : 1955 MD: Emily Jiménez MD 
  
 
Treatment Diagnosis: Left shoulder pain [M25.512] Start of Care: 18 Visits from Start of Care: 6 Missed Visits: 2 Summary of Care: Mr Deidra Saab has been seen in PT for 6 visits since 18. He has had 2 missed visits. He reports difficulty attending PT more than 1x per week due to his work schedule. Treatment per Dr. Thomas Quick' arthroscopic rotator cuff repair 0-6w protocol, shoulder PROM, wrist/elbow AROM with no resistance. Pain well controlled, pt continues to use sling per protocol. Assessment / Recommendations: L Shoulder PROM: 
Flexion: 175deg ER: 70 deg Abd:170 deg 
  Short Term Goals: To be accomplished in 6 weeks: Pt will be independent with HEP per protocol   MET Pt will demonstrate improved posture for correct [address] alignment with ADLs PROGRESSING Pt will demonstrate PROM L shoulder per 6w protocol MET Pt will position for sleep without difficulty PROGRESSING Patient making good progress towards goals and continues to show improvements with PROM. Patient will do well with continued progression into phase II of protocol next week per MD protocols and clearance. Merline Pack, PT, MSPT 2019 
 
________________________________________________________________________ NOTE TO PHYSICIAN:  Please complete the following and fax to: [facility]: [phone] Julio Kitchen Retain this original for your records. If you are unable to process this request in 24 hours, please contact our office.   
 
 
____ I have read the above report and request that my patient continue therapy with the following changes/special instructions: 
____ I have read the above report and request that my patient be discharged from therapy [de-identified] Signature:_________________ Date:___________Time:__________

## 2019-01-29 ENCOUNTER — APPOINTMENT (OUTPATIENT)
Dept: PHYSICAL THERAPY | Age: 64
End: 2019-01-29
Payer: COMMERCIAL

## 2019-01-31 ENCOUNTER — APPOINTMENT (OUTPATIENT)
Dept: PHYSICAL THERAPY | Age: 64
End: 2019-01-31
Payer: COMMERCIAL

## 2019-02-01 ENCOUNTER — HOSPITAL ENCOUNTER (OUTPATIENT)
Dept: PHYSICAL THERAPY | Age: 64
Discharge: HOME | End: 2019-02-01
Payer: COMMERCIAL

## 2019-02-01 PROCEDURE — 97016 VASOPNEUMATIC DEVICE THERAPY: CPT

## 2019-02-01 PROCEDURE — 97110 THERAPEUTIC EXERCISES: CPT

## 2019-02-01 PROCEDURE — 97140 MANUAL THERAPY 1/> REGIONS: CPT

## 2019-02-01 NOTE — PROGRESS NOTES
PT DAILY TREATMENT NOTE 2-15 Patient Name: Nicolás Uribe Date:2019 : 1955 [x]  Patient  Verified Payor: Phil Ahumada / Plan: [employer] / Product Type: PPO / In [address] [place] [address] Total Treatment Time (min): 70 Visit #: 2 Treatment Area: Left shoulder pain [M25.512] SUBJECTIVE Pain Level (0-10 scale): 0 Any medication changes, allergies to medications, adverse drug reactions, diagnosis change, or new procedure performed?: [x] No    [] Yes (see summary sheet for update) Subjective functional status/changes:   [] No changes reported Patient reports he is allowed to move to phase II of program and is not wearling sling anymore. OBJECTIVE Modality rationale: decrease edema, decrease inflammation and decrease pain to improve the patients ability to tolerate sleeping without pain Type Additional Details  
[] Estim: []Att   []Unatt    []TENS instruct []IFC  []Premod   []NMES []Other:  []w/US   []w/ice   []w/heat Position: Location:  
[]  Traction: [] Cervical       []Lumbar 
                     [] Prone          []Supine []Intermittent   []Continuous Lbs: 
[] before manual 
[] after manual 
[]w/heat  
[]  Ultrasound: []Continuous   [] Pulsed  
                    at: []1MHz   []3MHz Location: 
W/cm2:  
[] Paraffin Location:  
[]w/heat  
[]  Ice     []  Heat 
[]  Ice massage Position: Location:  
[]  Laser 
[]  Other: Position: Location:  
15[x]  Vasopneumatic Device Pressure:       [x] lo [] med [] hi  
Temperature: 34  
[x] Skin assessment post-treatment:  [x]intact []redness- no adverse reaction 
  []redness  adverse reaction:  
 
45 min Therapeutic Exercise:  [x] See flow sheet :  
Rationale: increase ROM to improve the patients ability to tolerate sitting and sleeping tolerance 10 min Manual Therapy:  PROM all directions to tolerance,  grade I-II GH jt mobs, pec release Rationale: decrease pain, increase ROM, increase tissue extensibility and increase postural awareness  to improve the patients ability to progress toward full ROM With 
 [] TE 
 [] TA 
 [] neuro 
 [] other: Patient Education: [x] Review HEP [] Progressed/Changed HEP based on:  
[x] positioning   [x] body mechanics   [] transfers   [x] heat/ice application   
[] other:   
 
Other Objective/Functional Measures:pt with mod fatigue with advanced exercises, increased tightness with ER/IR and post cap stretching Pain Level (0-10 scale) post treatment: 0 
 
ASSESSMENT/Changes in Function:  
 
Patient will continue to benefit from skilled PT services to modify and progress therapeutic interventions, address functional mobility deficits, address ROM deficits, address strength deficits, analyze and address soft tissue restrictions, analyze and cue movement patterns, analyze and modify body mechanics/ergonomics and assess and modify postural abnormalities to attain remaining goals. []  See Plan of Care 
[]  See progress note/recertification 
[]  See Discharge Summary Progress towards goals / Updated goals: 
Patient demonstrates good tolerance for advanced interventions with no pain throughout. Patient will do well with continued progression within limits of protocol. PLAN [x]  Upgrade activities as tolerated     [x]  Continue plan of care [x]  Update interventions per flow sheet      
[]  Discharge due to:_ 
[]  Other:_ [facility] 2/1/2019  8:42 AM

## 2019-02-05 ENCOUNTER — HOSPITAL ENCOUNTER (OUTPATIENT)
Dept: PHYSICAL THERAPY | Age: 64
Discharge: HOME | End: 2019-02-05
Payer: COMMERCIAL

## 2019-02-05 PROCEDURE — 97110 THERAPEUTIC EXERCISES: CPT | Performed by: PHYSICAL MEDICINE & REHABILITATION

## 2019-02-05 PROCEDURE — 97140 MANUAL THERAPY 1/> REGIONS: CPT | Performed by: PHYSICAL MEDICINE & REHABILITATION

## 2019-02-05 PROCEDURE — 97016 VASOPNEUMATIC DEVICE THERAPY: CPT | Performed by: PHYSICAL MEDICINE & REHABILITATION

## 2019-02-05 NOTE — PROGRESS NOTES
PT DAILY TREATMENT NOTE - [facility] 2-15 Patient Name: Andrade Wheeler Date:2019 : 1955 [x]  Patient  Verified Payor: Rosalia Goldsmith / Plan: Binta Holley / Product Type: PPO / In time: 8:00AM  Out time: 9:00AM 
Total Treatment Time (min): 60 Total Timed Codes (min): 45 
1:1 Treatment Time ( [address] only):  
Visit #: 8 Treatment Area: Left shoulder pain [M25.512] SUBJECTIVE Pain Level (0-10 scale): 0 Any medication changes, allergies to medications, adverse drug reactions, diagnosis change, or new procedure performed?: [x] No    [] Yes (see summary sheet for update) Subjective functional status/changes:   [] No changes reported Pt states no significant changes since his previous visit. Pt c/o minor stiffness in L shoulder with ER. Pt states he is participating in his HEP daily and has a pulley system at home that he uses. OBJECTIVE Modality rationale: decrease edema, decrease inflammation, decrease pain and increase tissue extensibility to improve the patients ability to increase ADL efficiency. Min Type Additional Details  
 [] Estim: []Att   []Unatt        []TENS instruct []IFC  []Premod   []NMES []Other:  []w/US   []w/ice   []w/heat Position: Location:  
 []  Traction: [] Cervical       []Lumbar 
                     [] Prone          []Supine []Intermittent   []Continuous Lbs: 
[] before manual 
[] after manual 
[]w/heat  
 []  Ultrasound: []Continuous   [] Pulsed at: 
                         []1MHz   []3MHz Location: 
W/cm2:  
 [] Paraffin Location:  
[]w/heat  
 []  Ice     []  Heat 
[]  Ice massage Position: Location:  
 []  Laser 
[]  Other: Position: Location:  
 
15 [x]  Vasopneumatic Device Pressure:       [x] lo [] med [] hi  
Temperature: 34  
 
[x] Skin assessment post-treatment:  [x]intact []redness- no adverse reaction 
  []redness  adverse reaction: 35 min Therapeutic Exercise:  [x] See flow sheet :  
Rationale: increase ROM, increase strength, improve coordination, improve balance and increase proprioception to improve the patients ability to increase ADL efficiency. 10 min Manual Therapy: PROM to L GH jt, grade I & II joint mobs: A/P, inferior Rationale: decrease pain, increase ROM, increase tissue extensibility, decrease edema , correct positional vertigo and decrease trigger points to improve the patients ability to increase ADL efficiency. With 
 [x] TE 
 [] TA 
 [] neuro 
 [] other: Patient Education: [x] Review HEP [] Progressed/Changed HEP based on:  
[] positioning   [] body mechanics   [] transfers   [] heat/ice application   
[] other:   
 
Other Objective/Functional Measures: Pt able to complete all exercises with no increase in pain. Pt requires min vc/tc for proper body positioning during exercises. Pain Level (0-10 scale) post treatment: 0 
 
ASSESSMENT/Changes in Function:  
 
Patient will continue to benefit from skilled PT services to modify and progress therapeutic interventions, address functional mobility deficits, address ROM deficits, address strength deficits, analyze and address soft tissue restrictions, analyze and cue movement patterns, analyze and modify body mechanics/ergonomics, assess and modify postural abnormalities and address imbalance/dizziness to attain remaining goals. []  See Plan of Care 
[]  See progress note/recertification 
[]  See Discharge Summary Progress towards goals / Updated goals: 
Pt tolerates PT well and continues to progress towards STG/LTG. Pt will do well continuing to progress UE exercises following protocol. PLAN [x]  Upgrade activities as tolerated     [x]  Continue plan of care [x]  Update interventions per flow sheet      
[]  Discharge due to:_ 
[]  Other:_   
 
DEVON Eaton 2/5/2019  
 
1 MT 
2 TE 
1 VC

## 2019-02-08 ENCOUNTER — APPOINTMENT (OUTPATIENT)
Dept: PHYSICAL THERAPY | Age: 64
End: 2019-02-08
Payer: COMMERCIAL

## 2019-02-12 ENCOUNTER — HOSPITAL ENCOUNTER (OUTPATIENT)
Dept: PHYSICAL THERAPY | Age: 64
Discharge: HOME | End: 2019-02-12
Payer: COMMERCIAL

## 2019-02-12 PROCEDURE — 97110 THERAPEUTIC EXERCISES: CPT

## 2019-02-12 PROCEDURE — 97016 VASOPNEUMATIC DEVICE THERAPY: CPT

## 2019-02-12 PROCEDURE — 97140 MANUAL THERAPY 1/> REGIONS: CPT

## 2019-02-12 NOTE — PROGRESS NOTES
PT DAILY TREATMENT NOTE - [facility] 2-15 Patient Name: Arcola Dakin Date:2019 : 1955 [x]  Patient  Verified Payor: Skylar Tan / Plan: Margarita Herrera / Product Type: PPO / In time: 8:30a  Out time: 9:45a Total Treatment Time (min):75 Total Timed Codes (min): 60 
1:1 Treatment Time ([address] only):  
Visit #: 9 Treatment Area: Left shoulder pain [M25.512] SUBJECTIVE Pain Level (0-10 scale): 0 Any medication changes, allergies to medications, adverse drug reactions, diagnosis change, or new procedure performed?: [x] No    [] Yes (see summary sheet for update) Subjective functional status/changes:   [] No changes reported Pt states no significant changes since his previous visit. Pt c/o minor stiffness in L shoulder with ER. Pt states he is participating in his HEP daily and has a pulley system at home that he uses. OBJECTIVE Modality rationale: decrease edema, decrease inflammation, decrease pain and increase tissue extensibility to improve the patients ability to increase ADL efficiency. Type Additional Details  
[] Estim: []Att   []Unatt        []TENS instruct []IFC  []Premod   []NMES []Other:  []w/US   []w/ice   []w/heat Position: Location:  
[]  Traction: [] Cervical       []Lumbar 
                     [] Prone          []Supine []Intermittent   []Continuous Lbs: 
[] before manual 
[] after manual 
[]w/heat  
[]  Ultrasound: []Continuous   [] Pulsed at: 
                         []1MHz   []3MHz Location: 
W/cm2:  
[] Paraffin Location:  
[]w/heat  
[]  Ice     []  Heat 
[]  Ice massage Position: Location:  
[]  Laser 
[]  Other: Position: Location:  
15[x]  Vasopneumatic Device Pressure:       [x] lo [] med [] hi  
Temperature: 34  
 
[x] Skin assessment post-treatment:  [x]intact []redness- no adverse reaction 
  []redness  adverse reaction: 45 min Therapeutic Exercise:  [x] See flow sheet :  
Rationale: increase ROM, increase strength, improve coordination, improve balance and increase proprioception to improve the patients ability to increase ADL efficiency. 15 min Manual Therapy: [employer] jt mobs grade II-III, PROM all directions to tolerance, pec release, scapular gliding Rationale: decrease pain, increase ROM, increase tissue extensibility, decrease edema , correct positional vertigo and decrease trigger points to improve the patients ability to increase ADL efficiency. With 
 [x] TE 
 [] TA 
 [] neuro 
 [] other: Patient Education: [x] Review HEP [] Progressed/Changed HEP based on:  
[] positioning   [] body mechanics   [] transfers   [] heat/ice application   
[] other:   
 
Other Objective/Functional Measures: Pt able to complete all exercises with no increase in pain. Pt requires min vc/tc for proper body positioning during exercises. PROM flexion:170 deg AAROM: 160 deg Pain Level (0-10 scale) post treatment: 0 
 
ASSESSMENT/Changes in Function:  
 
Patient will continue to benefit from skilled PT services to modify and progress therapeutic interventions, address functional mobility deficits, address ROM deficits, address strength deficits, analyze and address soft tissue restrictions, analyze and cue movement patterns, analyze and modify body mechanics/ergonomics, assess and modify postural abnormalities and address imbalance/dizziness to attain remaining goals. []  See Plan of Care 
[]  See progress note/recertification 
[]  See Discharge Summary Progress towards goals / Updated goals: 
Patient demonstrates good tolerance for interventions with increased difficulty and stretch with IR passive stretching. Patient will do well with continued advancement within protocol as tolerated. PLAN [x]  Upgrade activities as tolerated     [x]  Continue plan of care [x]  Update interventions per flow sheet []  Discharge due to:_ 
[]  Other:_ Ana Rosa Redding, PTA 2/12/2019

## 2019-02-15 ENCOUNTER — APPOINTMENT (OUTPATIENT)
Dept: PHYSICAL THERAPY | Age: 64
End: 2019-02-15
Payer: COMMERCIAL

## 2019-02-19 ENCOUNTER — HOSPITAL ENCOUNTER (OUTPATIENT)
Dept: PHYSICAL THERAPY | Age: 64
Discharge: HOME | End: 2019-02-19
Payer: COMMERCIAL

## 2019-02-19 ENCOUNTER — OFFICE VISIT (OUTPATIENT)
Dept: INTERNAL MEDICINE CLINIC | Age: 64
End: 2019-02-19

## 2019-02-19 VITALS
OXYGEN SATURATION: 96 % | HEIGHT: 74 IN | HEART RATE: 75 BPM | WEIGHT: 196 LBS | RESPIRATION RATE: 16 BRPM | SYSTOLIC BLOOD PRESSURE: 138 MMHG | BODY MASS INDEX: 25.15 KG/M2 | TEMPERATURE: 97.8 F | DIASTOLIC BLOOD PRESSURE: 76 MMHG

## 2019-02-19 DIAGNOSIS — K21.9 GERD WITHOUT ESOPHAGITIS: ICD-10-CM

## 2019-02-19 DIAGNOSIS — I10 ESSENTIAL HYPERTENSION: ICD-10-CM

## 2019-02-19 DIAGNOSIS — F41.0 PANIC ATTACK: ICD-10-CM

## 2019-02-19 DIAGNOSIS — Z12.5 PROSTATE CANCER SCREENING: ICD-10-CM

## 2019-02-19 DIAGNOSIS — E78.5 DYSLIPIDEMIA, GOAL LDL BELOW 100: Primary | ICD-10-CM

## 2019-02-19 DIAGNOSIS — E78.5 DYSLIPIDEMIA, GOAL LDL BELOW 100: ICD-10-CM

## 2019-02-19 DIAGNOSIS — F41.9 ANXIETY: ICD-10-CM

## 2019-02-19 PROCEDURE — 97140 MANUAL THERAPY 1/> REGIONS: CPT

## 2019-02-19 PROCEDURE — 97110 THERAPEUTIC EXERCISES: CPT

## 2019-02-19 PROCEDURE — 97016 VASOPNEUMATIC DEVICE THERAPY: CPT

## 2019-02-19 RX ORDER — PANTOPRAZOLE SODIUM 40 MG/1
TABLET, DELAYED RELEASE ORAL
Qty: 30 TAB | Refills: 5 | Status: SHIPPED | OUTPATIENT
Start: 2019-02-19 | End: 2019-08-12 | Stop reason: SDUPTHER

## 2019-02-19 RX ORDER — ALPRAZOLAM 0.5 MG/1
TABLET ORAL
Qty: 20 TAB | Refills: 1 | OUTPATIENT
Start: 2019-02-19

## 2019-02-19 RX ORDER — LISINOPRIL 20 MG/1
TABLET ORAL
Qty: 30 TAB | Refills: 5 | Status: SHIPPED | OUTPATIENT
Start: 2019-02-19 | End: 2019-04-15

## 2019-02-19 RX ORDER — ATORVASTATIN CALCIUM 10 MG/1
TABLET, FILM COATED ORAL
Qty: 30 TAB | Refills: 5 | Status: SHIPPED | OUTPATIENT
Start: 2019-02-19 | End: 2019-09-16 | Stop reason: SDUPTHER

## 2019-02-19 NOTE — PROGRESS NOTES
HISTORY OF PRESENT ILLNESS  Brittany Brown is a 59 y.o. male. JACKI Murphy comes in for med check. He is due for labs. He feels generally well. He is on Lisinopril/HCTZ. No cough, chest pain or shortness of breath. He had rotator cuff surgery in December with Dr. Sunny Nelson and had no complications. He remains on Suboxone with Dr. Mitesh Thomas. He is not smoking, no alcohol. 20 Xanax usually last him at least six months. He is taking his statin, no diffuse myalgias. Due for labs, including prostate screening. Review of Systems   Constitutional: Negative for chills, fever and weight loss. Respiratory: Negative for cough, shortness of breath and wheezing. Cardiovascular: Negative for chest pain, palpitations, orthopnea, leg swelling and PND. Gastrointestinal: Negative for abdominal pain, heartburn and nausea. Musculoskeletal: Negative for myalgias. Neurological: Negative for dizziness and headaches. Psychiatric/Behavioral: The patient does not have insomnia. Physical Exam   Constitutional: He is oriented to person, place, and time. He appears well-developed and well-nourished. HENT:   Head: Normocephalic and atraumatic. Right Ear: Tympanic membrane, external ear and ear canal normal.   Left Ear: Tympanic membrane, external ear and ear canal normal.   Nose: Nose normal. Right sinus exhibits no maxillary sinus tenderness and no frontal sinus tenderness. Left sinus exhibits no maxillary sinus tenderness and no frontal sinus tenderness. Mouth/Throat: Oropharynx is clear and moist and mucous membranes are normal. No oropharyngeal exudate. Eyes: Conjunctivae are normal. Pupils are equal, round, and reactive to light. Right eye exhibits no discharge. Left eye exhibits no discharge. Neck: Normal range of motion. Neck supple. Carotid bruit is not present. No thyromegaly present. Cardiovascular: Normal rate, regular rhythm, normal heart sounds and intact distal pulses.    Pulmonary/Chest: Effort normal and breath sounds normal. No respiratory distress. He has no wheezes. He has no rales. Musculoskeletal: He exhibits no edema. Lymphadenopathy:     He has no cervical adenopathy. Neurological: He is alert and oriented to person, place, and time. Psychiatric: He has a normal mood and affect. His behavior is normal.   Nursing note and vitals reviewed. ASSESSMENT and PLAN  Diagnoses and all orders for this visit:    1. Dyslipidemia, goal LDL below 253  -     METABOLIC PANEL, COMPREHENSIVE  -     LIPID PANEL  -     TSH 3RD GENERATION    2. Prostate cancer screening  -     PSA, DIAGNOSTIC (PROSTATE SPECIFIC AG)    3.  GERD without esophagitis  -     CBC WITH AUTOMATED DIFF    htn-cont meds

## 2019-02-19 NOTE — PROGRESS NOTES
PT DAILY TREATMENT NOTE - [facility] 2-15 Patient Name: Ave Castañeda Date:2019 : 1955 [x]  Patient  Verified Payor: Jose Payer / Plan: Maurice Mendez / Product Type: PPO / In time: 8:30a  Out time: 9:40a Total Treatment Time (min):70 Total Timed Codes (min): 55 
1:1 Treatment Time ([address] only):-- Visit #: 80 Treatment Area: Left shoulder pain [M25.512] SUBJECTIVE Pain Level (0-10 scale):1 Any medication changes, allergies to medications, adverse drug reactions, diagnosis change, or new procedure performed?: [x] No    [] Yes (see summary sheet for update) Subjective functional status/changes:   [] No changes reported Patient reports he is a little sore from work, but feels good otherwise. OBJECTIVE Modality rationale: decrease edema, decrease inflammation, decrease pain and increase tissue extensibility to improve the patients ability to increase ADL efficiency. Type Additional Details  
[] Estim: []Att   []Unatt        []TENS instruct []IFC  []Premod   []NMES []Other:  []w/US   []w/ice   []w/heat Position: Location:  
[]  Traction: [] Cervical       []Lumbar 
                     [] Prone          []Supine []Intermittent   []Continuous Lbs: 
[] before manual 
[] after manual 
[]w/heat  
[]  Ultrasound: []Continuous   [] Pulsed at: 
                         []1MHz   []3MHz Location: 
W/cm2:  
[] Paraffin Location:  
[]w/heat  
[]  Ice     []  Heat 
[]  Ice massage Position: Location:  
[]  Laser 
[]  Other: Position: Location:  
15[x]  Vasopneumatic Device Pressure:       [x] lo [] med [] hi  
Temperature: 34  
 
[x] Skin assessment post-treatment:  [x]intact []redness- no adverse reaction 
  []redness  adverse reaction:  
 
40 min Therapeutic Exercise:  [x] See flow sheet :  
Rationale: increase ROM, increase strength, improve coordination, improve balance and increase proprioception to improve the patients ability to increase ADL efficiency. 15 min Manual Therapy: [address] [facility] jt mobs grade II-III, PROM all directions to tolerance, pec release, scapular gliding Rationale: decrease pain, increase ROM, increase tissue extensibility, decrease edema , correct positional vertigo and decrease trigger points to improve the patients ability to increase ADL efficiency. With 
 [x] TE 
 [] TA 
 [] neuro 
 [] other: Patient Education: [x] Review HEP [] Progressed/Changed HEP based on:  
[] positioning   [] body mechanics   [] transfers   [] heat/ice application   
[] other:   
 
Other Objective/Functional Measures: pt able to advance exercises within protocol with no increased pain/soreness Pain Level (0-10 scale) post treatment: 0 
 
ASSESSMENT/Changes in Function:  
 
Patient will continue to benefit from skilled PT services to modify and progress therapeutic interventions, address functional mobility deficits, address ROM deficits, address strength deficits, analyze and address soft tissue restrictions, analyze and cue movement patterns, analyze and modify body mechanics/ergonomics, assess and modify postural abnormalities and address imbalance/dizziness to attain remaining goals. []  See Plan of Care 
[]  See progress note/recertification 
[]  See Discharge Summary Progress towards goals / Updated goals: 
Patient demonstrates good tolerance for interventions and advancements with no increased pain. Patient will do well with continued advancement within protocol as tolerated. PLAN [x]  Upgrade activities as tolerated     [x]  Continue plan of care [x]  Update interventions per flow sheet      
[]  Discharge due to:_ 
[]  Other:_ Norris Cortés, PTA 2/19/2019

## 2019-02-21 ENCOUNTER — PATIENT OUTREACH (OUTPATIENT)
Dept: OTHER | Age: 64
End: 2019-02-21

## 2019-02-21 NOTE — PROGRESS NOTES
Resolving current episode of case management/  HPRP. Patient has met patient stated and medical goals. Patient consistenly demonstrates understanding of the medical action plan through execution of plan. Appointments with key providers are scheduled and attended. Plan of care is modified and updated to address new challenges and barriers with minimal support from the CM team(proactively uses physicians and community resources) The support system remains current and has been modified as needed. Patient continues to acquire needed resources from the current support system established. Last call to patient for CM services. Verified  and address for HIPAA security. Left rotator cuff repair 2018.  OP procedure * Mr. Chris Bowers says that his Left shoulder is improving/ and PT will remain in place for machines that he needs and manual manipulation by therapist.   
 - Anticipates up to 6 months of therapy. - No pain like post op/ just some chronic ache with rainy cold days. Hopes for better improvement with spring. * R knee / chronic pain -  MRI planned. - No acute injury/   No brace/  No swelling or fluid pockets that he knows of.   
 - Able to walk without DME. 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 * Mr. hCris Bowers is fine with closing CM episode/  No further CM needs identified. ECM will follow as needed and patient has ECM contact information for future needs. Chart Review: For MRI of R knee / Laureen Soto is a 59 y.o. male. HPI Marcus Lewis comes in for med check. He is due for labs. He feels generally well. He is on Lisinopril/HCTZ. No cough, chest pain or shortness of breath. He had rotator cuff surgery in December with Dr. Haylee Hayes and had no complications. He remains on Suboxone with Dr. Barbi Perez. He is not smoking, no alcohol. 20 Xanax usually last him at least six months.   He is taking his statin, no diffuse myalgias. Due for labs, including prostate screening.

## 2019-02-22 ENCOUNTER — HOSPITAL ENCOUNTER (OUTPATIENT)
Dept: PHYSICAL THERAPY | Age: 64
Discharge: HOME | End: 2019-02-22
Payer: COMMERCIAL

## 2019-02-22 PROCEDURE — 97140 MANUAL THERAPY 1/> REGIONS: CPT

## 2019-02-22 PROCEDURE — 97016 VASOPNEUMATIC DEVICE THERAPY: CPT

## 2019-02-22 PROCEDURE — 97110 THERAPEUTIC EXERCISES: CPT

## 2019-02-22 NOTE — PROGRESS NOTES
PT DAILY TREATMENT NOTE - [facility] 2-15 Patient Name: Arcenio Chew Date:2019 : 1955 [x]  Patient  Verified Payor: Amanda Martins / Plan: [employer] / Product Type: PPO / In time: 8:00a  Out time: 9:15a Total Treatment Time (min):75 Total Timed Codes (min): 60 
1:1 Treatment Time ([facility] only):-- Visit #: 86 Treatment Area: Left shoulder pain [M25.512] SUBJECTIVE Pain Level (0-10 scale): 0 Any medication changes, allergies to medications, adverse drug reactions, diagnosis change, or new procedure performed?: [x] No    [] Yes (see summary sheet for update) Subjective functional status/changes:   [] No changes reported Patient reports he is feeling better this week and only has some tightness at end range movements. OBJECTIVE Modality rationale: decrease edema, decrease inflammation, decrease pain and increase tissue extensibility to improve the patients ability to increase ADL efficiency. Type Additional Details  
[] Estim: []Att   []Unatt        []TENS instruct []IFC  []Premod   []NMES []Other:  []w/US   []w/ice   []w/heat Position: Location:  
[]  Traction: [] Cervical       []Lumbar 
                     [] Prone          []Supine []Intermittent   []Continuous Lbs: 
[] before manual 
[] after manual 
[]w/heat  
[]  Ultrasound: []Continuous   [] Pulsed at: 
                         []1MHz   []3MHz Location: 
W/cm2:  
[] Paraffin Location:  
[]w/heat  
[]  Ice     []  Heat 
[]  Ice massage Position: Location:  
[]  Laser 
[]  Other: Position: Location:  
15[x]  Vasopneumatic Device Pressure:       [x] lo [] med [] hi  
Temperature: 34  
 
[x] Skin assessment post-treatment:  [x]intact []redness- no adverse reaction 
  []redness  adverse reaction:  
 
45 min Therapeutic Exercise:  [x] See flow sheet :  
Rationale: increase ROM, increase strength, improve coordination, improve balance and increase proprioception to improve the patients ability to increase ADL efficiency. 15 min Manual Therapy: [address] [facility] [address] jt mobs grade II-III, PROM all directions to tolerance, pec release, MFR UT, levator Rationale: decrease pain, increase ROM, increase tissue extensibility, decrease edema , correct positional vertigo and decrease trigger points to improve the patients ability to increase ADL efficiency. With 
 [x] TE 
 [] TA 
 [] neuro 
 [] other: Patient Education: [x] Review HEP [] Progressed/Changed HEP based on:  
[] positioning   [] body mechanics   [] transfers   [] heat/ice application   
[] other:   
 
Other Objective/Functional Measures: pt able to advance exercises within protocol with no increased pain/soreness Pain Level (0-10 scale) post treatment: 0 
 
ASSESSMENT/Changes in Function:  
 
Patient will continue to benefit from skilled PT services to modify and progress therapeutic interventions, address functional mobility deficits, address ROM deficits, address strength deficits, analyze and address soft tissue restrictions, analyze and cue movement patterns, analyze and modify body mechanics/ergonomics, assess and modify postural abnormalities and address imbalance/dizziness to attain remaining goals. []  See Plan of Care 
[]  See progress note/recertification 
[]  See Discharge Summary Progress towards goals / Updated goals: 
Patient demonstrates good tolerance for interventions and advancements with no increased pain. Patient will do well with continued advancement within protocol as tolerated. PLAN [x]  Upgrade activities as tolerated     [x]  Continue plan of care [x]  Update interventions per flow sheet      
[]  Discharge due to:_ 
[]  Other:_ [employer], PTA 2/22/2019

## 2019-02-23 LAB
ALBUMIN SERPL-MCNC: 4.3 G/DL (ref 3.6–4.8)
ALBUMIN/GLOB SERPL: 2 {RATIO} (ref 1.2–2.2)
ALP SERPL-CCNC: 67 IU/L (ref 39–117)
ALT SERPL-CCNC: 14 IU/L (ref 0–44)
AST SERPL-CCNC: 15 IU/L (ref 0–40)
BASOPHILS # BLD AUTO: 0 X10E3/UL (ref 0–0.2)
BASOPHILS NFR BLD AUTO: 0 %
BILIRUB SERPL-MCNC: 0.9 MG/DL (ref 0–1.2)
BUN SERPL-MCNC: 15 MG/DL (ref 8–27)
BUN/CREAT SERPL: 13 (ref 10–24)
CALCIUM SERPL-MCNC: 9.4 MG/DL (ref 8.6–10.2)
CHLORIDE SERPL-SCNC: 100 MMOL/L (ref 96–106)
CHOLEST SERPL-MCNC: 139 MG/DL (ref 100–199)
CO2 SERPL-SCNC: 26 MMOL/L (ref 20–29)
CREAT SERPL-MCNC: 1.13 MG/DL (ref 0.76–1.27)
EOSINOPHIL # BLD AUTO: 0.1 X10E3/UL (ref 0–0.4)
EOSINOPHIL NFR BLD AUTO: 3 %
ERYTHROCYTE [DISTWIDTH] IN BLOOD BY AUTOMATED COUNT: 13.4 % (ref 12.3–15.4)
GLOBULIN SER CALC-MCNC: 2.1 G/DL (ref 1.5–4.5)
GLUCOSE SERPL-MCNC: 86 MG/DL (ref 65–99)
HCT VFR BLD AUTO: 44.2 % (ref 37.5–51)
HDLC SERPL-MCNC: 41 MG/DL
HGB BLD-MCNC: 14.8 G/DL (ref 13–17.7)
IMM GRANULOCYTES # BLD AUTO: 0 X10E3/UL (ref 0–0.1)
IMM GRANULOCYTES NFR BLD AUTO: 0 %
INTERPRETATION, 910389: NORMAL
LDLC SERPL CALC-MCNC: 81 MG/DL (ref 0–99)
LYMPHOCYTES # BLD AUTO: 0.9 X10E3/UL (ref 0.7–3.1)
LYMPHOCYTES NFR BLD AUTO: 17 %
MCH RBC QN AUTO: 29.9 PG (ref 26.6–33)
MCHC RBC AUTO-ENTMCNC: 33.5 G/DL (ref 31.5–35.7)
MCV RBC AUTO: 89 FL (ref 79–97)
MONOCYTES # BLD AUTO: 0.5 X10E3/UL (ref 0.1–0.9)
MONOCYTES NFR BLD AUTO: 8 %
NEUTROPHILS # BLD AUTO: 4 X10E3/UL (ref 1.4–7)
NEUTROPHILS NFR BLD AUTO: 72 %
PLATELET # BLD AUTO: 185 X10E3/UL (ref 150–379)
POTASSIUM SERPL-SCNC: 4.6 MMOL/L (ref 3.5–5.2)
PROT SERPL-MCNC: 6.4 G/DL (ref 6–8.5)
PSA SERPL-MCNC: 1 NG/ML (ref 0–4)
RBC # BLD AUTO: 4.95 X10E6/UL (ref 4.14–5.8)
SODIUM SERPL-SCNC: 139 MMOL/L (ref 134–144)
TRIGL SERPL-MCNC: 86 MG/DL (ref 0–149)
TSH SERPL DL<=0.005 MIU/L-ACNC: 3.85 UIU/ML (ref 0.45–4.5)
VLDLC SERPL CALC-MCNC: 17 MG/DL (ref 5–40)
WBC # BLD AUTO: 5.5 X10E3/UL (ref 3.4–10.8)

## 2019-02-26 ENCOUNTER — HOSPITAL ENCOUNTER (OUTPATIENT)
Dept: PHYSICAL THERAPY | Age: 64
Discharge: HOME | End: 2019-02-26
Payer: COMMERCIAL

## 2019-02-26 PROCEDURE — 97016 VASOPNEUMATIC DEVICE THERAPY: CPT

## 2019-02-26 PROCEDURE — 97140 MANUAL THERAPY 1/> REGIONS: CPT

## 2019-02-26 PROCEDURE — 97110 THERAPEUTIC EXERCISES: CPT

## 2019-02-26 NOTE — PROGRESS NOTES
PT DAILY TREATMENT NOTE - [facility] 2-15 Patient Name: Estefani Feng Date:2019 : 1955 [x]  Patient  Verified Payor: Tomi Sotelo / Plan: Rhianna Fernandez / Product Type: PPO / In time: 8:35a  Out time: 9:45a Total Treatment Time (min):70 Total Timed Codes (min): 55 
1:1 Treatment Time ([address] only):-- Visit #: 59 Treatment Area: Left shoulder pain [M25.512] SUBJECTIVE Pain Level (0-10 scale): 0 Any medication changes, allergies to medications, adverse drug reactions, diagnosis change, or new procedure performed?: [x] No    [] Yes (see summary sheet for update) Subjective functional status/changes:   [x] No changes reported OBJECTIVE Modality rationale: decrease edema, decrease inflammation, decrease pain and increase tissue extensibility to improve the patients ability to increase ADL efficiency. Type Additional Details  
[] Estim: []Att   []Unatt        []TENS instruct []IFC  []Premod   []NMES []Other:  []w/US   []w/ice   []w/heat Position: Location:  
[]  Traction: [] Cervical       []Lumbar 
                     [] Prone          []Supine []Intermittent   []Continuous Lbs: 
[] before manual 
[] after manual 
[]w/heat  
[]  Ultrasound: []Continuous   [] Pulsed at: 
                         []1MHz   []3MHz Location: 
W/cm2:  
[] Paraffin Location:  
[]w/heat  
[]  Ice     []  Heat 
[]  Ice massage Position: Location:  
[]  Laser 
[]  Other: Position: Location:  
15[x]  Vasopneumatic Device Pressure:       [x] lo [] med [] hi  
Temperature: 34  
 
[x] Skin assessment post-treatment:  [x]intact []redness- no adverse reaction 
  []redness  adverse reaction:  
 
45 min Therapeutic Exercise:  [x] See flow sheet :  
Rationale: increase ROM, increase strength, improve coordination, improve balance and increase proprioception to improve the patients ability to increase ADL efficiency. 10 min Manual Therapy: [facility] jt mobs grade II-III, PROM all directions to tolerance, pec release, Rationale: decrease pain, increase ROM, increase tissue extensibility, decrease edema , correct positional vertigo and decrease trigger points to improve the patients ability to increase ADL efficiency. With 
 [x] TE 
 [] TA 
 [] neuro 
 [] other: Patient Education: [x] Review HEP [] Progressed/Changed HEP based on:  
[] positioning   [] body mechanics   [] transfers   [] heat/ice application   
[] other:   
 
Other Objective/Functional Measures: pt with \"odd feeling\" with reverse UBE last 10 seconds, discontinued at that point Pain Level (0-10 scale) post treatment: 0 
 
ASSESSMENT/Changes in Function:  
 
Patient will continue to benefit from skilled PT services to modify and progress therapeutic interventions, address functional mobility deficits, address ROM deficits, address strength deficits, analyze and address soft tissue restrictions, analyze and cue movement patterns, analyze and modify body mechanics/ergonomics, assess and modify postural abnormalities and address imbalance/dizziness to attain remaining goals. []  See Plan of Care 
[]  See progress note/recertification 
[]  See Discharge Summary Progress towards goals / Updated goals: 
Patient demonstrates good tolerance for interventions and advancements with no increased pain. Patient making good progress with near full ROM all directions with tightness at end range ER>Abduction>Flexion. PLAN [x]  Upgrade activities as tolerated     [x]  Continue plan of care [x]  Update interventions per flow sheet      
[]  Discharge due to:_ 
[]  Other:_ Edna Cody, PTA 2/26/2019

## 2019-02-27 ENCOUNTER — HOSPITAL ENCOUNTER (OUTPATIENT)
Dept: MRI IMAGING | Age: 64
Discharge: HOME | End: 2019-02-27
Attending: ORTHOPAEDIC SURGERY
Payer: COMMERCIAL

## 2019-02-27 DIAGNOSIS — S83.241D ACUTE MEDIAL MENISCUS TEAR OF RIGHT KNEE, SUBSEQUENT ENCOUNTER: ICD-10-CM

## 2019-02-27 PROCEDURE — 73721 MRI JNT OF LWR EXTRE W/O DYE: CPT

## 2019-03-01 ENCOUNTER — APPOINTMENT (OUTPATIENT)
Dept: PHYSICAL THERAPY | Age: 64
End: 2019-03-01
Payer: COMMERCIAL

## 2019-03-05 ENCOUNTER — HOSPITAL ENCOUNTER (OUTPATIENT)
Dept: PHYSICAL THERAPY | Age: 64
Discharge: HOME | End: 2019-03-05
Payer: COMMERCIAL

## 2019-03-05 PROCEDURE — 97140 MANUAL THERAPY 1/> REGIONS: CPT

## 2019-03-05 PROCEDURE — 97016 VASOPNEUMATIC DEVICE THERAPY: CPT

## 2019-03-05 PROCEDURE — 97110 THERAPEUTIC EXERCISES: CPT

## 2019-03-05 NOTE — PROGRESS NOTES
PT DAILY TREATMENT NOTE - [facility] 2-15    Patient Name: Jono Montero  Date:3/5/2019  : 1955  [x]  Patient  Verified  Payor: Karyn Colin / Plan: Jolie Cardona / Product Type: PPO /    In time: 8:35a  Out time: 9:50a  Total Treatment Time (min):75  Total Timed Codes (min): 60  1:1 Treatment Time ( only):--   Visit #: 13    Treatment Area: Left shoulder pain [M25.512]    SUBJECTIVE  Pain Level (0-10 scale): 0  Any medication changes, allergies to medications, adverse drug reactions, diagnosis change, or new procedure performed?: [x] No    [] Yes (see summary sheet for update)  Subjective functional status/changes:   [] No changes reported  Patient reports decreased soreness with interventions. OBJECTIVE    Modality rationale: decrease edema, decrease inflammation, decrease pain and increase tissue extensibility to improve the patients ability to increase ADL efficiency.    Type Additional Details   [] Estim: []Att   []Unatt        []TENS instruct                  []IFC  []Premod   []NMES                     []Other:  []w/US   []w/ice   []w/heat  Position:  Location:   []  Traction: [] Cervical       []Lumbar                       [] Prone          []Supine                       []Intermittent   []Continuous Lbs:  [] before manual  [] after manual  []w/heat   []  Ultrasound: []Continuous   [] Pulsed at:                           []1MHz   []3MHz Location:  W/cm2:   [] Paraffin         Location:   []w/heat   []  Ice     []  Heat  []  Ice massage Position:  Location:   []  Laser  []  Other: Position:  Location:   15[x]  Vasopneumatic Device Pressure:       [x] lo [] med [] hi   Temperature: 34     [x] Skin assessment post-treatment:  [x]intact []redness- no adverse reaction    []redness  adverse reaction:     50 min Therapeutic Exercise:  [x] See flow sheet :   Rationale: increase ROM, increase strength, improve coordination, improve balance and increase proprioception to improve the patients ability to increase ADL efficiency. 10 min Manual Therapy: [facility] jt mobs grade II-III, PROM all directions to tolerance,   Rationale: decrease pain, increase ROM, increase tissue extensibility, decrease edema , correct positional vertigo and decrease trigger points to improve the patients ability to increase ADL efficiency. With   [x] TE   [] TA   [] neuro   [] other: Patient Education: [x] Review HEP    [] Progressed/Changed HEP based on:   [] positioning   [] body mechanics   [] transfers   [] heat/ice application    [] other:      Other Objective/Functional Measures:   PROM/AROM L shoulder:  Flexion: 170, 160  Abduction:165,  160  ER: 75, (AAROM 70)  IR: 65    Pain Level (0-10 scale) post treatment: 0    ASSESSMENT/Changes in Function:     Patient will continue to benefit from skilled PT services to modify and progress therapeutic interventions, address functional mobility deficits, address ROM deficits, address strength deficits, analyze and address soft tissue restrictions, analyze and cue movement patterns, analyze and modify body mechanics/ergonomics, assess and modify postural abnormalities and address imbalance/dizziness to attain remaining goals. []  See Plan of Care  []  See progress note/recertification  []  See Discharge Summary         Progress towards goals / Updated goals:  Patient demonstrates good tolerance for interventions and advancements with no increased pain. Patient making good progress with near full ROM all directions with tightness at end range ER>Abduction>Flexion.     PLAN  [x]  Upgrade activities as tolerated     [x]  Continue plan of care  [x]  Update interventions per flow sheet       []  Discharge due to:_  []  Other:_      Norris Cortés, PASCUAL 3/5/2019

## 2019-03-08 ENCOUNTER — HOSPITAL ENCOUNTER (OUTPATIENT)
Dept: PHYSICAL THERAPY | Age: 64
Discharge: HOME | End: 2019-03-08
Payer: COMMERCIAL

## 2019-03-08 PROCEDURE — 97140 MANUAL THERAPY 1/> REGIONS: CPT

## 2019-03-08 PROCEDURE — 97110 THERAPEUTIC EXERCISES: CPT

## 2019-03-08 PROCEDURE — 97016 VASOPNEUMATIC DEVICE THERAPY: CPT

## 2019-03-08 NOTE — PROGRESS NOTES
PT DAILY TREATMENT NOTE - [facility] 2-15    Patient Name: Rody Simon  Date:3/8/2019  : 1955  [x]  Patient  Verified  Payor: Kaitlynn Bailon / Plan: Darius Mandel / Product Type: PPO /    In time: 8:35a  Out time: 9:45a  Total Treatment Time (min):70  Total Timed Codes (min): 55  1:1 Treatment Time ( only):--   Visit #: 14    Treatment Area: Left shoulder pain [M25.512]    SUBJECTIVE  Pain Level (0-10 scale): 0  Any medication changes, allergies to medications, adverse drug reactions, diagnosis change, or new procedure performed?: [x] No    [] Yes (see summary sheet for update)  Subjective functional status/changes:   [] No changes reported  Patient reports he sees MD Tuesday next week. OBJECTIVE    Modality rationale: decrease edema, decrease inflammation, decrease pain and increase tissue extensibility to improve the patients ability to increase ADL efficiency.    Type Additional Details   [] Estim: []Att   []Unatt        []TENS instruct                  []IFC  []Premod   []NMES                     []Other:  []w/US   []w/ice   []w/heat  Position:  Location:   []  Traction: [] Cervical       []Lumbar                       [] Prone          []Supine                       []Intermittent   []Continuous Lbs:  [] before manual  [] after manual  []w/heat   []  Ultrasound: []Continuous   [] Pulsed at:                           []1MHz   []3MHz Location:  W/cm2:   [] Paraffin         Location:   []w/heat   []  Ice     []  Heat  []  Ice massage Position:  Location:   []  Laser  []  Other: Position:  Location:   15[x]  Vasopneumatic Device Pressure:       [x] lo [] med [] hi   Temperature: 34     [x] Skin assessment post-treatment:  [x]intact []redness- no adverse reaction    []redness  adverse reaction:     45 min Therapeutic Exercise:  [x] See flow sheet :   Rationale: increase ROM, increase strength, improve coordination, improve balance and increase proprioception to improve the patients ability to increase ADL efficiency. 10 min Manual Therapy: [address] [facility] jt mobs grade II-III, PROM all directions to tolerance   Rationale: decrease pain, increase ROM, increase tissue extensibility, decrease edema , correct positional vertigo and decrease trigger points to improve the patients ability to increase ADL efficiency. With   [x] TE   [] TA   [] neuro   [] other: Patient Education: [x] Review HEP    [] Progressed/Changed HEP based on:   [] positioning   [] body mechanics   [] transfers   [] heat/ice application    [] other:      Other Objective/Functional Measures:   PROM/AROM L shoulder:  Flexion: 173, 165  Abduction:170,  165  ER: 80, (AAROM 75)  IR: 65    Pain Level (0-10 scale) post treatment: 0    ASSESSMENT/Changes in Function:     Patient will continue to benefit from skilled PT services to modify and progress therapeutic interventions, address functional mobility deficits, address ROM deficits, address strength deficits, analyze and address soft tissue restrictions, analyze and cue movement patterns, analyze and modify body mechanics/ergonomics, assess and modify postural abnormalities and address imbalance/dizziness to attain remaining goals. []  See Plan of Care  []  See progress note/recertification  []  See Discharge Summary         Progress towards goals / Updated goals:  Patient demonstrates good tolerance for interventions and advancements with no increased pain. Patient making good progress with near full ROM all directions with tightness at end range ER>Abduction>Flexion. Patient will do well with continued progression into next phase of protocol and continued strengthening.     PLAN  [x]  Upgrade activities as tolerated     [x]  Continue plan of care  [x]  Update interventions per flow sheet       []  Discharge due to:_  []  Other:_      Amauri Geller PTA 3/8/2019

## 2019-03-11 NOTE — PROGRESS NOTES
[employer] [facility]  [address] [facility] [address]  Phone: [phone]      Fax:  [phone]    Progress Note    Name: Estefani Feng   : 1955   MD: Ellie Rcie MD       Treatment Diagnosis: Left shoulder pain [M25.512]  Start of Care: 18    Visits from Start of Care: 14  Missed Visits: 5    Summary of Care: Mr Fausto Car was referred to PT post L RTC repair, acromioplasty 18     Assessment / Recommendations:     PROM/AROM L shoulder:  Flexion: 173, 165  Abduction:170,  165  ER: 80, (AAROM 75)  IR: 65    Progress towards goals   Patient demonstrates good tolerance for interventions and advancements with no increased pain. Patient making good progress with near full ROM all directions with tightness at end range ER>Abduction>Flexion. Patient will do well with continued progression into next phase of protocol and continued strengthening. Progress towards goals / Updated goals:  Patient demonstrates good tolerance for interventions and advancements with no increased pain. Patient making good progress with near full ROM all directions with tightness at end range ER>Abduction>Flexion. Patient will do well with continued progression into next phase of protocol and continued strengthening. Plan:  Continue per MD recommendations    García Salmeron, PT, MSPT    3/11/2019    ________________________________________________________________________  NOTE TO PHYSICIAN:  Please complete the following and fax to: [facility]: [phone]  . Retain this original for your records. If you are unable to process this request in 24 hours, please contact our office.        ____ I have read the above report and request that my patient continue therapy with the following changes/special instructions:  ____ I have read the above report and request that my patient be discharged from therapy    Physician's Signature:_________________ Date:___________Time:__________

## 2019-03-19 ENCOUNTER — APPOINTMENT (OUTPATIENT)
Dept: PHYSICAL THERAPY | Age: 64
End: 2019-03-19
Payer: COMMERCIAL

## 2019-03-22 ENCOUNTER — HOSPITAL ENCOUNTER (OUTPATIENT)
Dept: CT IMAGING | Age: 64
Discharge: HOME | End: 2019-03-22
Attending: ORTHOPAEDIC SURGERY
Payer: COMMERCIAL

## 2019-03-22 DIAGNOSIS — M25.561 MECHANICAL PAIN OF RIGHT KNEE: ICD-10-CM

## 2019-03-22 DIAGNOSIS — M17.11 ARTHRITIS OF RIGHT KNEE: ICD-10-CM

## 2019-03-22 PROCEDURE — 73700 CT LOWER EXTREMITY W/O DYE: CPT

## 2019-04-15 ENCOUNTER — HOSPITAL ENCOUNTER (OUTPATIENT)
Dept: PREADMISSION TESTING | Age: 64
Discharge: HOME | End: 2019-04-15

## 2019-04-15 VITALS
RESPIRATION RATE: 16 BRPM | HEIGHT: 74 IN | OXYGEN SATURATION: 96 % | SYSTOLIC BLOOD PRESSURE: 147 MMHG | DIASTOLIC BLOOD PRESSURE: 89 MMHG | TEMPERATURE: 98 F | BODY MASS INDEX: 24.85 KG/M2 | HEART RATE: 88 BPM | WEIGHT: 193.6 LBS

## 2019-04-15 RX ORDER — TADALAFIL 10 MG/1
10 TABLET ORAL
COMMUNITY
End: 2019-11-06

## 2019-04-15 RX ORDER — LISINOPRIL 20 MG/1
20 TABLET ORAL
COMMUNITY
End: 2019-09-30 | Stop reason: ALTCHOICE

## 2019-04-15 RX ORDER — IBUPROFEN 200 MG
600 TABLET ORAL
COMMUNITY
End: 2021-08-17

## 2019-04-15 RX ORDER — TESTOSTERONE CYPIONATE 200 MG/ML
80 INJECTION INTRAMUSCULAR
COMMUNITY

## 2019-04-15 NOTE — H&P (VIEW-ONLY)
PAT Pre-Op History & Physical 
 
Patient: Viv Mead                  MRN: [id number]          SSN: [id number]            YOB: 1955 Chief Complaint: 
 Pre-Op Exam - Right Knee Scope HPI:  
Patient is a 59 y.o.  male  who presents with history of right knee pain for the past 5 months. He notes the pain has gotten worse and his knee is very unstable. His knee buckling has caused him to have several falls. He denies injury. He describes the pain as a dull, sharp ache. Pain ranges from 2-10/10. He feels like his knee is \"floating\". He has failed injections x 3, NSAID. The patient was evaluated in the surgeon's office and it was determined that the most appropriate plan of care is to proceed with surgical intervention. Patient's PCP Daniel Stallings MD 
 
 
Past Medical History:  
Diagnosis Date  Abnormal finding on EKG 2015 Infarct- ruled as a mis-read by cardiology after testing  Arthritis  Chronic back pain s/p surgery  GERD (gastroesophageal reflux disease)  Hyperlipidemia  Hypertension  Hypogonadism, male 8/21/2013  
 total 97, free 1.8  5/13/13  Psychiatric disorder   
 panic attack (last attack 3 month ago), anxiety Past Surgical History:  
Procedure Laterality Date  COLONOSCOPY N/A 6/8/2018 COLONOSCOPY performed by Shabana Rapp MD at [address] [facility] HX BLEPHAROPLASTY  2011  HX COLONOSCOPY Dr. Cespedes Levo?  
 [address] [facility] [address]  2011?  
 normal  
 HX ORTHOPAEDIC  2014  
 right knee scope  HX ORTHOPAEDIC  2007  
 right shoulder scope  HX ORTHOPAEDIC  1990  
 right wrist  
 HX ORTHOPAEDIC  2015  
 left knee scopes x 5  
 HX ORTHOPAEDIC Right 2015  
 ankle surgery  HX ORTHOPAEDIC Right 2017  
 shoulder rotator cuff  HX ORTHOPAEDIC Right   
 clavicle repair  HX ORTHOPAEDIC  2018  
 left shoulder rotator cuff repair Prior to Admission medications Medication Sig Start Date End Date Taking? Authorizing Provider  
tadalafil (CIALIS) 10 mg tablet Take 10 mg by mouth daily as needed. Yes Provider, Historical  
ibuprofen/diphenhydramine cit (ADVIL PM PO) Take 2 Tabs by mouth nightly as needed. Yes Provider, Historical  
ibuprofen (ADVIL) 200 mg tablet Take 600 mg by mouth every six (6) hours as needed for Pain. Yes Provider, Historical  
lisinopril (PRINIVIL, ZESTRIL) 20 mg tablet Take 20 mg by mouth nightly. Yes Provider, Historical  
testosterone cypionate (DEPOTESTOTERONE CYPIONATE) 200 mg/mL injection 80 mg by IntraMUSCular route every seven (7) days. Yes Provider, Historical  
pantoprazole (PROTONIX) 40 mg tablet TAKE 1 TABLET BY MOUTH DAILY 2/19/19  Yes Kenia Skelton MD  
atorvastatin (LIPITOR) 10 mg tablet TAKE ONE TABLET BY MOUTH DAILY 2/19/19  Yes Kenia Skelton MD  
ALPRAZolam [facility]) 0.5 mg tablet TAKE 1 TABLET BY MOUTH EVERY 8 HOURS AS NEEDED FOR ANXIETY 1/8/19  Yes Kenia Skelton MD  
buprenorphine-naloxone (SUBOXONE) 8-2 mg film sublingaul film 1 Film by SubLINGual route daily. Yes Provider, Historical  
 
 
Allergies Allergen Reactions  Codeine Rash Over 40 years ago Social History Tobacco Use  Smoking status: Never Smoker  Smokeless tobacco: Never Used Substance Use Topics  Alcohol use: No  
  Alcohol/week: 0.0 oz Frequency: Never Comment: Rare glass of wine Social History Substance and Sexual Activity Drug Use Not Currently Family History Problem Relation Age of Onset  Heart Disease Father 46 CABG x5  
 Coronary Artery Disease Father   
     stents  Diabetes Sister  Heart Disease Sister   
     afib  
 Heart Failure Mother  No Known Problems Brother  No Known Problems Brother Review of Systems: 
 
Constitutional: Negative for chills and fever HENT: Negative for congestion and sore throat Eyes: negative for blurred vision and double vision Respiratory: Negative for cough, shortness of breath and wheezing Cardiovascular: Negative for chest pain and palpitations Gastrointestinal: Negative for abdominal pain, constipation, diarrhea and nausea Genitourinary: Negative for dysuria and hematuria Musculoskeletal: Positive for right knee pain. Skin: Negative for rash, open wounds Neurological: Negative for dizziness, tremors and headaches Psychiatric: Negative for depression. The patient is not nervous/anxious. Objective:  
 
Patient Vitals for the past 24 hrs: 
 Temp Pulse Resp BP SpO2  
04/15/19 0952 98 °F (36.7 °C) 88 16 147/89 96 % Temp (24hrs), Av °F (36.7 °C), Min:98 °F (36.7 °C), Max:98 °F (36.7 °C) Body mass index is 24.86 kg/m². Wt Readings from Last 1 Encounters:  
04/15/19 87.8 kg (193 lb 9.6 oz) Physical Exam: 
 
 General: Pleasant,  cooperative, no apparent distress, appears stated age. Eyes: Conjunctivae/corneas clear. EOMs intact. Nose: Nares normal. 
 Mouth/Throat: Lips, mucosa, and tongue normal. Teeth and gums normal. 
 Lungs: Clear to auscultation bilaterally. Heart: Regular rate and rhythm, S1, S2 normal. No murmur, click, rub or gallop. Abdomen: Soft, non-tender. Bowel sounds normal. No distention. Musculoskeletal:  Pain over medial joint line, patellar crepitus, no deficit in extension. Extremities:  Extremities normal, atraumatic, no cyanosis or edema. Calves 
                               supple, non tender to palpation. Pulses: 2+ and symmetric bilateral upper extremities. Cap. refill <2 seconds Skin: Skin color, texture, turgor normal. No visible open areas, examined fully clothed Neurologic: CN II-XII grossly intact. Alert and oriented x3. Labs: None Assessment and Plan 1. ACUTE MEDIAL MENISCUS TEAR RIGHT KNEE 2. Pre-op general physical exam 
 
Scheduled for RIGHT KNEE ARTHROSCOPY, PARTIAL MEDIAL MENISCECTOMY No labs done per protocol. EKG reviewed from 12/2018 Charlie Don NP

## 2019-04-15 NOTE — H&P
PAT Pre-Op History & Physical    Patient: Alvina Sandhoff                  MRN: [id number]          SSN: [id number]            YOB: 1955                  Chief Complaint:   Pre-Op Exam - Right Knee Scope          HPI:   Patient is a 59 y.o.  male  who presents with history of right knee pain for the past 5 months. He notes the pain has gotten worse and his knee is very unstable. His knee buckling has caused him to have several falls. He denies injury. He describes the pain as a dull, sharp ache. Pain ranges from 2-10/10. He feels like his knee is \"floating\". He has failed injections x 3, NSAID. The patient was evaluated in the surgeon's office and it was determined that the most appropriate plan of care is to proceed with surgical intervention.   Patient's PCP Andrew Jhaveri MD      Past Medical History:   Diagnosis Date    Abnormal finding on EKG 2015    Infarct- ruled as a mis-read by cardiology after testing    Arthritis     Chronic back pain     s/p surgery    GERD (gastroesophageal reflux disease)     Hyperlipidemia     Hypertension     Hypogonadism, male 8/21/2013    total 97, free 1.8  5/13/13    Psychiatric disorder     panic attack (last attack 3 month ago), anxiety      Past Surgical History:   Procedure Laterality Date    COLONOSCOPY N/A 6/8/2018    COLONOSCOPY performed by Josie Aguilera MD at [address] [facility] HX BLEPHAROPLASTY  2011   Savanah Lieberman?    [address] [facility] [address]  2011?    normal    HX ORTHOPAEDIC  2014    right knee scope    HX ORTHOPAEDIC  2007    right shoulder scope    HX ORTHOPAEDIC  1990    right wrist    HX ORTHOPAEDIC  2015    left knee scopes x 5    HX ORTHOPAEDIC Right 2015    ankle surgery    HX ORTHOPAEDIC Right 2017    shoulder rotator cuff    HX ORTHOPAEDIC Right     clavicle repair    HX ORTHOPAEDIC  2018    left shoulder rotator cuff repair      Prior to Admission medications    Medication Sig Start Date End Date Taking? Authorizing Provider   tadalafil (CIALIS) 10 mg tablet Take 10 mg by mouth daily as needed. Yes Provider, Historical   ibuprofen/diphenhydramine cit (ADVIL PM PO) Take 2 Tabs by mouth nightly as needed. Yes Provider, Historical   ibuprofen (ADVIL) 200 mg tablet Take 600 mg by mouth every six (6) hours as needed for Pain. Yes Provider, Historical   lisinopril (PRINIVIL, ZESTRIL) 20 mg tablet Take 20 mg by mouth nightly. Yes Provider, Historical   testosterone cypionate (DEPOTESTOTERONE CYPIONATE) 200 mg/mL injection 80 mg by IntraMUSCular route every seven (7) days. Yes Provider, Historical   pantoprazole (PROTONIX) 40 mg tablet TAKE 1 TABLET BY MOUTH DAILY 2/19/19  Yes Maria Elena Gooden MD   atorvastatin (LIPITOR) 10 mg tablet TAKE ONE TABLET BY MOUTH DAILY 2/19/19  Yes Maria Elena Gooden MD   ALPRAZolam Rosannelyn Babinski) 0.5 mg tablet TAKE 1 TABLET BY MOUTH EVERY 8 HOURS AS NEEDED FOR ANXIETY 1/8/19  Yes Maria Elena Gooden MD   buprenorphine-naloxone (SUBOXONE) 8-2 mg film sublingaul film 1 Film by SubLINGual route daily.    Yes Provider, Historical       Allergies   Allergen Reactions    Codeine Rash     Over 40 years ago        Social History     Tobacco Use    Smoking status: Never Smoker    Smokeless tobacco: Never Used   Substance Use Topics    Alcohol use: No     Alcohol/week: 0.0 oz     Frequency: Never     Comment: Rare glass of wine      Social History     Substance and Sexual Activity   Drug Use Not Currently     Family History   Problem Relation Age of Onset    Heart Disease Father 46        CABG x5    Coronary Artery Disease Father         stents    Diabetes Sister     Heart Disease Sister         afib    Heart Failure Mother     No Known Problems Brother     No Known Problems Brother        Review of Systems:    Constitutional: Negative for chills and fever  HENT: Negative for congestion and sore throat  Eyes: negative for blurred vision and double vision  Respiratory: Negative for cough, shortness of breath and wheezing  Cardiovascular: Negative for chest pain and palpitations  Gastrointestinal: Negative for abdominal pain, constipation, diarrhea and nausea  Genitourinary: Negative for dysuria and hematuria  Musculoskeletal: Positive for right knee pain. Skin: Negative for rash, open wounds  Neurological: Negative for dizziness, tremors and headaches  Psychiatric: Negative for depression. The patient is not nervous/anxious. Objective:     Patient Vitals for the past 24 hrs:   Temp Pulse Resp BP SpO2   04/15/19 0952 98 °F (36.7 °C) 88 16 147/89 96 %     Temp (24hrs), Av °F (36.7 °C), Min:98 °F (36.7 °C), Max:98 °F (36.7 °C)    Body mass index is 24.86 kg/m². Wt Readings from Last 1 Encounters:   04/15/19 87.8 kg (193 lb 9.6 oz)        Physical Exam:     General: Pleasant,  cooperative, no apparent distress, appears stated age. Eyes: Conjunctivae/corneas clear. EOMs intact. Nose: Nares normal.   Mouth/Throat: Lips, mucosa, and tongue normal. Teeth and gums normal.   Lungs: Clear to auscultation bilaterally. Heart: Regular rate and rhythm, S1, S2 normal. No murmur, click, rub or gallop. Abdomen: Soft, non-tender. Bowel sounds normal. No distention. Musculoskeletal:  Pain over medial joint line, patellar crepitus, no deficit in extension. Extremities:  Extremities normal, atraumatic, no cyanosis or edema. Calves                                 supple, non tender to palpation. Pulses: 2+ and symmetric bilateral upper extremities. Cap. refill <2 seconds   Skin: Skin color, texture, turgor normal. No visible open areas, examined fully clothed   Neurologic: CN II-XII grossly intact. Alert and oriented x3. Labs: None    Assessment and Plan     1. ACUTE MEDIAL MENISCUS TEAR RIGHT KNEE  2. Pre-op general physical exam    Scheduled for RIGHT KNEE ARTHROSCOPY, PARTIAL MEDIAL MENISCECTOMY    No labs done per protocol.  EKG reviewed from 2018      Bettina Forde NP

## 2019-04-15 NOTE — PERIOP NOTES
[address] [address] [facility]                            MAIN OR                                  [phone]   MAIN PRE OP                          [phone]                                                                                AMBULATORY PRE OP          [phone]  PRE-ADMISSION TESTING    [phone]     Surgery Date:   Monday 4/29/19         Is surgery arrival time given by surgeon? NO  If NO, [address] [facility] staff will call you between 3 and 7pm the day before your surgery with your arrival time. (If your surgery is on a Monday, we will call you the Friday before.)    Call [phone] after 7pm Monday-Friday if you did not receive your arrival time.     INSTRUCTIONS BEFORE YOUR SURGERY   When You  Arrive   Arrive at the [address] [facility] on the day of your surgery  Have your insurance card, photo ID, and any copayment (if needed)     Food   and   Drink   NO food or drink after midnight the night before surgery    This means NO water, gum, mints, coffee, juice, etc.  No alcohol (beer, wine, liquor) 24 hours before and after surgery     Medications to   TAKE   Morning of Surgery   MEDICATIONS TO TAKE THE MORNING OF SURGERY WITH A SIP OF WATER:    protonix  Contact Dr. Lise Otoole for instructions with suboxone  Stop cialis x 48hrs prior to surgery     Medications  To  STOP      7 days before surgery    Non-Steroidal anti-inflammatory Drugs (NSAID's): for example, Ibuprofen (Advil, Motrin), Naproxen (Aleve)   Aspirin, if taking for pain    Herbal supplements, vitamins, and fish oil   Other:  (Pain medications not listed above, including Tylenol may be taken)   Blood  Thinners         Bathing Clothing  Jewelry  Valuables       If you shower the morning of surgery, please do not apply anything to your skin (lotions, powders, deodorant, or makeup, especially mascara)      Do not shave or trim anywhere 24 hours before surgery   Wear your hair loose or down; no pony-tails, buns, or metal hair clips   Wear loose, comfortable, clean clothes   Wear glasses instead of contacts   Leave money, valuables, and jewelry, including body piercings, at home     Going Home       or Spending the Night    SAME-DAY SURGERY: You must have a responsible adult drive you home and stay with you 24 hours after surgery   ADMITS: If your doctor is keeping you into the hospital after surgery, leave personal belongings/luggage in your car until you have a hospital room number. Hospital discharge time is 12 noon  Drivers must be here before 12 noon unless you are told differently   Special Instructions   Free  parking 7am-5pm       Follow all instructions so your surgery wont be cancelled. Please, be on time. If a situation occurs and you are delayed the day of surgery, call [phone] or          [phone]. If your physical condition changes (like a fever, cold, flu, etc.) call your surgeon. The patient was contacted  in person. Home medication reviewed and verified during PAT appointment. The patient verbalizes understanding of all instructions and does not  need reinforcement.

## 2019-04-23 DIAGNOSIS — F41.9 ANXIETY: ICD-10-CM

## 2019-04-23 RX ORDER — ALPRAZOLAM 0.5 MG/1
TABLET ORAL
Qty: 20 TAB | Refills: 2 | OUTPATIENT
Start: 2019-04-23 | End: 2019-09-12 | Stop reason: SDUPTHER

## 2019-04-25 ENCOUNTER — ANESTHESIA EVENT (OUTPATIENT)
Dept: SURGERY | Age: 64
End: 2019-04-25
Payer: COMMERCIAL

## 2019-04-29 ENCOUNTER — ANESTHESIA (OUTPATIENT)
Dept: SURGERY | Age: 64
End: 2019-04-29
Payer: COMMERCIAL

## 2019-04-29 ENCOUNTER — HOSPITAL ENCOUNTER (OUTPATIENT)
Age: 64
Setting detail: OUTPATIENT SURGERY
Discharge: HOME | End: 2019-04-29
Attending: ORTHOPAEDIC SURGERY | Admitting: ORTHOPAEDIC SURGERY
Payer: COMMERCIAL

## 2019-04-29 VITALS
HEART RATE: 70 BPM | HEIGHT: 74 IN | TEMPERATURE: 97.9 F | WEIGHT: 193.6 LBS | DIASTOLIC BLOOD PRESSURE: 90 MMHG | OXYGEN SATURATION: 98 % | SYSTOLIC BLOOD PRESSURE: 142 MMHG | BODY MASS INDEX: 24.85 KG/M2 | RESPIRATION RATE: 16 BRPM

## 2019-04-29 DIAGNOSIS — R52 PAIN: Primary | ICD-10-CM

## 2019-04-29 PROCEDURE — 76210000006 HC OR PH I REC 0.5 TO 1 HR: Performed by: ORTHOPAEDIC SURGERY

## 2019-04-29 PROCEDURE — 77030020782 HC GWN BAIR PAWS FLX 3M -B

## 2019-04-29 PROCEDURE — 77030032490 HC SLV COMPR SCD KNE COVD -B

## 2019-04-29 PROCEDURE — 77030028907 HC WRP KNEE WO BGS SOLM -B

## 2019-04-29 PROCEDURE — 77030008496 HC TBNG ARTHSC IRR S&N -B: Performed by: ORTHOPAEDIC SURGERY

## 2019-04-29 PROCEDURE — 74011250636 HC RX REV CODE- 250/636: Performed by: ORTHOPAEDIC SURGERY

## 2019-04-29 PROCEDURE — 76010000149 HC OR TIME 1 TO 1.5 HR: Performed by: ORTHOPAEDIC SURGERY

## 2019-04-29 PROCEDURE — 77030000032 HC CUF TRNQT ZIMM -B: Performed by: ORTHOPAEDIC SURGERY

## 2019-04-29 PROCEDURE — 77030002916 HC SUT ETHLN J&J -A: Performed by: ORTHOPAEDIC SURGERY

## 2019-04-29 PROCEDURE — 74011250636 HC RX REV CODE- 250/636

## 2019-04-29 PROCEDURE — 77030020143 HC AIRWY LARYN INTUB CGAS -A: Performed by: ANESTHESIOLOGY

## 2019-04-29 PROCEDURE — 77030006877 HC BLD SHV FLL RAD S&N -B: Performed by: ORTHOPAEDIC SURGERY

## 2019-04-29 PROCEDURE — 74011250636 HC RX REV CODE- 250/636: Performed by: ANESTHESIOLOGY

## 2019-04-29 PROCEDURE — 76060000033 HC ANESTHESIA 1 TO 1.5 HR: Performed by: ORTHOPAEDIC SURGERY

## 2019-04-29 PROCEDURE — 76210000020 HC REC RM PH II FIRST 0.5 HR: Performed by: ORTHOPAEDIC SURGERY

## 2019-04-29 PROCEDURE — 77030018835 HC SOL IRR LR ICUM -A: Performed by: ORTHOPAEDIC SURGERY

## 2019-04-29 PROCEDURE — 74011250637 HC RX REV CODE- 250/637: Performed by: ORTHOPAEDIC SURGERY

## 2019-04-29 RX ORDER — ONDANSETRON 2 MG/ML
INJECTION INTRAMUSCULAR; INTRAVENOUS AS NEEDED
Status: DISCONTINUED | OUTPATIENT
Start: 2019-04-29 | End: 2019-04-29 | Stop reason: HOSPADM

## 2019-04-29 RX ORDER — HYDROCODONE BITARTRATE AND ACETAMINOPHEN 5; 325 MG/1; MG/1
1 TABLET ORAL
Qty: 42 TAB | Refills: 0 | Status: SHIPPED | OUTPATIENT
Start: 2019-04-29 | End: 2019-05-06

## 2019-04-29 RX ORDER — SODIUM CHLORIDE, SODIUM LACTATE, POTASSIUM CHLORIDE, CALCIUM CHLORIDE 600; 310; 30; 20 MG/100ML; MG/100ML; MG/100ML; MG/100ML
125 INJECTION, SOLUTION INTRAVENOUS CONTINUOUS
Status: DISCONTINUED | OUTPATIENT
Start: 2019-04-29 | End: 2019-04-29 | Stop reason: HOSPADM

## 2019-04-29 RX ORDER — SODIUM CHLORIDE 0.9 % (FLUSH) 0.9 %
5-40 SYRINGE (ML) INJECTION AS NEEDED
Status: DISCONTINUED | OUTPATIENT
Start: 2019-04-29 | End: 2019-04-29 | Stop reason: HOSPADM

## 2019-04-29 RX ORDER — NALOXONE HYDROCHLORIDE 2 MG/.4ML
2 INJECTION, SOLUTION INTRAMUSCULAR; SUBCUTANEOUS
Qty: 1 DEVICE | Refills: 0 | Status: SHIPPED | OUTPATIENT
Start: 2019-04-29 | End: 2019-04-29

## 2019-04-29 RX ORDER — SODIUM CHLORIDE 0.9 % (FLUSH) 0.9 %
5-40 SYRINGE (ML) INJECTION EVERY 8 HOURS
Status: DISCONTINUED | OUTPATIENT
Start: 2019-04-29 | End: 2019-04-29 | Stop reason: HOSPADM

## 2019-04-29 RX ORDER — DIPHENHYDRAMINE HYDROCHLORIDE 50 MG/ML
12.5 INJECTION, SOLUTION INTRAMUSCULAR; INTRAVENOUS AS NEEDED
Status: DISCONTINUED | OUTPATIENT
Start: 2019-04-29 | End: 2019-04-29 | Stop reason: HOSPADM

## 2019-04-29 RX ORDER — FENTANYL CITRATE 50 UG/ML
INJECTION, SOLUTION INTRAMUSCULAR; INTRAVENOUS AS NEEDED
Status: DISCONTINUED | OUTPATIENT
Start: 2019-04-29 | End: 2019-04-29 | Stop reason: HOSPADM

## 2019-04-29 RX ORDER — ROPIVACAINE HYDROCHLORIDE 5 MG/ML
INJECTION, SOLUTION EPIDURAL; INFILTRATION; PERINEURAL AS NEEDED
Status: DISCONTINUED | OUTPATIENT
Start: 2019-04-29 | End: 2019-04-29 | Stop reason: HOSPADM

## 2019-04-29 RX ORDER — NALOXONE HYDROCHLORIDE 0.4 MG/ML
0.04 INJECTION, SOLUTION INTRAMUSCULAR; INTRAVENOUS; SUBCUTANEOUS
Status: DISCONTINUED | OUTPATIENT
Start: 2019-04-29 | End: 2019-04-29 | Stop reason: HOSPADM

## 2019-04-29 RX ORDER — PROPOFOL 10 MG/ML
INJECTION, EMULSION INTRAVENOUS AS NEEDED
Status: DISCONTINUED | OUTPATIENT
Start: 2019-04-29 | End: 2019-04-29 | Stop reason: HOSPADM

## 2019-04-29 RX ORDER — FLUMAZENIL 0.1 MG/ML
0.2 INJECTION INTRAVENOUS
Status: DISCONTINUED | OUTPATIENT
Start: 2019-04-29 | End: 2019-04-29 | Stop reason: HOSPADM

## 2019-04-29 RX ORDER — LIDOCAINE HYDROCHLORIDE 20 MG/ML
INJECTION, SOLUTION EPIDURAL; INFILTRATION; INTRACAUDAL; PERINEURAL AS NEEDED
Status: DISCONTINUED | OUTPATIENT
Start: 2019-04-29 | End: 2019-04-29 | Stop reason: HOSPADM

## 2019-04-29 RX ORDER — MIDAZOLAM HYDROCHLORIDE 1 MG/ML
INJECTION, SOLUTION INTRAMUSCULAR; INTRAVENOUS AS NEEDED
Status: DISCONTINUED | OUTPATIENT
Start: 2019-04-29 | End: 2019-04-29 | Stop reason: HOSPADM

## 2019-04-29 RX ORDER — HYDROCODONE BITARTRATE AND ACETAMINOPHEN 5; 325 MG/1; MG/1
1 TABLET ORAL ONCE
Status: COMPLETED | OUTPATIENT
Start: 2019-04-29 | End: 2019-04-29

## 2019-04-29 RX ORDER — DEXAMETHASONE SODIUM PHOSPHATE 4 MG/ML
INJECTION, SOLUTION INTRA-ARTICULAR; INTRALESIONAL; INTRAMUSCULAR; INTRAVENOUS; SOFT TISSUE AS NEEDED
Status: DISCONTINUED | OUTPATIENT
Start: 2019-04-29 | End: 2019-04-29 | Stop reason: HOSPADM

## 2019-04-29 RX ORDER — ASPIRIN 325 MG
325 TABLET ORAL 2 TIMES DAILY WITH MEALS
Qty: 28 TAB | Refills: 0 | Status: SHIPPED | OUTPATIENT
Start: 2019-04-29 | End: 2019-05-13

## 2019-04-29 RX ORDER — LIDOCAINE HYDROCHLORIDE 10 MG/ML
0.1 INJECTION, SOLUTION EPIDURAL; INFILTRATION; INTRACAUDAL; PERINEURAL AS NEEDED
Status: DISCONTINUED | OUTPATIENT
Start: 2019-04-29 | End: 2019-04-29 | Stop reason: HOSPADM

## 2019-04-29 RX ORDER — KETOROLAC TROMETHAMINE 30 MG/ML
INJECTION, SOLUTION INTRAMUSCULAR; INTRAVENOUS AS NEEDED
Status: DISCONTINUED | OUTPATIENT
Start: 2019-04-29 | End: 2019-04-29 | Stop reason: HOSPADM

## 2019-04-29 RX ORDER — CEFAZOLIN SODIUM/WATER 2 G/20 ML
2 SYRINGE (ML) INTRAVENOUS ONCE
Status: COMPLETED | OUTPATIENT
Start: 2019-04-29 | End: 2019-04-29

## 2019-04-29 RX ORDER — HYDROMORPHONE HYDROCHLORIDE 1 MG/ML
.25-1 INJECTION, SOLUTION INTRAMUSCULAR; INTRAVENOUS; SUBCUTANEOUS
Status: DISCONTINUED | OUTPATIENT
Start: 2019-04-29 | End: 2019-04-29 | Stop reason: HOSPADM

## 2019-04-29 RX ADMIN — MIDAZOLAM HYDROCHLORIDE 2 MG: 1 INJECTION, SOLUTION INTRAMUSCULAR; INTRAVENOUS at 07:29

## 2019-04-29 RX ADMIN — FENTANYL CITRATE 100 MCG: 50 INJECTION, SOLUTION INTRAMUSCULAR; INTRAVENOUS at 07:39

## 2019-04-29 RX ADMIN — LIDOCAINE HYDROCHLORIDE 40 MG: 20 INJECTION, SOLUTION EPIDURAL; INFILTRATION; INTRACAUDAL; PERINEURAL at 07:39

## 2019-04-29 RX ADMIN — HYDROMORPHONE HYDROCHLORIDE 0.5 MG: 1 INJECTION, SOLUTION INTRAMUSCULAR; INTRAVENOUS; SUBCUTANEOUS at 09:09

## 2019-04-29 RX ADMIN — SODIUM CHLORIDE, SODIUM LACTATE, POTASSIUM CHLORIDE, AND CALCIUM CHLORIDE 125 ML/HR: 600; 310; 30; 20 INJECTION, SOLUTION INTRAVENOUS at 06:21

## 2019-04-29 RX ADMIN — HYDROCODONE BITARTRATE AND ACETAMINOPHEN 1 TABLET: 5; 325 TABLET ORAL at 09:54

## 2019-04-29 RX ADMIN — FENTANYL CITRATE 25 MCG: 50 INJECTION, SOLUTION INTRAMUSCULAR; INTRAVENOUS at 08:16

## 2019-04-29 RX ADMIN — FENTANYL CITRATE 50 MCG: 50 INJECTION, SOLUTION INTRAMUSCULAR; INTRAVENOUS at 07:29

## 2019-04-29 RX ADMIN — PROPOFOL 150 MG: 10 INJECTION, EMULSION INTRAVENOUS at 07:39

## 2019-04-29 RX ADMIN — FENTANYL CITRATE 25 MCG: 50 INJECTION, SOLUTION INTRAMUSCULAR; INTRAVENOUS at 08:13

## 2019-04-29 RX ADMIN — ONDANSETRON 4 MG: 2 INJECTION INTRAMUSCULAR; INTRAVENOUS at 08:17

## 2019-04-29 RX ADMIN — FENTANYL CITRATE 50 MCG: 50 INJECTION, SOLUTION INTRAMUSCULAR; INTRAVENOUS at 08:03

## 2019-04-29 RX ADMIN — Medication 2 G: at 07:53

## 2019-04-29 RX ADMIN — KETOROLAC TROMETHAMINE 30 MG: 30 INJECTION, SOLUTION INTRAMUSCULAR; INTRAVENOUS at 08:20

## 2019-04-29 RX ADMIN — DEXAMETHASONE SODIUM PHOSPHATE 4 MG: 4 INJECTION, SOLUTION INTRA-ARTICULAR; INTRALESIONAL; INTRAMUSCULAR; INTRAVENOUS; SOFT TISSUE at 07:45

## 2019-04-29 RX ADMIN — HYDROMORPHONE HYDROCHLORIDE 1 MG: 1 INJECTION, SOLUTION INTRAMUSCULAR; INTRAVENOUS; SUBCUTANEOUS at 08:47

## 2019-04-29 NOTE — DISCHARGE INSTRUCTIONS
Knee Arthroscopy  Discharge Instructions    Edin Cruz M.D. Please take the time to review the following instructions before you leave the Operatory. You should use them as guidelines during your recovery from surgery. If you have any questions, you may contact my office at [phone]. Wound Care / Dressing Change:  Two days after surgery, you should remove the big, bulky dressing. You can put a band-aid over each incision and wear an ACE bandage as needed for comfort and swelling. Do not apply antibiotic ointment to your incisions. Sutures will be removed at your post-op visit. Radha Kimberly / Bathing: You may shower 2 days after your surgery. Your band-aids may be removed for showering. You may get your incisions wet in the shower. Do not vigorously scrub your incisions. Do not take a bath or swim until you follow up appointment. Do not soak your incisions under water. Weight Bearing Status / Braces: You may weight bear as tolerated. Use crutches as needed for safety. Please normalize your ambulation as quickly as possible. You should also progress your range of motion as tolerated. Beginning 2 days after surgery, you should ride a stationary bike daily for 15-20 minutes. Ice and Elevation:  Ice therapy should be used consistently for 48 hours after surgery. Subsequently, you should ice 3 times per day for 20 minutes at a time for 5 days. After the first week, you may use ice as needed for pain and swelling. Please ensure there is protective barrier between your skin and the ice. Diet:  You may advance your regular diet as tolerated. Increase your clear liquid intake for the next 2-3 days. Physical Therapy:   Physical therapy will be discussed with you at your first follow-up appointment if needed. Most patients do not require formal physical therapy after a knee arthroscopy.        Follow up appointment:  Please follow up at your scheduled appointment 10-14 days from the day of your surgery. If you do not have an appointment, please call our office at [phone] for your follow up appointment. Your appointment will likely be with Negra Sams PA-C. Abdullahi Turner assists Dr. Kaylyn Woodard in surgery and will be able to review your operation and answer your questions. Medications:    1. You will take one 325 mg Aspirin twice each day for 14 days after surgery to prevent a blood clot. If you were prescribed a blood thinner, Xarelto 10mg, you should take it as prescribed. Do not take Aspirin with the Xarelto. Do not take additional NSAIDs while taking blood thinning medication. 2. You were prescribed a narcotic medication for pain control. Please use the medications as prescribed. Pain medications may cause constipation - Colace or Milk of Magnesia may be used as needed. Other possible side effects of pain medications are dizziness, headache, nausea, vomiting, and urinary retention. Discontinue the pain medication if you develop itching, rash, shortness of breath, or difficulties swallowing. If these symptoms become severe or arent relieved by discontinuing the medication, you should seek immediate medical attention. You cannot drive or operate machinery while taking narcotic medication. Refills of pain medication are authorized during office hours only   (8AM - 5PM Monday through Friday)    3. Some pain medications already contain Tylenol/Acetaminophen. Please read your prescription pain medicine bottle prior to taking additional Tylenol. Do not exceed 3000mg of Tylenol/Acetaminophen in a 24 hour period. 4. You may resume other medications that you were taking prior to your surgery. Pain medication may change the effects of any antidepressant medication you may be taking.   If you have any questions about possible interactions between your regular medication and the pain medication, you should consult the physician who prescribes your regular medications. Important Signs and Symptoms:  If you experience any of the following signs or symptoms, you should contact Dr. Ella Lopez office. Please be advised if a problem arises which you feel requires immediate medical attention or you are unable to contact Dr. Ella Lopez office, you should seek immediate medical attention. If it is after 5pm, please call the main office number [phone] for medical emergencies. Signs and symptoms to watch for include:  1. A sudden increase in swelling and/or redness or warmth at the area of your surgery which isnt relieved by rest, ice, and elevation. 2. Oral temperature greater than 101 degrees for 12 hours or more which isnt relieved by an increase in fluid intake and taking two Tylenol every 4-6 hours. 3. Excessive drainage from your incisions, or drainage which hasnt stopped by 72 hours after your surgery. 4. Calf pain, Fever, chills, shortness of breath, chest pain, nausea, vomiting or other signs and symptoms which are of concern to you. DISCHARGE SUMMARY from your Nurse    The following personal items collected during your admission are returned to you:   Dental Appliance: Dental Appliances: None  Vision:    Hearing Aid:    Jewelry:    Clothing: Clothing: Footwear, Socks, Shorts, Jacket/Coat, Shirt, Undergarments, Pants  Other Valuables: Other Valuables: None  Valuables sent to safe:      PATIENT INSTRUCTIONS:    After general anesthesia or intravenous sedation, for 24 hours or while taking prescription Narcotics:  · Limit your activities  · Do not drive and operate hazardous machinery  · Do not make important personal or business decisions  · Do  not drink alcoholic beverages  · If you have not urinated within 8 hours after discharge, please contact your surgeon on call.     Report the following to your surgeon:  · Excessive pain, swelling, redness or odor of or around the surgical area  · Temperature over 100.5  · Nausea and vomiting lasting longer than 4 hours or if unable to take medications  · Any signs of decreased circulation or nerve impairment to extremity: change in color, persistent  numbness, tingling, coldness or increase pain  · Any questions    COUGH AND DEEP BREATHE    Breathing deep and coughing are very important exercises to do after surgery. Deep breathing and coughing open the little air tubes and air sacks in your lungs. You take deep breaths every day. You may not even notice - it is just something you do when you sigh or yawn. It is a natural exercise you do to keep these air passages open. After surgery, take deep breaths and cough, on purpose. Coughing and deep breathing help prevent bronchitis and pneumonia after surgery. If you had chest or belly surgery, use a pillow as a \"hug carrie\" and hold it tightly to your chest or belly when you cough. DIRECTIONS:  6. Take 10 to 15 slow deep breaths every hour while awake. 7. Breathe in deeply, and hold it for 2 seconds. 8. Exhale slowly through puckered lips, like blowing up a balloon. 9. After every 4th or 5th deep breath, hug your pillow to your chest or belly and give a hard, deep cough. Yes, it will probably hurt. But doing this exercise is very important part of healing after surgery. Take your pain medicine to help you do this exercise without too much pain. IF YOU HAVE BEEN DIAGNOSED WITH SLEEP APNEA, PLEASE USE YOUR SLEEP APNEA DEVICE OR CPAP MACHINE WHEN YOU INTEND TO NAP AFTER TAKING PAIN MEDICATION. Ankle Pumps    Ankle pumps increase the circulation of oxygenated blood to your lower extremities and decrease your risk for circulation problems such as blood clots. They also stretch the muscles, tendons and ligaments in your foot and ankle, and prevent joint contracture in the ankle and foot, especially after surgeries on the legs.     It is important to do ankle pump exercises regularly after surgery because immobility increases your risk for developing a blood clot. Your doctor may also have you take an Aspirin for the next few days as well. If your doctor did not ask you to take an Aspirin, consult with him before starting Aspirin therapy on your own. Slowly point your foot forward, feeling the muscles on the top of your lower leg stretch, and hold this position for 5 seconds. Next, pull your foot back toward you as far as possible, stretching the calf muscles, and hold that position for 5 seconds. Repeat with the other foot. Perform 10 repetitions every hour while awake for both ankles if possible (down and then up with the foot once is one repetition). You should feel gentle stretching of the muscles in your lower leg when doing this exercise. If you feel pain, or your range of motion is limited, don't  Push too hard. Only go the limit your joint and muscles will let you go. If you have increasing pain, progressively worsening leg warmth or swelling, STOP the exercise and call your doctor. Below is information about the medications your doctor is prescribing after your visit:    Other information in your discharge envelope:  []     PRESCRIPTIONS  []     PHYSICAL THERAPY PRESCRIPTION  []     APPOINTMENT CARDS  []     Regional Anesthesia Pamphlet for block or block with On-Q Catheter from Anesthesia Service  []     Medical device information sheets/pamphlets from their    []     School/work excuse note. []     /parent work excuse note. These are general instructions for a healthy lifestyle:    *  Please give a list of your current medications to your Primary Care Provider. *  Please update this list whenever your medications are discontinued, doses are      changed, or new medications (including over-the-counter products) are added.   *  Please carry medication information at all times in case of emergency situations. About Smoking  No smoking / No tobacco products / Avoid exposure to second hand smoke    Surgeon General's Warning:  Quitting smoking now greatly reduces serious risk to your health. Obesity, smoking, and sedentary lifestyle greatly increases your risk for illness and disease. A healthy diet, regular physical exercise & weight monitoring are important for maintaining a healthy lifestyle. Congestive Heart Failure  You may be retaining fluid if you have a history of heart failure or if you experience any of the following symptoms:  Weight gain of 3 pounds or more overnight or 5 pounds in a week, increased swelling in our hands or feet or shortness of breath while lying flat in bed. Please call your doctor as soon as you notice any of these symptoms; do not wait until your next office visit. Recognize signs and symptoms of STROKE:  F - face looks uneven  A - arms unable to move or move even  S - speech slurred or non-existent  T - time-call 911 as soon as signs and symptoms begin-DO NOT go         Back to bed or wait to see if you get better-TIME IS BRAIN. Warning signs of HEART ATTACK  Call 911 if you have these symptoms    · Chest discomfort. Most heart attacks involve discomfort in the center of the chest that lasts more than a few minutes, or that goes away and comes back. It can feel like uncomfortable pressure, squeezing, fullness, or pain. · Discomfort in other areas of the upper body. Symptoms can include pain or discomfort in one or both        Arms, the back, neck, jaw, or stomach. ·  Shortness of breath with or without chest discomfort. · Other signs may include breaking out in a cold sweat, nausea, or lightheadedness    Don't wait more than five minutes to call 911 - MINUTES MATTER! Fast action can save your life. Calling 911 is almost always the fastest way to get lifesaving treatment.   Emergency Medical Services staff can begin treatment when they arrive - up to an hour sooner than if someone gets to the hospital by car. DARIEL MANRIQUE MEDICATION AND SIDE EFFECT GUIDE    The [facility] MEDICATION AND SIDE EFFECT GUIDE was provided to the PATIENT AND CARE PROVIDER.   Information provided includes instruction about drug purpose and common side effects for the following medications:    · Norco 5-325  · Aspirin 325mg  · Naloxone 2mg/0.4ml

## 2019-04-29 NOTE — INTERVAL H&P NOTE
H&P Update:  Danuta Ojeda was seen and examined. History and physical has been reviewed. The patient has been examined.  There have been no significant clinical changes since the completion of the originally dated History and Physical.

## 2019-04-29 NOTE — OP NOTES
KNEE ARTHROSCOPY OP NOTE        OPERATIVE REPORT    Patient: Dantua Ojeda CSN: [id number] SSN: [id number]    YOB: 1955  Age: 59 y.o. Sex: male      Admit Date: 4/29/2019  Admit Diagnosis: ACUTE MEDIAL MENISCUS TEAR RIGHT KNEE    Date of Procedure: 4/29/2019   Preoperative Diagnosis: ACUTE MEDIAL MENISCUS TEAR RIGHT KNEE  Postoperative Diagnosis: ACUTE MEDIAL MENISCUS TEAR RIGHT KNEE, chondral lesion   Procedure: Procedure(s):  RIGHT KNEE ARTHROSCOPY, PARTIAL MEDIAL MENISCECTOMY, chondroplasty  Surgeon: Edwin Dee MD  Assistant(s):   Anesthesia: General   Estimated Blood Loss: 5cc  Specimens: * No specimens in log *   Complications: None  Implants: * No implants in log *      INDICATIONS: @ is a patient of mine who has had a long history of  knee pain. Danuta Ojeda has failed conservative treatment and presents for definitive operative care. DESCRIPTION OF PROCEDURE:  After being informed of the risks and benefits of the procedure, which include, but are not limited to bleeding, infection, neurovascular damage, wound complications, pain and stiffness in the knee, and DVT, the patient consented for the procedure. After adequate general anesthesia, the involved knee was prepped and draped in a sterile fashion. Standard arthroscopic portals were established and the knee joint was serially inspected. The medial joint line was initially evaluated. The medial femoral condyle revealed grade 3 changes. The medial tibial plateau revealed grade 2 changes. The medial meniscus revealed a radial tearinvolving zone A and zone B. The ACL and PCL were visualized, probed, and felt to be intact. The lateral joint line was next evaluated. The lateral femoral condyle revealed grade 3 changes. The lateral tibial plateau revealed grade 2 changes. The lateral meniscus revealed no significant abnormalities. The medial and lateral gutters were evaluated andNo loose bodies were noted. . The scope was then placed in the patellofemoral joint. The medial facet, central ridge, lateral facet, and trochlear groove were visualized for articular cartilage damage. The medial facet revealed Grade 3 changes. The central ridge revealed Grade 3 changes. The lateral facet revealed Grade 3 changes. The trochlear groove revealed Grade 3 changes. All articular cartilage lesions and meniscal tears were debrided appropriately to stable edges. Care was taken to be sure that the borders of articular cartilage injuries were well-shouldered. In addition, the meniscal tears were debrided appropriately to a stable edge. As much intact meniscus was preserved as possible. Once all the debridement was completed through the lateral portal, the scope was placed in the medial portal where, again, all surfaces were evaluated and felt to have been appropriately debrided. The knee was then irrigated and the scope was removed. The wounds were closed. Sterile dressings were applied and the patient was awakened and taken to the recovery room in stable condition. There were no complications.

## 2019-04-29 NOTE — ANESTHESIA PREPROCEDURE EVALUATION
Anesthetic History No history of anesthetic complications Review of Systems / Medical History Patient summary reviewed, nursing notes reviewed and pertinent labs reviewed Pulmonary Within defined limits Neuro/Psych Psychiatric history Comments: Spinal stenosis Anxiety/depression Cardiovascular Within defined limits Hypertension: well controlled Exercise tolerance: >4 METS Comments: Not on Beta Blocker GI/Hepatic/Renal 
Within defined limits GERD: well controlled Endo/Other Within defined limits Arthritis Other Findings Comments: Hx opioid addiction: chronic pain Physical Exam 
 
Airway Mallampati: II 
 
Neck ROM: normal range of motion Mouth opening: Normal 
 
 Cardiovascular Regular rate and rhythm,  S1 and S2 normal,  no murmur, click, rub, or gallop Rhythm: regular Rate: normal 
 
 
 
 Dental 
No notable dental hx Pulmonary Breath sounds clear to auscultation Abdominal 
GI exam deferred Other Findings Anesthetic Plan ASA: 2 Anesthesia type: general and regional 
 
 
Post-op pain plan if not by surgeon: peripheral nerve block continuous Induction: Intravenous Anesthetic plan and risks discussed with: Patient Informed consent obtained.

## 2019-04-29 NOTE — ANESTHESIA POSTPROCEDURE EVALUATION
Procedure(s): RIGHT KNEE ARTHROSCOPY, PARTIAL MEDIAL MENISCECTOMY. general, regional 
 
Anesthesia Post Evaluation Multimodal analgesia: multimodal analgesia not used between 6 hours prior to anesthesia start to PACU discharge Patient location during evaluation: PACU Patient participation: complete - patient participated Level of consciousness: awake Pain management: adequate Airway patency: patent Anesthetic complications: no 
Cardiovascular status: acceptable, blood pressure returned to baseline and hemodynamically stable Respiratory status: acceptable Hydration status: acceptable Vitals Value Taken Time /74 4/29/2019  9:05 AM  
Temp 36.5 °C (97.7 °F) 4/29/2019  8:40 AM  
Pulse 70 4/29/2019  9:06 AM  
Resp 14 4/29/2019  9:06 AM  
SpO2 100 % 4/29/2019  9:06 AM  
Vitals shown include unvalidated device data.

## 2019-04-29 NOTE — PERIOP NOTES
Pt's spouse and sister updated on pt's care with plan for discharge with understanding being verbalized.

## 2019-04-29 NOTE — BRIEF OP NOTE
BRIEF OPERATIVE NOTE    Date of Procedure: 4/29/2019   Preoperative Diagnosis: ACUTE MEDIAL MENISCUS TEAR RIGHT KNEE  Postoperative Diagnosis: ACUTE MEDIAL MENISCUS TEAR RIGHT KNEE , chondral lesion  Procedure: Procedure(s):  RIGHT KNEE ARTHROSCOPY, PARTIAL MEDIAL MENISCECTOMY , chondroplasty  Surgeon: Enmanuel Childs MD  Assistant(s): Jonah Barbosa PA-C, ATC  Anesthesia: General   Estimated Blood Loss: minimal  Specimens: * No specimens in log *   Findings: MMT  Complications: None  Implants: * No implants in log *

## 2019-04-30 ENCOUNTER — PATIENT OUTREACH (OUTPATIENT)
Dept: OTHER | Age: 64
End: 2019-04-30

## 2019-04-30 NOTE — PROGRESS NOTES
[facility] Outreach Patient on report as eligible for Case Management. OP ortho surgery for R knee meniscus repair/ arthroscopic. Verified  and address for HIPAA security. * Patient is well known to  for prior ortho procedures and followed after joint replacements. * Expects quick recovery with current procedure. * Patient does not identify any Care Management needs at this time and declines services,  but will keep my number and will call if needs arise. *He is open to [facility] in the future if ED and inpt stays occur. Chart Review: Op report:   
Date of Procedure: 2019 Preoperative Diagnosis: ACUTE MEDIAL MENISCUS TEAR RIGHT KNEE Postoperative Diagnosis: ACUTE MEDIAL MENISCUS TEAR RIGHT KNEE, chondral lesion Procedure: Procedure(s): RIGHT KNEE ARTHROSCOPY, PARTIAL MEDIAL MENISCECTOMY, chondroplasty Surgeon: Bola Mcpherson MD

## 2019-05-16 RX ORDER — TADALAFIL 10 MG/1
TABLET ORAL
Qty: 8 TAB | Refills: 3 | Status: SHIPPED | OUTPATIENT
Start: 2019-05-16 | End: 2020-02-12

## 2019-07-01 NOTE — PROGRESS NOTES
[address] [place] [place] [address]  Phone: [phone]  Fax: [phone]    Discharge Summary  2-15    Patient name: Arcenio Chew  : 1955  Provider#: [id number]  Referral source: Domonique Rubio MD      Medical/Treatment Diagnosis: Left shoulder pain [M25.512]     Prior Hospitalization: see medical history     Comorbidities: See Plan of Care  Prior Level of Function:See Plan of Care  Medications: Verified on Patient Summary List    Start of Care: 18       Onset Date:18  Visits from Start of Care: 14     Missed Visits:  5  Reporting Period : 18 - 3/8/19      ASSESSMENT/SUMMARY OF CARE:  Mr Clair France was referred to PT for post surgical rehabilitation post L RTC repair, acromioplasty, distal clavectomy 18. Treatment consisted of manual therapy, therapeutic exercise, therapeutic activity, patient education, posture and body mechanics training, modalities, home exercise program development and progression per surgeon's protocol. Mr Clair France made slow steady gains toward all goals, he demonstrated fair compliance with HEP, had several missed visits and late arrivals which limited his progress to some degree. He was discharged by his surgeon from PT following his last visit on 3/8/19. On last visit he had returned to work.       Short Term Goals: To be accomplished in 6 weeks:  Pt will be independent with HEP per protocol   MET  Pt will demonstrate improved posture for correct [facility] alignment with ADLs MET  Pt will demonstrate PROM L shoulder per 6w protocol MET  Pt will position for sleep without difficulty MET      RECOMMENDATIONS:  [x]Discontinue therapy: [x]Patient has reached or is progressing toward set goals      []Patient is non-compliant or has abdicated      []Due to lack of appreciable progress towards set goals      [x]Other: Discharged by referring MD. Devaughn Kim, PT, MSPT    2019

## 2019-07-23 ENCOUNTER — TELEPHONE (OUTPATIENT)
Dept: INTERNAL MEDICINE CLINIC | Age: 64
End: 2019-07-23

## 2019-07-23 NOTE — TELEPHONE ENCOUNTER
[facility] called to follow up on a Prior auth status request for patients Cialis 10 MG Tablet. Please call [facility] to advise. [phone]

## 2019-07-31 ENCOUNTER — TELEPHONE (OUTPATIENT)
Dept: INTERNAL MEDICINE CLINIC | Age: 64
End: 2019-07-31

## 2019-07-31 NOTE — TELEPHONE ENCOUNTER
Patient is requesting to speak with the nurse, left no further details,  He can be reached at [phone]

## 2019-07-31 NOTE — TELEPHONE ENCOUNTER
Notified patient [employer] denied his Cialis as they do not cover meds for Erectile dysfunction. Patient verbalized understanding.

## 2019-08-12 DIAGNOSIS — F41.0 PANIC ATTACK: ICD-10-CM

## 2019-08-12 RX ORDER — PANTOPRAZOLE SODIUM 40 MG/1
TABLET, DELAYED RELEASE ORAL
Qty: 30 TAB | Refills: 0 | Status: SHIPPED | OUTPATIENT
Start: 2019-08-12 | End: 2019-09-06 | Stop reason: SDUPTHER

## 2019-09-06 DIAGNOSIS — F41.0 PANIC ATTACK: ICD-10-CM

## 2019-09-07 RX ORDER — PANTOPRAZOLE SODIUM 40 MG/1
TABLET, DELAYED RELEASE ORAL
Qty: 30 TAB | Refills: 5 | Status: SHIPPED | OUTPATIENT
Start: 2019-09-07 | End: 2020-03-02

## 2019-09-12 DIAGNOSIS — F41.9 ANXIETY: ICD-10-CM

## 2019-09-13 RX ORDER — ALPRAZOLAM 0.5 MG/1
TABLET ORAL
Qty: 20 TAB | Refills: 0 | OUTPATIENT
Start: 2019-09-13 | End: 2019-10-23 | Stop reason: SDUPTHER

## 2019-09-16 DIAGNOSIS — I10 ESSENTIAL HYPERTENSION: ICD-10-CM

## 2019-09-16 DIAGNOSIS — E78.5 DYSLIPIDEMIA, GOAL LDL BELOW 100: ICD-10-CM

## 2019-09-16 RX ORDER — ATORVASTATIN CALCIUM 10 MG/1
TABLET, FILM COATED ORAL
Qty: 30 TAB | Refills: 0 | Status: SHIPPED | OUTPATIENT
Start: 2019-09-16 | End: 2019-10-23 | Stop reason: SDUPTHER

## 2019-09-16 RX ORDER — LISINOPRIL 10 MG/1
TABLET ORAL
Qty: 30 TAB | Refills: 0 | Status: SHIPPED | OUTPATIENT
Start: 2019-09-16 | End: 2019-09-30 | Stop reason: DRUGHIGH

## 2019-09-30 ENCOUNTER — OFFICE VISIT (OUTPATIENT)
Dept: INTERNAL MEDICINE CLINIC | Age: 64
End: 2019-09-30

## 2019-09-30 VITALS
BODY MASS INDEX: 23.1 KG/M2 | HEART RATE: 79 BPM | OXYGEN SATURATION: 98 % | WEIGHT: 180 LBS | RESPIRATION RATE: 16 BRPM | SYSTOLIC BLOOD PRESSURE: 149 MMHG | DIASTOLIC BLOOD PRESSURE: 75 MMHG | HEIGHT: 74 IN | TEMPERATURE: 97.7 F

## 2019-09-30 DIAGNOSIS — E78.5 DYSLIPIDEMIA, GOAL LDL BELOW 100: ICD-10-CM

## 2019-09-30 DIAGNOSIS — I10 HTN, GOAL BELOW 130/80: Primary | ICD-10-CM

## 2019-09-30 DIAGNOSIS — Z23 ENCOUNTER FOR IMMUNIZATION: ICD-10-CM

## 2019-09-30 DIAGNOSIS — F41.0 PANIC ATTACK: ICD-10-CM

## 2019-09-30 RX ORDER — LISINOPRIL 20 MG/1
20 TABLET ORAL DAILY
Qty: 30 TAB | Refills: 5 | Status: SHIPPED | OUTPATIENT
Start: 2019-09-30 | End: 2019-11-06

## 2019-09-30 NOTE — PROGRESS NOTES
HISTORY OF PRESENT ILLNESS  Tia Juarez is a 62 y.o. male. HPI  Wes Peacock is seen at follow up. He is on Lisinopril 10 mg. He is not having chest pain, headaches or dizzy spells, but systolics are running in the 140s. Will bump to the 20 mg. Panic episode. He did have a panic episode, felt like he was dying, did ultimately take a Xanax, which helped. Not sure how often these spells are occurring and I have asked him to log them. No clear triggers. GERD, doing well on Protonix 40. No breakthrough symptoms. Review of Systems   Constitutional: Negative for chills, fever and weight loss. Respiratory: Negative for cough, shortness of breath and wheezing. Cardiovascular: Negative for chest pain, palpitations, orthopnea, leg swelling and PND. Gastrointestinal: Negative for abdominal pain, heartburn and nausea. Musculoskeletal: Negative for myalgias. Neurological: Negative for dizziness and headaches. Psychiatric/Behavioral: Negative for depression. The patient does not have insomnia. Physical Exam   Constitutional: He is oriented to person, place, and time. He appears well-developed and well-nourished. HENT:   Head: Normocephalic and atraumatic. Right Ear: Tympanic membrane, external ear and ear canal normal.   Left Ear: Tympanic membrane, external ear and ear canal normal.   Nose: Nose normal. Right sinus exhibits no maxillary sinus tenderness and no frontal sinus tenderness. Left sinus exhibits no maxillary sinus tenderness and no frontal sinus tenderness. Mouth/Throat: Oropharynx is clear and moist and mucous membranes are normal. No oropharyngeal exudate. Eyes: Pupils are equal, round, and reactive to light. Conjunctivae are normal. Right eye exhibits no discharge. Left eye exhibits no discharge. Neck: Normal range of motion. Neck supple. Carotid bruit is not present. No thyromegaly present.    Cardiovascular: Normal rate, regular rhythm, normal heart sounds and intact distal pulses. Pulmonary/Chest: Effort normal and breath sounds normal. No respiratory distress. He has no wheezes. He has no rales. Musculoskeletal: He exhibits no edema. Lymphadenopathy:     He has no cervical adenopathy. Neurological: He is alert and oriented to person, place, and time. Psychiatric: He has a normal mood and affect. His behavior is normal.   Nursing note and vitals reviewed. ASSESSMENT and PLAN  Diagnoses and all orders for this visit:    1. HTN, goal below 130/80  -     lisinopril (PRINIVIL, ZESTRIL) 20 mg tablet; Take 1 Tab by mouth daily. 2. Dyslipidemia, goal LDL below 112  -     METABOLIC PANEL, COMPREHENSIVE  -     LIPID PANEL    3. Panic attack    4.  Encounter for immunization  -     INFLUENZA VIRUS VACCINE, PRESERVATIVE FREE SYRINGE, 3 YRS AND OLDER  -     TN IMMUNIZ ADMIN,1 SINGLE/COMB VAC/TOXOID      the following changes in treatment are made: inc dose from 10 to 20 lisinopril  Track panic episodes  appt in 6 mo sooner prn

## 2019-10-01 LAB
ALBUMIN SERPL-MCNC: 4.8 G/DL (ref 3.6–4.8)
ALBUMIN/GLOB SERPL: 2.4 {RATIO} (ref 1.2–2.2)
ALP SERPL-CCNC: 65 IU/L (ref 39–117)
ALT SERPL-CCNC: 20 IU/L (ref 0–44)
AST SERPL-CCNC: 24 IU/L (ref 0–40)
BILIRUB SERPL-MCNC: 1.1 MG/DL (ref 0–1.2)
BUN SERPL-MCNC: 16 MG/DL (ref 8–27)
BUN/CREAT SERPL: 14 (ref 10–24)
CALCIUM SERPL-MCNC: 9.8 MG/DL (ref 8.6–10.2)
CHLORIDE SERPL-SCNC: 97 MMOL/L (ref 96–106)
CHOLEST SERPL-MCNC: 188 MG/DL (ref 100–199)
CO2 SERPL-SCNC: 28 MMOL/L (ref 20–29)
CREAT SERPL-MCNC: 1.18 MG/DL (ref 0.76–1.27)
GLOBULIN SER CALC-MCNC: 2 G/DL (ref 1.5–4.5)
GLUCOSE SERPL-MCNC: 85 MG/DL (ref 65–99)
HDLC SERPL-MCNC: 50 MG/DL
INTERPRETATION, 910389: NORMAL
LDLC SERPL CALC-MCNC: 108 MG/DL (ref 0–99)
POTASSIUM SERPL-SCNC: 4.7 MMOL/L (ref 3.5–5.2)
PROT SERPL-MCNC: 6.8 G/DL (ref 6–8.5)
SODIUM SERPL-SCNC: 139 MMOL/L (ref 134–144)
TRIGL SERPL-MCNC: 149 MG/DL (ref 0–149)
VLDLC SERPL CALC-MCNC: 30 MG/DL (ref 5–40)

## 2019-10-23 DIAGNOSIS — E78.5 DYSLIPIDEMIA, GOAL LDL BELOW 100: ICD-10-CM

## 2019-10-23 DIAGNOSIS — F41.9 ANXIETY: ICD-10-CM

## 2019-10-23 RX ORDER — ALPRAZOLAM 0.5 MG/1
TABLET ORAL
Qty: 20 TAB | Refills: 2 | OUTPATIENT
Start: 2019-10-23 | End: 2020-03-20 | Stop reason: SDUPTHER

## 2019-10-23 RX ORDER — ATORVASTATIN CALCIUM 10 MG/1
TABLET, FILM COATED ORAL
Qty: 30 TAB | Refills: 5 | Status: SHIPPED | OUTPATIENT
Start: 2019-10-23 | End: 2020-06-29 | Stop reason: SDUPTHER

## 2019-11-06 ENCOUNTER — HOSPITAL ENCOUNTER (OUTPATIENT)
Dept: PREADMISSION TESTING | Age: 64
Discharge: HOME | End: 2019-11-06

## 2019-11-06 ENCOUNTER — HOSPITAL ENCOUNTER (OUTPATIENT)
Dept: PREADMISSION TESTING | Age: 64
Discharge: HOME | End: 2019-11-06
Payer: COMMERCIAL

## 2019-11-06 VITALS
OXYGEN SATURATION: 99 % | DIASTOLIC BLOOD PRESSURE: 86 MMHG | WEIGHT: 184 LBS | TEMPERATURE: 97.7 F | BODY MASS INDEX: 24.39 KG/M2 | RESPIRATION RATE: 16 BRPM | HEIGHT: 73 IN | HEART RATE: 75 BPM | SYSTOLIC BLOOD PRESSURE: 168 MMHG

## 2019-11-06 LAB
ABO + RH BLD: NORMAL
ALBUMIN SERPL-MCNC: 3.9 G/DL (ref 3.5–5)
ALBUMIN/GLOB SERPL: 1.4 {RATIO} (ref 1.1–2.2)
ALP SERPL-CCNC: 67 U/L (ref 45–117)
ALT SERPL-CCNC: 24 U/L (ref 12–78)
ANION GAP SERPL CALC-SCNC: 2 MMOL/L (ref 5–15)
APPEARANCE UR: CLEAR
AST SERPL-CCNC: 14 U/L (ref 15–37)
ATRIAL RATE: 69 BPM
BACTERIA URNS QL MICRO: NEGATIVE /HPF
BASOPHILS # BLD: 0 K/UL (ref 0–0.1)
BASOPHILS NFR BLD: 1 % (ref 0–1)
BILIRUB SERPL-MCNC: 0.6 MG/DL (ref 0.2–1)
BILIRUB UR QL: NEGATIVE
BLOOD GROUP ANTIBODIES SERPL: NORMAL
BUN SERPL-MCNC: 16 MG/DL (ref 6–20)
BUN/CREAT SERPL: 15 (ref 12–20)
CALCIUM SERPL-MCNC: 8.6 MG/DL (ref 8.5–10.1)
CALCULATED P AXIS, ECG09: 44 DEGREES
CALCULATED R AXIS, ECG10: 26 DEGREES
CALCULATED T AXIS, ECG11: 27 DEGREES
CHLORIDE SERPL-SCNC: 106 MMOL/L (ref 97–108)
CO2 SERPL-SCNC: 31 MMOL/L (ref 21–32)
COLOR UR: NORMAL
CREAT SERPL-MCNC: 1.06 MG/DL (ref 0.7–1.3)
CRP SERPL-MCNC: <0.29 MG/DL (ref 0–0.6)
DIAGNOSIS, 93000: NORMAL
DIFFERENTIAL METHOD BLD: ABNORMAL
EOSINOPHIL # BLD: 0.1 K/UL (ref 0–0.4)
EOSINOPHIL NFR BLD: 3 % (ref 0–7)
EPITH CASTS URNS QL MICRO: NORMAL /LPF
ERYTHROCYTE [DISTWIDTH] IN BLOOD BY AUTOMATED COUNT: 12 % (ref 11.5–14.5)
ERYTHROCYTE [SEDIMENTATION RATE] IN BLOOD: 2 MM/HR (ref 0–20)
EST. AVERAGE GLUCOSE BLD GHB EST-MCNC: 105 MG/DL
GLOBULIN SER CALC-MCNC: 2.7 G/DL (ref 2–4)
GLUCOSE SERPL-MCNC: 89 MG/DL (ref 65–100)
GLUCOSE UR STRIP.AUTO-MCNC: NEGATIVE MG/DL
HBA1C MFR BLD: 5.3 % (ref 4.2–6.3)
HCT VFR BLD AUTO: 45.7 % (ref 36.6–50.3)
HGB BLD-MCNC: 15.3 G/DL (ref 12.1–17)
HGB UR QL STRIP: NEGATIVE
HYALINE CASTS URNS QL MICRO: NORMAL /LPF (ref 0–5)
IMM GRANULOCYTES # BLD AUTO: 0 K/UL (ref 0–0.04)
IMM GRANULOCYTES NFR BLD AUTO: 0 % (ref 0–0.5)
KETONES UR QL STRIP.AUTO: NEGATIVE MG/DL
LEUKOCYTE ESTERASE UR QL STRIP.AUTO: NEGATIVE
LYMPHOCYTES # BLD: 1.2 K/UL (ref 0.8–3.5)
LYMPHOCYTES NFR BLD: 33 % (ref 12–49)
MCH RBC QN AUTO: 30.4 PG (ref 26–34)
MCHC RBC AUTO-ENTMCNC: 33.5 G/DL (ref 30–36.5)
MCV RBC AUTO: 90.9 FL (ref 80–99)
MONOCYTES # BLD: 0.4 K/UL (ref 0–1)
MONOCYTES NFR BLD: 10 % (ref 5–13)
NEUTS SEG # BLD: 2 K/UL (ref 1.8–8)
NEUTS SEG NFR BLD: 53 % (ref 32–75)
NITRITE UR QL STRIP.AUTO: NEGATIVE
NRBC # BLD: 0 K/UL (ref 0–0.01)
NRBC BLD-RTO: 0 PER 100 WBC
P-R INTERVAL, ECG05: 164 MS
PH UR STRIP: 6 [PH] (ref 5–8)
PLATELET # BLD AUTO: 165 K/UL (ref 150–400)
PMV BLD AUTO: 9.4 FL (ref 8.9–12.9)
POTASSIUM SERPL-SCNC: 4.7 MMOL/L (ref 3.5–5.1)
PROT SERPL-MCNC: 6.6 G/DL (ref 6.4–8.2)
PROT UR STRIP-MCNC: NEGATIVE MG/DL
Q-T INTERVAL, ECG07: 390 MS
QRS DURATION, ECG06: 96 MS
QTC CALCULATION (BEZET), ECG08: 417 MS
RBC # BLD AUTO: 5.03 M/UL (ref 4.1–5.7)
RBC #/AREA URNS HPF: NORMAL /HPF (ref 0–5)
SODIUM SERPL-SCNC: 139 MMOL/L (ref 136–145)
SP GR UR REFRACTOMETRY: 1.02 (ref 1–1.03)
SPECIMEN EXP DATE BLD: NORMAL
UA: UC IF INDICATED,UAUC: NORMAL
UROBILINOGEN UR QL STRIP.AUTO: 1 EU/DL (ref 0.2–1)
VENTRICULAR RATE, ECG03: 69 BPM
WBC # BLD AUTO: 3.6 K/UL (ref 4.1–11.1)
WBC URNS QL MICRO: NORMAL /HPF (ref 0–4)

## 2019-11-06 PROCEDURE — 86140 C-REACTIVE PROTEIN: CPT

## 2019-11-06 PROCEDURE — 86900 BLOOD TYPING SEROLOGIC ABO: CPT

## 2019-11-06 PROCEDURE — 93005 ELECTROCARDIOGRAM TRACING: CPT

## 2019-11-06 PROCEDURE — 81001 URINALYSIS AUTO W/SCOPE: CPT

## 2019-11-06 PROCEDURE — 85652 RBC SED RATE AUTOMATED: CPT

## 2019-11-06 PROCEDURE — 80053 COMPREHEN METABOLIC PANEL: CPT

## 2019-11-06 PROCEDURE — 85025 COMPLETE CBC W/AUTO DIFF WBC: CPT

## 2019-11-06 PROCEDURE — 36415 COLL VENOUS BLD VENIPUNCTURE: CPT

## 2019-11-06 PROCEDURE — 83036 HEMOGLOBIN GLYCOSYLATED A1C: CPT

## 2019-11-06 RX ORDER — LISINOPRIL 20 MG/1
20 TABLET ORAL
COMMUNITY
End: 2020-03-24

## 2019-11-06 NOTE — H&P
Preoperative Evaluation                     History and Physical with Surgical Risk Stratification     11/6/2019    CC: Right knee pain  Surgery: RKT     HPI:   Arun Montes is a 62 y.o. male referred for pre-operative evaluation by Dr. Malena Penaloza for surgery on 11/14/19. Mr. Erasto Avina states he had a knee scope in April and his pain never went away. He has had numerous scopes to both knees and knows now is the time to replace his joint. The pain is constant. He is self employed as a  and has to stoop, bend, kneel and crawl for his job. He notes buckling band sometimes stumbles He describes it as an ache on most days. He sometimes has sharp pains. Pain can range from 0-8/10. The patient was evaluated in the surgeon's office and it was determined that the most appropriate plan of care is to proceed with surgical intervention. Patient's PCP Kane Quesada MD    Review of Systems     Constitutional: Negative for chills and fever  HENT: Negative for congestion and sore throat  Eyes: negative for blurred vision and double vision  Respiratory: Negative for cough, shortness of breath and wheezing  Mouth: Negative for loose, broken or chipped teeth. Negative for dentures  Cardiovascular: Negative for chest pain and palpitations  Gastrointestinal: Negative for abdominal pain, constipation, diarrhea and nausea  Genitourinary: Negative for dysuria and hematuria  Musculoskeletal: Positive for right knee pain  Skin: Negative for rash, open wounds. Negative for bruises easily  Neurological: Negative for dizziness, tremors and headaches  Psychiatric: Negative for depression. The patient is not nervous/anxious.     Inherent Risk of Surgery     Surgical risk:  Intermediate  Low:  EIntermediate:  Joint Replacement, High:    Patient Cardiac Risk Assessment     Revised Cardiac Risk Index (RCRI)    Rate if cardiac death, nonfatal MI, nonfatal cardiac arrest by number of risk factor- 0.4%    MARY ANN/AHA 2007 Guidelines:   1) Surgery Emergency, Non-cardiac -> to surgery  2) If not, look at clinical predictors    Major Intermediate Minor       Blood Thinner: None    METS     >4 METS Climb a flight of stairs or a hill Walk on level ground at 4 mph Run a short distance Heavy work around house (scrub floors, move furniture)     Other Risk Factors:   Screening for ETOH use:  Done and low risk  Smoking status:   None    Personal or FH of bleeding problems:  No  Personal or FH of blood clots:  No  Personal or FH of anesthesia problems:   No    Pulmonary Risk:  Asthma or COPD:  No  Body mass index is 24.28 kg/m². Known NIKHIL:  No  Albumin normal, BUN normal    Past Medical, Surgical, Social History     Allergies: Allergies   Allergen Reactions    Codeine Rash     Over 40 years ago       Current Outpatient Medications   Medication Sig    lisinopril (PRINIVIL, ZESTRIL) 20 mg tablet Take 20 mg by mouth nightly.  ALPRAZolam (XANAX) 0.5 mg tablet TAKE 1 TABLET BY MOUTH EVERY 8 HOURS AS NEEDED FOR ANXIETY    atorvastatin (LIPITOR) 10 mg tablet TAKE ONE TABLET BY MOUTH DAILY    pantoprazole (PROTONIX) 40 mg tablet TAKE 1 TABLET BY MOUTH DAILY    tadalafil (CIALIS) 10 mg tablet TAKE 1 TAB BY MOUTH DAILY. AS NEEDED    ibuprofen (ADVIL) 200 mg tablet Take 600 mg by mouth every six (6) hours as needed for Pain.  testosterone cypionate (DEPOTESTOTERONE CYPIONATE) 200 mg/mL injection 80 mg by IntraMUSCular route every seven (7) days. No current facility-administered medications for this encounter.       Past Medical History:   Diagnosis Date    Abnormal finding on EKG 2015    Infarct- ruled as a mis-read by cardiology after testing    Arthritis     Chronic back pain     s/p surgery    GERD (gastroesophageal reflux disease)     Hyperlipidemia     Hypertension     Hypogonadism, male 8/21/2013    total 97, free 1.8  5/13/13    Panic attacks      Past Surgical History:   Procedure Laterality Date    COLONOSCOPY N/A 6/8/2018    COLONOSCOPY performed by Jeferson Santa MD at [address] HX BLEPHAROPLASTY  2011   [facility] COLONOSCOPY      Dr. Calvillo Reji?    HX FRACTURE TX Right     clavicle repair    HX HEART CATHETERIZATION  2011?    normal    HX KNEE ARTHROSCOPY Right 2014    HX KNEE ARTHROSCOPY Left 2015    x 5    HX KNEE ARTHROSCOPY Right 04/29/2019    HX ORTHOPAEDIC  1990    right wrist    HX ORTHOPAEDIC Right 2015    ankle surgery    HX ROTATOR CUFF REPAIR Right 2017    HX ROTATOR CUFF REPAIR Left 2018    HX SHOULDER ARTHROSCOPY Right 2007     Social History     Tobacco Use    Smoking status: Never Smoker    Smokeless tobacco: Never Used   Substance Use Topics    Alcohol use: No     Alcohol/week: 0.0 standard drinks     Frequency: Never     Comment: Rare glass of wine    Drug use: Not Currently     Family History   Problem Relation Age of Onset    Heart Disease Father 46        CABG x5    Coronary Artery Disease Father         stents    Diabetes Sister     Heart Disease Sister         afib    Heart Failure Mother     No Known Problems Brother     No Known Problems Brother     Malignant Hyperthermia Neg Hx        Objective     Vitals:    11/06/19 0919   BP: 168/86   Pulse: 75   Resp: 16   Temp: 97.7 °F (36.5 °C)   SpO2: 99%   Weight: 83.5 kg (184 lb)   Height: 6' 1\" (1.854 m)       Constitutional:  Appears well,  No Acute Distress, Vitals noted  Psychiatric:   Affect normal, Alert and Oriented to person/place/time    Eyes:   Pupils equally round and reactive, EOMI, conjunctiva clear, eyelids normal  ENT:   External ears and nose normal/lips, teeth normal, gums normal, TMs and Orophyarynx normal  Neck:   general inspection and Thyroid normal.  No abnormal cervical or supraclavicular nodes    Lungs:   clear to auscultation, good respiratory effort  Heart: Ausculation normal.  Regular rhythm. No cardiac murmurs.   No carotid bruits or palpable thrills  Chest wall normal  Musculoskeletal: Gait antalgic. Extremities:   without edema, good peripheral pulses  Skin:   Warm to palpation, without rashes, bruising, or suspicious lesions     Recent Results (from the past 72 hour(s))   C REACTIVE PROTEIN, QT    Collection Time: 11/06/19 10:01 AM   Result Value Ref Range    C-Reactive protein <0.29 0.00 - 0.60 mg/dL   CBC WITH AUTOMATED DIFF    Collection Time: 11/06/19 10:01 AM   Result Value Ref Range    WBC 3.6 (L) 4.1 - 11.1 K/uL    RBC 5.03 4. 10 - 5.70 M/uL    HGB 15.3 12.1 - 17.0 g/dL    HCT 45.7 36.6 - 50.3 %    MCV 90.9 80.0 - 99.0 FL    MCH 30.4 26.0 - 34.0 PG    MCHC 33.5 30.0 - 36.5 g/dL    RDW 12.0 11.5 - 14.5 %    PLATELET 888 033 - 759 K/uL    MPV 9.4 8.9 - 12.9 FL    NRBC 0.0 0  WBC    ABSOLUTE NRBC 0.00 0.00 - 0.01 K/uL    NEUTROPHILS 53 32 - 75 %    LYMPHOCYTES 33 12 - 49 %    MONOCYTES 10 5 - 13 %    EOSINOPHILS 3 0 - 7 %    BASOPHILS 1 0 - 1 %    IMMATURE GRANULOCYTES 0 0.0 - 0.5 %    ABS. NEUTROPHILS 2.0 1.8 - 8.0 K/UL    ABS. LYMPHOCYTES 1.2 0.8 - 3.5 K/UL    ABS. MONOCYTES 0.4 0.0 - 1.0 K/UL    ABS. EOSINOPHILS 0.1 0.0 - 0.4 K/UL    ABS. BASOPHILS 0.0 0.0 - 0.1 K/UL    ABS. IMM. GRANS. 0.0 0.00 - 0.04 K/UL    DF AUTOMATED     METABOLIC PANEL, COMPREHENSIVE    Collection Time: 11/06/19 10:01 AM   Result Value Ref Range    Sodium 139 136 - 145 mmol/L    Potassium 4.7 3.5 - 5.1 mmol/L    Chloride 106 97 - 108 mmol/L    CO2 31 21 - 32 mmol/L    Anion gap 2 (L) 5 - 15 mmol/L    Glucose 89 65 - 100 mg/dL    BUN 16 6 - 20 MG/DL    Creatinine 1.06 0.70 - 1.30 MG/DL    BUN/Creatinine ratio 15 12 - 20      GFR est AA >60 >60 ml/min/1.73m2    GFR est non-AA >60 >60 ml/min/1.73m2    Calcium 8.6 8.5 - 10.1 MG/DL    Bilirubin, total 0.6 0.2 - 1.0 MG/DL    ALT (SGPT) 24 12 - 78 U/L    AST (SGOT) 14 (L) 15 - 37 U/L    Alk.  phosphatase 67 45 - 117 U/L    Protein, total 6.6 6.4 - 8.2 g/dL    Albumin 3.9 3.5 - 5.0 g/dL    Globulin 2.7 2.0 - 4.0 g/dL    A-G Ratio 1.4 1.1 - 2.2     HEMOGLOBIN A1C WITH EAG Collection Time: 11/06/19 10:01 AM   Result Value Ref Range    Hemoglobin A1c 5.3 4.2 - 6.3 %    Est. average glucose 105 mg/dL   CULTURE, MRSA    Collection Time: 11/06/19 10:01 AM   Result Value Ref Range    Special Requests: NO SPECIAL REQUESTS      Culture result: MRSA NOT PRESENT      Culture result:            Screening of patient nares for MRSA is for surveillance purposes and, if positive, to facilitate isolation considerations in high risk settings. It is not intended for automatic decolonization interventions per se as regimens are not sufficiently effective to warrant routine use.    SED RATE (ESR)    Collection Time: 11/06/19 10:01 AM   Result Value Ref Range    Sed rate, automated 2 0 - 20 mm/hr   TYPE & SCREEN    Collection Time: 11/06/19 10:01 AM   Result Value Ref Range    Crossmatch Expiration 11/17/2019     ABO/Rh(D) O POSITIVE     Antibody screen NEG    URINALYSIS W/ REFLEX CULTURE    Collection Time: 11/06/19 10:01 AM   Result Value Ref Range    Color YELLOW/STRAW      Appearance CLEAR CLEAR      Specific gravity 1.018 1.003 - 1.030      pH (UA) 6.0 5.0 - 8.0      Protein NEGATIVE  NEG mg/dL    Glucose NEGATIVE  NEG mg/dL    Ketone NEGATIVE  NEG mg/dL    Bilirubin NEGATIVE  NEG      Blood NEGATIVE  NEG      Urobilinogen 1.0 0.2 - 1.0 EU/dL    Nitrites NEGATIVE  NEG      Leukocyte Esterase NEGATIVE  NEG      WBC 0-4 0 - 4 /hpf    RBC 0-5 0 - 5 /hpf    Epithelial cells FEW FEW /lpf    Bacteria NEGATIVE  NEG /hpf    UA:UC IF INDICATED CULTURE NOT INDICATED BY UA RESULT CNI      Hyaline cast 0-2 0 - 5 /lpf   EKG, 12 LEAD, INITIAL    Collection Time: 11/06/19  1:12 PM   Result Value Ref Range    Ventricular Rate 69 BPM    Atrial Rate 69 BPM    P-R Interval 164 ms    QRS Duration 96 ms    Q-T Interval 390 ms    QTC Calculation (Bezet) 417 ms    Calculated P Axis 44 degrees    Calculated R Axis 26 degrees    Calculated T Axis 27 degrees    Diagnosis       Normal sinus rhythm  Normal ECG  When compared with ECG of 03-DEC-2018 08:54,  No significant change was found  Confirmed by Santosh Villalobos MD., Panda Perkins ([id number]) on 11/6/2019 1:57:01 PM         Assessment and Plan     Assessment/Plan:   1) OA Right Knee  2) Pre-Operative Evaluation    Labs and EKG reviewed. MRSA negative    Preoperative Clearance  Per RCRI, the patient has a 0.4% risk of cardiac death, nonfatal MI, nonfatal cardiac arrest based on no risk factors. Per ACC/AHA guidelines, patient is low risk for a(n) intermediate risk surgery and may proceed to planned surgery with the above noted risk.     Dwan Closs, NP

## 2019-11-06 NOTE — PERIOP NOTES
[address] , [address] [facility]   MAIN OR                                  [phone]   MAIN PRE OP                          [phone]                                                                                AMBULATORY PRE OP          [phone]  PRE-ADMISSION TESTING    [phone]     Surgery Date:   Thursday, November 14, 2019        Is surgery arrival time given by surgeon? YES  If NO, [facility] staff will call you between 3 and 7pm the day before your surgery with your arrival time. (If your surgery is on a Monday, we will call you the Friday before.)    Call [phone] after 7pm Monday-Friday if you did not receive this call. INSTRUCTIONS BEFORE YOUR SURGERY   When You  Arrive Arrive at the [address] [facility] on the day of your surgery  Have your insurance card, photo ID, and any copayment (if needed)   Food   and   Drink NO food or drink after midnight the night before surgery    This means NO water, gum, mints, coffee, juice, etc.  No alcohol (beer, wine, liquor) 24 hours before and after surgery   Medications to   TAKE   Morning of Surgery MEDICATIONS TO TAKE THE MORNING OF SURGERY WITH A SIP OF WATER:    Protonix   Medications  To  STOP      7 days before surgery  Non-Steroidal anti-inflammatory Drugs (NSAID's): for example, Ibuprofen (Advil, Motrin), Naproxen (Aleve)   Aspirin, if taking for pain    Herbal supplements, vitamins, and fish oil   Other:  (Pain medications not listed above, including Tylenol may be taken)   Blood  Thinners  If you take  Aspirin, Plavix, Coumadin, or any blood-thinning or anti-blood clot medicine, talk to the doctor who prescribed the medications for pre-operative instructions.    Bathing Clothing  Jewelry  Valuables      If you shower the morning of surgery, please do not apply anything to your skin (lotions, powders, deodorant, or makeup, especially mascara)   Follow Chlorhexidine Care Fusion body wash instructions provided to you during PAT appointment. Begin 3 days prior to surgery.  Do not shave or trim anywhere 24 hours before surgery   Wear your hair loose or down; no pony-tails, buns, or metal hair clips   Wear loose, comfortable, clean clothes   Wear glasses instead of contacts   Leave money, valuables, and jewelry, including body piercings, at home   Going Home - or Spending the Night  SAME-DAY SURGERY: You must have a responsible adult drive you home and stay with you 24 hours after surgery   ADMITS: If your doctor is keeping you in the hospital after surgery, leave personal belongings/luggage in your car until you have a hospital room number. Hospital discharge time is 12 noon  Drivers must be here before 12 noon unless you are told differently   Special Instructions   Special Instructions:  · Use Chlorhexidine Care Fusion wash and sponges 3 days prior to surgery as instructed. · Incentive spirometer given with instructions to practice at home and bring back to the hospital on the day of surgery. · [facility] will contact you if your Hemoglobin A1C is greater than 7.5. · Ensure/Glucerna  sample, nutritional information, and Ensure/Glucerna coupon given. · Pain pamphlet and Call Don't Fall reminder reviewed with patient. ·  parking is complimentary Monday - Friday 7 am - 5 pm  · Bring PTA Medication list day of surgery with the last doses taken documented     Follow all instructions so your surgery wont be cancelled. Please, be on time. If a situation occurs and you are delayed the day of surgery, call [phone]. If your physical condition changes (like a fever, cold, flu, etc.) call your surgeon. Home medication(s) reviewed and verified verbally and by patient during PAT appointment. The patient was contacted  in person.    The patient verbalizes understanding of all instructions and does not  need reinforcement.

## 2019-11-07 LAB
BACTERIA SPEC CULT: NORMAL
BACTERIA SPEC CULT: NORMAL
SERVICE CMNT-IMP: NORMAL

## 2019-11-11 ENCOUNTER — HOSPITAL ENCOUNTER (OUTPATIENT)
Dept: CT IMAGING | Age: 64
Discharge: HOME | End: 2019-11-11
Attending: ORTHOPAEDIC SURGERY
Payer: COMMERCIAL

## 2019-11-11 DIAGNOSIS — M17.12 TRICOMPARTMENT OSTEOARTHRITIS OF LEFT KNEE: ICD-10-CM

## 2019-11-11 PROCEDURE — 73700 CT LOWER EXTREMITY W/O DYE: CPT

## 2019-11-13 ENCOUNTER — ANESTHESIA EVENT (OUTPATIENT)
Dept: SURGERY | Age: 64
DRG: 470 | End: 2019-11-13
Payer: COMMERCIAL

## 2019-11-13 ENCOUNTER — TELEPHONE (OUTPATIENT)
Dept: INTERNAL MEDICINE CLINIC | Age: 64
End: 2019-11-13

## 2019-11-13 NOTE — TELEPHONE ENCOUNTER
Patient is scheduled for total right knee replacement tomorrow under the care of . Patient was advised to stop all Nsaids prior to surgery & now his pain is almost unbearable in his knees. Patient is requesting something to help with pain just to get him thru the rest of today & tonight. He did contact  but was advised to get something temporary from his PCP office. Only drug allergy is codeine. If something can be sent please send to the [facility] downstairs. Please advise.

## 2019-11-14 ENCOUNTER — APPOINTMENT (OUTPATIENT)
Dept: GENERAL RADIOLOGY | Age: 64
DRG: 470 | End: 2019-11-14
Attending: PHYSICIAN ASSISTANT
Payer: COMMERCIAL

## 2019-11-14 ENCOUNTER — HOSPITAL ENCOUNTER (INPATIENT)
Age: 64
LOS: 1 days | Discharge: HOME | DRG: 470 | End: 2019-11-15
Attending: ORTHOPAEDIC SURGERY | Admitting: ORTHOPAEDIC SURGERY
Payer: COMMERCIAL

## 2019-11-14 ENCOUNTER — ANESTHESIA (OUTPATIENT)
Dept: SURGERY | Age: 64
DRG: 470 | End: 2019-11-14
Payer: COMMERCIAL

## 2019-11-14 DIAGNOSIS — M17.11 PRIMARY OSTEOARTHRITIS OF RIGHT KNEE: Primary | ICD-10-CM

## 2019-11-14 PROCEDURE — 74011000250 HC RX REV CODE- 250: Performed by: ORTHOPAEDIC SURGERY

## 2019-11-14 PROCEDURE — 74011250636 HC RX REV CODE- 250/636: Performed by: PHYSICIAN ASSISTANT

## 2019-11-14 PROCEDURE — 74011000250 HC RX REV CODE- 250: Performed by: STUDENT IN AN ORGANIZED HEALTH CARE EDUCATION/TRAINING PROGRAM

## 2019-11-14 PROCEDURE — 74011000250 HC RX REV CODE- 250: Performed by: PHYSICIAN ASSISTANT

## 2019-11-14 PROCEDURE — 77030038149 HC BLD SAW SAG STRY -D: Performed by: ORTHOPAEDIC SURGERY

## 2019-11-14 PROCEDURE — 77030007866 HC KT SPN ANES BBMI -B

## 2019-11-14 PROCEDURE — 74011250637 HC RX REV CODE- 250/637: Performed by: PHYSICIAN ASSISTANT

## 2019-11-14 PROCEDURE — 77030031139 HC SUT VCRL2 J&J -A: Performed by: ORTHOPAEDIC SURGERY

## 2019-11-14 PROCEDURE — 74011000250 HC RX REV CODE- 250: Performed by: NURSE ANESTHETIST, CERTIFIED REGISTERED

## 2019-11-14 PROCEDURE — 77030029820: Performed by: ORTHOPAEDIC SURGERY

## 2019-11-14 PROCEDURE — 76210000034 HC AMBSU PH I REC 0.5 TO 1 HR: Performed by: ORTHOPAEDIC SURGERY

## 2019-11-14 PROCEDURE — 74011250637 HC RX REV CODE- 250/637: Performed by: STUDENT IN AN ORGANIZED HEALTH CARE EDUCATION/TRAINING PROGRAM

## 2019-11-14 PROCEDURE — 77030018673: Performed by: ORTHOPAEDIC SURGERY

## 2019-11-14 PROCEDURE — 77030011640 HC PAD GRND REM COVD -A: Performed by: ORTHOPAEDIC SURGERY

## 2019-11-14 PROCEDURE — 74011250636 HC RX REV CODE- 250/636: Performed by: STUDENT IN AN ORGANIZED HEALTH CARE EDUCATION/TRAINING PROGRAM

## 2019-11-14 PROCEDURE — 65270000029 HC RM PRIVATE

## 2019-11-14 PROCEDURE — C1776 JOINT DEVICE (IMPLANTABLE): HCPCS | Performed by: ORTHOPAEDIC SURGERY

## 2019-11-14 PROCEDURE — 74011000272 HC RX REV CODE- 272: Performed by: ORTHOPAEDIC SURGERY

## 2019-11-14 PROCEDURE — 77030035236 HC SUT PDS STRATFX BARB J&J -B: Performed by: ORTHOPAEDIC SURGERY

## 2019-11-14 PROCEDURE — 74011250636 HC RX REV CODE- 250/636: Performed by: NURSE ANESTHETIST, CERTIFIED REGISTERED

## 2019-11-14 PROCEDURE — 77030041075 HC DRSG AG OPTIFRM MDII -B: Performed by: ORTHOPAEDIC SURGERY

## 2019-11-14 PROCEDURE — 73560 X-RAY EXAM OF KNEE 1 OR 2: CPT

## 2019-11-14 PROCEDURE — 77030020263 HC SOL INJ SOD CL0.9% LFCR 1000ML: Performed by: ORTHOPAEDIC SURGERY

## 2019-11-14 PROCEDURE — 77030003601 HC NDL NRV BLK BBMI -A

## 2019-11-14 PROCEDURE — 77030028907 HC WRP KNEE WO BGS SOLM -B

## 2019-11-14 PROCEDURE — 77030029828 HC FEM TIB CKPNT KT DISP STRY -B: Performed by: ORTHOPAEDIC SURGERY

## 2019-11-14 PROCEDURE — 77030002933 HC SUT MCRYL J&J -A: Performed by: ORTHOPAEDIC SURGERY

## 2019-11-14 PROCEDURE — 74011250636 HC RX REV CODE- 250/636

## 2019-11-14 PROCEDURE — 77030040361 HC SLV COMPR DVT MDII -B

## 2019-11-14 PROCEDURE — 77030018836 HC SOL IRR NACL ICUM -A: Performed by: ORTHOPAEDIC SURGERY

## 2019-11-14 PROCEDURE — 77030006835 HC BLD SAW SAG STRY -B: Performed by: ORTHOPAEDIC SURGERY

## 2019-11-14 PROCEDURE — 77030010507 HC ADH SKN DERMBND J&J -B: Performed by: ORTHOPAEDIC SURGERY

## 2019-11-14 PROCEDURE — 77030000032 HC CUF TRNQT ZIMM -B: Performed by: ORTHOPAEDIC SURGERY

## 2019-11-14 PROCEDURE — 74011250636 HC RX REV CODE- 250/636: Performed by: ORTHOPAEDIC SURGERY

## 2019-11-14 PROCEDURE — 76030000021 HC AMB SURG 2 TO 2.5 HR INTENSV-TIER 1: Performed by: ORTHOPAEDIC SURGERY

## 2019-11-14 PROCEDURE — 74011250637 HC RX REV CODE- 250/637: Performed by: ORTHOPAEDIC SURGERY

## 2019-11-14 PROCEDURE — 64447 NJX AA&/STRD FEMORAL NRV IMG: CPT

## 2019-11-14 PROCEDURE — 77030018723 HC ELCTRD BLD COVD -A: Performed by: ORTHOPAEDIC SURGERY

## 2019-11-14 PROCEDURE — 0SRC0JA REPLACEMENT OF RIGHT KNEE JOINT WITH SYNTHETIC SUBSTITUTE, UNCEMENTED, OPEN APPROACH: ICD-10-PCS | Performed by: ORTHOPAEDIC SURGERY

## 2019-11-14 PROCEDURE — 76060000064 HC AMB SURG ANES 2 TO 2.5 HR: Performed by: ORTHOPAEDIC SURGERY

## 2019-11-14 PROCEDURE — 74011000250 HC RX REV CODE- 250

## 2019-11-14 PROCEDURE — 64445 NJX AA&/STRD SCIATIC NRV IMG: CPT

## 2019-11-14 PROCEDURE — 77030013708 HC HNDPC SUC IRR PULS STRY –B: Performed by: ORTHOPAEDIC SURGERY

## 2019-11-14 DEVICE — TIBIAL BEARING INSERT - CR
Type: IMPLANTABLE DEVICE | Site: KNEE | Status: FUNCTIONAL
Brand: TRIATHLON

## 2019-11-14 DEVICE — COMPONENT TOT KNEE HYBRID POROUS X3 TRIATHLON: Type: IMPLANTABLE DEVICE | Status: FUNCTIONAL

## 2019-11-14 DEVICE — TIBIAL COMPONENT
Type: IMPLANTABLE DEVICE | Site: KNEE | Status: FUNCTIONAL
Brand: TRIATHLON

## 2019-11-14 DEVICE — CRUCIATE RETAINING FEMORAL
Type: IMPLANTABLE DEVICE | Site: KNEE | Status: FUNCTIONAL
Brand: TRIATHLON

## 2019-11-14 DEVICE — PATELLA
Type: IMPLANTABLE DEVICE | Site: KNEE | Status: FUNCTIONAL
Brand: TRIATHLON

## 2019-11-14 RX ORDER — IBUPROFEN 800 MG/1
800 TABLET ORAL
Qty: 50 TAB | Refills: 2 | Status: SHIPPED | OUTPATIENT
Start: 2019-11-14 | End: 2020-11-17

## 2019-11-14 RX ORDER — OXYCODONE HYDROCHLORIDE 5 MG/1
10 TABLET ORAL
Status: DISCONTINUED | OUTPATIENT
Start: 2019-11-14 | End: 2019-11-15 | Stop reason: HOSPADM

## 2019-11-14 RX ORDER — FENTANYL CITRATE 50 UG/ML
25 INJECTION, SOLUTION INTRAMUSCULAR; INTRAVENOUS
Status: DISCONTINUED | OUTPATIENT
Start: 2019-11-14 | End: 2019-11-14 | Stop reason: HOSPADM

## 2019-11-14 RX ORDER — ASPIRIN 81 MG/1
81 TABLET ORAL 2 TIMES DAILY
Status: DISCONTINUED | OUTPATIENT
Start: 2019-11-14 | End: 2019-11-15 | Stop reason: HOSPADM

## 2019-11-14 RX ORDER — NALOXONE HYDROCHLORIDE 0.4 MG/ML
0.4 INJECTION, SOLUTION INTRAMUSCULAR; INTRAVENOUS; SUBCUTANEOUS AS NEEDED
Status: DISCONTINUED | OUTPATIENT
Start: 2019-11-14 | End: 2019-11-15 | Stop reason: HOSPADM

## 2019-11-14 RX ORDER — ONDANSETRON 2 MG/ML
4 INJECTION INTRAMUSCULAR; INTRAVENOUS AS NEEDED
Status: DISCONTINUED | OUTPATIENT
Start: 2019-11-14 | End: 2019-11-14 | Stop reason: HOSPADM

## 2019-11-14 RX ORDER — GABAPENTIN 100 MG/1
100 CAPSULE ORAL
Qty: 120 CAP | Refills: 1 | Status: SHIPPED | OUTPATIENT
Start: 2019-11-14 | End: 2020-11-17

## 2019-11-14 RX ORDER — TRAMADOL HYDROCHLORIDE 50 MG/1
50 TABLET ORAL
Qty: 40 TAB | Refills: 0 | Status: SHIPPED | OUTPATIENT
Start: 2019-11-14 | End: 2019-11-21

## 2019-11-14 RX ORDER — EPHEDRINE SULFATE/0.9% NACL/PF 50 MG/5 ML
SYRINGE (ML) INTRAVENOUS AS NEEDED
Status: DISCONTINUED | OUTPATIENT
Start: 2019-11-14 | End: 2019-11-14 | Stop reason: HOSPADM

## 2019-11-14 RX ORDER — SODIUM CHLORIDE 0.9 % (FLUSH) 0.9 %
5-40 SYRINGE (ML) INJECTION EVERY 8 HOURS
Status: DISCONTINUED | OUTPATIENT
Start: 2019-11-14 | End: 2019-11-15 | Stop reason: HOSPADM

## 2019-11-14 RX ORDER — ASPIRIN 81 MG/1
81 TABLET ORAL 2 TIMES DAILY
Qty: 60 TAB | Refills: 0 | Status: SHIPPED | OUTPATIENT
Start: 2019-11-14 | End: 2020-11-17

## 2019-11-14 RX ORDER — AMOXICILLIN 250 MG
1 CAPSULE ORAL 2 TIMES DAILY
Status: DISCONTINUED | OUTPATIENT
Start: 2019-11-14 | End: 2019-11-15 | Stop reason: HOSPADM

## 2019-11-14 RX ORDER — FAMOTIDINE 20 MG/1
20 TABLET, FILM COATED ORAL 2 TIMES DAILY
Status: DISCONTINUED | OUTPATIENT
Start: 2019-11-14 | End: 2019-11-15 | Stop reason: HOSPADM

## 2019-11-14 RX ORDER — ONDANSETRON 2 MG/ML
4 INJECTION INTRAMUSCULAR; INTRAVENOUS
Status: DISCONTINUED | OUTPATIENT
Start: 2019-11-14 | End: 2019-11-15 | Stop reason: HOSPADM

## 2019-11-14 RX ORDER — ATORVASTATIN CALCIUM 10 MG/1
10 TABLET, FILM COATED ORAL DAILY
Status: DISCONTINUED | OUTPATIENT
Start: 2019-11-14 | End: 2019-11-15 | Stop reason: HOSPADM

## 2019-11-14 RX ORDER — MIDAZOLAM HYDROCHLORIDE 1 MG/ML
INJECTION, SOLUTION INTRAMUSCULAR; INTRAVENOUS AS NEEDED
Status: DISCONTINUED | OUTPATIENT
Start: 2019-11-14 | End: 2019-11-14 | Stop reason: HOSPADM

## 2019-11-14 RX ORDER — DIPHENHYDRAMINE HYDROCHLORIDE 50 MG/ML
12.5 INJECTION, SOLUTION INTRAMUSCULAR; INTRAVENOUS
Status: DISCONTINUED | OUTPATIENT
Start: 2019-11-14 | End: 2019-11-15 | Stop reason: HOSPADM

## 2019-11-14 RX ORDER — POVIDONE-IODINE 10 %
SOLUTION, NON-ORAL TOPICAL AS NEEDED
Status: DISCONTINUED | OUTPATIENT
Start: 2019-11-14 | End: 2019-11-14 | Stop reason: HOSPADM

## 2019-11-14 RX ORDER — HYDROMORPHONE HYDROCHLORIDE 1 MG/ML
.5-1 INJECTION, SOLUTION INTRAMUSCULAR; INTRAVENOUS; SUBCUTANEOUS
Status: DISCONTINUED | OUTPATIENT
Start: 2019-11-14 | End: 2019-11-14 | Stop reason: HOSPADM

## 2019-11-14 RX ORDER — OXYCODONE HCL 10 MG/1
10 TABLET, FILM COATED, EXTENDED RELEASE ORAL ONCE
Status: COMPLETED | OUTPATIENT
Start: 2019-11-14 | End: 2019-11-14

## 2019-11-14 RX ORDER — FENTANYL CITRATE 50 UG/ML
INJECTION, SOLUTION INTRAMUSCULAR; INTRAVENOUS AS NEEDED
Status: DISCONTINUED | OUTPATIENT
Start: 2019-11-14 | End: 2019-11-14 | Stop reason: HOSPADM

## 2019-11-14 RX ORDER — KETOROLAC TROMETHAMINE 30 MG/ML
30 INJECTION, SOLUTION INTRAMUSCULAR; INTRAVENOUS EVERY 6 HOURS
Status: DISCONTINUED | OUTPATIENT
Start: 2019-11-14 | End: 2019-11-15 | Stop reason: HOSPADM

## 2019-11-14 RX ORDER — SODIUM CHLORIDE, SODIUM LACTATE, POTASSIUM CHLORIDE, CALCIUM CHLORIDE 600; 310; 30; 20 MG/100ML; MG/100ML; MG/100ML; MG/100ML
125 INJECTION, SOLUTION INTRAVENOUS CONTINUOUS
Status: DISCONTINUED | OUTPATIENT
Start: 2019-11-14 | End: 2019-11-14 | Stop reason: HOSPADM

## 2019-11-14 RX ORDER — LIDOCAINE HYDROCHLORIDE 20 MG/ML
INJECTION, SOLUTION INFILTRATION; PERINEURAL AS NEEDED
Status: DISCONTINUED | OUTPATIENT
Start: 2019-11-14 | End: 2019-11-14 | Stop reason: HOSPADM

## 2019-11-14 RX ORDER — POLYETHYLENE GLYCOL 3350 17 G/17G
17 POWDER, FOR SOLUTION ORAL DAILY
Status: DISCONTINUED | OUTPATIENT
Start: 2019-11-15 | End: 2019-11-15 | Stop reason: HOSPADM

## 2019-11-14 RX ORDER — ONDANSETRON 8 MG/1
4 TABLET, ORALLY DISINTEGRATING ORAL
Qty: 30 TAB | Refills: 0 | Status: SHIPPED | OUTPATIENT
Start: 2019-11-14 | End: 2020-11-17

## 2019-11-14 RX ORDER — SODIUM CHLORIDE 9 MG/ML
125 INJECTION, SOLUTION INTRAVENOUS CONTINUOUS
Status: DISCONTINUED | OUTPATIENT
Start: 2019-11-14 | End: 2019-11-15 | Stop reason: HOSPADM

## 2019-11-14 RX ORDER — OXYCODONE HYDROCHLORIDE 5 MG/1
5 TABLET ORAL
Qty: 42 TAB | Refills: 0 | Status: SHIPPED | OUTPATIENT
Start: 2019-11-14 | End: 2019-11-21

## 2019-11-14 RX ORDER — OXYCODONE HYDROCHLORIDE 5 MG/1
5 TABLET ORAL
Status: DISCONTINUED | OUTPATIENT
Start: 2019-11-14 | End: 2019-11-15 | Stop reason: HOSPADM

## 2019-11-14 RX ORDER — ACETAMINOPHEN 500 MG
500-1000 TABLET ORAL
Qty: 60 TAB | Refills: 0 | Status: SHIPPED | OUTPATIENT
Start: 2019-11-14 | End: 2020-11-17

## 2019-11-14 RX ORDER — ACETAMINOPHEN 325 MG/1
975 TABLET ORAL ONCE
Status: COMPLETED | OUTPATIENT
Start: 2019-11-14 | End: 2019-11-14

## 2019-11-14 RX ORDER — GABAPENTIN 100 MG/1
100 CAPSULE ORAL
Status: DISCONTINUED | OUTPATIENT
Start: 2019-11-15 | End: 2019-11-15 | Stop reason: HOSPADM

## 2019-11-14 RX ORDER — PROPOFOL 10 MG/ML
INJECTION, EMULSION INTRAVENOUS
Status: DISCONTINUED | OUTPATIENT
Start: 2019-11-14 | End: 2019-11-14 | Stop reason: HOSPADM

## 2019-11-14 RX ORDER — GABAPENTIN 300 MG/1
300 CAPSULE ORAL
Status: DISCONTINUED | OUTPATIENT
Start: 2019-11-14 | End: 2019-11-15 | Stop reason: HOSPADM

## 2019-11-14 RX ORDER — ALPRAZOLAM 0.5 MG/1
0.5 TABLET ORAL
Status: DISCONTINUED | OUTPATIENT
Start: 2019-11-14 | End: 2019-11-15 | Stop reason: HOSPADM

## 2019-11-14 RX ORDER — KETOROLAC TROMETHAMINE 30 MG/ML
15 INJECTION, SOLUTION INTRAMUSCULAR; INTRAVENOUS
Status: COMPLETED | OUTPATIENT
Start: 2019-11-14 | End: 2019-11-14

## 2019-11-14 RX ORDER — CELECOXIB 100 MG/1
200 CAPSULE ORAL 2 TIMES DAILY
Status: DISCONTINUED | OUTPATIENT
Start: 2019-11-15 | End: 2019-11-15 | Stop reason: HOSPADM

## 2019-11-14 RX ORDER — FACIAL-BODY WIPES
10 EACH TOPICAL DAILY PRN
Status: DISCONTINUED | OUTPATIENT
Start: 2019-11-16 | End: 2019-11-15 | Stop reason: HOSPADM

## 2019-11-14 RX ORDER — BUPIVACAINE HYDROCHLORIDE 5 MG/ML
INJECTION, SOLUTION EPIDURAL; INTRACAUDAL AS NEEDED
Status: DISCONTINUED | OUTPATIENT
Start: 2019-11-14 | End: 2019-11-14 | Stop reason: HOSPADM

## 2019-11-14 RX ORDER — ROPIVACAINE HYDROCHLORIDE 2 MG/ML
INJECTION, SOLUTION EPIDURAL; INFILTRATION; PERINEURAL AS NEEDED
Status: DISCONTINUED | OUTPATIENT
Start: 2019-11-14 | End: 2019-11-14 | Stop reason: HOSPADM

## 2019-11-14 RX ORDER — SODIUM CHLORIDE 0.9 % (FLUSH) 0.9 %
5-40 SYRINGE (ML) INJECTION AS NEEDED
Status: DISCONTINUED | OUTPATIENT
Start: 2019-11-14 | End: 2019-11-15 | Stop reason: HOSPADM

## 2019-11-14 RX ORDER — OXYCODONE HYDROCHLORIDE 5 MG/1
10 TABLET ORAL
Status: DISCONTINUED | OUTPATIENT
Start: 2019-11-14 | End: 2019-11-14 | Stop reason: HOSPADM

## 2019-11-14 RX ORDER — LIDOCAINE HYDROCHLORIDE 10 MG/ML
0.1 INJECTION, SOLUTION EPIDURAL; INFILTRATION; INTRACAUDAL; PERINEURAL AS NEEDED
Status: DISCONTINUED | OUTPATIENT
Start: 2019-11-14 | End: 2019-11-14 | Stop reason: HOSPADM

## 2019-11-14 RX ORDER — HYDROMORPHONE HYDROCHLORIDE 1 MG/ML
0.5 INJECTION, SOLUTION INTRAMUSCULAR; INTRAVENOUS; SUBCUTANEOUS
Status: DISCONTINUED | OUTPATIENT
Start: 2019-11-14 | End: 2019-11-15 | Stop reason: HOSPADM

## 2019-11-14 RX ORDER — ACETAMINOPHEN 325 MG/1
650 TABLET ORAL EVERY 6 HOURS
Status: DISCONTINUED | OUTPATIENT
Start: 2019-11-14 | End: 2019-11-15 | Stop reason: HOSPADM

## 2019-11-14 RX ORDER — PREGABALIN 75 MG/1
75 CAPSULE ORAL ONCE
Status: COMPLETED | OUTPATIENT
Start: 2019-11-14 | End: 2019-11-14

## 2019-11-14 RX ORDER — PANTOPRAZOLE SODIUM 40 MG/1
40 TABLET, DELAYED RELEASE ORAL
Status: DISCONTINUED | OUTPATIENT
Start: 2019-11-15 | End: 2019-11-15 | Stop reason: HOSPADM

## 2019-11-14 RX ADMIN — Medication 10 ML: at 21:22

## 2019-11-14 RX ADMIN — OXYCODONE HYDROCHLORIDE 5 MG: 5 TABLET ORAL at 18:48

## 2019-11-14 RX ADMIN — ACETAMINOPHEN 650 MG: 325 TABLET ORAL at 18:12

## 2019-11-14 RX ADMIN — OXYCODONE HYDROCHLORIDE 10 MG: 5 TABLET ORAL at 22:32

## 2019-11-14 RX ADMIN — SODIUM CHLORIDE, SODIUM LACTATE, POTASSIUM CHLORIDE, AND CALCIUM CHLORIDE 125 ML/HR: 600; 310; 30; 20 INJECTION, SOLUTION INTRAVENOUS at 12:51

## 2019-11-14 RX ADMIN — PHENYLEPHRINE HYDROCHLORIDE 50 MCG: 10 INJECTION INTRAMUSCULAR; INTRAVENOUS; SUBCUTANEOUS at 15:07

## 2019-11-14 RX ADMIN — ROPIVACAINE HYDROCHLORIDE 20 ML: 2 INJECTION, SOLUTION EPIDURAL; INFILTRATION; PERINEURAL at 13:07

## 2019-11-14 RX ADMIN — SENNOSIDES AND DOCUSATE SODIUM 1 TABLET: 8.6; 5 TABLET ORAL at 18:12

## 2019-11-14 RX ADMIN — Medication 10 MG: at 14:48

## 2019-11-14 RX ADMIN — FENTANYL CITRATE 50 MCG: 50 INJECTION INTRAMUSCULAR; INTRAVENOUS at 13:03

## 2019-11-14 RX ADMIN — LIDOCAINE HYDROCHLORIDE 100 MG: 20 INJECTION, SOLUTION INFILTRATION; PERINEURAL at 13:52

## 2019-11-14 RX ADMIN — FENTANYL CITRATE 50 MCG: 50 INJECTION INTRAMUSCULAR; INTRAVENOUS at 13:14

## 2019-11-14 RX ADMIN — GABAPENTIN 300 MG: 300 CAPSULE ORAL at 21:22

## 2019-11-14 RX ADMIN — KETOROLAC TROMETHAMINE 15 MG: 30 INJECTION, SOLUTION INTRAMUSCULAR at 16:10

## 2019-11-14 RX ADMIN — CEFAZOLIN 2 G: 1 INJECTION, POWDER, FOR SOLUTION INTRAMUSCULAR; INTRAVENOUS at 14:17

## 2019-11-14 RX ADMIN — LIDOCAINE HYDROCHLORIDE 0.1 ML: 10 INJECTION, SOLUTION EPIDURAL; INFILTRATION; INTRACAUDAL; PERINEURAL at 12:52

## 2019-11-14 RX ADMIN — ASPIRIN 81 MG: 81 TABLET, COATED ORAL at 18:12

## 2019-11-14 RX ADMIN — WATER 2 G: 1 INJECTION INTRAMUSCULAR; INTRAVENOUS; SUBCUTANEOUS at 21:21

## 2019-11-14 RX ADMIN — KETOROLAC TROMETHAMINE 30 MG: 30 INJECTION, SOLUTION INTRAMUSCULAR at 21:21

## 2019-11-14 RX ADMIN — SODIUM CHLORIDE 125 ML/HR: 900 INJECTION, SOLUTION INTRAVENOUS at 22:34

## 2019-11-14 RX ADMIN — PHENYLEPHRINE HYDROCHLORIDE 100 MCG: 10 INJECTION INTRAMUSCULAR; INTRAVENOUS; SUBCUTANEOUS at 15:17

## 2019-11-14 RX ADMIN — BUPIVACAINE HYDROCHLORIDE 2.4 ML: 5 INJECTION, SOLUTION EPIDURAL; INTRACAUDAL; PERINEURAL at 13:24

## 2019-11-14 RX ADMIN — FAMOTIDINE 20 MG: 20 TABLET ORAL at 18:12

## 2019-11-14 RX ADMIN — PHENYLEPHRINE HYDROCHLORIDE 100 MCG: 10 INJECTION INTRAMUSCULAR; INTRAVENOUS; SUBCUTANEOUS at 14:40

## 2019-11-14 RX ADMIN — OXYCODONE HYDROCHLORIDE 10 MG: 10 TABLET, FILM COATED, EXTENDED RELEASE ORAL at 12:47

## 2019-11-14 RX ADMIN — PHENYLEPHRINE HYDROCHLORIDE 50 MCG: 10 INJECTION INTRAMUSCULAR; INTRAVENOUS; SUBCUTANEOUS at 14:48

## 2019-11-14 RX ADMIN — PREGABALIN 75 MG: 75 CAPSULE ORAL at 12:47

## 2019-11-14 RX ADMIN — ATORVASTATIN CALCIUM 10 MG: 10 TABLET, FILM COATED ORAL at 21:22

## 2019-11-14 RX ADMIN — ACETAMINOPHEN 975 MG: 325 TABLET ORAL at 12:47

## 2019-11-14 RX ADMIN — PROPOFOL 75 MCG/KG/MIN: 10 INJECTION, EMULSION INTRAVENOUS at 13:52

## 2019-11-14 RX ADMIN — MIDAZOLAM 2 MG: 1 INJECTION INTRAMUSCULAR; INTRAVENOUS at 13:03

## 2019-11-14 RX ADMIN — ROPIVACAINE HYDROCHLORIDE 20 ML: 2 INJECTION, SOLUTION EPIDURAL; INFILTRATION; PERINEURAL at 13:10

## 2019-11-14 RX ADMIN — SODIUM CHLORIDE 125 ML/HR: 900 INJECTION, SOLUTION INTRAVENOUS at 16:25

## 2019-11-14 RX ADMIN — Medication 10 MG: at 14:27

## 2019-11-14 NOTE — ANESTHESIA PROCEDURE NOTES
Spinal Block    Start time: 11/14/2019 1:15 PM  End time: 11/14/2019 1:24 PM  Performed by: Susanna Mayberry CRNA  Authorized by: Babs Manzo MD     Pre-procedure: Indications: at surgeon's request and primary anesthetic  Preanesthetic Checklist: patient identified, risks and benefits discussed, anesthesia consent, site marked, patient being monitored and timeout performed    Timeout Time: 13:15          Spinal Block:   Patient Position:  Seated  Prep Region:  Lumbar  Prep: Betadine      Location:  L3-4  Technique:  Single shot        Needle:   Needle Type:   Madeleine  Needle Gauge:  25 G  Attempts:  1      Events: CSF confirmed, no blood with aspiration and no paresthesia        Assessment:  Insertion:  Uncomplicated  Patient tolerance:  Patient tolerated the procedure well with no immediate complications

## 2019-11-14 NOTE — ANESTHESIA PREPROCEDURE EVALUATION
Anesthetic History   No history of anesthetic complications            Review of Systems / Medical History  Patient summary reviewed and pertinent labs reviewed    Pulmonary  Within defined limits                 Neuro/Psych   Within defined limits           Cardiovascular    Hypertension                   GI/Hepatic/Renal     GERD: well controlled           Endo/Other        Arthritis     Other Findings   Comments: BPH         Physical Exam    Airway  Mallampati: II    Neck ROM: normal range of motion   Mouth opening: Normal     Cardiovascular    Rhythm: regular  Rate: normal         Dental  No notable dental hx       Pulmonary  Breath sounds clear to auscultation               Abdominal  GI exam deferred       Other Findings            Anesthetic Plan    ASA: 2  Anesthesia type: spinal      Post-op pain plan if not by surgeon: regional    Induction: Intravenous  Anesthetic plan and risks discussed with: Patient

## 2019-11-14 NOTE — PROGRESS NOTES
Problem: Falls - Risk of  Goal: *Absence of Falls  Description  Document Ivar Du Fall Risk and appropriate interventions in the flowsheet. Outcome: Progressing Towards Goal  Note:   Fall Risk Interventions:                 Elimination Interventions: Call light in reach, Patient to call for help with toileting needs, Bed/chair exit alarm              Problem: Patient Education: Go to Patient Education Activity  Goal: Patient/Family Education  Outcome: Progressing Towards Goal     Problem: Pressure Injury - Risk of  Goal: *Prevention of pressure injury  Description  Document Andres Scale and appropriate interventions in the flowsheet. Outcome: Progressing Towards Goal  Note:   Pressure Injury Interventions:             Activity Interventions: Increase time out of bed, Pressure redistribution bed/mattress(bed type), PT/OT evaluation    Mobility Interventions: Pressure redistribution bed/mattress (bed type), PT/OT evaluation    Nutrition Interventions: Offer support with meals,snacks and hydration                     Problem: Patient Education: Go to Patient Education Activity  Goal: Patient/Family Education  Outcome: Progressing Towards Goal     Problem: Knee Replacement: Day of Surgery/Unit  Goal: Off Pathway (Use only if patient is Off Pathway)  Outcome: Progressing Towards Goal  Goal: Activity/Safety  Outcome: Progressing Towards Goal  Goal: Consults, if ordered  Outcome: Progressing Towards Goal  Goal: Diagnostic Test/Procedures  Outcome: Progressing Towards Goal  Goal: Nutrition/Diet  Outcome: Progressing Towards Goal  Goal: Medications  Outcome: Progressing Towards Goal  Goal: Respiratory  Outcome: Progressing Towards Goal  Goal: Treatments/Interventions/Procedures  Outcome: Progressing Towards Goal  Goal: Psychosocial  Outcome: Progressing Towards Goal  Goal: *Initiate mobility  Outcome: Progressing Towards Goal  Goal: *Optimal pain control at patient's stated goal  Outcome: Progressing Towards Goal  Goal: *Hemodynamically stable  Outcome: Progressing Towards Goal

## 2019-11-14 NOTE — DISCHARGE SUMMARY
Total Knee Replacement Home Discharge Summary    Patient ID:  Norma Coy  1955  62 y.o.  [id number]    Admit date: 11/14/2019    Discharge date and time: No discharge date for patient encounter. Admitting Physician: Joana Ayon MD     Admission Diagnoses: Primary osteoarthritis of right knee [M17.11]    Discharge Diagnoses: Active Problems:    Primary osteoarthritis of right knee (11/14/2019)        Surgeon: Bina Rodriguez MD    HOSPITAL COURSE:  Norma Coy was admitted on11/14/2019 and underwent successful total knee arthroplasty. There were no complications intraoperatively and the patient was transferred to the orthopaedic floor in stable condition. Physical therapy and occupational therapy initiated their evaluation and treatment and continued to follow the patient until the patient was discharged. DVT prophylaxis was initiated on the day of surgery including; Pharmocologic prophylaxis and bilateral foot pumps. The incision site remained clean, dry, and intact. The patient remained neurovascularly intact. At the time of discharge, the patient was able to ambulate safely, go up and down stairs and had an understanding of the explicit discharge precautions and instructions following surgery. The patient had adequate oral pain control and good PO intake. Important in Hospital Events : none      Post Op complications: None    Current Discharge Medication List      START taking these medications    Details   aspirin delayed-release 81 mg tablet Take 1 Tab by mouth two (2) times a day. Qty: 60 Tab, Refills: 0      gabapentin (NEURONTIN) 100 mg capsule Take 1 Cap by mouth ACB/HS. Max Daily Amount: 200 mg. T1 tablet at breakfast and 3 tablets at night. Qty: 120 Cap, Refills: 1    Associated Diagnoses: Primary osteoarthritis of right knee      !! ibuprofen (MOTRIN) 800 mg tablet Take 1 Tab by mouth every six (6) hours as needed for Pain.   Qty: 50 Tab, Refills: 2      oxyCODONE IR (ROXICODONE) 5 mg immediate release tablet Take 1 Tab by mouth every four (4) hours as needed for Pain for up to 7 days. Max Daily Amount: 30 mg. Indications: pain  Qty: 42 Tab, Refills: 0    Associated Diagnoses: Primary osteoarthritis of right knee      traMADol (ULTRAM) 50 mg tablet Take 1 Tab by mouth every six (6) hours as needed for Pain (Take for breakthrough pain if Oxycodone is not working or when transitioning off Oxycodone) for up to 7 days. Max Daily Amount: 200 mg. Indications: pain, Post-op Pain, Diagnosis Hip and Knee Arthritis ICD 10 - M16.9  Qty: 40 Tab, Refills: 0    Associated Diagnoses: Primary osteoarthritis of right knee      acetaminophen (TYLENOL EXTRA STRENGTH) 500 mg tablet Take 1-2 Tabs by mouth every six (6) hours as needed for Pain. Not to exceed 4,000mg in any 24 hour period  Indications: pain  Qty: 60 Tab, Refills: 0      ondansetron (ZOFRAN ODT) 8 mg disintegrating tablet Take 0.5 Tabs by mouth every eight (8) hours as needed for Nausea. Qty: 30 Tab, Refills: 0       !! - Potential duplicate medications found. Please discuss with provider. CONTINUE these medications which have NOT CHANGED    Details   lisinopril (PRINIVIL, ZESTRIL) 20 mg tablet Take 20 mg by mouth nightly. atorvastatin (LIPITOR) 10 mg tablet TAKE ONE TABLET BY MOUTH DAILY  Qty: 30 Tab, Refills: 5    Associated Diagnoses: Dyslipidemia, goal LDL below 100      testosterone cypionate (DEPOTESTOTERONE CYPIONATE) 200 mg/mL injection 80 mg by IntraMUSCular route every seven (7) days. ALPRAZolam (XANAX) 0.5 mg tablet TAKE 1 TABLET BY MOUTH EVERY 8 HOURS AS NEEDED FOR ANXIETY  Qty: 20 Tab, Refills: 2    Associated Diagnoses: Anxiety      pantoprazole (PROTONIX) 40 mg tablet TAKE 1 TABLET BY MOUTH DAILY  Qty: 30 Tab, Refills: 5    Associated Diagnoses: Panic attack      tadalafil (CIALIS) 10 mg tablet TAKE 1 TAB BY MOUTH DAILY.  AS NEEDED  Qty: 8 Tab, Refills: 3      !! ibuprofen (ADVIL) 200 mg tablet Take 600 mg by mouth every six (6) hours as needed for Pain. !! - Potential duplicate medications found. Please discuss with provider.           Discharged to: Home    Follow-up : Scheduled for 2 weeks post-op    Signed:  Alysia Brothers MD  11/14/2019  1:56 PM

## 2019-11-14 NOTE — ANESTHESIA PROCEDURE NOTES
Peripheral Block    Start time: 11/14/2019 1:03 PM  End time: 11/14/2019 1:10 PM  Performed by: Ulysses Semen, CRNA  Authorized by: Raeann Kevin MD       Pre-procedure: Indications: at surgeon's request and post-op pain management    Preanesthetic Checklist: patient identified, risks and benefits discussed, site marked, timeout performed, anesthesia consent given and patient being monitored    Timeout Time: 13:03          Block Type:   Block Type:  Popliteal and femoral single shot  Laterality:  Right  Monitoring:  Continuous pulse ox, frequent vital sign checks, heart rate and responsive to questions  Injection Technique:  Single shot  Procedures: ultrasound guided    Patient Position: supine  Prep: chlorhexidine    Location:  Upper thigh  Needle Type:  Stimuplex  Needle Gauge:  21 G  Needle Localization:  Nerve stimulator and ultrasound guidance    Assessment:  Number of attempts:  1  Injection Assessment:  Incremental injection every 5 mL, local visualized surrounding nerve on ultrasound, negative aspiration for blood, no paresthesia and no intravascular symptoms  Patient tolerance:  Patient tolerated the procedure well with no immediate complications  Popliteal block done under ultrasound guidance with incremental injection of Ropivacaine 0.2% 20 cc using a 21 g Stimuplex needle.

## 2019-11-14 NOTE — OP NOTES
OPERATIVE REPORT     Admit Date: 11/14/2019  Admit Diagnosis: Primary osteoarthritis of right knee [M17.11]    Date of Procedure: 11/14/2019   Preoperative Diagnosis: DJD RIGHT KNEE  Postoperative Diagnosis: * No post-op diagnosis entered *    Procedure: Procedure(s):  RIGHT TOTAL KNEE ARTHROPLASTY - MAKOPLASTY  Surgeon: Dipti Loo MD  Assistant(s): Shamar Doty PA-C  Anesthesia: Spinal   Estimated Blood Loss: 300cc  Specimens: * No specimens in log *   Complications: None           INDICATIONS:   The patient is a 62 y.o., male who has complained of a long history of knee pain. The patient  has failed conservative treatment and presents for definitive operative care. Informed consent obtained including a discussion of the risks and benefits, which include, but are not limited to, bleeding, infection, neurovascular damage, wound complications, pain and stiffness in the knee, periprosthetic loosening, fracture dislocation and DVT, the patient consented for the procedure. DESCRIPTION OF PROCEDURE:        The patient was seen in the preoperative holding area. The patient was positively identified. The limb was initialed,  questions were answered. The patient was given Ancef preop for an antibiotic. The patient was subsequently taken to the operating room. The patient underwent spinal anesthesia. The patient was positioned in the supine position. All bony prominences were well padded. The limb was prepped and draped in a sterile fashion. The appropriate pause for safety was performed. A mid-line incision was created. Utilizing an incision from above the superior pole of the patella distally to the tibial tubercle. The incision was taken down through the skin and subcutaneous tissue until the retinaculum could be identified. This was sharply incised utilizing a medial parapatellar incision. The femoral array was placed at the superior portion of the patella.  The patellar was everted, a planar resurfacing was performed and the drill guide was placed with the appropriate rotation. The medial-based soft tissue was then retracted as well as well as the lateral tissue. Duy pins were placed within the incision for thetibial array (one hand breadth proximal to the superior pole of the patella). The femoral and tibial trackers were placed. The hip center or rotation was established. Registration was performed on the femur and tibia. Osteophytes were removed. The knee was balanced in both flexion and extension with force applied. The computer model of implants and position was verified. Once this was complete the MAKOplasty robot was positioned. Standard cuts were made for the tibia first. The tibial cut was removed. The remaining femoral cuts were made with the robot. The blade was changed and the medial meniscus was removed. The tibial preparation was completed. Including removal of residual medial and lateral mensci. Tibial baseplate placement and keel preparation were performed along with drill holes for the tibial baseplate. Final bony osteophytes were removed and the meniscal rim was removed. The knee was injected with 60cc of Morphine / Ketoralac and Lidocaine. Trial implants were placed and range of motion along with overall fit was established with very good integrity of the MCL noted. The duy pins and trackers were removed and accounted for prior to closure. All the bony surfaces were irrigated. The tibia was placed first, then the poly, residual osteophytes were removed and then the femur. Finally, the patella was placed and press-fit with the clamp / impaction. There was normal tracking of the patella at the conclusion of the case. The knee was very stable after it was taken through a full range of motion. The wound was closed with 0 Vicryl and then number 2 Quill proximally.  Once this had been completed, the skin was closed with 2-0 Vicryl 4-0 monocryl suture and Dermabond. A sterile dressing was applied. The patient was taken from the operating room in stable condition. 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 IMPLANTS :   6F, 7T, 11mm Poly, 35mm patella-    JUSTIFICATION FOR SURGICAL ASSISTANT:   Surgical Assistant, was requried and necessary in this case, to help with soft tissue retraction, extremity positioning, equiment management, implant management, and wound closure.     Joana Ayon MD

## 2019-11-14 NOTE — ROUTINE PROCESS
TRANSFER - OUT REPORT:    Verbal report given to [facility], RN(name) on Luly Pascal  being transferred to [facility](unit) for routine progression of care       Report consisted of patients Situation, Background, Assessment and   Recommendations(SBAR). Information from the following report(s) SBAR and Kardex was reviewed with the receiving nurse. Lines:   Peripheral IV 11/14/19 Right Forearm (Active)   Site Assessment Clean, dry, & intact 11/14/2019  4:38 PM   Phlebitis Assessment 0 11/14/2019  4:38 PM   Infiltration Assessment 0 11/14/2019  4:38 PM   Dressing Status Intact 11/14/2019  4:38 PM   Dressing Type Transparent 11/14/2019  4:38 PM   Hub Color/Line Status Pink; Infusing 11/14/2019  4:38 PM   Alcohol Cap Used Yes 11/14/2019  3:59 PM        Opportunity for questions and clarification was provided.       Patient transported with:   Registered Nurse

## 2019-11-15 ENCOUNTER — PATIENT OUTREACH (OUTPATIENT)
Dept: OTHER | Age: 64
End: 2019-11-15

## 2019-11-15 VITALS
SYSTOLIC BLOOD PRESSURE: 138 MMHG | RESPIRATION RATE: 16 BRPM | TEMPERATURE: 97.9 F | HEART RATE: 96 BPM | HEIGHT: 73 IN | BODY MASS INDEX: 24.19 KG/M2 | WEIGHT: 182.54 LBS | OXYGEN SATURATION: 99 % | DIASTOLIC BLOOD PRESSURE: 72 MMHG

## 2019-11-15 LAB
ANION GAP SERPL CALC-SCNC: 6 MMOL/L (ref 5–15)
BUN SERPL-MCNC: 18 MG/DL (ref 6–20)
BUN/CREAT SERPL: 18 (ref 12–20)
CALCIUM SERPL-MCNC: 7.9 MG/DL (ref 8.5–10.1)
CHLORIDE SERPL-SCNC: 109 MMOL/L (ref 97–108)
CO2 SERPL-SCNC: 25 MMOL/L (ref 21–32)
CREAT SERPL-MCNC: 1.01 MG/DL (ref 0.7–1.3)
GLUCOSE SERPL-MCNC: 87 MG/DL (ref 65–100)
HGB BLD-MCNC: 11.7 G/DL (ref 12.1–17)
POTASSIUM SERPL-SCNC: 4.1 MMOL/L (ref 3.5–5.1)
SODIUM SERPL-SCNC: 140 MMOL/L (ref 136–145)

## 2019-11-15 PROCEDURE — 97116 GAIT TRAINING THERAPY: CPT

## 2019-11-15 PROCEDURE — 77030041075 HC DRSG AG OPTIFRM MDII -B

## 2019-11-15 PROCEDURE — 36415 COLL VENOUS BLD VENIPUNCTURE: CPT

## 2019-11-15 PROCEDURE — 74011000250 HC RX REV CODE- 250: Performed by: PHYSICIAN ASSISTANT

## 2019-11-15 PROCEDURE — 74011250637 HC RX REV CODE- 250/637: Performed by: PHYSICIAN ASSISTANT

## 2019-11-15 PROCEDURE — 85018 HEMOGLOBIN: CPT

## 2019-11-15 PROCEDURE — 97161 PT EVAL LOW COMPLEX 20 MIN: CPT

## 2019-11-15 PROCEDURE — 74011250636 HC RX REV CODE- 250/636: Performed by: PHYSICIAN ASSISTANT

## 2019-11-15 PROCEDURE — 97110 THERAPEUTIC EXERCISES: CPT

## 2019-11-15 PROCEDURE — 97165 OT EVAL LOW COMPLEX 30 MIN: CPT | Performed by: OCCUPATIONAL THERAPIST

## 2019-11-15 PROCEDURE — 80048 BASIC METABOLIC PNL TOTAL CA: CPT

## 2019-11-15 PROCEDURE — 97535 SELF CARE MNGMENT TRAINING: CPT | Performed by: OCCUPATIONAL THERAPIST

## 2019-11-15 RX ADMIN — OXYCODONE HYDROCHLORIDE 10 MG: 5 TABLET ORAL at 06:47

## 2019-11-15 RX ADMIN — KETOROLAC TROMETHAMINE 30 MG: 30 INJECTION, SOLUTION INTRAMUSCULAR at 03:16

## 2019-11-15 RX ADMIN — KETOROLAC TROMETHAMINE 30 MG: 30 INJECTION, SOLUTION INTRAMUSCULAR at 10:51

## 2019-11-15 RX ADMIN — FAMOTIDINE 20 MG: 20 TABLET ORAL at 07:48

## 2019-11-15 RX ADMIN — SENNOSIDES AND DOCUSATE SODIUM 1 TABLET: 8.6; 5 TABLET ORAL at 07:48

## 2019-11-15 RX ADMIN — ACETAMINOPHEN 650 MG: 325 TABLET ORAL at 00:43

## 2019-11-15 RX ADMIN — OXYCODONE HYDROCHLORIDE 10 MG: 5 TABLET ORAL at 10:51

## 2019-11-15 RX ADMIN — POLYETHYLENE GLYCOL 3350 17 G: 17 POWDER, FOR SOLUTION ORAL at 07:49

## 2019-11-15 RX ADMIN — WATER 2 G: 1 INJECTION INTRAMUSCULAR; INTRAVENOUS; SUBCUTANEOUS at 13:56

## 2019-11-15 RX ADMIN — ASPIRIN 81 MG: 81 TABLET, COATED ORAL at 07:48

## 2019-11-15 RX ADMIN — OXYCODONE HYDROCHLORIDE 10 MG: 5 TABLET ORAL at 13:55

## 2019-11-15 RX ADMIN — WATER 2 G: 1 INJECTION INTRAMUSCULAR; INTRAVENOUS; SUBCUTANEOUS at 06:46

## 2019-11-15 RX ADMIN — OXYCODONE HYDROCHLORIDE 5 MG: 5 TABLET ORAL at 03:16

## 2019-11-15 RX ADMIN — ACETAMINOPHEN 650 MG: 325 TABLET ORAL at 06:46

## 2019-11-15 RX ADMIN — GABAPENTIN 100 MG: 100 CAPSULE ORAL at 07:48

## 2019-11-15 RX ADMIN — ACETAMINOPHEN 650 MG: 325 TABLET ORAL at 13:55

## 2019-11-15 NOTE — PROGRESS NOTES
Problem: Falls - Risk of  Goal: *Absence of Falls  Description  Document Agata Lomax Fall Risk and appropriate interventions in the flowsheet. Outcome: Progressing Towards Goal  Note:   Fall Risk Interventions:  Mobility Interventions: Bed/chair exit alarm, Patient to call before getting OOB, PT Consult for mobility concerns, PT Consult for assist device competence, Utilize walker, cane, or other assistive device, Utilize gait belt for transfers/ambulation         Medication Interventions: Patient to call before getting OOB, Teach patient to arise slowly    Elimination Interventions: Call light in reach, Patient to call for help with toileting needs              Problem: Patient Education: Go to Patient Education Activity  Goal: Patient/Family Education  Outcome: Progressing Towards Goal     Problem: Pressure Injury - Risk of  Goal: *Prevention of pressure injury  Description  Document Andres Scale and appropriate interventions in the flowsheet. Outcome: Progressing Towards Goal  Note:   Pressure Injury Interventions:             Activity Interventions: Increase time out of bed, PT/OT evaluation, Pressure redistribution bed/mattress(bed type)    Mobility Interventions: Pressure redistribution bed/mattress (bed type), PT/OT evaluation    Nutrition Interventions: Offer support with meals,snacks and hydration                     Problem: Patient Education: Go to Patient Education Activity  Goal: Patient/Family Education  Outcome: Progressing Towards Goal     Problem: Knee Replacement: Post-Op Day 1  Goal: Off Pathway (Use only if patient is Off Pathway)  Outcome: Progressing Towards Goal  Goal: Activity/Safety  Outcome: Progressing Towards Goal  Goal: Diagnostic Test/Procedures  Outcome: Progressing Towards Goal  Goal: Nutrition/Diet  Outcome: Progressing Towards Goal  Goal: Medications  Outcome: Progressing Towards Goal  Goal: Respiratory  Outcome: Progressing Towards Goal  Goal: Treatments/Interventions/Procedures  Outcome: Progressing Towards Goal  Goal: Psychosocial  Outcome: Progressing Towards Goal  Goal: Discharge Planning  Outcome: Progressing Towards Goal  Goal: *Demonstrates progressive activity  Outcome: Progressing Towards Goal  Goal: *Optimal pain control at patient's stated goal  Outcome: Progressing Towards Goal  Goal: *Hemodynamically stable  Outcome: Progressing Towards Goal  Goal: *Discharge plan identified  Outcome: Progressing Towards Goal

## 2019-11-15 NOTE — PROGRESS NOTES
OCCUPATIONAL THERAPY EVALUATION/DISCHARGE  Patient: Tommy Castro (66 y.o. male)  Date: 11/15/2019  Primary Diagnosis: Primary osteoarthritis of right knee [M17.11]  Procedure(s) (LRB):  RIGHT TOTAL KNEE ARTHROPLASTY - MAKOPLASTY (Right) 1 Day Post-Op   Precautions:  Fall, WBAT(no knee flexion >90 degrees)    ASSESSMENT  Pt cleared for OT by RN. Based on the objective data described below, the patient presents with good safety awareness POD 1 R TKR. Pt remains with numbness and sensory impairments RLE post sx and bleeding noted on dressing. RN aware. He is at an overall CGA and set-up level with LE ADLs, toileting and functional mobility using RW to amb. He has good support from wife at home who will assist pt as needed. Current Level of Function (ADLs/self-care): CGA and set-up level with LE ADLs, toileting and functional mobility using RW to amb    Functional Outcome Measure: The patient scored 55/100 on the Barthel Index outcome measure. Other factors to consider for discharge: none     PLAN :  Recommendation for discharge: (in order for the patient to meet his/her long term goals)  No skilled occupational therapy/ follow up rehabilitation needs identified at this time. This discharge recommendation:  Has been made in collaboration with the attending provider and/or case management    IF patient discharges home will need the following DME: rolling walker       SUBJECTIVE:   Patient stated I am still numb, but feel good.     OBJECTIVE DATA SUMMARY:   HISTORY:   Past Medical History:   Diagnosis Date    Abnormal finding on EKG 2015    Infarct- ruled as a mis-read by cardiology after testing    Arthritis     Chronic back pain     s/p surgery    GERD (gastroesophageal reflux disease)     Hyperlipidemia     Hypertension     Hypogonadism, male 8/21/2013    total 97, free 1.8  5/13/13    Panic attacks      Past Surgical History:   Procedure Laterality Date    COLONOSCOPY N/A 6/8/2018 COLONOSCOPY performed by Miguel Gómez MD at [address] [facility] HX BLEPHAROPLASTY  2011   Alma Semen      Dr. Selma Hay?    HX FRACTURE TX Right     clavicle repair    HX HEART CATHETERIZATION  2011?    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   Expanded or extensive additional review of patient history:     Home Situation  Home Environment: Private residence  # Steps to Enter: 6  Rails to Enter: Yes  [employer] : Right  One/Two Story Residence: Two story, live on 1st floor  Living Alone: No  Support Systems: Spouse/Significant Other/Partner, Family member(s)  Patient Expects to be Discharged to[de-identified] Private residence  Current DME Used/Available at Home: Cane, straight  Tub or Shower Type: Shower    Hand dominance: Right    EXAMINATION OF PERFORMANCE DEFICITS:  Cognitive/Behavioral Status:  Neurologic State: Alert; Appropriate for age  Orientation Level: Oriented X4  Cognition: Appropriate decision making; Appropriate for age attention/concentration; Appropriate safety awareness; Follows commands  Perception: Appears intact  Perseveration: No perseveration noted  Safety/Judgement: Awareness of environment;Driving appropriateness; Fall prevention;Good awareness of safety precautions; Home safety; Insight into deficits    Hearing:   Auditory  Auditory Impairment: None    Vision/Perceptual:    Acuity: Within Defined Limits         Range of Motion:  AROM: Generally decreased, functional(RLE still numb from sx)  PROM: Within functional limits                      Strength:  Strength: Generally decreased, functional(RLE still numb from sx)                Coordination:  Coordination: Generally decreased, functional  Fine Motor Skills-Upper: Left Intact; Right Intact    Gross Motor Skills-Upper: Left Intact; Right Intact    Tone & Sensation:  Tone: Normal  Sensation: (RLE post sx)                      Balance:  Sitting: Intact  Standing: Intact; With support(RW and 2 person A due to numb RLE)    Functional Mobility and Transfers for ADLs:  Bed Mobility:  Rolling: Modified independent  Supine to Sit: Modified independent  Sit to Supine: Modified independent  Scooting: Modified independent    Transfers:  Sit to Stand: Contact guard assistance; Additional time;Assist x2(RW- RLE imp sensation)  Stand to Sit: Contact guard assistance; Additional time;Assist x2  Toilet Transfer : Contact guard assistance; Additional time;Assist x2(RW- RLE imp sensation)  Assistive Device : Walker, rolling    ADL Assessment:  Feeding: Independent    Oral Facial Hygiene/Grooming: Contact guard assistance; Additional time;Assist x2(in standing )    Bathing: Contact guard assistance    Upper Body Dressing: Setup    Lower Body Dressing: Contact guard assistance; Additional time    Toileting: Contact guard assistance; Additional time;Assist x2                ADL Intervention and task modifications:  Bathing: Patient instructed when bathing to not submerge wound in water, stand to shower or sponge bathe, cover wound with plastic and tape to ensure no water reaches bandage/wound without cues. Patient indicated understanding. Dressing joint: Patient instructed and demonstrated to don/doff Right LE first/last verbal cues. Patient instructed and demonstrated to don all clothing while sitting prior to standing, doff all clothing to knees while standing, then sit to doff clothing off from knees to feet in order to facilitate fall prevention, pain management, and energy conservation with Supervision. Dressing joint reach exercise:  To increase independence with lower body dressing, patient instructed and demonstrated to reach down Right LE in a seated position slowly to prevent tearing/shearing until slight pull is felt, hold at end range for 10 seconds, then return to starting upright position with Supervision. Patient instructed to complete three sets of three repetitions each daily. Home safety: Patient instructed on home modifications and safety (raise height of ADL objects, appropriate height of chair surfaces, recliner safety, change of floor surfaces, clear pathways) to increase independence and fall prevention. Patient indicated understanding. Standing: Patient instructed and demonstrated during ADLs to walk up to sink/counter top/surfaces, step into walker to increase safety of joint and fall prevention with Supervision. Patient educated about knee anatomy verbally and with pictures and educated to avoid rotation of Right LE. Instructed to apply concept to ADLs within the home (no twisting of knee during reaching across body, square off while using objects, slide objects along surfaces). Patient instructed to increase amount of time standing, observe standing position during ADLs in order to increase even weight bearing through bilateral LEs in order to increase independence with ADLs. Goal to be reached 30 days post - op, per orthopedic surgeon or per PT. Patient indicated understanding. Tub transfer: Patient instructed regarding when it is safe to begin transfer into tub (complete stairs with PT, advance exercises with PT high enough to clear tub height). Patient instructed to use the same technique as used with stairs when entering and exiting tub (\"up with the non-surgical, down with the surgical leg\"). Patient indicated understanding. Cognitive Retraining  Safety/Judgement: Awareness of environment;Driving appropriateness; Fall prevention;Good awareness of safety precautions; Home safety; Insight into deficits    Functional Measure:  Barthel Index:    Bathin  Bladder: 10  Bowels: 10  Groomin  Dressin  Feeding: 10  Mobility: 0  Stairs: 0  Toilet Use: 5  Transfer (Bed to Chair and Back): 10  Total: 55/100        The Barthel ADL Index: Guidelines  1. The index should be used as a record of what a patient does, not as a record of what a patient could do. 2. The main aim is to establish degree of independence from any help, physical or verbal, however minor and for whatever reason. 3. The need for supervision renders the patient not independent. 4. A patient's performance should be established using the best available evidence. Asking the patient, friends/relatives and nurses are the usual sources, but direct observation and common sense are also important. However direct testing is not needed. 5. Usually the patient's performance over the preceding 24-48 hours is important, but occasionally longer periods will be relevant. 6. Middle categories imply that the patient supplies over 50 per cent of the effort. 7. Use of aids to be independent is allowed. Harmony Moore., Barthel, D.W. ([id number]). Functional evaluation: the Barthel Index. [address] [facility] (14)2. MACARIO Porras, Penny Lee, Agustín Dick, Primitivo, [facility] (). Measuring the change indisability after inpatient rehabilitation; comparison of the responsiveness of the Barthel Index and Functional [place] Measure. Journal of Neurology, Neurosurgery, and Psychiatry, 66(4), 775-113. Zafar Spencer, N.[place].A, YESSI Mazariegos, & Saul Colin MAkashA. (2004.) Assessment of post-stroke quality of life in cost-effectiveness studies: The usefulness of the Barthel Index and the EuroQoL-5D.  Quality of Life Research, 15, 360-20       Occupational Therapy Evaluation Charge Determination   History Examination Decision-Making   LOW Complexity : Brief history review  LOW Complexity : 1-3 performance deficits relating to physical, cognitive , or psychosocial skils that result in activity limitations and / or participation restrictions  LOW Complexity : No comorbidities that affect functional and no verbal or physical assistance needed to complete eval tasks       Based on the above components, the patient evaluation is determined to be of the following complexity level: LOW   Pain Ratin/10    Activity Tolerance:   Good and tolerates ADLs without rest breaks  Please refer to the flowsheet for vital signs taken during this treatment. After treatment patient left in no apparent distress:    Supine in bed, Call bell within reach and Caregiver / family present    COMMUNICATION/EDUCATION:   The patients plan of care was discussed with: Physical Therapist and Registered Nurse.     Thank you for this referral.  Sobia Vanegas, SOHA  Time Calculation: 27 mins

## 2019-11-15 NOTE — PROGRESS NOTES
Patient has met criteria for discharge. Instructions and prescriptions for Oxycodone, tylenol, aspirin, gabapentin, zofran, tramadol, ibuprofen  given to patient. Patient and family verbalized understanding of instructions. IV D/C'd. Patient to call to schedule 2 week follow up appointment. Home via wheelchair with family.

## 2019-11-15 NOTE — PROGRESS NOTES
NUTRITION   RD Screen      Pt seen for:      []  MST for     [x]   MD Consult    []        Supplements  []   PO intake check   []        Food Allergies  []   Food Preferences/tolerances    []        Rescreen  []   Education    []        Diet order clarification []   Other   []  LOS                          Nutrition Prescription:     Increase protein intake for 30 days, via supplements or dietary sources, in addition to balanced meals. Encourage adequate intake of meals and supplements    Assessment:   Information obtained from:   Patient    11/15: 63 y/o M admitted with primary osteoarthritis of R knee. MD consult for ONS. PMH - GERD, HLD, HTN. S/p R total knee arthroplasty - makoplasty 11/14. Pt with good appetite/ intake. Pt with 50% breakfast intake and 100% dinner last night. Pt reports good appetite/ intake PTA. Consuming ONS BID PTA prior to surgery.  lb. BMI 24.1 WNL. Pt reports 10 lb intentional weight loss recently. No c/s difficulties. No note of GI distress. LBM 11/13, on bowel regimen. Skin without PI. Educated pt on increased protein needs s/p surgery. Discussed protein rich foods and how to incorporate into diet. Pt agreeable to receive ONS BID in-house. Labs Reviewed: [x] Ca 7.9 L  Medications Reviewed: [x] famotidine, polyethylene glycol, senna-docusate, pantoprazole     Cultural, Rastafari and ethnic food preferences:   [x]  None   []  Identified and addressed    Diet:  Regular    PO Intake: [x]           Good     []           Fair      []           Poor     No data found. Wt Readings from Last 5 Encounters:   11/14/19 82.8 kg (182 lb 8.7 oz)   11/06/19 83.5 kg (184 lb)   09/30/19 81.6 kg (180 lb)   04/29/19 87.8 kg (193 lb 9.6 oz)   04/15/19 87.8 kg (193 lb 9.6 oz)   ]    Body mass index is 24.08 kg/m².     Skin: knee R incision  BM: 11/13, on bowel regimen  ABD: WDL    Estimated Daily Nutrition Requirements:  Kcals/day: 2180 Kcals/day(REE 1677 x 1.3)  Protein: 100 g(99 - 116 gm (1.2 - 1.4 gm/kg))  Fluid:  2180    (1mL/kcal)  Based On: Abdullahi Granda  Weight Used: Actual wt    Weight Changes:   [x]   Loss - intentional  []   Gain  []   Stable    Nutrition Problems Identified  [x]     None  []     Specified food preferences   []     Dislikes supplements              []     Allergies  []     Difficulty chewing     []     Dentition   []     Nausea/Vomiting  []    Constipation  []    Diarrhea    Nutrition Diagnosis:      Increased nutrient needs related to increased protein needs with wound healing evidenced by impaired skin integrity with knee R incision.     Intervention:   [x]    Obtained/adjusted food preferences/tolerances and/or snacks options   []    Dislikes supplements will try a substitution   []    Modify diet for food allergies  []    Adjust texture due to difficulty chewing   []    Encourage Fresh Fruit, Activia yogurt, fluid   [x]    Educated patient  []    Rescreen per screening protocol  [x]    Add Supplements - Ensure HP BID    Goal: patient to consume 1 protein item with meals and 50% of meals and snacks in the next 5-7 days    Monitoring/Evaluation:   Education & Discharge Needs  [x] Nutrition related discharge needs addressed:   [x] Supplements (on d/c instruction &/or coupons provided)    [x] Education   [] No nutrition related discharge needs at this time    Monitor: intake, wt and wound healing    Rescreen: [x]  At Nutrition Risk          []  Not at [address], rescreen per screening protocol      Stephen Bronson, [address] [place] [address]  Pager [phone]   Office [phone]

## 2019-11-15 NOTE — PROGRESS NOTES
Reason for Admission:   DJD right knee                   RRAT Score:    2                 Plan for utilizing home health:      No                    Current Advanced Directive/Advance Care Plan: Not on file; pt has deferred at this time. Transition of Care Plan:                    CM met with pt and his wife Alexis Morelos for d/c planning. They live in a two story house with six steps to enter. Pt stays on the main floor. He owns a cane and will need a rw prior to d/c. Pt signed choice letter for [facility]. He confirmed that he has an outpatient PT appointment on 11/19/19. Pt's preferred pharmacy is the [facility]. Pt and wife were provided information on [employer]. 1. Home with outpatient therapy and family assistance.   Pt's wife will provide transportation at d/c.  2. Outpatient follow-up    Care Management Interventions  PCP Verified by CM: Yes(Dr. Elida Hong; nurse navigator notified)  Palliative Care Criteria Met (RRAT>21 & CHF Dx)?: No  Transition of Care Consult (CM Consult): Discharge Planning  Physical Therapy Consult: Yes  Occupational Therapy Consult: Yes  Speech Therapy Consult: No  Current Support Network: Lives with Spouse  Confirm Follow Up Transport: Family  Freedom of Choice Offered: (S) Yes  Discharge Location  Discharge Placement: Home with outpatient services     Lisbet Momin LCSW

## 2019-11-15 NOTE — PROGRESS NOTES
Bedside shift change report given to Chiki Lees (oncoming nurse) by Lise Jean (offgoing nurse). Report included the following information SBAR, Kardex, Procedure Summary, Intake/Output, MAR and Recent Results.

## 2019-11-15 NOTE — PROGRESS NOTES
PHYSICAL THERAPY EVALUATION/DISCHARGE  Patient: Ayala Vanegas (89 y.o. male)  Date: 11/15/2019  Primary Diagnosis: Primary osteoarthritis of right knee [M17.11]  Procedure(s) (LRB):  RIGHT TOTAL KNEE ARTHROPLASTY - MAKOPLASTY (Right) 1 Day Post-Op   Precautions:   Fall, WBAT(no knee flexion >45 degrees today due to bleeding)      ASSESSMENT  Based on the objective data described below, the patient presents withdecreased right knee ROM and strength as well as post op pain. He requires constant support of assistive device post TKA to prevent from falling. He demonstrated ability to perform all exercises, transfers, amb functional distances, and negotiate 12 stairs with MOD I and his wife observing. Some anxiety and decreased concentration but overall doing well and no unsteadiness or knee instability with activity. Patient cleared for discharge to home and ELI Aldana Most alerted at 11:30 am. Patient's dressing changed this am due to break through bleeding. Dr. Qamar Russo gave PT verbal orders to limit knee flexion to no >than 45 degrees today. Patient and his wife also made aware. Alerted CM needs RW for d/c    Functional Outcome Measure: The patient scored 55/100 on the Barthel Index outcome measure. Further skilled acute physical therapy is not indicated at this time. PLAN :  Recommendation for discharge: (in order for the patient to meet his/her long term goals)  Outpatient physical therapy follow up     This discharge recommendation:  Has not yet been discussed the attending provider and/or case management    IF patient discharges home will need the following DME: rolling walker       SUBJECTIVE:   Patient stated I am ready to go! Tatiana Bill    OBJECTIVE DATA SUMMARY:   HISTORY:    Past Medical History:   Diagnosis Date    Abnormal finding on EKG 2015    Infarct- ruled as a mis-read by cardiology after testing    Arthritis     Chronic back pain     s/p surgery    GERD (gastroesophageal reflux disease)     Hyperlipidemia     Hypertension     Hypogonadism, male 8/21/2013    total 97, free 1.8  5/13/13    Panic attacks      Past Surgical History:   Procedure Laterality Date    COLONOSCOPY N/A 6/8/2018    COLONOSCOPY performed by Saray Yoo MD at [facility] HX BLEPHAROPLASTY  2011    HX COLONOSCOPY      Dr. Paulina Hu?    HX FRACTURE TX Right     clavicle repair    HX HEART CATHETERIZATION  2011?    normal    HX KNEE ARTHROSCOPY Right 2014    HX KNEE ARTHROSCOPY Left 2015    x 5    HX KNEE ARTHROSCOPY Right 04/29/2019    HX ORTHOPAEDIC  1990    right wrist    HX ORTHOPAEDIC Right 2015    ankle surgery    HX ROTATOR CUFF REPAIR Right 2017    HX ROTATOR CUFF REPAIR Left 2018    HX SHOULDER ARTHROSCOPY Right 2007       Prior level of function: Ind amb w/o device  Personal factors and/or comorbidities impacting plan of care:    Home Situation  Home Environment: Private residence  # Steps to Enter: 6  Rails to Enter: Yes  Hand Rails : Right  One/Two Story Residence: Two story, live on 1st floor  Living Alone: No  Support Systems: Spouse/Significant Other/Partner, Family member(s)  Patient Expects to be Discharged to[de-identified] Private residence  Current DME Used/Available at Home: Angel Moons, straight  Tub or Shower Type: Shower    EXAMINATION/PRESENTATION/DECISION MAKING:   Critical Behavior:  Neurologic State: Alert, Appropriate for age  Orientation Level: Oriented X4  Cognition: Appropriate decision making, Appropriate for age attention/concentration, Appropriate safety awareness, Follows commands  Safety/Judgement: Awareness of environment, Driving appropriateness, Fall prevention, Good awareness of safety precautions, Home safety, Insight into deficits  Hearing:   Auditory  Auditory Impairment: None    Range Of Motion:  AROM: Generally decreased, functional(RLE still numb from sx)           PROM: Within functional limits  RLE AROM  R Knee Flexion: 45  R Knee Extension: 0        Strength:    Strength: Generally decreased, functional(RLE still numb from sx)                    Tone & Sensation:   Tone: Normal              Sensation: (RLE post sx)               Coordination:  Coordination: Generally decreased, functional  Vision:   Acuity: Within Defined Limits  Functional Mobility:  Bed Mobility:  Rolling: Modified independent  Supine to Sit: Modified independent  Sit to Supine: Modified independent  Scooting: Modified independent  Transfers:  Sit to Stand: Modified independent  Stand to Sit: Modified independent  Stand Pivot Transfers: Modified independent     Bed to Chair: Modified independent              Balance:   Sitting: Intact  Standing: Intact; With support(RW and 2 person A due to numb RLE)  Ambulation/Gait Training:  Distance (ft): 100 Feet (ft)(x 2)  Assistive Device: Gait belt;Walker, rolling  Ambulation - Level of Assistance: Modified independent        Gait Abnormalities: Antalgic  Right Side Weight Bearing: As tolerated     Base of Support: Narrowed     Stairs:  Number of Stairs Trained: 12  Stairs - Level of Assistance: Modified independent   Rail Use: Right (and SPC)    Therapeutic Exercises:   Written handout and reviewed 10 reps of each exercise per handout    Functional Measure:  Barthel Index:    Bathin  Bladder: 10  Bowels: 10  Groomin  Dressin  Feeding: 10  Mobility: 0  Stairs: 0  Toilet Use: 5  Transfer (Bed to Chair and Back): 10  Total: 55/100       The Barthel ADL Index: Guidelines  1. The index should be used as a record of what a patient does, not as a record of what a patient could do. 2. The main aim is to establish degree of independence from any help, physical or verbal, however minor and for whatever reason. 3. The need for supervision renders the patient not independent. 4. A patient's performance should be established using the best available evidence.  Asking the patient, friends/relatives and nurses are the usual sources, but direct observation and common sense are also important. However direct testing is not needed. 5. Usually the patient's performance over the preceding 24-48 hours is important, but occasionally longer periods will be relevant. 6. Middle categories imply that the patient supplies over 50 per cent of the effort. 7. Use of aids to be independent is allowed. Deidra Webb., Barthel, D.W. ([id number]). Functional evaluation: the Barthel Index. [address] [facility] (14)2. MACARIO Mcnamara, Baudilio Alanis., Antonia Acosta., [place], [address] (). Measuring the change indisability after inpatient rehabilitation; comparison of the responsiveness of the Barthel Index and Functional [place] Measure. Journal of Neurology, Neurosurgery, and Psychiatry, 66(4), 038-923. KRISHNA Durham, YESSI Mazariegos, & Richelle Mendoza M.A. (2004.) Assessment of post-stroke quality of life in cost-effectiveness studies: The usefulness of the Barthel Index and the EuroQoL-5D. Quality of Life Research, 15, 045-48          Physical Therapy Evaluation Charge Determination   History Examination Presentation Decision-Making   LOW Complexity : Zero comorbidities / personal factors that will impact the outcome / POC LOW Complexity : 1-2 Standardized tests and measures addressing body structure, function, activity limitation and / or participation in recreation  LOW Complexity : Stable, uncomplicated  Other outcome measures Barthel Index  LOW       Based on the above components, the patient evaluation is determined to be of the following complexity level: LOW     Pain Ratin/10    Activity Tolerance:   WNL and tolerates ADLs without rest breaks  Please refer to the flowsheet for vital signs taken during this treatment. After treatment patient left in no apparent distress:   Sitting in chair, Call bell within reach and Caregiver / family present    COMMUNICATION/EDUCATION:   The patients plan of care was discussed with: Occupational Therapist and Registered Nurse.     Fall prevention education was provided and the patient/caregiver indicated understanding. and Patient/family have participated as able in goal setting and plan of care.     Thank you for this referral.  Jarad Villatoro, PT, DPT   Time Calculation: 35 mins

## 2019-11-15 NOTE — PROGRESS NOTES
Physical Therapy-     Patient's operative RLE numbness and lack of motor control persists- no active R  DF. Bleeding through dressing. Unable to complete PT eval at this time, will continue to monitor through the morning.      Solange Ryan, PT, DPT

## 2019-11-15 NOTE — PROGRESS NOTES
Patient on report as eligible for Case Management. Pt was admitted to [facility] 11/15/19 for a scheduled procedure. Currently admitted. Procedure: Procedure(s):  RIGHT TOTAL KNEE ARTHROPLASTY - [facility]  Surgeon: Momo Champion MD      Unable to leave a discreet message on voicemail with this CM contact information, phone busy. Will attempt to contact again to offer [address] [place] [address] Management services.

## 2019-11-15 NOTE — PROGRESS NOTES
Bedside shift change report given to Eagle Zapien RN (oncoming nurse) by Jenny Santacruz (offgoing nurse). Report included the following information SBAR, Kardex, Procedure Summary, Intake/Output, MAR and Recent Results.

## 2019-11-15 NOTE — DISCHARGE INSTRUCTIONS
TOTAL KNEE DISCHARGE INSTRUCTIONS    Patient: Ganesh Howe MRN: [id number]  SSN: [id number]            Please take the time to review the following instructions before you leave the hospital and use them as guidelines during your recovery from surgery. If you have any questions you may contact my office at [phone]  After business hours or during the weekend you can contact me through [address] or text / call at [phone] (cell phone) for emergency's. Please use the office number during regular business hours. SPECIAL INSTRUCTIONS :   1. Full extension at the knee is the most important aspect of your range of motion. Avoid placing a pillow or bump behind the knee. Rather, place the heel up on a bump or pillow and allow gravity to help straighten the knee. 2. You may weight bear as tolerated on the knee and during the day you should bend the knee as much as possible. 3. Drainage from the incision more than 4 days from surgery is concerning. Contact my office if there is any question [phone].   4. You may contact me directly through [employer] if there are specific questions or text / call using my cell number [phone]. Wound Care/ Dressing Changes:      DRESSING :     OptiFoam Dressings : This should be removed by physical therapy or you may remove this yourself 7 days after the date of your surgery. If there is no drainage, then a simple dressing may be used or no dressing at all. Other dressing options can be purchased over the counter at a local pharmacy or medical supply vendor. A porous adhesive dressing such as pictured above can be purchased at a local [employer] or [employer]. You only need to keep the incision covered for 7 days after showers. A dressing may be used for longer if there are issues with clothing clinging to the incision. Showering/ Bathing: You may shower with the OptiFoam dressing in place.  This is left in place for 7 days following discharge from the hospital. If your incision is dry without drainage you may shower following your discharge home. After 7 days your dressing should be removed for showering. It is fine to have water run over the incision. Do not vigorously scrub your incision. Apply a clean, dry dressing after you have dried your incision. Do not take a bath or get into a swimming pool / [place] until you follow up with Dr. Sy Perez. Do not soak your incision under water. If there is continued drainage or you are concerned contact Dr Luis Langford office prior to showering [phone] ext [phone] . Diet:  You may advance to your regular diet as tolerated. Increase your clear liquid intake for the next 2-3 days. Increase your protein intake for 30 days to promote healing. Common protein rich foods include: meat, fish, yogurt, milk, cheese, eggs, soy, and nut products. Continue to consume a balanced diet including whole grains, fruit and vegetables. If you are having difficulty consuming adequate protein, you may want to consider a protein supplement, such as Ensure High Protein or similar product, 1-2 bottles per day. Medication:      1. You will be given prescriptions for pain medication when you are discharged from the hospital. The side effects of these medications can be substantial and the narcotic medications are not mandatory. You may substitute these medications with Tylenol or Aleve / Motrin. 2. Please use the medications as prescribed. Pain medications may cause constipation- Colace twice daily and Miralax one scoop daily while taking the narcotic medication should help prevent constipation. Please discuss with your local pharmacist regarding increasing this dosage if constipation persists. Other possible side effects of pain medication are dizziness, headache, nausea, vomiting, and urinary retention.   Discontinue the pain medication if you develop itching, rash, shortness of breath, or difficulties swallowing. If these symptoms become severe or are not relieved by discontinuing the medication, you should seek immediate medical attention. 3. Refills of pain medication are authorized during office hours only (8 AM- 5 PM  Monday thru Friday). Many of these medication will require you or a family member to pick-up a physical prescription at the office. 4. Medications other than antinflammatories will not be called into the pharmacy after business hours. 5. You may resume the medication(s) you were taking prior to your surgery. Narcotics may change the effects of some antidepressant medication(s). If you have any questions about possible interactions between your regular medications and the pain medication, you should ask the pharmacist or contact the prescribing physician. 6. If you have constipation which is not improved by oral stool softeners then a Ducolax suppository should be purchased over the counter. 7. Continue the blood thinner (Aspirin or Lovenox) for a total of 30 days following surgery. Follow up appointment:    Please call our office at [phone] for your follow up appointment. This should be scheduled 14 days following the date of surgery. You should also have a scheduled appointment for physical therapy following surgery. Physical Therapy / Nursing:    Physical Therapy following surgery will be arranged as an outpatient or at home. They have specific instructions for rehab and wound care. It is fine to have physical therapy remove your dressing at 7 days following surgery. Returning to work:    Normal return to work is 6-12 weeks following surgery. Depending on your progression following surgery and specific job duties you may take longer for a full return to work. DRIVING    You should not return to driving until you are off all opioid pain medications and able to safely and quickly apply the brakes.  This is normally 2-6 weeks for left sided surgery and 2-8 weeks for right sided surgery. Important Signs and Symptoms:    If any of the following signs or symptoms occur, you should contact Dr. Gabriel Daugherty office. Please be advised if a problem arises which you feel requires immediate medical attention or you are unable to contact Dr. Gabriel Daugherty office you should seek immediate medical attention at the ER or other health care facility you have access to.    1. A sudden increase in swelling and/or redness or warmth at the area your surgery was performed which isnt relieved by rest, ice, and elevation. 2. Oral temperature greater than 101 degrees for 12 hours or more which isnt relieved by an increase in fluid intake and taking 2 Tylenol every 4-6 hours. 3. Excessive drainage from your incisions, or drainage which hasnt stopped by 72 hours after your surgery. 4. Fever, chills, shortness of breath, chest pain, nausea, vomiting or other signs and symptoms which are of concern to you. FREQUENTLY ASKED QUESTIONS   What should I take for pain?  o In general you will be discharged with three medications for pain (Extra Strength Tylenol, Oxycodone 5mg and Tramadol). Gabapentin is also used for pain and taken as directed (100mg in the am and 300mg prior to bed at night). This may vary slightly depending on what you were taking in the hospital. USE ICE regularly, this is the most important modality for controlling pain. Scheduled Medications :      1. Tyleonol 1 tablet (325mg) to 2 tablets (650mg) every 4 hours. This should not exceed 4000mg in a 24 hour period. 2. Ibuprofen 800mg every 8 hours x 30 days. 3. Gabapentin 1 tablet (100 mg) in the morning with breakfast and 3 tablets (300 mg) at night before bed. Break through Pain Medications :       1st Line - Oxycodone 1 (5mg) - 2 (10mg) every 4 hours (Or as directed), take these between Tylenol / Ibuprofen doses if your pain is not below 4 / 10.   This may be needed only for several days following your discharge. Extra Strength Tylenol 1-2 tablets (500-1000mg) every 4-6 hrs. 2nd Line - Tramadol 50mg Every 8 hours for pain if not improving with the Tylenol and Oxycodone.  When should I call for advice regarding my pain?  o After 12 hours on the above regimen, if nothing is working call the office [phone], contact me through [address] or text / call my cell after hours [phone].  Can I get refills?  o Opioid refills are provided for the first 6 weeks following surgery. o Try Tylenol 325- 650mg along with Aleve 220-440mg or Motrin 200-800mg during the majority of the day. - Save the opioid pain medications for physical therapy (1 hour prior) and before sleeping at night. - Keep in mind you need to discontinue these medications prior to returning to driving.  Is swelling normal?  o Almost everyone has some degree of swelling following surgery. o Following hip and knee replacement surgery, swelling can be normal below the incision for the first 1-2 weeks. - This swelling peaks around 5-7 days after surgery.   - You may have some bruising around the back of the thigh, calf and even into the foot. - Use ice daily   What should I do for the swelling?  o Ice, Ice, Ice  o Keep the limb elevated. o Apply compression socks (knee high for total knees and up to the mid-thigh for total hips.   o Heat may also be applied, choose the modality that makes you the most comfortable.  How long should I remain on blood thinners following surgery?  o Thirty days   When can I drive?  o Once you have stopped using regular narcotic pain medications (Percocet, Lortab, etc.) and can safely apply the brakes without hesitation.  When can I shower? o 48-72hours following surgery if the incision is dry.  o No submersion of the incision, bathing or swimming for 14 days following surgery or until cleared by Dr Malena Penaloza.    What do I do with the dressing when I shower?  o The dressing can be removed after 7 days. o The incision is sealed with Dermabond (Biologic glue) and except for wounds which are draining should be watertight.  How active should I be following surgery?   o Progress activities in moderation at your own pace.   o Walk each day and set progressive goals with small increments (1st week - ½ block of walking, 2nd week - 1 block, 3rd week - 2 blocks, etc.)     Please do not hesitate to contact me through [employer] or by text / call me at [phone] (cell phone) for questions following surgery - MD Wes Ceballos MD  Cell [phone]  Ayesha Morfin [address] [phone]  Medical Assistants: [address]  [phone]

## 2019-11-18 ENCOUNTER — TELEPHONE (OUTPATIENT)
Dept: MEDSURG UNIT | Age: 64
End: 2019-11-18

## 2019-11-18 ENCOUNTER — PATIENT OUTREACH (OUTPATIENT)
Dept: OTHER | Age: 64
End: 2019-11-18

## 2019-11-18 NOTE — PROGRESS NOTES
Transition Of Care Note    Patient discharged from [facility] admitted on 19 and discharged on 11/15/19 for   Procedure: Procedure(s):  RIGHT TOTAL KNEE ARTHROPLASTY - [address]  Surgeon: Aby Elizabeth MD    Medical History:     Past Medical History:   Diagnosis Date    Abnormal finding on EKG     Infarct- ruled as a mis-read by cardiology after testing    Arthritis     Chronic back pain     s/p surgery    GERD (gastroesophageal reflux disease)     Hyperlipidemia     Hypertension     Hypogonadism, male 2013    total 97, free 1.8  13    Panic attacks        Care Manager contacted the patient by telephone to perform post hospital discharge assessment. Verified  and zip code with patient as identifiers. Provided introduction to self, and explanation of the Nurse Care Manager role. [employer] employee - spouse    Patient reported he is doing better in regards to pain after taking prescribed pain med and using ice (40 minutes on and 20 min off. ) Pt had spoken to ortho staff earlier in AM and reported 10/10 on pain scale, now he is at a 2/10. Pt reported entire right leg is \"black and blue\" and reported pain with entire leg. Ortho staff had advised to monitor for changes or s/s of DVT and to have staff evaluate next day at PT appt. Denies s/s of infection and dressing is dry and intact. Patient denies C/P, SOB, cough, wheezing, fever, N/V, diarrhea, difficulty urinating or constipation. BM today, denies blood. Appetite/hydration good. Patient's primary care provider relationship reviewed with patient and modified, as applicable. Red Flags:  A sudden increase in swelling and/or redness or warmth at the area your surgery was performed which isnt relieved by rest, ice, and elevation. 2. Oral temperature greater than 101 degrees for 12 hours or more which isnt relieved by an increase in fluid intake  and taking 2 Tylenol every 4-6 hours.   3. Excessive drainage from your incisions, or drainage which hasnt stopped by 72 hours after your surgery. 4. Fever, chills, shortness of breath, chest pain, nausea, vomiting or other signs and symptoms which are of concern  to you    Condition Focused Assessment:   Patient reports the following: Surgical/Wound Condition Focused Assessment    Skin- any open wounds or incisions? yes  Description and location of wound- right knee post surgical  In the last 24 hour have you experienced; Fever no    Low body temperature no    Chills or shaking no    Sweating no    Fast heart rate no    Fast breathing no    Dizziness/lightheadedness no    Confusion or unusual change in mental status no    Diarrhea no    Nausea no    Vomiting no    Shortness of breath or difficulty breathing no    Less urine output no    Cold, clammy, and pale skin no     Skin rash or skin color changes no  New or worsening pain? yes  If yes, pain rated 0-10: 2 Location/pain characteristics: right knee   New or worsening numbness or tingling? no  If yes, location of numbness and tingling: n/a    Patient reported important numbers to know:  Temperature afebrile   Heart rate 16   Last /72   Weight 182     Activity level- moving several times a day, or as recommended? yes  Abnormal activity level reported: yes   Bathing and showering instructions? yes  Nutrition- prescribed diet? yes   Tolerating food? yes  Hydration- (how much in ounces per day) >16 oz  Medications- new antibiotic? no             Pain medication? yes  Does patient understand how and when to take their medications? yes  If no, instructed patient on proper medication use? n/a  Does patient have incentive spirometer? no  If yes, how frequently is patient using incentive spirometer?  N/a deep breath cough exercises      Medication:   New Medications at Discharge:     aspirin delayed-release 81 mg tablet    gabapentin (NEURONTIN) 100 mg capsule    !! ibuprofen (MOTRIN) 800 mg tablet    oxyCODONE IR (ROXICODONE) 5 mg immediate release tablet    traMADol (ULTRAM) 50 mg tablet    acetaminophen (TYLENOL EXTRA STRENGTH) 500 mg tablet T   ondansetron (ZOFRAN ODT) 8 mg disintegrating tablet      Changed Medications at Discharge: no  Discontinued Medications at Discharge: no    Current Outpatient Medications   Medication Sig    aspirin delayed-release 81 mg tablet Take 1 Tab by mouth two (2) times a day.  gabapentin (NEURONTIN) 100 mg capsule Take 1 Cap by mouth ACB/HS. Max Daily Amount: 200 mg. T1 tablet at breakfast and 3 tablets at night.  ibuprofen (MOTRIN) 800 mg tablet Take 1 Tab by mouth every six (6) hours as needed for Pain.  oxyCODONE IR (ROXICODONE) 5 mg immediate release tablet Take 1 Tab by mouth every four (4) hours as needed for Pain for up to 7 days. Max Daily Amount: 30 mg. Indications: pain    traMADol (ULTRAM) 50 mg tablet Take 1 Tab by mouth every six (6) hours as needed for Pain (Take for breakthrough pain if Oxycodone is not working or when transitioning off Oxycodone) for up to 7 days. Max Daily Amount: 200 mg. Indications: pain, Post-op Pain, Diagnosis Hip and Knee Arthritis ICD 10 - M16.9    acetaminophen (TYLENOL EXTRA STRENGTH) 500 mg tablet Take 1-2 Tabs by mouth every six (6) hours as needed for Pain. Not to exceed 4,000mg in any 24 hour period  Indications: pain    lisinopril (PRINIVIL, ZESTRIL) 20 mg tablet Take 20 mg by mouth nightly.  ALPRAZolam (XANAX) 0.5 mg tablet TAKE 1 TABLET BY MOUTH EVERY 8 HOURS AS NEEDED FOR ANXIETY    atorvastatin (LIPITOR) 10 mg tablet TAKE ONE TABLET BY MOUTH DAILY    pantoprazole (PROTONIX) 40 mg tablet TAKE 1 TABLET BY MOUTH DAILY    ibuprofen (ADVIL) 200 mg tablet Take 600 mg by mouth every six (6) hours as needed for Pain.  testosterone cypionate (DEPOTESTOTERONE CYPIONATE) 200 mg/mL injection 80 mg by IntraMUSCular route every seven (7) days.     ondansetron (ZOFRAN ODT) 8 mg disintegrating tablet Take 0.5 Tabs by mouth every eight (8) hours as needed for Nausea.  tadalafil (CIALIS) 10 mg tablet TAKE 1 TAB BY MOUTH DAILY. AS NEEDED     No current facility-administered medications for this visit. There are no discontinued medications. Performed medication reconciliation with patient, and patient verbalizes understanding of administration of home medications. There were no barriers to obtaining medications identified at this time. Inpatient RRAT score: 2  Was this a readmission? no   Patient stated reason for the readmission: n/a    Barriers/Support system:  patient and spouse    Home health/DME in place per discharge orders - walker, outpt PT    Barriers/Challenges to Care: []  Decline in memory    []  Language barrier     []  Emotional                  [x]  Limited mobility  []  Lack of motivation     [] Vision, hearing or cognitive impairment [x]  Knowledge [] Financial Barriers []  Lack of support  [x]  Pain []  Other []  None    CM Identified  Problems   (contributing problems for risk for readmission)  1. Risk for infection  2. Risk for fall  3. Risk for DVT    Goals      Attends follow-up appointments as directed. PCP - TBD  Surgeon - 11/26/19         Knowledge and adherence of prescribed medication (ie. action, side effects, missed dose, etc.). Taking prescribed meds including pain med and ASA. Discussed possible issues with constipation and remedies.  Supportive resources in place to maintain patient in the community (ie. Home Health, DME equipment, refer to, medication assistant plan, etc.)      Will be attending outpt PT at [facility]  Provided contact info for [facility] 24/7  Need to activate [employer]       Understands red flags post discharge. A sudden increase in swelling and/or redness or warmth at the area your surgery was performed which isnt relieved by rest, ice, and elevation.   2. Oral temperature greater than 101 degrees for 12 hours or more which isnt relieved by an increase in fluid intake  and taking 2 Tylenol every 4-6 hours. 3. Excessive drainage from your incisions, or drainage which hasnt stopped by 72 hours after your surgery. 4. Fever, chills, shortness of breath, chest pain, nausea, vomiting or other signs and symptoms which are of concern  to you            Discharge Instructions :  Reviewed discharge instructions with patient. Patient verbalizes understanding of discharge instructions and follow-up care. Advance Care Planning:   Patient was offered the opportunity to discuss advance care planning:  no     Does patient have an Advance Directive:  no   If no, did you provide information on C[facility] Connections?  no     PCP/Specialist follow up: Patient scheduled to follow up with MD Chichi Mills [id number]  Surgeon - 11/26/19    Future Appointments   Date Time Provider Sarah Crawley   11/19/2019  9:00 AM Jennifer Zabala PT [facility]      Reviewed red flags with patient, and patient verbalizes understanding. Patient given an opportunity to ask questions. No other clinical/social/functional needs noted. The patient agrees to contact the PCP office for questions related to their healthcare. The patient expressed thanks, offered no additional questions and ended the call.       CM will f/u in 3 weeks

## 2019-11-18 NOTE — TELEPHONE ENCOUNTER
Phone call made to patient after hospital discharge from surgical joint replacement. 1. States discharge instructions were clear and easy to understand has no questions  2. Patient was able to fill prescriptions and has no medications questions. 3. States pain is severe 10/10, has texted   4. Bruising moderate, swelling is moderate, is keeping it elevated with ice, has swelling and bruising in lower leg and pain, educated on signs of blood clot, encouraged patient to text   5. States dressing intact without drainage  6. Denies N/V, has a loss of appetite, is drinking protein shakes, mild constipation had BM this am  7. Mobility using a walker without difficulty, is not moving every hour, encouraged patient to do so   8. Only able to do a few exercises   9. Patient states needs were met by the staff while in the hospital  10. Follow up appointment scheduled for next week  11.  PT scheduled for tomorrow, but he does not think he will go, educated about the importance on going to PT   Opportunity given for patient to ask questions  Secure text sent to  and [facility] [place] with information

## 2019-11-19 ENCOUNTER — HOSPITAL ENCOUNTER (OUTPATIENT)
Dept: PHYSICAL THERAPY | Age: 64
Discharge: HOME | End: 2019-11-19
Payer: COMMERCIAL

## 2019-11-19 PROCEDURE — 97162 PT EVAL MOD COMPLEX 30 MIN: CPT | Performed by: PHYSICAL THERAPIST

## 2019-11-19 PROCEDURE — 97016 VASOPNEUMATIC DEVICE THERAPY: CPT | Performed by: PHYSICAL THERAPIST

## 2019-11-19 PROCEDURE — 97110 THERAPEUTIC EXERCISES: CPT | Performed by: PHYSICAL THERAPIST

## 2019-11-19 NOTE — PROGRESS NOTES
[address] [facility]  [place], [address] Ledbetter[address]  Phone: [phone]  Fax: [phone]    Plan of Care/ Statement of Necessity for Physical Therapy Services 2-15    Patient name: Hussain Bolden  : 1955  Provider#: [id number]  Referral source: Jaquan Sandoval MD      Medical/Treatment Diagnosis: Right knee pain [M25.561]     Prior Hospitalization: see medical history     Comorbidities: None  Prior Level of Function: see initial eval  Medications: Verified on Patient Summary List    Start of Care: 19      Onset Date: 19       The Plan of Care and following information is based on the information from the initial evaluation. Assessment/ key information: Patient is POD #5 following R TKA. He is doing well and is meeting current ROM goal, which is limited to 45 degrees of knee flexion per surgeon's orders. Today he tolerated an introduction of therapeutic exercises focused on quad control very well. Evaluation Complexity History MEDIUM  Complexity : 1-2 comorbidities / personal factors will impact the outcome/ POC ; Examination MEDIUM Complexity : 3 Standardized tests and measures addressing body structure, function, activity limitation and / or participation in recreation  ;Presentation MEDIUM Complexity : Evolving with changing characteristics  ; Clinical Decision Making MEDIUM Complexity : FOTO score of 26-74  Overall Complexity Rating: MEDIUM    Problem List: pain affecting function, decrease ROM, decrease strength, edema affecting function, impaired gait/ balance, decrease ADL/ functional abilitiies, decrease activity tolerance, decrease flexibility/ joint mobility and decrease transfer abilities   Treatment Plan may include any combination of the following: Therapeutic exercise, Therapeutic activities, Neuromuscular re-education, Physical agent/modality, Gait/balance training, Manual therapy, Patient education, Self Care training, Functional mobility training, Home safety training and Stair training  Patient / Family readiness to learn indicated by: asking questions, trying to perform skills and interest  Persons(s) to be included in education: patient (P)  Barriers to Learning/Limitations: None  Patient Goal (s): I want to be able to return to work as soon as possible.   Patient Self Reported Health Status: excellent  Rehabilitation Potential: excellent    Short Term Goals: To be accomplished in 12 treatments:  1. Patient will be able to ambulate a curb with the R LE with no AD. 2. Patient will be able to ambulate two blocks with no AD. 3. Patient will be able to perform a sit-to-stand transfer with no UE support. 4. Patient will be able to perform a sit->supine task with no assistance to the R LE. Long Term Goals: To be accomplished in 24 treatments:  1. Patient will be able to walk 1 mile with no pain or limitation. 2. Patient will be able to stand for an hour with no pain or limitation. 3. Patient will be able to ascend a 24 ft. Ladder with no pain or limitation. 3. Patient will be able to ambulate a flight of stairs with no pain or limitation. Frequency / Duration: Patient to be seen 2 times per week for 8 weeks. Patient/ Caregiver education and instruction: self care, activity modification and exercises    [x]  Plan of care has been reviewed with PASCUAL Allen, PT 11/19/2019     ________________________________________________________________________    I certify that the above Therapy Services are being furnished while the patient is under my care. I agree with the treatment plan and certify that this therapy is necessary.     [de-identified] Signature:____________________  Date:____________Time: _________

## 2019-11-19 NOTE — PROGRESS NOTES
PT INITIAL EVALUATION NOTE 2-15    Patient Name: Natali Landry  Date:2019  : 1955  [x]  Patient  Verified  Payor: Iris Alvarado / Plan: Beckie Toro / Product Type: PPO /    In time:12:00 PM  Out time:1:00 PM  Total Treatment Time (min): 60  Visit #: 1     Treatment Area: Right knee pain [M25.561]    SUBJECTIVE  Pain Level (0-10 scale): 3/10  Any medication changes, allergies to medications, adverse drug reactions, diagnosis change, or new procedure performed?: [] No    [x] Yes (see summary sheet for update)  Subjective:     R TKA  PLOF: Limited in walking, squatting e  Mechanism of Injury: Patient had a R TKA makoplasty performed on 19 by Dr. David Solis. He was discharged from the hospital 24 hrs after surgery and opted for OP PT versus HH PT. He was ordered to avoid knee flexion >45 degrees until his next f/u visit with the surgeon's office next week. Previous Treatment/Compliance: He has been seen at this clinic several times before post-op shoulder surgery. 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 Most challenging work tasks is climbing ladders. Living Situation: Lives in a one story home with wife  Pt Goals: to reduce pain and return to work ASAP  Barriers: none  Motivation: motivated  Substance use: none   FABQ Score: none  Cognition: A & O x 4        OBJECTIVE/EXAMINATION  Gait and Functional Mobility:    Gait: RW, mod I, limited to distances <50 ft.  Due to fatigue  Transfers: Independent, requires UE assistance  Bed mobility: Utilizes L LE for R LE management, independent  Swelling:  Circumferential:   Suprapatellar: 43 cm   Mid-patellar: 41 cm   Infrapatellar: 39 cm    Joint Mobility: Patellofemoral: NT      AROM, PROM:        R  L  Knee Flexion  45  130       Knee Extension 0  0             MMT:    R  L  HS    3+/5  5/5  Quadriceps  3+/5  5/5    Integumentary: Incision site is healing well, small amount of bleeding, no signs of infection    Modality rationale: decrease edema, decrease inflammation and decrease pain to improve the patients ability to walk without pain   Min Type Additional Details    [] Estim: []Att   []Unatt        []TENS instruct                  []IFC  []Premod   []NMES                     []Other:  []w/US   []w/ice   []w/heat  Position:  Location:    []  Traction: [] Cervical       []Lumbar                       [] Prone          []Supine                       []Intermittent   []Continuous Lbs:  [] before manual  [] after manual  []w/heat    []  Ultrasound: []Continuous   [] Pulsed at:                            []1MHz   []3MHz Location:  W/cm2:    []  Paraffin         Location:  []w/heat    []  Ice     []  Heat  []  Ice massage Position:  Location:    []  Laser  []  Other: Position:  Location:   15 [x]  Vasopneumatic Device Pressure:       [x] lo [] med [] hi   Temperature: 34   [x] Skin assessment post-treatment:  [x]intact []redness- no adverse reaction    []redness - adverse reaction:     25 min Therapeutic Exercise:  [x] See flow sheet :   Rationale: increase ROM, increase strength, improve coordination, improve balance and increase proprioception to improve the patients ability to walk without pain    With   [] TE   [] TA   [] neuro   [] other: Patient Education: [x] Review HEP    [] Progressed/Changed HEP based on:   [] positioning   [] body mechanics   [] transfers   [] heat/ice application    [] other:        Other Objective/Functional Measures:  Max fatigue and increased patellofemoral pain with SLR's    Pain Level (0-10 scale) post treatment: 3      ASSESSMENT:      [x]  See Plan of Enrrique Lea, PT , DPT, OCS, Cert.  DN   11/19/2019

## 2019-11-21 ENCOUNTER — APPOINTMENT (OUTPATIENT)
Dept: PHYSICAL THERAPY | Age: 64
End: 2019-11-21
Payer: COMMERCIAL

## 2019-11-25 ENCOUNTER — APPOINTMENT (OUTPATIENT)
Dept: PHYSICAL THERAPY | Age: 64
End: 2019-11-25
Payer: COMMERCIAL

## 2019-11-26 ENCOUNTER — HOSPITAL ENCOUNTER (OUTPATIENT)
Dept: PHYSICAL THERAPY | Age: 64
Discharge: HOME | End: 2019-11-26
Payer: COMMERCIAL

## 2019-11-26 PROCEDURE — 97110 THERAPEUTIC EXERCISES: CPT | Performed by: PHYSICAL MEDICINE & REHABILITATION

## 2019-11-26 PROCEDURE — 97016 VASOPNEUMATIC DEVICE THERAPY: CPT | Performed by: PHYSICAL MEDICINE & REHABILITATION

## 2019-11-27 ENCOUNTER — APPOINTMENT (OUTPATIENT)
Dept: PHYSICAL THERAPY | Age: 64
End: 2019-11-27
Payer: COMMERCIAL

## 2019-12-02 ENCOUNTER — HOSPITAL ENCOUNTER (OUTPATIENT)
Dept: PHYSICAL THERAPY | Age: 64
Discharge: HOME | End: 2019-12-02
Payer: COMMERCIAL

## 2019-12-02 PROCEDURE — 97014 ELECTRIC STIMULATION THERAPY: CPT | Performed by: PHYSICAL THERAPIST

## 2019-12-02 PROCEDURE — 97016 VASOPNEUMATIC DEVICE THERAPY: CPT | Performed by: PHYSICAL THERAPIST

## 2019-12-02 PROCEDURE — 97140 MANUAL THERAPY 1/> REGIONS: CPT | Performed by: PHYSICAL THERAPIST

## 2019-12-02 PROCEDURE — 97110 THERAPEUTIC EXERCISES: CPT | Performed by: PHYSICAL THERAPIST

## 2019-12-02 NOTE — PROGRESS NOTES
PT DAILY TREATMENT NOTE 2-15    Patient Name: Sarah Vaz  Date:2019  : 1955  [x]  Patient  Verified  Payor: Jasmin Gonzalez / Plan: Usha Rhodes / Product Type: PPO /    In time:10:15 AM  Out time:11:15 AM  Total Treatment Time (min): 60  Visit #:  3    Treatment Area: Right knee pain [M25.561]    SUBJECTIVE  Pain Level (0-10 scale): 3/10  Any medication changes, allergies to medications, adverse drug reactions, diagnosis change, or new procedure performed?: [x] No    [] Yes (see summary sheet for update)  Subjective functional status/changes:   [] No changes reported  Patient reports that he has been cleared by the surgeon's office for full ROM. He has been active with his HEP, but it is causing increased soreness along his calf. OBJECTIVE                Modality rationale: decrease edema, decrease inflammation and decrease pain to improve the patients ability to walk without pain    Min Type Additional Details     []? Estim: []? Att   []? Unatt        []? TENS instruct                  []?IFC  []? Premod   []? NMES                     []?Other:  []?w/US   []?w/ice   []?w/heat  Position:  Location:     []? Traction: []? Cervical       []? Lumbar                       []? Prone          []? Supine                       []?Intermittent   []? Continuous Lbs:  []? before manual  []? after manual  []?w/heat     []? Ultrasound: []? Continuous   []? Pulsed at:                            []? 1MHz   []? 3MHz Location:  W/cm2:     []? Paraffin         Location:  []?w/heat     []? Ice     []? Heat  []? Ice massage Position:  Location:     []? Laser  []? Other: Position:  Location:   15 [x]? Vasopneumatic Device Pressure:       [x]? lo []? med []? hi   Temperature: 34   [x]? Skin assessment post-treatment:  [x]? intact []? redness- no adverse reaction    []? redness - adverse reaction:      30 min Therapeutic Exercise:  [x]?  See flow sheet :   Rationale: increase ROM, increase strength, improve coordination, improve balance and increase proprioception to improve the patients ability to walk without pain    10 min Manual Therapy:  Passive stretching into flexion and extension   Rationale: increase ROM, increase strength, improve coordination, improve balance and increase proprioception to improve the patients ability to walk without pain            With   [] TE   [] TA   [] neuro   [] other: Patient Education: [x] Review HEP    [] Progressed/Changed HEP based on:   [] positioning   [] body mechanics   [] transfers   [] heat/ice application    [] other:      Other Objective/Functional Measures:   ROM:   Pre:    Ext: lacking 5 degrees    Flex: 90   Post:     Ext: 0    Flex: 105    Pain Level (0-10 scale) post treatment: 0    ASSESSMENT/Changes in Function:   Patient is progressing well with ROM goals. Patient will continue to benefit from skilled PT services to modify and progress therapeutic interventions, address functional mobility deficits, address ROM deficits, address strength deficits, analyze and address soft tissue restrictions, analyze and cue movement patterns, analyze and modify body mechanics/ergonomics and assess and modify postural abnormalities to attain remaining goals. []  See Plan of Care  []  See progress note/recertification  []  See Discharge Summary         Progress towards goals / Updated goals:  Patient tolerated today's therapy well and is showing good initial progress towards short term goals. PLAN  [x]  Upgrade activities as tolerated     [x]  Continue plan of care  []  Update interventions per flow sheet       []  Discharge due to:_  []  Other:_      Venkatesh Quevedo, PT , DPT, OCS, Cert.  DN   12/2/2019

## 2019-12-04 ENCOUNTER — HOSPITAL ENCOUNTER (OUTPATIENT)
Dept: PHYSICAL THERAPY | Age: 64
Discharge: HOME | End: 2019-12-04
Payer: COMMERCIAL

## 2019-12-04 PROCEDURE — 97110 THERAPEUTIC EXERCISES: CPT | Performed by: PHYSICAL MEDICINE & REHABILITATION

## 2019-12-04 PROCEDURE — 97016 VASOPNEUMATIC DEVICE THERAPY: CPT | Performed by: PHYSICAL MEDICINE & REHABILITATION

## 2019-12-04 NOTE — PROGRESS NOTES
PT DAILY TREATMENT NOTE 2-15    Patient Name: Mavis Ruffin  Date:2019  : 1955  [x]  Patient  Verified  Payor: Shiv Patel / Plan: Krishna Olivares / Product Type: PPO /    In time:930 AM  Out time:1030 AM  Total Treatment Time (min): 60  Visit #:  2    Treatment Area: Right knee pain [M25.561]    SUBJECTIVE  Pain Level (0-10 scale): 3/10  Any medication changes, allergies to medications, adverse drug reactions, diagnosis change, or new procedure performed?: [x] No    [] Yes (see summary sheet for update)  Subjective functional status/changes:   [] No changes reported  Patient reports that he has been cleared by the surgeon's office for full ROM. OBJECTIVE                Modality rationale: decrease edema, decrease inflammation and decrease pain to improve the patients ability to walk without pain    Min Type Additional Details     []? Estim: []? Att   []? Unatt        []? TENS instruct                  []?IFC  []? Premod   []? NMES                     []?Other:  []?w/US   []?w/ice   []?w/heat  Position:  Location:     []? Traction: []? Cervical       []? Lumbar                       []? Prone          []? Supine                       []?Intermittent   []? Continuous Lbs:  []? before manual  []? after manual  []?w/heat     []? Ultrasound: []? Continuous   []? Pulsed at:                            []? 1MHz   []? 3MHz Location:  W/cm2:     []? Paraffin         Location:  []?w/heat     []? Ice     []? Heat  []? Ice massage Position:  Location:     []? Laser  []? Other: Position:  Location:   15 [x]? Vasopneumatic Device Pressure:       [x]? lo []? med []? hi   Temperature: 34   [x]? Skin assessment post-treatment:  [x]? intact []? redness- no adverse reaction    []? redness - adverse reaction:      45 min Therapeutic Exercise:  [x]?  See flow sheet :   Rationale: increase ROM, increase strength, improve coordination, improve balance and increase proprioception to improve the patients ability to walk without pain            With   [] TE   [] TA   [] neuro   [] other: Patient Education: [x] Review HEP    [] Progressed/Changed HEP based on:   [] positioning   [] body mechanics   [] transfers   [] heat/ice application    [] other:      Other Objective/Functional Measures:     Pain Level (0-10 scale) post treatment: 0    ASSESSMENT/Changes in Function:     Patient will continue to benefit from skilled PT services to modify and progress therapeutic interventions, address functional mobility deficits, address ROM deficits, address strength deficits, analyze and address soft tissue restrictions, analyze and cue movement patterns, analyze and modify body mechanics/ergonomics and assess and modify postural abnormalities to attain remaining goals. []  See Plan of Care  []  See progress note/recertification  []  See Discharge Summary         Progress towards goals / Updated goals:  Patient tolerated today's therapy well and is showing good initial progress towards short term goals.     PLAN  [x]  Upgrade activities as tolerated     [x]  Continue plan of care  [x]  Update interventions per flow sheet       []  Discharge due to:_  []  Other:_      Ramona Mathews , PTA, OPTA, CPT  12/4/2019

## 2019-12-04 NOTE — PROGRESS NOTES
PT DAILY TREATMENT NOTE 2-15    Patient Name: Arun Montes  Date:2019  : 1955  [x]  Patient  Verified  Payor: Latrell Kelley / Plan: Bhupendra Mathews / Product Type: PPO /    In time:1100 AM  Out time:1200 PM  Total Treatment Time (min): 60  Visit #:  4    Treatment Area: Right knee pain [M25.561]    SUBJECTIVE  Pain Level (0-10 scale): 2-3/10  Any medication changes, allergies to medications, adverse drug reactions, diagnosis change, or new procedure performed?: [x] No    [] Yes (see summary sheet for update)  Subjective functional status/changes:   [] No changes reported  Patient reports that he is happy with the progress he is making each week. OBJECTIVE            Modality rationale: decrease edema, decrease inflammation and decrease pain to improve the patients ability to walk without pain    Min Type Additional Details     []? Estim: []? Att   []? Unatt        []? TENS instruct                  []?IFC  []? Premod   []? NMES                     []?Other:  []?w/US   []?w/ice   []?w/heat  Position:  Location:     []? Traction: []? Cervical       []? Lumbar                       []? Prone          []? Supine                       []?Intermittent   []? Continuous Lbs:  []? before manual  []? after manual  []?w/heat     []? Ultrasound: []? Continuous   []? Pulsed at:                            []? 1MHz   []? 3MHz Location:  W/cm2:     []? Paraffin         Location:  []?w/heat     []? Ice     []? Heat  []? Ice massage Position:  Location:     []? Laser  []? Other: Position:  Location:   15 [x]? Vasopneumatic Device Pressure:       [x]? lo []? med []? hi   Temperature: 34   [x]? Skin assessment post-treatment:  [x]? intact []? redness- no adverse reaction    []? redness - adverse reaction:      45 min Therapeutic Exercise:  [x]?  See flow sheet :   Rationale: increase ROM, increase strength, improve coordination, improve balance and increase proprioception to improve the patients ability to walk without pain          With   [] TE   [] TA   [] neuro   [] other: Patient Education: [x] Review HEP    [] Progressed/Changed HEP based on:   [] positioning   [] body mechanics   [] transfers   [] heat/ice application    [] other:      Other Objective/Functional Measures:   Knee flexion PROM 105    Pain Level (0-10 scale) post treatment: 0    ASSESSMENT/Changes in Function:     Patient will continue to benefit from skilled PT services to modify and progress therapeutic interventions, address functional mobility deficits, address ROM deficits, address strength deficits, analyze and address soft tissue restrictions, analyze and cue movement patterns, analyze and modify body mechanics/ergonomics and assess and modify postural abnormalities to attain remaining goals. []  See Plan of Care  []  See progress note/recertification  []  See Discharge Summary         Progress towards goals / Updated goals:  Patient tolerated today's therapy well and is showing good initial progress towards short term goals.     PLAN  [x]  Upgrade activities as tolerated     [x]  Continue plan of care  []  Update interventions per flow sheet       []  Discharge due to:_  []  Other:_      Joan Ards , PTA, OPTA, CPT 12/4/2019

## 2019-12-05 ENCOUNTER — APPOINTMENT (OUTPATIENT)
Dept: PHYSICAL THERAPY | Age: 64
End: 2019-12-05
Payer: COMMERCIAL

## 2019-12-09 ENCOUNTER — APPOINTMENT (OUTPATIENT)
Dept: PHYSICAL THERAPY | Age: 64
End: 2019-12-09
Payer: COMMERCIAL

## 2019-12-10 ENCOUNTER — PATIENT OUTREACH (OUTPATIENT)
Dept: OTHER | Age: 64
End: 2019-12-10

## 2019-12-10 NOTE — PROGRESS NOTES
[facility]    Goals      Attends follow-up appointments as directed. PCP - TBD  Surgeon - 11/26/19    12/10/19 call placed to pt, no answer. VM left to return call. Pt attended appt with surgeon 11/26/19 F/U 1/10/20.  Knowledge and adherence of prescribed medication (ie. action, side effects, missed dose, etc.). Taking prescribed meds including pain med and ASA. Discussed possible issues with constipation and remedies.  Supportive resources in place to maintain patient in the community (ie. Home Health, DME equipment, refer to, medication assistant plan, etc.)      Will be attending outpt PT at [facility]  Provided contact info for [facility] 24/7  Need to activate [address]    12/10/19 attending outpt PT.       Gilbertharsha Ruizlopez Understands red flags post discharge. A sudden increase in swelling and/or redness or warmth at the area your surgery was performed which isnt relieved by rest, ice, and elevation. 2. Oral temperature greater than 101 degrees for 12 hours or more which isnt relieved by an increase in fluid intake  and taking 2 Tylenol every 4-6 hours. 3. Excessive drainage from your incisions, or drainage which hasnt stopped by 72 hours after your surgery. 4. Fever, chills, shortness of breath, chest pain, nausea, vomiting or other signs and symptoms which are of concern  to you            CM will f/u in 3 weeks.

## 2019-12-12 ENCOUNTER — APPOINTMENT (OUTPATIENT)
Dept: PHYSICAL THERAPY | Age: 64
End: 2019-12-12
Payer: COMMERCIAL

## 2019-12-16 ENCOUNTER — HOSPITAL ENCOUNTER (OUTPATIENT)
Dept: PHYSICAL THERAPY | Age: 64
Discharge: HOME | End: 2019-12-16
Payer: COMMERCIAL

## 2019-12-16 PROCEDURE — 97110 THERAPEUTIC EXERCISES: CPT | Performed by: PHYSICAL MEDICINE & REHABILITATION

## 2019-12-16 NOTE — PROGRESS NOTES
PT DAILY TREATMENT NOTE - [facility] 2-15    Patient Name: Ibis Adorno  Date:2019  : 1955  [x]  Patient  Verified  Payor: Reji Chaudhari / Plan: Pau Dietrich / Product Type: PPO /    In time:12:00  Out time:1;00  Total Treatment Time (min): 60  Total Timed Codes (min): 45  1:1 Treatment Time ( only): -   Visit #: 5      Treatment Area: Right knee pain [M25.561]    SUBJECTIVE  Pain Level (0-10 scale): 0  Any medication changes, allergies to medications, adverse drug reactions, diagnosis change, or new procedure performed?: [x] No    [] Yes (see summary sheet for update)  Subjective functional status/changes:   [] No changes reported  Pt C/o of joint stiffness in right knee.     OBJECTIVE    Modality rationale: decrease edema, decrease inflammation, increase tissue extensibility and increase muscle contraction/control to improve the patients ability to improve adl's, increase cardiovascular endurance, increase lesvia to therapeutic exercise    Min Type Additional Details    [] Estim: []Att   []Unatt        []TENS instruct                  []IFC  []Premod   []NMES                     []Other:  []w/US   []w/ice   []w/heat  Position:  Location:    []  Traction: [] Cervical       []Lumbar                       [] Prone          []Supine                       []Intermittent   []Continuous Lbs:  [] before manual  [] after manual  []w/heat    []  Ultrasound: []Continuous   [] Pulsed at:                           []1MHz   []3MHz Location:  W/cm2:    [] Paraffin         Location:   []w/heat   15 [x]  Ice     []  Heat  []  Ice massage Position: Supine  Location: R knee    []  Laser  []  Other: Position:  Location:      []  Vasopneumatic Device Pressure:       [] lo [] med [] hi   Temperature:      [x] Skin assessment post-treatment:  [x]intact []redness- no adverse reaction    []redness - adverse reaction:     45 min Therapeutic Exercise:  [x] See flow sheet :   Rationale: increase ROM, increase strength, improve coordination, improve balance and increase proprioception to improve the patients ability to increase ability to perform ADL's and exercise lesvia. With   [x] TE   [] TA   [] neuro   [] other: Patient Education: [x] Review HEP    [] Progressed/Changed HEP based on:   [x] positioning   [] body mechanics   [] transfers   [] heat/ice application    [] other:      Other Objective/Functional Measures: pt exhibits difficulty with balance exercises. Patient needed breaks throughout today's session due to fatigue. Pain Level (0-10 scale) post treatment: 0    ASSESSMENT/Changes in Function:     Patient will continue to benefit from skilled PT services to modify and progress therapeutic interventions, address functional mobility deficits, address ROM deficits, address strength deficits, analyze and address soft tissue restrictions, analyze and cue movement patterns, analyze and modify body mechanics/ergonomics, assess and modify postural abnormalities and address imbalance/dizziness to attain remaining goals. []  See Plan of Care  []  See progress note/recertification  []  See Discharge Summary         Progress towards goals / Updated goals:  Pt is making good progress towards goals. Pt requires min vc and tactile cues to ensure proper body mechanics and hand/foot placement with balance and therapeutic exercises. Will cont to assess pt throughout POC.      PLAN  [x]  Upgrade activities as tolerated     [x]  Continue plan of care  [x]  Update interventions per flow sheet       []  Discharge due to:_  []  Other:_      Perla Jade PTA, OPTA, CPT  12/16/2019

## 2019-12-19 ENCOUNTER — APPOINTMENT (OUTPATIENT)
Dept: PHYSICAL THERAPY | Age: 64
End: 2019-12-19
Payer: COMMERCIAL

## 2020-01-03 ENCOUNTER — PATIENT OUTREACH (OUTPATIENT)
Dept: OTHER | Age: 65
End: 2020-01-03

## 2020-02-12 RX ORDER — TADALAFIL 10 MG/1
TABLET ORAL
Qty: 8 TAB | Refills: 1 | Status: SHIPPED | OUTPATIENT
Start: 2020-02-12 | End: 2020-05-24

## 2020-03-02 DIAGNOSIS — F41.0 PANIC ATTACK: ICD-10-CM

## 2020-03-02 RX ORDER — PANTOPRAZOLE SODIUM 40 MG/1
TABLET, DELAYED RELEASE ORAL
Qty: 30 TAB | Refills: 5 | Status: SHIPPED | OUTPATIENT
Start: 2020-03-02 | End: 2020-07-09 | Stop reason: SDUPTHER

## 2020-03-07 NOTE — ANESTHESIA POSTPROCEDURE EVALUATION
Procedure(s):  RIGHT TOTAL KNEE ARTHROPLASTY - MAKOPLASTY. general    Anesthesia Post Evaluation      Multimodal analgesia: multimodal analgesia used between 6 hours prior to anesthesia start to PACU discharge  Patient location during evaluation: bedside  Patient participation: complete - patient participated  Level of consciousness: awake  Pain management: adequate  Airway patency: patent  Anesthetic complications: no  Cardiovascular status: acceptable  Respiratory status: acceptable  Hydration status: acceptable        Vitals Value Taken Time   /108 11/14/2019  4:26 PM   Temp 36.3 °C (97.4 °F) 11/14/2019  4:02 PM   Pulse 69 11/14/2019  4:30 PM   Resp 17 11/14/2019  4:30 PM   SpO2 100 % 11/14/2019  4:30 PM   Vitals shown include unvalidated device data. EMS/Patient

## 2020-03-11 NOTE — PROGRESS NOTES
[address] [place] [facility] Kellee Tyson 58  Phone: [phone]  Fax: [phone]    Discharge Summary  2-15    Patient name: Chandler Tobias  : 1955  Provider#: [id number]  Referral source: Miya Cardona MD      Medical/Treatment Diagnosis: Right knee pain [M25.561]     Prior Hospitalization: see medical history     Comorbidities: See Plan of Care  Prior Level of Function:See Plan of Care  Medications: Verified on Patient Summary List    Start of Care: 19      Onset Date:19   Visits from Start of Care: 5     Missed Visits: 2 cancellations, 1 no show  Reporting Period : 19 to 19      ASSESSMENT/SUMMARY OF CARE: The patient was treated for 5 PT visits to address ROM limitations and weakness along the R LE following a TKA. He responded very well to PT and met all short term goals. He did not return to therapy following his last visit on 2019 and will now be discharged.     Short Term Goals: To be accomplished in 12 treatments:  1. Patient will be able to ambulate a curb with the R LE with no AD. Met.  2. Patient will be able to ambulate two blocks with no AD. Met.  3. Patient will be able to perform a sit-to-stand transfer with no UE support. Met.  4. Patient will be able to perform a sit->supine task with no assistance to the R LE. Met. Long Term Goals: To be accomplished in 24 treatments:  1. Patient will be able to walk 1 mile with no pain or limitation. Progressing towards goal.  2. Patient will be able to stand for an hour with no pain or limitation. Progressing towards goal.  3. Patient will be able to ascend a 24 ft. Ladder with no pain or limitation. Progressing towards goal.  4. Patient will be able to ambulate a flight of stairs with no pain or limitation.  Progressing towards goal.        RECOMMENDATIONS:  [x]Discontinue therapy: [x]Patient has reached or is progressing toward set goals      []Patient is non-compliant or has abdicated      []Due to lack of appreciable progress towards set goals      []Other    Varsha Anderson, PT 3/11/2020

## 2020-03-20 DIAGNOSIS — F41.9 ANXIETY: ICD-10-CM

## 2020-03-20 RX ORDER — ALPRAZOLAM 0.5 MG/1
TABLET ORAL
Qty: 30 TAB | Refills: 0 | Status: SHIPPED | OUTPATIENT
Start: 2020-03-20 | End: 2020-07-14 | Stop reason: SDUPTHER

## 2020-03-24 RX ORDER — LISINOPRIL 20 MG/1
TABLET ORAL
Qty: 30 TAB | Refills: 0 | Status: SHIPPED | OUTPATIENT
Start: 2020-03-24 | End: 2020-06-29 | Stop reason: SDUPTHER

## 2020-05-24 RX ORDER — TADALAFIL 10 MG/1
TABLET ORAL
Qty: 8 TAB | Refills: 0 | Status: SHIPPED | OUTPATIENT
Start: 2020-05-24 | End: 2020-08-26

## 2020-06-29 DIAGNOSIS — E78.5 DYSLIPIDEMIA, GOAL LDL BELOW 100: ICD-10-CM

## 2020-06-29 RX ORDER — LISINOPRIL 20 MG/1
20 TABLET ORAL DAILY
Qty: 30 TAB | Refills: 0 | Status: SHIPPED | OUTPATIENT
Start: 2020-06-29 | End: 2020-07-07 | Stop reason: SDUPTHER

## 2020-06-29 RX ORDER — ATORVASTATIN CALCIUM 10 MG/1
10 TABLET, FILM COATED ORAL DAILY
Qty: 30 TAB | Refills: 0 | Status: SHIPPED | OUTPATIENT
Start: 2020-06-29 | End: 2020-07-07 | Stop reason: SDUPTHER

## 2020-07-02 ENCOUNTER — TELEPHONE (OUTPATIENT)
Dept: INTERNAL MEDICINE CLINIC | Age: 65
End: 2020-07-02

## 2020-07-02 DIAGNOSIS — F41.0 PANIC ATTACK: ICD-10-CM

## 2020-07-02 DIAGNOSIS — E78.5 DYSLIPIDEMIA, GOAL LDL BELOW 100: ICD-10-CM

## 2020-07-02 NOTE — TELEPHONE ENCOUNTER
----- Message from Dorota Banda sent at 7/2/2020 10:39 AM EDT -----  Regarding: DR Mark Jones / REFILL  General Message/Vendor [id number] [facility] is requesting     \"ATORVASTATIN 10 MG \"    \"LISINOPRIL 20 MG\"    \"IBURYJDQRWNN 80 MG\"         Callback required       Best contact number(s): [phone]    FAX:  [phone]            Dorota Banda

## 2020-07-07 ENCOUNTER — OFFICE VISIT (OUTPATIENT)
Dept: INTERNAL MEDICINE CLINIC | Age: 65
End: 2020-07-07

## 2020-07-07 ENCOUNTER — HOSPITAL ENCOUNTER (OUTPATIENT)
Dept: LAB | Age: 65
Discharge: HOME | End: 2020-07-07

## 2020-07-07 VITALS
HEIGHT: 73 IN | DIASTOLIC BLOOD PRESSURE: 76 MMHG | HEART RATE: 68 BPM | WEIGHT: 180 LBS | BODY MASS INDEX: 23.86 KG/M2 | OXYGEN SATURATION: 98 % | SYSTOLIC BLOOD PRESSURE: 112 MMHG | RESPIRATION RATE: 18 BRPM

## 2020-07-07 DIAGNOSIS — F41.0 PANIC ATTACK: ICD-10-CM

## 2020-07-07 DIAGNOSIS — Z98.890 H/O COLONOSCOPY: ICD-10-CM

## 2020-07-07 DIAGNOSIS — Z12.5 PROSTATE CANCER SCREENING: ICD-10-CM

## 2020-07-07 DIAGNOSIS — E78.5 DYSLIPIDEMIA, GOAL LDL BELOW 100: ICD-10-CM

## 2020-07-07 DIAGNOSIS — E78.5 DYSLIPIDEMIA, GOAL LDL BELOW 100: Primary | ICD-10-CM

## 2020-07-07 DIAGNOSIS — I10 HTN, GOAL BELOW 130/80: ICD-10-CM

## 2020-07-07 LAB
ALBUMIN SERPL-MCNC: 4.3 G/DL (ref 3.5–5)
ALBUMIN/GLOB SERPL: 1.5 {RATIO} (ref 1.1–2.2)
ALP SERPL-CCNC: 77 U/L (ref 45–117)
ALT SERPL-CCNC: 24 U/L (ref 12–78)
ANION GAP SERPL CALC-SCNC: 5 MMOL/L (ref 5–15)
AST SERPL-CCNC: 17 U/L (ref 15–37)
BILIRUB SERPL-MCNC: 0.7 MG/DL (ref 0.2–1)
BUN SERPL-MCNC: 24 MG/DL (ref 6–20)
BUN/CREAT SERPL: 22 (ref 12–20)
CALCIUM SERPL-MCNC: 9.2 MG/DL (ref 8.5–10.1)
CHLORIDE SERPL-SCNC: 105 MMOL/L (ref 97–108)
CHOLEST SERPL-MCNC: 151 MG/DL
CO2 SERPL-SCNC: 27 MMOL/L (ref 21–32)
CREAT SERPL-MCNC: 1.11 MG/DL (ref 0.7–1.3)
GLOBULIN SER CALC-MCNC: 2.8 G/DL (ref 2–4)
GLUCOSE SERPL-MCNC: 96 MG/DL (ref 65–100)
HDLC SERPL-MCNC: 51 MG/DL
HDLC SERPL: 3 {RATIO} (ref 0–5)
LDLC SERPL CALC-MCNC: 85.2 MG/DL (ref 0–100)
LIPID PROFILE,FLP: NORMAL
POTASSIUM SERPL-SCNC: 4.7 MMOL/L (ref 3.5–5.1)
PROT SERPL-MCNC: 7.1 G/DL (ref 6.4–8.2)
SODIUM SERPL-SCNC: 137 MMOL/L (ref 136–145)
TRIGL SERPL-MCNC: 74 MG/DL (ref ?–150)
VLDLC SERPL CALC-MCNC: 14.8 MG/DL

## 2020-07-07 RX ORDER — ATORVASTATIN CALCIUM 10 MG/1
10 TABLET, FILM COATED ORAL DAILY
Qty: 90 TAB | Refills: 1 | Status: SHIPPED | OUTPATIENT
Start: 2020-07-07 | End: 2020-07-21 | Stop reason: SDUPTHER

## 2020-07-07 RX ORDER — LISINOPRIL 20 MG/1
20 TABLET ORAL DAILY
Qty: 90 TAB | Refills: 1 | Status: SHIPPED | OUTPATIENT
Start: 2020-07-07 | End: 2020-07-21 | Stop reason: SDUPTHER

## 2020-07-07 NOTE — PROGRESS NOTES
HISTORY OF PRESENT ILLNESS  Ayala Vanegas is a 62 y.o. male. HPI  He is doing well. Issues:  1. Hypertension. He is on Lisinopril 20 mg. No cough, no dizzy spells, no headaches. Blood pressure by me 117/76. 2. Dyslipidemia, on Lipitor. No myalgias. Due for labs. 3. Prostate cancer screening. Has seen Dr. Vanessa Farr and reports recent PSA was good. He had a colonoscopy in 2018, polyps removed, five year repeat advised. 4. He has had a right total knee replacement in November and healed well, thinking of doing his left knee sometime in the next six months. Review of Systems   Constitutional: Negative for chills, fever and weight loss. Respiratory: Negative for cough, shortness of breath and wheezing. Cardiovascular: Negative for chest pain, palpitations, orthopnea, leg swelling and PND. Gastrointestinal: Negative for abdominal pain, constipation, diarrhea, heartburn and nausea. Musculoskeletal: Negative for falls, joint pain and myalgias. Neurological: Negative for dizziness and headaches. Physical Exam  Vitals signs and nursing note reviewed. Constitutional:       Appearance: He is well-developed. HENT:      Head: Normocephalic and atraumatic. Neck:      Musculoskeletal: Normal range of motion and neck supple. Thyroid: No thyromegaly. Vascular: No carotid bruit. Cardiovascular:      Rate and Rhythm: Normal rate and regular rhythm. Heart sounds: Normal heart sounds. Pulmonary:      Effort: Pulmonary effort is normal. No respiratory distress. Breath sounds: Normal breath sounds. No wheezing or rales. Musculoskeletal:      Right lower leg: No edema. Left lower leg: No edema. Neurological:      Mental Status: He is alert and oriented to person, place, and time. Psychiatric:         Behavior: Behavior normal.         ASSESSMENT and PLAN  Diagnoses and all orders for this visit:    1.  Dyslipidemia, goal LDL below 874  -     METABOLIC PANEL, COMPREHENSIVE; Future  -     LIPID PANEL; Future  -     atorvastatin (LIPITOR) 10 mg tablet; Take 1 Tab by mouth daily. 2. HTN, goal below 130/80    3. Prostate cancer screening  -     PSA, DIAGNOSTIC (PROSTATE SPECIFIC AG); Future    4. Panic attack-doing well    5. H/O colonoscopy    Other orders  -     lisinopriL (PRINIVIL, ZESTRIL) 20 mg tablet; Take 1 Tab by mouth daily.     Flu shot iin sept  reviewed diet, exercise and weight control

## 2020-07-07 NOTE — PROGRESS NOTES
Room:     Identified pt with two pt identifiers(name and ). Reviewed record in preparation for visit and have obtained necessary documentation. All patient medications has been reviewed. Chief Complaint   Patient presents with    Medication Refill       Health Maintenance Due   Topic    Shingrix Vaccine Age 50> (1 of 2)       There were no vitals filed for this visit. 1.Have you traveled outside of the [address] [place] [address] [address] in the last month No    2. Have you been in close contact with someone who is suspected to have COVID-19 or has tested positive. No    3. Do you have any signs or symptoms. ? 4. Have you been to the ER, urgent care clinic since your last visit? Hospitalized since your last visit? No    5. Have you seen or consulted any other health care providers outside of the [address] [place] [address] since your last visit? Include any pap smears or colon screening. No      7. Are you fasting for blood work today? YES    8. Do you have an Advanced Directive/ Living Will in place? NO  If yes, do we have a copy on file NO  If no, would you like information NO    Patient is accompanied by self I have received verbal consent from Shanwa Campbell to discuss any/all medical information while they are present in the room.

## 2020-07-09 DIAGNOSIS — F41.0 PANIC ATTACK: ICD-10-CM

## 2020-07-09 DIAGNOSIS — E78.5 DYSLIPIDEMIA, GOAL LDL BELOW 100: ICD-10-CM

## 2020-07-09 RX ORDER — ATORVASTATIN CALCIUM 10 MG/1
10 TABLET, FILM COATED ORAL DAILY
Qty: 90 TAB | Refills: 1 | Status: CANCELLED | OUTPATIENT
Start: 2020-07-09

## 2020-07-09 RX ORDER — LISINOPRIL 20 MG/1
20 TABLET ORAL DAILY
Qty: 90 TAB | Refills: 1 | Status: CANCELLED | OUTPATIENT
Start: 2020-07-09

## 2020-07-09 RX ORDER — PANTOPRAZOLE SODIUM 40 MG/1
TABLET, DELAYED RELEASE ORAL
Qty: 90 TAB | Refills: 1 | Status: SHIPPED | OUTPATIENT
Start: 2020-07-09 | End: 2020-07-21 | Stop reason: SDUPTHER

## 2020-07-14 DIAGNOSIS — F41.9 ANXIETY: ICD-10-CM

## 2020-07-15 RX ORDER — ALPRAZOLAM 0.5 MG/1
TABLET ORAL
Qty: 30 TAB | Refills: 0 | Status: SHIPPED | OUTPATIENT
Start: 2020-07-15 | End: 2020-11-04

## 2020-07-21 DIAGNOSIS — E78.5 DYSLIPIDEMIA, GOAL LDL BELOW 100: ICD-10-CM

## 2020-07-21 DIAGNOSIS — F41.0 PANIC ATTACK: ICD-10-CM

## 2020-07-21 RX ORDER — PANTOPRAZOLE SODIUM 40 MG/1
TABLET, DELAYED RELEASE ORAL
Qty: 90 TAB | Refills: 1 | Status: SHIPPED | OUTPATIENT
Start: 2020-07-21 | End: 2020-10-19 | Stop reason: SDUPTHER

## 2020-07-21 RX ORDER — ATORVASTATIN CALCIUM 10 MG/1
10 TABLET, FILM COATED ORAL DAILY
Qty: 90 TAB | Refills: 1 | Status: SHIPPED | OUTPATIENT
Start: 2020-07-21 | End: 2020-10-19

## 2020-07-21 RX ORDER — LISINOPRIL 20 MG/1
20 TABLET ORAL DAILY
Qty: 90 TAB | Refills: 1 | Status: SHIPPED | OUTPATIENT
Start: 2020-07-21 | End: 2020-10-19

## 2020-08-26 RX ORDER — TADALAFIL 10 MG/1
TABLET ORAL
Qty: 8 TAB | Refills: 0 | Status: SHIPPED | OUTPATIENT
Start: 2020-08-26 | End: 2020-11-17

## 2020-10-19 DIAGNOSIS — E78.5 DYSLIPIDEMIA, GOAL LDL BELOW 100: ICD-10-CM

## 2020-10-19 DIAGNOSIS — F41.0 PANIC ATTACK: ICD-10-CM

## 2020-10-19 RX ORDER — ATORVASTATIN CALCIUM 10 MG/1
TABLET, FILM COATED ORAL
Qty: 30 TAB | Refills: 0 | Status: SHIPPED | OUTPATIENT
Start: 2020-10-19 | End: 2020-10-20 | Stop reason: SDUPTHER

## 2020-10-19 RX ORDER — LISINOPRIL 20 MG/1
TABLET ORAL
Qty: 30 TAB | Refills: 0 | Status: SHIPPED | OUTPATIENT
Start: 2020-10-19 | End: 2020-10-20 | Stop reason: SDUPTHER

## 2020-10-19 NOTE — TELEPHONE ENCOUNTER
Requested Prescriptions     Pending Prescriptions Disp Refills    atorvastatin (LIPITOR) 10 mg tablet 90 Tab 1     Sig: Take 1 Tab by mouth daily.  pantoprazole (PROTONIX) 40 mg tablet 90 Tab 1     Sig: TAKE 1 TABLET BY MOUTH DAILY    lisinopriL (PRINIVIL, ZESTRIL) 20 mg tablet 90 Tab 1     Sig: Take 1 Tab by mouth daily. Verified needs to go to [employer].

## 2020-10-20 RX ORDER — LISINOPRIL 20 MG/1
20 TABLET ORAL DAILY
Qty: 90 TAB | Refills: 1 | Status: SHIPPED | OUTPATIENT
Start: 2020-10-20 | End: 2020-11-17 | Stop reason: SDUPTHER

## 2020-10-20 RX ORDER — PANTOPRAZOLE SODIUM 40 MG/1
40 TABLET, DELAYED RELEASE ORAL DAILY
Qty: 90 TAB | Refills: 1 | Status: SHIPPED | OUTPATIENT
Start: 2020-10-20 | End: 2020-11-17 | Stop reason: SDUPTHER

## 2020-10-20 RX ORDER — ATORVASTATIN CALCIUM 10 MG/1
10 TABLET, FILM COATED ORAL DAILY
Qty: 90 TAB | Refills: 1 | Status: SHIPPED | OUTPATIENT
Start: 2020-10-20 | End: 2020-11-17 | Stop reason: SDUPTHER

## 2020-11-04 DIAGNOSIS — F41.9 ANXIETY: ICD-10-CM

## 2020-11-04 RX ORDER — ALPRAZOLAM 0.5 MG/1
TABLET ORAL
Qty: 30 TAB | Refills: 0 | Status: SHIPPED | OUTPATIENT
Start: 2020-11-04 | End: 2021-02-25

## 2020-11-17 ENCOUNTER — OFFICE VISIT (OUTPATIENT)
Dept: INTERNAL MEDICINE CLINIC | Age: 65
End: 2020-11-17
Payer: COMMERCIAL

## 2020-11-17 VITALS
TEMPERATURE: 98 F | HEIGHT: 73 IN | DIASTOLIC BLOOD PRESSURE: 81 MMHG | OXYGEN SATURATION: 98 % | WEIGHT: 186 LBS | HEART RATE: 83 BPM | RESPIRATION RATE: 20 BRPM | BODY MASS INDEX: 24.65 KG/M2 | SYSTOLIC BLOOD PRESSURE: 144 MMHG

## 2020-11-17 DIAGNOSIS — F41.0 PANIC ATTACK: ICD-10-CM

## 2020-11-17 DIAGNOSIS — I10 HTN, GOAL BELOW 130/80: Primary | ICD-10-CM

## 2020-11-17 DIAGNOSIS — F41.9 ANXIETY: ICD-10-CM

## 2020-11-17 DIAGNOSIS — Z23 ENCOUNTER FOR IMMUNIZATION: ICD-10-CM

## 2020-11-17 DIAGNOSIS — K21.9 GERD WITHOUT ESOPHAGITIS: ICD-10-CM

## 2020-11-17 DIAGNOSIS — E78.5 DYSLIPIDEMIA, GOAL LDL BELOW 100: ICD-10-CM

## 2020-11-17 DIAGNOSIS — Z23 NEEDS FLU SHOT: ICD-10-CM

## 2020-11-17 PROCEDURE — 90472 IMMUNIZATION ADMIN EACH ADD: CPT

## 2020-11-17 PROCEDURE — 90686 IIV4 VACC NO PRSV 0.5 ML IM: CPT

## 2020-11-17 PROCEDURE — 90715 TDAP VACCINE 7 YRS/> IM: CPT

## 2020-11-17 PROCEDURE — 99214 OFFICE O/P EST MOD 30 MIN: CPT | Performed by: INTERNAL MEDICINE

## 2020-11-17 PROCEDURE — 90471 IMMUNIZATION ADMIN: CPT

## 2020-11-17 RX ORDER — ATORVASTATIN CALCIUM 10 MG/1
10 TABLET, FILM COATED ORAL DAILY
Qty: 90 TAB | Refills: 1 | Status: SHIPPED | OUTPATIENT
Start: 2020-11-17 | End: 2021-05-17

## 2020-11-17 RX ORDER — LISINOPRIL 20 MG/1
20 TABLET ORAL DAILY
Qty: 90 TAB | Refills: 1 | Status: SHIPPED | OUTPATIENT
Start: 2020-11-17 | End: 2021-05-17

## 2020-11-17 RX ORDER — PANTOPRAZOLE SODIUM 40 MG/1
40 TABLET, DELAYED RELEASE ORAL DAILY
Qty: 90 TAB | Refills: 1 | Status: SHIPPED | OUTPATIENT
Start: 2020-11-17 | End: 2022-04-22

## 2020-11-17 NOTE — PROGRESS NOTES
HISTORY OF PRESENT ILLNESS  Jessica Roberts is a 60 y.o. male. HPI  Hypertension ROS: taking medications as instructed, no medication side effects noted, no TIA's, no chest pain on exertion, no dyspnea on exertion, no swelling of ankles. New concerns: BP in office today is 144/81. Pt reports that he exercises regularly. Hyperlipidemia:  Cardiovascular risk analysis - 60 y.o. male LDL goal is under 130. ROS: taking medications as instructed, no medication side effects noted, no TIA's, no chest pain on exertion, no dyspnea on exertion, no swelling of ankles. Tolerating meds, no myalgias or other side effects noted. New concerns: Pt's last LDL was 85.2 on 20. Anxiety: Stable, pt continues to comply with Xanax PRN to manage his panic attacks. He notes that he takes at most 2 tablets per month. GERD: Stable, pt continues to comply with Protonix. Health maintenance: Pt requests flu shot and pertussis booster today since he is going to be around his  grandchild. Pt reports that he recently had labs done with urologist.     Review of Systems   All other systems reviewed and are negative. Physical Exam  Vitals signs reviewed. Constitutional:       General: He is not in acute distress. Appearance: Normal appearance. He is not ill-appearing, toxic-appearing or diaphoretic. HENT:      Right Ear: Hearing normal.      Left Ear: Hearing normal.      Nose: Nose normal.      Mouth/Throat:      Mouth: Mucous membranes are moist.      Pharynx: Oropharynx is clear. Eyes:      Conjunctiva/sclera: Conjunctivae normal.   Neck:      Musculoskeletal: Normal range of motion. Pulmonary:      Effort: No respiratory distress. Breath sounds: Normal air entry. Musculoskeletal: Normal range of motion. Skin:     General: Skin is warm and dry. Neurological:      General: No focal deficit present. Mental Status: He is alert and oriented to person, place, and time.  Mental status is at baseline. Psychiatric:         Mood and Affect: Mood normal.         Behavior: Behavior normal.         Thought Content: Thought content normal.         Judgment: Judgment normal.         ASSESSMENT and PLAN  Diagnoses and all orders for this visit:    1. HTN, goal below 130/80  BP is at goal. I do not recommend any change in medications. -     lisinopriL (PRINIVIL, ZESTRIL) 20 mg tablet; Take 1 Tab by mouth daily. 2. Anxiety  Stable and well-managed. No change in medications. 3. Dyslipidemia, goal LDL below 100  Stable, patient is tolerating medications, no myalgias. I do not recommend any change in medications. -     atorvastatin (LIPITOR) 10 mg tablet; Take 1 Tab by mouth daily. 4. GERD without esophagitis  Stable and well-managed. No change in medications. -     pantoprazole (PROTONIX) 40 mg tablet; Take 1 Tab by mouth daily. 5. Encounter for immunization  Administered pertussis injection today in clinic.  -     TETANUS, DIPHTHERIA TOXOIDS AND ACELLULAR PERTUSSIS VACCINE (TDAP), IN INDIVIDS. >=7, IM    6. Needs flu shot  Administered flu injection today in clinic.  -     INFLUENZA VIRUS VAC QUAD,SPLIT,PRESV FREE SYRINGE IM    7. Panic attack  Stable and well-managed. Pt reports that he has maximum 2 panic attacks her month. No change in medications. Lab results and schedule of future lab studies reviewed with patient. Reviewed diet, exercise and weight control. Written by Sonya Jose, as dictated by Juan Avendaño MD.     Current diagnosis and concerns discussed with pt at length. Understands risks and benefits or current treatment plan and medications and accepts the treatment and medication with any possible risks. Pt asks appropriate questions which were answered. Pt instructed to call with any concerns or problems.

## 2020-11-29 RX ORDER — TADALAFIL 10 MG/1
TABLET ORAL
Qty: 8 TAB | Refills: 0 | Status: SHIPPED | OUTPATIENT
Start: 2020-11-29 | End: 2021-02-07

## 2021-02-03 ENCOUNTER — TRANSCRIBE ORDER (OUTPATIENT)
Dept: SCHEDULING | Age: 66
End: 2021-02-03

## 2021-02-03 DIAGNOSIS — M17.12 TRICOMPARTMENT OSTEOARTHRITIS OF LEFT KNEE: Primary | ICD-10-CM

## 2021-02-07 RX ORDER — TADALAFIL 10 MG/1
TABLET ORAL
Qty: 8 TAB | Refills: 0 | Status: SHIPPED | OUTPATIENT
Start: 2021-02-07 | End: 2021-07-14

## 2021-02-24 DIAGNOSIS — F41.9 ANXIETY: ICD-10-CM

## 2021-02-25 RX ORDER — ALPRAZOLAM 0.5 MG/1
TABLET ORAL
Qty: 30 TAB | Refills: 0 | Status: SHIPPED | OUTPATIENT
Start: 2021-02-25 | End: 2021-08-09

## 2021-02-26 NOTE — PROGRESS NOTES
[facility]  [address]  [facility] [address]  Phone: [phone]    February 26, 2021        Maurice Jon  [address] [place] [address]          To whom it may concern:    RE: Maurice LISBETH Danay    Patient was seen and treated today at our clinic.  Patient may return to work 2/26/2021 with the following:  No working or lifting restrictions    Please contact me for questions or concerns.      Sincerely,        Alon iPneda MD   PT INITIAL EVALUATION NOTE 2-15    Patient Name: Brittany Brown  Date:2017  : 1955  [x]  Patient  Verified  Payor: Letha Estrada / Plan: Lindsey Walsh / Product Type: PPO /    In time:11:00 AM  Out time:11:50 AM  Total Treatment Time (min): 50  Visit #: 1     Treatment Area: Right shoulder pain [M25.511]    SUBJECTIVE  Pain Level (0-10 scale): 0  Any medication changes, allergies to medications, adverse drug reactions, diagnosis change, or new procedure performed?: [] No    [x] Yes (see summary sheet for update)  Subjective:     R RTC repair with subacromial decompression on 17  PLOF: No limitations with work and ADLs, no regular exercise  Mechanism of Injury: Patient fell while at work and caught himself on a wall in 2016. He didn't initially notice any shoulder pain, but had difficulty with work tasks for several months. He met with Dr. Sunny Nelson who tried a cortisone injection. No relief was found, so an MRI was performed which revealed the RTC tear. Previous Treatment/Compliance: He has been performing PROM exercise given to him for the past week. 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   Palpation:Incisions sites are healing well   Joint Mobility:NT  Scapulohumoral Control / Rhythm: NT      Shoulder PROM:         R   L  Shoulder Flexion  120       Shoulder Abduction  100  Shoulder Extension  NT  Shoulder ER   40  Shoulder IR   45  *little to no pain reported throughout PROM           MMT: NT  Neurological: Sensations: Intact         Modality rationale: decrease edema, decrease inflammation and decrease pain to improve the patients ability to reach overhead   Min Type Additional Details    [] Estim: []Att   []Unatt        []TENS instruct                  []IFC  []Premod   []NMES                     []Other:  []w/US   []w/ice   []w/heat  Position:  Location:    []  Traction: [] Cervical       []Lumbar                       [] Prone          []Supine                       []Intermittent   []Continuous Lbs:  [] before manual  [] after manual  []w/heat    []  Ultrasound: []Continuous   [] Pulsed at:                            []1MHz   []3MHz Location:  W/cm2:    []  Paraffin         Location:  []w/heat    []  Ice     []  Heat  []  Ice massage Position:  Location:    []  Laser  []  Other: Position:  Location:   15 [x]  Vasopneumatic Device Pressure:       [x] lo [] med [] hi   Temperature: 34   [x] Skin assessment post-treatment:  [x]intact []redness- no adverse reaction    []redness - adverse reaction:     15 min Therapeutic Exercise:  [x] See flow sheet :   Rationale: increase ROM to improve the patients ability to reach overhead    10 min Manual Therapy:    PROM in flexion, abduction, and ER   Rationale: decrease pain, increase ROM and increase tissue extensibility  to improve the patients ability to reach overhead          With   [] TE   [] TA   [] neuro   [] other: Patient Education: [x] Review HEP    [] Progressed/Changed HEP based on:   [] positioning   [] body mechanics   [] transfers   [] heat/ice application    [] other:        Other Objective/Functional Measures:    Pain Level (0-10 scale) post treatment: 0      ASSESSMENT:      [x]  See Plan of Care      Wilfredo Whiting PT , DPT, OCS, Cert.  DN   7/7/2017  11:35 AM

## 2021-03-08 NOTE — TELEPHONE ENCOUNTER
Reached out to patient (x2) to assist w/scheduling a medication evaluation / follow up appointment per PCP.  No answer left VM unable to sent My Chart message patient has not set up an account- thank you

## 2021-04-28 ENCOUNTER — APPOINTMENT (OUTPATIENT)
Dept: CT IMAGING | Age: 66
End: 2021-04-28
Attending: EMERGENCY MEDICINE
Payer: COMMERCIAL

## 2021-04-28 ENCOUNTER — HOSPITAL ENCOUNTER (EMERGENCY)
Age: 66
Discharge: HOME | End: 2021-04-28
Attending: EMERGENCY MEDICINE
Payer: COMMERCIAL

## 2021-04-28 VITALS
RESPIRATION RATE: 16 BRPM | DIASTOLIC BLOOD PRESSURE: 84 MMHG | TEMPERATURE: 98 F | OXYGEN SATURATION: 97 % | HEART RATE: 65 BPM | SYSTOLIC BLOOD PRESSURE: 152 MMHG

## 2021-04-28 DIAGNOSIS — S30.1XXA CONTUSION OF FLANK, INITIAL ENCOUNTER: Primary | ICD-10-CM

## 2021-04-28 LAB
ALBUMIN SERPL-MCNC: 3.9 G/DL (ref 3.5–5)
ALBUMIN/GLOB SERPL: 1.3 {RATIO} (ref 1.1–2.2)
ALP SERPL-CCNC: 65 U/L (ref 45–117)
ALT SERPL-CCNC: 24 U/L (ref 12–78)
ANION GAP SERPL CALC-SCNC: 9 MMOL/L (ref 5–15)
APPEARANCE UR: CLEAR
AST SERPL-CCNC: 21 U/L (ref 15–37)
BACTERIA URNS QL MICRO: NEGATIVE /HPF
BASOPHILS # BLD: 0 K/UL (ref 0–0.1)
BASOPHILS NFR BLD: 0 % (ref 0–1)
BILIRUB SERPL-MCNC: 0.8 MG/DL (ref 0.2–1)
BILIRUB UR QL: NEGATIVE
BUN SERPL-MCNC: 26 MG/DL (ref 6–20)
BUN/CREAT SERPL: 21 (ref 12–20)
CALCIUM SERPL-MCNC: 9 MG/DL (ref 8.5–10.1)
CHLORIDE SERPL-SCNC: 101 MMOL/L (ref 97–108)
CO2 SERPL-SCNC: 28 MMOL/L (ref 21–32)
COLOR UR: ABNORMAL
CREAT SERPL-MCNC: 1.21 MG/DL (ref 0.7–1.3)
DIFFERENTIAL METHOD BLD: ABNORMAL
EOSINOPHIL # BLD: 0 K/UL (ref 0–0.4)
EOSINOPHIL NFR BLD: 1 % (ref 0–7)
EPITH CASTS URNS QL MICRO: ABNORMAL /LPF
ERYTHROCYTE [DISTWIDTH] IN BLOOD BY AUTOMATED COUNT: 11.9 % (ref 11.5–14.5)
GLOBULIN SER CALC-MCNC: 2.9 G/DL (ref 2–4)
GLUCOSE SERPL-MCNC: 87 MG/DL (ref 65–100)
GLUCOSE UR STRIP.AUTO-MCNC: NEGATIVE MG/DL
HCT VFR BLD AUTO: 41.5 % (ref 36.6–50.3)
HGB BLD-MCNC: 14.2 G/DL (ref 12.1–17)
HGB UR QL STRIP: ABNORMAL
IMM GRANULOCYTES # BLD AUTO: 0 K/UL
IMM GRANULOCYTES NFR BLD AUTO: 0 %
KETONES UR QL STRIP.AUTO: NEGATIVE MG/DL
LEUKOCYTE ESTERASE UR QL STRIP.AUTO: NEGATIVE
LYMPHOCYTES # BLD: 0.5 K/UL (ref 0.8–3.5)
LYMPHOCYTES NFR BLD: 10 % (ref 12–49)
MCH RBC QN AUTO: 30 PG (ref 26–34)
MCHC RBC AUTO-ENTMCNC: 34.2 G/DL (ref 30–36.5)
MCV RBC AUTO: 87.6 FL (ref 80–99)
MONOCYTES # BLD: 0.5 K/UL (ref 0–1)
MONOCYTES NFR BLD: 11 % (ref 5–13)
NEUTS SEG # BLD: 3.6 K/UL (ref 1.8–8)
NEUTS SEG NFR BLD: 78 % (ref 32–75)
NITRITE UR QL STRIP.AUTO: NEGATIVE
NRBC # BLD: 0 K/UL (ref 0–0.01)
NRBC BLD-RTO: 0 PER 100 WBC
PH UR STRIP: 5.5 [PH] (ref 5–8)
PLATELET # BLD AUTO: 152 K/UL (ref 150–400)
PMV BLD AUTO: 9.6 FL (ref 8.9–12.9)
POTASSIUM SERPL-SCNC: 4.4 MMOL/L (ref 3.5–5.1)
PROT SERPL-MCNC: 6.8 G/DL (ref 6.4–8.2)
PROT UR STRIP-MCNC: NEGATIVE MG/DL
RBC # BLD AUTO: 4.74 M/UL (ref 4.1–5.7)
RBC #/AREA URNS HPF: ABNORMAL /HPF (ref 0–5)
RBC MORPH BLD: ABNORMAL
SODIUM SERPL-SCNC: 138 MMOL/L (ref 136–145)
SP GR UR REFRACTOMETRY: >1.03 (ref 1–1.03)
UR CULT HOLD, URHOLD: NORMAL
UROBILINOGEN UR QL STRIP.AUTO: 0.2 EU/DL (ref 0.2–1)
WBC # BLD AUTO: 4.6 K/UL (ref 4.1–11.1)
WBC URNS QL MICRO: ABNORMAL /HPF (ref 0–4)

## 2021-04-28 PROCEDURE — 80053 COMPREHEN METABOLIC PANEL: CPT

## 2021-04-28 PROCEDURE — 74177 CT ABD & PELVIS W/CONTRAST: CPT

## 2021-04-28 PROCEDURE — 36415 COLL VENOUS BLD VENIPUNCTURE: CPT

## 2021-04-28 PROCEDURE — 85025 COMPLETE CBC W/AUTO DIFF WBC: CPT

## 2021-04-28 PROCEDURE — 99283 EMERGENCY DEPT VISIT LOW MDM: CPT

## 2021-04-28 PROCEDURE — 96374 THER/PROPH/DIAG INJ IV PUSH: CPT

## 2021-04-28 PROCEDURE — 74011000636 HC RX REV CODE- 636: Performed by: EMERGENCY MEDICINE

## 2021-04-28 PROCEDURE — 74011250636 HC RX REV CODE- 250/636: Performed by: EMERGENCY MEDICINE

## 2021-04-28 PROCEDURE — 81001 URINALYSIS AUTO W/SCOPE: CPT

## 2021-04-28 RX ORDER — HYDROMORPHONE HYDROCHLORIDE 1 MG/ML
1 INJECTION, SOLUTION INTRAMUSCULAR; INTRAVENOUS; SUBCUTANEOUS ONCE
Status: DISCONTINUED | OUTPATIENT
Start: 2021-04-28 | End: 2021-04-28

## 2021-04-28 RX ORDER — KETOROLAC TROMETHAMINE 30 MG/ML
30 INJECTION, SOLUTION INTRAMUSCULAR; INTRAVENOUS
Status: COMPLETED | OUTPATIENT
Start: 2021-04-28 | End: 2021-04-28

## 2021-04-28 RX ADMIN — SODIUM CHLORIDE 1000 ML: 9 INJECTION, SOLUTION INTRAVENOUS at 19:23

## 2021-04-28 RX ADMIN — IOPAMIDOL 100 ML: 755 INJECTION, SOLUTION INTRAVENOUS at 19:55

## 2021-04-28 RX ADMIN — KETOROLAC TROMETHAMINE 30 MG: 30 INJECTION, SOLUTION INTRAMUSCULAR; INTRAVENOUS at 20:42
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
## 2021-04-28 NOTE — ED NOTES
Bedside shift change report given to Waldo Keller (oncoming nurse) by Anoop Allen RN (offgoing nurse). Report included the following information SBAR.

## 2021-05-17 DIAGNOSIS — I10 HTN, GOAL BELOW 130/80: ICD-10-CM

## 2021-05-17 DIAGNOSIS — E78.5 DYSLIPIDEMIA, GOAL LDL BELOW 100: ICD-10-CM

## 2021-05-17 RX ORDER — ATORVASTATIN CALCIUM 10 MG/1
TABLET, FILM COATED ORAL
Qty: 90 TAB | Refills: 0 | Status: SHIPPED | OUTPATIENT
Start: 2021-05-17 | End: 2021-08-07

## 2021-05-17 RX ORDER — LISINOPRIL 20 MG/1
TABLET ORAL
Qty: 90 TAB | Refills: 0 | Status: SHIPPED | OUTPATIENT
Start: 2021-05-17 | End: 2021-08-07

## 2021-05-18 ENCOUNTER — TELEPHONE (OUTPATIENT)
Dept: INTERNAL MEDICINE CLINIC | Age: 66
End: 2021-05-18

## 2021-05-18 NOTE — TELEPHONE ENCOUNTER
PSR called patient, LVM to advise of PCP's note to schedule follow up appointment before patient's next refill is due.

## 2021-07-14 RX ORDER — TADALAFIL 10 MG/1
TABLET ORAL
Qty: 8 TABLET | Refills: 0 | Status: SHIPPED | OUTPATIENT
Start: 2021-07-14 | End: 2021-08-19

## 2021-08-05 ENCOUNTER — OFFICE VISIT (OUTPATIENT)
Dept: INTERNAL MEDICINE CLINIC | Age: 66
End: 2021-08-05
Payer: COMMERCIAL

## 2021-08-05 VITALS
BODY MASS INDEX: 24.01 KG/M2 | TEMPERATURE: 97.7 F | HEART RATE: 65 BPM | SYSTOLIC BLOOD PRESSURE: 150 MMHG | HEIGHT: 73 IN | WEIGHT: 181.2 LBS | RESPIRATION RATE: 16 BRPM | OXYGEN SATURATION: 99 % | DIASTOLIC BLOOD PRESSURE: 80 MMHG

## 2021-08-05 DIAGNOSIS — R06.02 SOB (SHORTNESS OF BREATH): ICD-10-CM

## 2021-08-05 DIAGNOSIS — E78.5 DYSLIPIDEMIA, GOAL LDL BELOW 100: ICD-10-CM

## 2021-08-05 DIAGNOSIS — I10 HTN, GOAL BELOW 130/80: Primary | ICD-10-CM

## 2021-08-05 PROCEDURE — 93000 ELECTROCARDIOGRAM COMPLETE: CPT | Performed by: INTERNAL MEDICINE

## 2021-08-05 PROCEDURE — 99214 OFFICE O/P EST MOD 30 MIN: CPT | Performed by: INTERNAL MEDICINE

## 2021-08-05 NOTE — PROGRESS NOTES
HISTORY OF PRESENT ILLNESS  Rosalia Cruz is a 60 y.o. male. HPI  Seen for persistent shortness of breath. It started around July 31st.  He had symptoms of sudden diaphoresis, shortness of breath, some pain in his back, not in his chest.  He went to [facility] Emergency Room where initial EKG was read as sinus rhythm with inferior Q waves. Sats were in the 90's. Chemistries is normal.  D-dimer was elevated. CTA was normal.  No evidence of PE or aortic abnormality. Troponin was normal.  Hemoglobin 14.9, hematocrit 45, white count 6000. Subsequent EKG was read as showing AFib, which was felt to be new and question criteria for inferior MI. He was discharged and advised to follow up with Cardiology, but does not have an appointment until September. He notes currently still feels short of breath. He is not having chest pain, does describe some chronic left scapular pain. He does not feel diaphoretic or nausea. He does feel tired. Review of Systems   Constitutional: Positive for malaise/fatigue. Negative for chills, diaphoresis, fever and weight loss. Respiratory: Positive for shortness of breath. Negative for cough and wheezing. Cardiovascular: Negative for chest pain, palpitations, orthopnea, leg swelling and PND. Gastrointestinal: Negative for abdominal pain, heartburn, nausea and vomiting. Musculoskeletal: Negative for myalgias. Neurological: Negative for dizziness and headaches. Physical Exam  Vitals and nursing note reviewed. Constitutional:       Appearance: He is well-developed. HENT:      Head: Normocephalic and atraumatic. Neck:      Thyroid: No thyromegaly. Vascular: No carotid bruit. Cardiovascular:      Rate and Rhythm: Normal rate and regular rhythm. Heart sounds: Normal heart sounds. Pulmonary:      Effort: Pulmonary effort is normal. No respiratory distress. Breath sounds: Normal breath sounds. No wheezing or rales.    Musculoskeletal: Cervical back: Normal range of motion and neck supple. Right lower leg: No edema. Left lower leg: No edema. Neurological:      Mental Status: He is alert and oriented to person, place, and time. Psychiatric:         Behavior: Behavior normal.         ASSESSMENT and PLAN  Diagnoses and all orders for this visit:    1. HTN, goal below 130/80  -     AMB POC EKG ROUTINE W/ 12 LEADS, INTER & REP-shows sinus bradycardia but no afib  Spoke with dr Charla Cavazos who will kindly see him in office tomorrow-discussed with pt if any cp or worsening sob to go to er today if needed    2. SOB (shortness of breath)-recent cta neg but with ? afib in er and does have cardiac risk factors-needs urgent cardiology evaluation-cta negative in er and no sxs of pneumonia need to r/o cardiac issues  3.  Dyslipidemia, goal LDL below 100-cont on statin

## 2021-08-05 NOTE — PROGRESS NOTES
Eva Santos  Identified pt with two pt identifiers(name and ). Chief Complaint   Patient presents with    Follow-up     pt is presenting today for have being seen in the [facility] ER  for shortness of breath. 1. Have you been to the ER, urgent care clinic since your last visit? Hospitalized since your last visit? NO    2. Have you seen or consulted any other health care providers outside of the [address] [place] [address] since your last visit? Include any pap smears or colon screening. NO      Provider notified of reason for visit, vitals and flowsheets obtained on patients.      Patient received paperwork for advance directive during previous visit but has not completed at this time     Reviewed record In preparation for visit, huddled with provider and have obtained necessary documentation      Health Maintenance Due   Topic    Shingrix Vaccine Age 50> (1 of 2)    Lipid Screen        Wt Readings from Last 3 Encounters:   21 181 lb 3.2 oz (82.2 kg)   20 186 lb (84.4 kg)   20 180 lb (81.6 kg)     Temp Readings from Last 3 Encounters:   21 97.7 °F (36.5 °C) (Oral)   21 98 °F (36.7 °C)   20 98 °F (36.7 °C) (Oral)     BP Readings from Last 3 Encounters:   21 (!) 150/80   21 (!) 152/84   20 (!) 144/81     Pulse Readings from Last 3 Encounters:   21 65   21 65   20 83     Vitals:    21 0809   BP: (!) 150/80   Pulse: 65   Resp: 16   Temp: 97.7 °F (36.5 °C)   TempSrc: Oral   SpO2: 99%   Weight: 181 lb 3.2 oz (82.2 kg)   Height: 6' 1\" (1.854 m)   PainSc:   0 - No pain         Learning Assessment:  :     Learning Assessment 10/17/2014   PRIMARY LEARNER Patient   HIGHEST LEVEL OF EDUCATION - PRIMARY LEARNER  GRADUATED HIGH SCHOOL OR GED   PRIMARY LANGUAGE ENGLISH   LEARNER PREFERENCE PRIMARY LISTENING   ANSWERED BY patient   RELATIONSHIP SELF       Depression Screening:  :     3 most recent PHQ Screens 2021   PHQ Not Done -   Little interest or pleasure in doing things Not at all   Feeling down, depressed, irritable, or hopeless Not at all   Total Score PHQ 2 0       Fall Risk Assessment:  :     No flowsheet data found. Abuse Screening:  :     No flowsheet data found. ADL Screening:  :     No flowsheet data found. Medication reconciliation up to date and corrected with patient at this time.

## 2021-08-06 ENCOUNTER — HOSPITAL ENCOUNTER (OUTPATIENT)
Age: 66
Setting detail: OBSERVATION
Discharge: HOME | End: 2021-08-07
Attending: STUDENT IN AN ORGANIZED HEALTH CARE EDUCATION/TRAINING PROGRAM | Admitting: STUDENT IN AN ORGANIZED HEALTH CARE EDUCATION/TRAINING PROGRAM
Payer: COMMERCIAL

## 2021-08-06 ENCOUNTER — OFFICE VISIT (OUTPATIENT)
Dept: CARDIOLOGY CLINIC | Age: 66
End: 2021-08-06
Payer: COMMERCIAL

## 2021-08-06 VITALS
WEIGHT: 180 LBS | HEIGHT: 73 IN | DIASTOLIC BLOOD PRESSURE: 70 MMHG | OXYGEN SATURATION: 95 % | BODY MASS INDEX: 23.86 KG/M2 | HEART RATE: 85 BPM | SYSTOLIC BLOOD PRESSURE: 142 MMHG

## 2021-08-06 DIAGNOSIS — R06.02 SOB (SHORTNESS OF BREATH): ICD-10-CM

## 2021-08-06 DIAGNOSIS — I25.750: Primary | ICD-10-CM

## 2021-08-06 DIAGNOSIS — R07.9 CHEST PAIN, UNSPECIFIED TYPE: ICD-10-CM

## 2021-08-06 DIAGNOSIS — R06.02 SOB (SHORTNESS OF BREATH): Primary | ICD-10-CM

## 2021-08-06 DIAGNOSIS — R61 DIAPHORESIS: ICD-10-CM

## 2021-08-06 PROBLEM — I25.10 CAD (CORONARY ARTERY DISEASE): Status: ACTIVE | Noted: 2021-08-06

## 2021-08-06 LAB
ACT BLD: 202 SECS (ref 79–138)
ACT BLD: 340 SECS (ref 79–138)
ACT BLD: 422 SECS (ref 79–138)
ANION GAP SERPL CALC-SCNC: 1 MMOL/L (ref 5–15)
BUN SERPL-MCNC: 22 MG/DL (ref 6–20)
BUN/CREAT SERPL: 21 (ref 12–20)
CALCIUM SERPL-MCNC: 8.7 MG/DL (ref 8.5–10.1)
CHLORIDE SERPL-SCNC: 107 MMOL/L (ref 97–108)
CHOLEST SERPL-MCNC: 149 MG/DL
CO2 SERPL-SCNC: 31 MMOL/L (ref 21–32)
CREAT SERPL-MCNC: 1.05 MG/DL (ref 0.7–1.3)
ERYTHROCYTE [DISTWIDTH] IN BLOOD BY AUTOMATED COUNT: 12.4 % (ref 11.5–14.5)
EST. AVERAGE GLUCOSE BLD GHB EST-MCNC: 100 MG/DL
GLUCOSE SERPL-MCNC: 102 MG/DL (ref 65–100)
HBA1C MFR BLD: 5.1 % (ref 4–5.6)
HCT VFR BLD AUTO: 46 % (ref 36.6–50.3)
HDLC SERPL-MCNC: 60 MG/DL
HDLC SERPL: 2.5 {RATIO} (ref 0–5)
HGB BLD-MCNC: 14.7 G/DL (ref 12.1–17)
LDLC SERPL CALC-MCNC: 71.2 MG/DL (ref 0–100)
MCH RBC QN AUTO: 29.6 PG (ref 26–34)
MCHC RBC AUTO-ENTMCNC: 32 G/DL (ref 30–36.5)
MCV RBC AUTO: 92.7 FL (ref 80–99)
NRBC # BLD: 0 K/UL (ref 0–0.01)
NRBC BLD-RTO: 0 PER 100 WBC
PLATELET # BLD AUTO: 207 K/UL (ref 150–400)
PMV BLD AUTO: 9.4 FL (ref 8.9–12.9)
POTASSIUM SERPL-SCNC: 4.5 MMOL/L (ref 3.5–5.1)
RBC # BLD AUTO: 4.96 M/UL (ref 4.1–5.7)
SODIUM SERPL-SCNC: 139 MMOL/L (ref 136–145)
TRIGL SERPL-MCNC: 89 MG/DL (ref ?–150)
TROPONIN I SERPL-MCNC: <0.05 NG/ML
TROPONIN I SERPL-MCNC: <0.05 NG/ML
VLDLC SERPL CALC-MCNC: 17.8 MG/DL
WBC # BLD AUTO: 6.8 K/UL (ref 4.1–11.1)

## 2021-08-06 PROCEDURE — C1753 CATH, INTRAVAS ULTRASOUND: HCPCS | Performed by: STUDENT IN AN ORGANIZED HEALTH CARE EDUCATION/TRAINING PROGRAM

## 2021-08-06 PROCEDURE — 84484 ASSAY OF TROPONIN QUANT: CPT

## 2021-08-06 PROCEDURE — 77030012468 HC VLV BLEEDBK CNTRL ABBT -B: Performed by: STUDENT IN AN ORGANIZED HEALTH CARE EDUCATION/TRAINING PROGRAM

## 2021-08-06 PROCEDURE — C1769 GUIDE WIRE: HCPCS | Performed by: STUDENT IN AN ORGANIZED HEALTH CARE EDUCATION/TRAINING PROGRAM

## 2021-08-06 PROCEDURE — 83036 HEMOGLOBIN GLYCOSYLATED A1C: CPT

## 2021-08-06 PROCEDURE — 85027 COMPLETE CBC AUTOMATED: CPT

## 2021-08-06 PROCEDURE — C1760 CLOSURE DEV, VASC: HCPCS | Performed by: STUDENT IN AN ORGANIZED HEALTH CARE EDUCATION/TRAINING PROGRAM

## 2021-08-06 PROCEDURE — C1874 STENT, COATED/COV W/DEL SYS: HCPCS | Performed by: STUDENT IN AN ORGANIZED HEALTH CARE EDUCATION/TRAINING PROGRAM

## 2021-08-06 PROCEDURE — C1894 INTRO/SHEATH, NON-LASER: HCPCS | Performed by: STUDENT IN AN ORGANIZED HEALTH CARE EDUCATION/TRAINING PROGRAM

## 2021-08-06 PROCEDURE — C1725 CATH, TRANSLUMIN NON-LASER: HCPCS | Performed by: STUDENT IN AN ORGANIZED HEALTH CARE EDUCATION/TRAINING PROGRAM

## 2021-08-06 PROCEDURE — 77030013715 HC INFL SYS MRTM -B: Performed by: STUDENT IN AN ORGANIZED HEALTH CARE EDUCATION/TRAINING PROGRAM

## 2021-08-06 PROCEDURE — 99218 HC RM OBSERVATION: CPT

## 2021-08-06 PROCEDURE — 74011250636 HC RX REV CODE- 250/636: Performed by: STUDENT IN AN ORGANIZED HEALTH CARE EDUCATION/TRAINING PROGRAM

## 2021-08-06 PROCEDURE — 2709999900 HC NON-CHARGEABLE SUPPLY: Performed by: STUDENT IN AN ORGANIZED HEALTH CARE EDUCATION/TRAINING PROGRAM

## 2021-08-06 PROCEDURE — 93005 ELECTROCARDIOGRAM TRACING: CPT

## 2021-08-06 PROCEDURE — 74011250637 HC RX REV CODE- 250/637: Performed by: STUDENT IN AN ORGANIZED HEALTH CARE EDUCATION/TRAINING PROGRAM

## 2021-08-06 PROCEDURE — 99153 MOD SED SAME PHYS/QHP EA: CPT | Performed by: STUDENT IN AN ORGANIZED HEALTH CARE EDUCATION/TRAINING PROGRAM

## 2021-08-06 PROCEDURE — 80048 BASIC METABOLIC PNL TOTAL CA: CPT

## 2021-08-06 PROCEDURE — 99152 MOD SED SAME PHYS/QHP 5/>YRS: CPT | Performed by: STUDENT IN AN ORGANIZED HEALTH CARE EDUCATION/TRAINING PROGRAM

## 2021-08-06 PROCEDURE — 92928 PRQ TCAT PLMT NTRAC ST 1 LES: CPT | Performed by: STUDENT IN AN ORGANIZED HEALTH CARE EDUCATION/TRAINING PROGRAM

## 2021-08-06 PROCEDURE — 93458 L HRT ARTERY/VENTRICLE ANGIO: CPT | Performed by: STUDENT IN AN ORGANIZED HEALTH CARE EDUCATION/TRAINING PROGRAM

## 2021-08-06 PROCEDURE — 36415 COLL VENOUS BLD VENIPUNCTURE: CPT

## 2021-08-06 PROCEDURE — 99205 OFFICE O/P NEW HI 60 MIN: CPT | Performed by: SPECIALIST

## 2021-08-06 PROCEDURE — 74011000250 HC RX REV CODE- 250: Performed by: STUDENT IN AN ORGANIZED HEALTH CARE EDUCATION/TRAINING PROGRAM

## 2021-08-06 PROCEDURE — 92978 ENDOLUMINL IVUS OCT C 1ST: CPT | Performed by: STUDENT IN AN ORGANIZED HEALTH CARE EDUCATION/TRAINING PROGRAM

## 2021-08-06 PROCEDURE — C1887 CATHETER, GUIDING: HCPCS | Performed by: STUDENT IN AN ORGANIZED HEALTH CARE EDUCATION/TRAINING PROGRAM

## 2021-08-06 PROCEDURE — 85347 COAGULATION TIME ACTIVATED: CPT

## 2021-08-06 PROCEDURE — 74011000636 HC RX REV CODE- 636: Performed by: STUDENT IN AN ORGANIZED HEALTH CARE EDUCATION/TRAINING PROGRAM

## 2021-08-06 PROCEDURE — 93000 ELECTROCARDIOGRAM COMPLETE: CPT | Performed by: SPECIALIST

## 2021-08-06 PROCEDURE — 77030019569 HC BND COMPR RAD TERU -B: Performed by: STUDENT IN AN ORGANIZED HEALTH CARE EDUCATION/TRAINING PROGRAM

## 2021-08-06 PROCEDURE — 80061 LIPID PANEL: CPT

## 2021-08-06 DEVICE — XIENCE SIERRA™ EVEROLIMUS ELUTING CORONARY STENT SYSTEM 2.50 MM X 23 MM / RAPID-EXCHANGE
Type: IMPLANTABLE DEVICE | Status: FUNCTIONAL
Brand: XIENCE SIERRA™

## 2021-08-06 RX ORDER — ACETAMINOPHEN 325 MG/1
650 TABLET ORAL
Status: DISCONTINUED | OUTPATIENT
Start: 2021-08-06 | End: 2021-08-07 | Stop reason: HOSPADM

## 2021-08-06 RX ORDER — SODIUM CHLORIDE 0.9 % (FLUSH) 0.9 %
5-40 SYRINGE (ML) INJECTION EVERY 8 HOURS
Status: DISCONTINUED | OUTPATIENT
Start: 2021-08-06 | End: 2021-08-07 | Stop reason: HOSPADM

## 2021-08-06 RX ORDER — ATORVASTATIN CALCIUM 20 MG/1
80 TABLET, FILM COATED ORAL DAILY
Status: DISCONTINUED | OUTPATIENT
Start: 2021-08-07 | End: 2021-08-07 | Stop reason: HOSPADM

## 2021-08-06 RX ORDER — SODIUM CHLORIDE 0.9 % (FLUSH) 0.9 %
5-40 SYRINGE (ML) INJECTION EVERY 8 HOURS
Status: DISCONTINUED | OUTPATIENT
Start: 2021-08-06 | End: 2021-08-06 | Stop reason: HOSPADM

## 2021-08-06 RX ORDER — SODIUM CHLORIDE 0.9 % (FLUSH) 0.9 %
5-40 SYRINGE (ML) INJECTION AS NEEDED
Status: DISCONTINUED | OUTPATIENT
Start: 2021-08-06 | End: 2021-08-07 | Stop reason: HOSPADM

## 2021-08-06 RX ORDER — MORPHINE SULFATE 4 MG/ML
4 INJECTION INTRAVENOUS
Status: DISCONTINUED | OUTPATIENT
Start: 2021-08-06 | End: 2021-08-07 | Stop reason: HOSPADM

## 2021-08-06 RX ORDER — FENTANYL CITRATE 50 UG/ML
INJECTION, SOLUTION INTRAMUSCULAR; INTRAVENOUS AS NEEDED
Status: DISCONTINUED | OUTPATIENT
Start: 2021-08-06 | End: 2021-08-06 | Stop reason: HOSPADM

## 2021-08-06 RX ORDER — LISINOPRIL 20 MG/1
20 TABLET ORAL DAILY
Status: DISCONTINUED | OUTPATIENT
Start: 2021-08-07 | End: 2021-08-07 | Stop reason: HOSPADM

## 2021-08-06 RX ORDER — SODIUM CHLORIDE 9 MG/ML
100 INJECTION, SOLUTION INTRAVENOUS CONTINUOUS
Status: CANCELLED | OUTPATIENT
Start: 2021-08-06

## 2021-08-06 RX ORDER — VERAPAMIL HYDROCHLORIDE 2.5 MG/ML
INJECTION, SOLUTION INTRAVENOUS AS NEEDED
Status: DISCONTINUED | OUTPATIENT
Start: 2021-08-06 | End: 2021-08-06 | Stop reason: HOSPADM

## 2021-08-06 RX ORDER — LIDOCAINE HYDROCHLORIDE 10 MG/ML
INJECTION INFILTRATION; PERINEURAL AS NEEDED
Status: DISCONTINUED | OUTPATIENT
Start: 2021-08-06 | End: 2021-08-06 | Stop reason: HOSPADM

## 2021-08-06 RX ORDER — SODIUM CHLORIDE 0.9 % (FLUSH) 0.9 %
5-40 SYRINGE (ML) INJECTION AS NEEDED
Status: CANCELLED | OUTPATIENT
Start: 2021-08-06

## 2021-08-06 RX ORDER — HEPARIN SODIUM 1000 [USP'U]/ML
INJECTION, SOLUTION INTRAVENOUS; SUBCUTANEOUS AS NEEDED
Status: DISCONTINUED | OUTPATIENT
Start: 2021-08-06 | End: 2021-08-06 | Stop reason: HOSPADM

## 2021-08-06 RX ORDER — SODIUM CHLORIDE 0.9 % (FLUSH) 0.9 %
5-40 SYRINGE (ML) INJECTION AS NEEDED
Status: DISCONTINUED | OUTPATIENT
Start: 2021-08-06 | End: 2021-08-06 | Stop reason: HOSPADM

## 2021-08-06 RX ORDER — MIDAZOLAM HYDROCHLORIDE 1 MG/ML
INJECTION, SOLUTION INTRAMUSCULAR; INTRAVENOUS AS NEEDED
Status: DISCONTINUED | OUTPATIENT
Start: 2021-08-06 | End: 2021-08-06 | Stop reason: HOSPADM

## 2021-08-06 RX ORDER — GUAIFENESIN 100 MG/5ML
81 LIQUID (ML) ORAL DAILY
Status: DISCONTINUED | OUTPATIENT
Start: 2021-08-07 | End: 2021-08-07 | Stop reason: HOSPADM

## 2021-08-06 RX ORDER — SODIUM CHLORIDE 9 MG/ML
100 INJECTION, SOLUTION INTRAVENOUS CONTINUOUS
Status: DISCONTINUED | OUTPATIENT
Start: 2021-08-06 | End: 2021-08-06 | Stop reason: HOSPADM

## 2021-08-06 RX ORDER — SODIUM CHLORIDE 0.9 % (FLUSH) 0.9 %
5-40 SYRINGE (ML) INJECTION EVERY 8 HOURS
Status: DISCONTINUED | OUTPATIENT
Start: 2021-08-06 | End: 2021-08-07 | Stop reason: SDUPTHER

## 2021-08-06 RX ORDER — HYDROCODONE BITARTRATE AND ACETAMINOPHEN 5; 325 MG/1; MG/1
1 TABLET ORAL
Status: DISCONTINUED | OUTPATIENT
Start: 2021-08-06 | End: 2021-08-07 | Stop reason: HOSPADM

## 2021-08-06 RX ORDER — SODIUM CHLORIDE 0.9 % (FLUSH) 0.9 %
5-40 SYRINGE (ML) INJECTION EVERY 8 HOURS
Status: CANCELLED | OUTPATIENT
Start: 2021-08-06

## 2021-08-06 RX ORDER — ASPIRIN 325 MG
TABLET ORAL AS NEEDED
Status: DISCONTINUED | OUTPATIENT
Start: 2021-08-06 | End: 2021-08-06 | Stop reason: HOSPADM

## 2021-08-06 RX ORDER — HEPARIN SODIUM 200 [USP'U]/100ML
INJECTION, SOLUTION INTRAVENOUS
Status: COMPLETED | OUTPATIENT
Start: 2021-08-06 | End: 2021-08-06

## 2021-08-06 RX ADMIN — Medication 10 ML: at 21:17

## 2021-08-06 RX ADMIN — Medication 10 ML: at 21:19

## 2021-08-06 RX ADMIN — Medication 10 ML: at 21:18

## 2021-08-06 NOTE — Clinical Note
Bifurcation involving the LAD and Diagonal. Balloon inflated using multiple inflation technique. Lesion #1: Pressure = 18 moiz; Duration = 12 sec. Inflation 2: Pressure = 20 moiz; Duration = 9 sec.

## 2021-08-06 NOTE — H&P (VIEW-ONLY)
Carlene Young MD. [facility]              Patient: Alice Guadarrama  : 1955      Today's Date: 2021          HISTORY OF PRESENT ILLNESS:     History of Present Illness:  Referred for SOB, abnormal EKG     Dr. Sharyle Iba noted yesterday \"Seen for persistent shortness of breath. It started around .  He had symptoms of sudden diaphoresis, shortness of breath, some pain in his back, not in his chest.  He went to [facility] Emergency Room where initial EKG was read as sinus rhythm with inferior Q waves. Sats were in the 90's. Chemistries is normal.  D-dimer was elevated. CTA was normal.  No evidence of PE or aortic abnormality. Troponin was normal.  Hemoglobin 14.9, hematocrit 45, white count 6000. Subsequent EKG was read as showing AFib, which was felt to be new and question criteria for inferior MI. \"    A week ago - he got SOB while at work - broke out in a sweat and was SOB - drove himself to [employer] - worked up in ED and sent home. D-dimer was high but coronary CTA was OK. He has been SOB since then. This AM was SOB just walking. Has some epigastric discomfort but no CP. Has exertional complaints.   But better at rest.              PAST MEDICAL HISTORY:     Past Medical History:   Diagnosis Date    Abnormal finding on EKG     Infarct- ruled as a mis-read by cardiology after testing    Arthritis     Chronic back pain     s/p surgery    GERD (gastroesophageal reflux disease)     H/O colonoscopy 2020 5 year repeat    Hyperlipidemia     Hypertension     Hypogonadism, male 2013    total 97, free 1.8  13    Panic attacks        Past Surgical History:   Procedure Laterality Date    COLONOSCOPY N/A 2018    COLONOSCOPY performed by Fred Patrick MD at [address] HX BLEPHAROPLASTY      HX COLONOSCOPY      Dr. Estela Rogers?    HX FRACTURE [place] Right     clavicle repair    [address]  ?    normal    HX KNEE ARTHROSCOPY Right     HX KNEE ARTHROSCOPY Left 2015    x 5    HX KNEE ARTHROSCOPY Right 04/29/2019    HX ORTHOPAEDIC  1990    right wrist    HX ORTHOPAEDIC Right 2015    ankle surgery    HX ROTATOR CUFF REPAIR Right 2017    HX ROTATOR CUFF REPAIR Left 2018    HX SHOULDER ARTHROSCOPY Right 2007         MEDICATIONS:     Current Outpatient Medications   Medication Sig Dispense Refill    tadalafiL (CIALIS) 10 mg tablet TAKE ONE TABLET BY MOUTH DAILY AS NEEDED 8 Tablet 0    lisinopriL (PRINIVIL, ZESTRIL) 20 mg tablet TAKE ONE TABLET BY MOUTH DAILY 90 Tab 0    atorvastatin (LIPITOR) 10 mg tablet TAKE ONE TABLET BY MOUTH DAILY 90 Tab 0    ALPRAZolam (XANAX) 0.5 mg tablet TAKE 1 TABLET BY MOUTH EVERY 8 HOURS AS NEEDED FOR ANXIETY 30 Tab 0    pantoprazole (PROTONIX) 40 mg tablet Take 1 Tab by mouth daily. 90 Tab 1    ibuprofen (ADVIL) 200 mg tablet Take 600 mg by mouth every six (6) hours as needed for Pain.  testosterone cypionate (DEPOTESTOTERONE CYPIONATE) 200 mg/mL injection 80 mg by IntraMUSCular route every seven (7) days. Allergies   Allergen Reactions    Codeine Rash     Over 40 years ago           SOCIAL HISTORY:     Social History     Tobacco Use    Smoking status: Never Smoker    Smokeless tobacco: Never Used   Substance Use Topics    Alcohol use: No     Alcohol/week: 0.0 standard drinks     Comment: Rare glass of wine    Drug use: Not Currently         FAMILY HISTORY:     Family History   Problem Relation Age of Onset    Heart Disease Father 46        CABG x5    Coronary Artery Disease Father         stents    Diabetes Sister     Heart Disease Sister         afib    Heart Failure Mother     No Known Problems Brother     No Known Problems Brother     Malignant Hyperthermia Neg Hx            REVIEW OF SYMPTOMS:     Review of Symptoms:  Constitutional: Negative for fever, chills  HEENT: Negative for nosebleeds, tinnitus, and vision changes.    Respiratory: Negative for cough, wheezing  Cardiovascular: Negative for orthopnea, claudication, syncope, and PND. Gastrointestinal: Negative for abdominal pain, diarrhea, melena. Genitourinary: Negative for dysuria  Musculoskeletal: Negative for myalgias. Skin: Negative for rash  Heme: No problems bleeding. Neurological: Negative for speech change and focal weakness. PHYSICAL EXAM:     Physical Exam:  Visit Vitals  BP (!) 142/70 (BP 1 Location: Right upper arm, BP Patient Position: Sitting, BP Cuff Size: Adult)   Pulse 85   Ht 6' 1\" (1.854 m)   Wt 180 lb (81.6 kg)   SpO2 95%   BMI 23.75 kg/m²     Patient appears generally well, mood and affect are appropriate and pleasant. HEENT:  Hearing intact, non-icteric, normocephalic, atraumatic. Neck Exam: Supple, No JVD    Lung Exam: Clear to auscultation, even breath sounds. Cardiac Exam: Regular rate and rhythm with no murmur or rub  Abdomen: Soft, non-tender, normal bowel sounds. No bruits or masses. Extremities: Moves all ext well. No lower extremity edema. MSKTL: Overall good ROM ext  Skin: No significant rashes  Psych: Appropriate affect  Neuro - Grossly intact      LABS / OTHER STUDIES reviewed:     Lab Results   Component Value Date/Time    Sodium 138 04/28/2021 06:47 PM    Potassium 4.4 04/28/2021 06:47 PM    Chloride 101 04/28/2021 06:47 PM    CO2 28 04/28/2021 06:47 PM    Anion gap 9 04/28/2021 06:47 PM    Glucose 87 04/28/2021 06:47 PM    BUN 26 (H) 04/28/2021 06:47 PM    Creatinine 1.21 04/28/2021 06:47 PM    BUN/Creatinine ratio 21 (H) 04/28/2021 06:47 PM    GFR est AA >60 04/28/2021 06:47 PM    GFR est non-AA >60 04/28/2021 06:47 PM    Calcium 9.0 04/28/2021 06:47 PM    Bilirubin, total 0.8 04/28/2021 06:47 PM    Alk.  phosphatase 65 04/28/2021 06:47 PM    Protein, total 6.8 04/28/2021 06:47 PM    Albumin 3.9 04/28/2021 06:47 PM    Globulin 2.9 04/28/2021 06:47 PM    A-G Ratio 1.3 04/28/2021 06:47 PM    ALT (SGPT) 24 04/28/2021 06:47 PM    AST (SGOT) 21 04/28/2021 06:47 PM     Lab Results Component Value Date/Time    WBC 4.6 04/28/2021 06:47 PM    HGB 14.2 04/28/2021 06:47 PM    HCT 41.5 04/28/2021 06:47 PM    PLATELET 006 86/06/1189 06:47 PM    MCV 87.6 04/28/2021 06:47 PM       Lab Results   Component Value Date/Time    Cholesterol, total 151 07/07/2020 10:17 AM    HDL Cholesterol 51 07/07/2020 10:17 AM    LDL, calculated 85.2 07/07/2020 10:17 AM    VLDL, calculated 14.8 07/07/2020 10:17 AM    Triglyceride 74 07/07/2020 10:17 AM    CHOL/HDL Ratio 3.0 07/07/2020 10:17 AM       Lab Results   Component Value Date/Time    TSH 3.850 02/22/2019 10:05 AM           CARDIAC DIAGNOSTICS:     Cardiac Evaluation Includes:  I reviewed the results below. EKG 8/5/21 - sinus edgardo, normal - I independently viewed and interpreted the test image(s) myself. Stress Echo 5/11/16 - normal study         ASSESSMENT AND PLAN:     Assessment and Plan:  1) Unstable angina   - has had VERDE and diaphoresis the past week -- including symptoms walking this morning   - he has typical exertional complaints - new class 3 symptoms   - His symptoms are concerning for unstable angina. I reviewed options stress test (70-90% sensitive) vs a cath. Given his symptoms this morning with minimal activity, I recommended we proceed with a cath today. He agrees to proceed with a cath. Risks of MI, CVA, VIRAJ, death, etc reviewed. Will plan for urgent cath this afternoon. 2) Panic attacks  - he says his history of panic attacks are completely different than symptoms he is having now. 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 Enjoys golf. Yolanda Dominguez MD, [facility] 142  [facility], [facility] [address]. [facility]. Lana Goyal [address], [address]  [place], [address] [place] [address]  Ph: [phone]   Ph [phone]        [facility]   8/6/2021    [facility] records reviewed.   CMP, CBC, trop normal EKG 7/31/21 at 7:27:23 ([facility]) ---> NSR, cannot rule out inferior  Infarct --- this same EKG was read twice at [address] [place] [address] and called NSR once and Afib the other time ---> I believe the EKG shows sinus with sinus arrhythmia and not Afib   EKG 8/1/21 ([facility]) - NSR, normal     Chest CTA 7/31/21 - normal, no PE

## 2021-08-06 NOTE — PROGRESS NOTES
Mitali Samuels MD. [facility] [place]              Patient: Karen Winchester  : 1955      Today's Date: 2021          HISTORY OF PRESENT ILLNESS:     History of Present Illness:  Referred for SOB, abnormal EKG     Dr. Luz Maria Patton noted yesterday \"Seen for persistent shortness of breath. It started around .  He had symptoms of sudden diaphoresis, shortness of breath, some pain in his back, not in his chest.  He went to [facility] Emergency Room where initial EKG was read as sinus rhythm with inferior Q waves. Sats were in the 90's. Chemistries is normal.  D-dimer was elevated. CTA was normal.  No evidence of PE or aortic abnormality. Troponin was normal.  Hemoglobin 14.9, hematocrit 45, white count 6000. Subsequent EKG was read as showing AFib, which was felt to be new and question criteria for inferior MI. \"    A week ago - he got SOB while at work - broke out in a sweat and was SOB - drove himself to [place] - worked up in ED and sent home. D-dimer was high but coronary CTA was OK. He has been SOB since then. This AM was SOB just walking. Has some epigastric discomfort but no CP. Has exertional complaints.   But better at rest.              PAST MEDICAL HISTORY:     Past Medical History:   Diagnosis Date    Abnormal finding on EKG     Infarct- ruled as a mis-read by cardiology after testing    Arthritis     Chronic back pain     s/p surgery    GERD (gastroesophageal reflux disease)     H/O colonoscopy 2020 5 year repeat    Hyperlipidemia     Hypertension     Hypogonadism, male 2013    total 97, free 1.8  13    Panic attacks        Past Surgical History:   Procedure Laterality Date    COLONOSCOPY N/A 2018    COLONOSCOPY performed by Josue Collier MD at [facility] HX BLEPHAROPLASTY      HX COLONOSCOPY      Dr. Jenny Mendoza?    HX FRACTURE [place] Right     clavicle repair    [address]  ?    normal    HX KNEE ARTHROSCOPY Right     HX KNEE ARTHROSCOPY Left 2015    x 5    HX KNEE ARTHROSCOPY Right 04/29/2019    HX ORTHOPAEDIC  1990    right wrist    HX ORTHOPAEDIC Right 2015    ankle surgery    HX ROTATOR CUFF REPAIR Right 2017    HX ROTATOR CUFF REPAIR Left 2018    HX SHOULDER ARTHROSCOPY Right 2007         MEDICATIONS:     Current Outpatient Medications   Medication Sig Dispense Refill    tadalafiL (CIALIS) 10 mg tablet TAKE ONE TABLET BY MOUTH DAILY AS NEEDED 8 Tablet 0    lisinopriL (PRINIVIL, ZESTRIL) 20 mg tablet TAKE ONE TABLET BY MOUTH DAILY 90 Tab 0    atorvastatin (LIPITOR) 10 mg tablet TAKE ONE TABLET BY MOUTH DAILY 90 Tab 0    ALPRAZolam (XANAX) 0.5 mg tablet TAKE 1 TABLET BY MOUTH EVERY 8 HOURS AS NEEDED FOR ANXIETY 30 Tab 0    pantoprazole (PROTONIX) 40 mg tablet Take 1 Tab by mouth daily. 90 Tab 1    ibuprofen (ADVIL) 200 mg tablet Take 600 mg by mouth every six (6) hours as needed for Pain.  testosterone cypionate (DEPOTESTOTERONE CYPIONATE) 200 mg/mL injection 80 mg by IntraMUSCular route every seven (7) days. Allergies   Allergen Reactions    Codeine Rash     Over 40 years ago           SOCIAL HISTORY:     Social History     Tobacco Use    Smoking status: Never Smoker    Smokeless tobacco: Never Used   Substance Use Topics    Alcohol use: No     Alcohol/week: 0.0 standard drinks     Comment: Rare glass of wine    Drug use: Not Currently         FAMILY HISTORY:     Family History   Problem Relation Age of Onset    Heart Disease Father 46        CABG x5    Coronary Artery Disease Father         stents    Diabetes Sister     Heart Disease Sister         afib    Heart Failure Mother     No Known Problems Brother     No Known Problems Brother     Malignant Hyperthermia Neg Hx            REVIEW OF SYMPTOMS:     Review of Symptoms:  Constitutional: Negative for fever, chills  HEENT: Negative for nosebleeds, tinnitus, and vision changes.    Respiratory: Negative for cough, wheezing  Cardiovascular: Negative for orthopnea, claudication, syncope, and PND. Gastrointestinal: Negative for abdominal pain, diarrhea, melena. Genitourinary: Negative for dysuria  Musculoskeletal: Negative for myalgias. Skin: Negative for rash  Heme: No problems bleeding. Neurological: Negative for speech change and focal weakness. PHYSICAL EXAM:     Physical Exam:  Visit Vitals  BP (!) 142/70 (BP 1 Location: Right upper arm, BP Patient Position: Sitting, BP Cuff Size: Adult)   Pulse 85   Ht 6' 1\" (1.854 m)   Wt 180 lb (81.6 kg)   SpO2 95%   BMI 23.75 kg/m²     Patient appears generally well, mood and affect are appropriate and pleasant. HEENT:  Hearing intact, non-icteric, normocephalic, atraumatic. Neck Exam: Supple, No JVD    Lung Exam: Clear to auscultation, even breath sounds. Cardiac Exam: Regular rate and rhythm with no murmur or rub  Abdomen: Soft, non-tender, normal bowel sounds. No bruits or masses. Extremities: Moves all ext well. No lower extremity edema. MSKTL: Overall good ROM ext  Skin: No significant rashes  Psych: Appropriate affect  Neuro - Grossly intact      LABS / OTHER STUDIES reviewed:     Lab Results   Component Value Date/Time    Sodium 138 04/28/2021 06:47 PM    Potassium 4.4 04/28/2021 06:47 PM    Chloride 101 04/28/2021 06:47 PM    CO2 28 04/28/2021 06:47 PM    Anion gap 9 04/28/2021 06:47 PM    Glucose 87 04/28/2021 06:47 PM    BUN 26 (H) 04/28/2021 06:47 PM    Creatinine 1.21 04/28/2021 06:47 PM    BUN/Creatinine ratio 21 (H) 04/28/2021 06:47 PM    GFR est AA >60 04/28/2021 06:47 PM    GFR est non-AA >60 04/28/2021 06:47 PM    Calcium 9.0 04/28/2021 06:47 PM    Bilirubin, total 0.8 04/28/2021 06:47 PM    Alk.  phosphatase 65 04/28/2021 06:47 PM    Protein, total 6.8 04/28/2021 06:47 PM    Albumin 3.9 04/28/2021 06:47 PM    Globulin 2.9 04/28/2021 06:47 PM    A-G Ratio 1.3 04/28/2021 06:47 PM    ALT (SGPT) 24 04/28/2021 06:47 PM    AST (SGOT) 21 04/28/2021 06:47 PM     Lab Results Component Value Date/Time    WBC 4.6 04/28/2021 06:47 PM    HGB 14.2 04/28/2021 06:47 PM    HCT 41.5 04/28/2021 06:47 PM    PLATELET 192 68/32/9578 06:47 PM    MCV 87.6 04/28/2021 06:47 PM       Lab Results   Component Value Date/Time    Cholesterol, total 151 07/07/2020 10:17 AM    HDL Cholesterol 51 07/07/2020 10:17 AM    LDL, calculated 85.2 07/07/2020 10:17 AM    VLDL, calculated 14.8 07/07/2020 10:17 AM    Triglyceride 74 07/07/2020 10:17 AM    CHOL/HDL Ratio 3.0 07/07/2020 10:17 AM       Lab Results   Component Value Date/Time    TSH 3.850 02/22/2019 10:05 AM           CARDIAC DIAGNOSTICS:     Cardiac Evaluation Includes:  I reviewed the results below. EKG 8/5/21 - sinus edgardo, normal - I independently viewed and interpreted the test image(s) myself. Stress Echo 5/11/16 - normal study         ASSESSMENT AND PLAN:     Assessment and Plan:  1) Unstable angina   - has had VERDE and diaphoresis the past week -- including symptoms walking this morning   - he has typical exertional complaints - new class 3 symptoms   - His symptoms are concerning for unstable angina. I reviewed options stress test (70-90% sensitive) vs a cath. Given his symptoms this morning with minimal activity, I recommended we proceed with a cath today. He agrees to proceed with a cath. Risks of MI, CVA, VIRAJ, death, etc reviewed. Will plan for urgent cath this afternoon. 2) Panic attacks  - he says his history of panic attacks are completely different than symptoms he is having now. 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 Enjoys golf. Yoon Poe MD, [address], [address] [place] [address]  Ph: [phone]   Ph [phone]        [facility]   8/6/2021    [facility] records reviewed.   CMP, CBC, trop normal EKG 7/31/21 at 7:27:23 ([facility]) ---> NSR, cannot rule out inferior  Infarct --- this same EKG was read twice at [address] [place] [address] and called NSR once and Afib the other time ---> I believe the EKG shows sinus with sinus arrhythmia and not Afib   EKG 8/1/21 ([facility]) - NSR, normal     Chest CTA 7/31/21 - normal, no PE

## 2021-08-06 NOTE — Clinical Note
TRANSFER - OUT REPORT:     Verbal report given to: Matthew. Report consisted of patient's Situation, Background, Assessment and   Recommendations(SBAR). Opportunity for questions and clarification was provided. Patient transported with a Registered Nurse. Patient transported to: recovery.

## 2021-08-06 NOTE — Clinical Note
Bifurcation involving the LAD and Diagonal. First inflation pressure = 16 moiz; inflation time: 21 sec.

## 2021-08-06 NOTE — Clinical Note
Multiple views of the left main, left coronary artery, LAD, circumflex artery and right coronary artery obtained using hand injection.

## 2021-08-06 NOTE — Clinical Note
Bifurcation involving the LAD and Diagonal. Balloon inflated using multiple inflation technique. Lesion #1: Pressure = 12 moiz; Duration = 11 sec. Inflation 2: Pressure = 12 moiz; Duration = 8 sec.

## 2021-08-06 NOTE — Clinical Note
Balloon inserted. Balloon inflated using multiple inflation technique. Lesion #1: Pressure = 6 moiz; Duration = 15 sec.

## 2021-08-06 NOTE — Clinical Note
Balloon inflated using multiple inflation technique. Lesion #1: Pressure = 20 moiz; Duration = 13 sec. Inflation 2: Pressure = 16 moiz; Duration = 10 sec. Inflation 3: Pressure = 16 moiz; Duration = 12 sec. Inflation 4: Pressure = 20 moiz; Duration = 13 sec.

## 2021-08-06 NOTE — PROGRESS NOTES
3:06 PM  Patient arrived. ID and allergies verified verbally with patient. Pt voices understanding of procedure to be performed. Consent obtained. Pt prepped for procedure. 3:25 PM  TRANSFER - OUT REPORT:    Verbal report given to Ed, RN(name) on Chip Miles  being transferred to cath lab(unit) for ordered procedure       Report consisted of patients Situation, Background, Assessment and   Recommendations(SBAR). Information from the following report(s) SBAR was reviewed with the receiving nurse. Lines:   Peripheral IV 08/06/21 Right Antecubital (Active)   Site Assessment Clean, dry, & intact 08/06/21 1518   Phlebitis Assessment 0 08/06/21 1518   Dressing Status Clean, dry, & intact 08/06/21 1518   Dressing Type Tape;Transparent 08/06/21 1518   Hub Color/Line Status Pink 08/06/21 1518        Opportunity for questions and clarification was provided. Patient transported with:   Registered Nurse    5:26 PM  TRANSFER - IN REPORT:    Verbal report received from Ed, RN/RT Diana(name) on Chip Miles  being received from cath lab(unit) for routine post - op      Report consisted of patients Situation, Background, Assessment and   Recommendations(SBAR). Information from the following report(s) Procedure Summary was reviewed with the receiving nurse. Opportunity for questions and clarification was provided. Assessment completed upon patients arrival to unit and care assumed. 6:35 PM  TRANSFER - OUT REPORT:    Verbal report given to ELI Oropeza(name) on Chip Miles  being transferred to ICU Stepdown(unit) for routine progression of care       Report consisted of patients Situation, Background, Assessment and   Recommendations(SBAR). Information from the following report(s) ED Summary, Procedure Summary, MAR, Accordion, Recent Results and Cardiac Rhythm NSR was reviewed with the receiving nurse.     Lines:   Peripheral IV 08/06/21 Right Antecubital (Active)   Site Assessment Clean, dry, & intact 08/06/21 1518   Phlebitis Assessment 0 08/06/21 1518   Dressing Status Clean, dry, & intact 08/06/21 1518   Dressing Type Tape;Transparent 08/06/21 1518   Hub Color/Line Status Pink 08/06/21 1518        Opportunity for questions and clarification was provided. Patient transported with:   Monitor  Registered Nurse    Visualized R groin site and R radial site with TR band inflated with 11cc of air. Both sites are CDI, and soft with palpation.   Patient's bedrest is complete at 7:20 PM.

## 2021-08-06 NOTE — PROGRESS NOTES
Chief Complaint   Patient presents with    New Patient     Ref by Dr. Flash Marquis of Breath    Cholesterol Problem    Slow Heart Rate    Hypertension     Visit Vitals  Ht 6' 1\" (1.854 m)   Wt 180 lb (81.6 kg)   BMI 23.75 kg/m²     Chest pain denied   SOB for about a week or so  Palpitations denied   Swelling in hands/feet denied   Dizziness at times feels faint. 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 Drove to [facility] (7/31/21) EKG, CT scan.   -Bloodwork showed could show blood clot, but CT showed no clot. Abd discomfort for about a month ago. \"Not feeling right. \"    Couldn't catch breath after BM this am.  Took 1/2 Xanax, helped a little.

## 2021-08-06 NOTE — Clinical Note
Lesion located in the Ostium Diag 2. Bifurcation involving the LAD and Diagonal. Balloon inflated using single inflation technique. Lesion #1: Pressure = 8 moiz; Duration = 16 sec.

## 2021-08-06 NOTE — INTERVAL H&P NOTE
Update History & Physical    History and physical has been reviewed. The patient has been examined. There have been no significant clinical changes since the completion of the originally dated History and Physical.    Risk and benefit of cardiac catheterization/PCI was described in detail to patient.  (risk of vascular access complication with potential surgical intervention for management, stroke, myocardial infarction, emergent open heart surgery and death). Patient wishes to proceed with coronary angiography with possible intervention.          ASA 2  Airway 2      Electronically signed by Silverio Bass DO on 8/6/2021 at 5:23 PM

## 2021-08-06 NOTE — PROCEDURES
BRIEF PROCEDURE NOTE    Date of Procedure: 8/6/2021   Preoperative Diagnosis: Unstable angina  Postoperative Diagnosis: Unstable angina  Procedure: Left heart cath, coronary angiography, PCI with GENARO, IVUS   interventional Cardiologist: Clara Tobar DO  Assistant: None  Anesthesia: local + IV moderate sedation   I administered moderate sedation throughout this procedure. An independent trained observer pushed medications at my direction, and monitored the patients level of consciousness and physiological status throughout. Estimated Blood Loss: Minimal    Access: Right radial artery, 6F. Required versa core wire to traverse radial artery. Suspect high takeoff origin of right radial artery. Able to complete diagnostic catheterization with 5 Scottish catheters via the right radial.  Unable to track 6 Western Analisa guide through mid humeral region. Attempted balloon tracking with 2.0 compliant balloon along over run-through wire. Unable to track 6 Western Analisa guide. Deferred doing PCI through 5 Western Analisa guide due to bifurcation lesion. Right common femoral access, 6 Western Analisa. Exchanged to 25 cm sheath due to iliac tortuosity. Catheters:  Left coronary: JL 3.5, 5F  Right coronary: JR4, 5F    Findings:   L Main: Large caliber vessel, no significant disease  LAD: Large caliber vessel, proximal mid vessel with diffuse mild disease, within the distal aspect of the mid LAD at the bifurcation of D2 there is a 90% focal stenosis with diffuse plaque proximal to the lesion on IVUS, moderate D1 without critical disease, small D2 with an ostial 70% stenosis  LCx: Large caliber vessel, supplies 3 moderate to large caliber obtuse marginals without significant disease  RCA: Large-caliber vessel, small to moderate PL branch, moderate PDA, no significant disease    LVEDP:  14 mmhg    PCI:  Systemic heparinization  Initially tried to engage left coronary artery with EBU 4, EBU 3.5, JL 4. Unable to cannulate left main.   Left main was cannulated with JL 3.56 Western Analisa guide. Run-through wire was passed into distal LAD. Alberto blue was passed into D2. Mid LAD was postdilated with a 2.5 compliant balloon. D2 ostium was dilated with a 2.0 compliant balloon at 8 moiz. IVUS was used for vessel sizing. Mid LAD was stented with a 2.5X 23 mm Xience Elba GENARO deployed at 16 moiz. Distal to D2 was postdilated with a 2 5 noncompliant balloon at 16 moiz. Pot was performed with a 3.0 noncompliant balloon deployed at 16 to 20 moiz. Post angiography showed ALEX-3 flow into D2 and distal LAD. IVUS showed excellent stent apposition without evidence of dissection or perforation. The patient tolerated procedure well without any dynamic compromise. Specimens Removed: None    Implants: Xience Elba GENARO    Closure Device: radial TR band, Angio-Seal    See full cath note. Complications: none      Findings:  1. Critical bifurcation stenosis of mid LAD and D2  2. Mildly elevated LVEDP  3. Mid LAD stented with a 2.5X 23 mm Xience Elba GENARO, optimized with a 2 5 and 3.0 noncompliant balloon. Plan:    DAPT for at least 6 months. Titrate atorvastatin to 80 mg daily.     DO Mady Madison Che, DO  [facility] [address] [place] [address]                                              Office [phone],DXF [phone]

## 2021-08-07 VITALS
SYSTOLIC BLOOD PRESSURE: 131 MMHG | OXYGEN SATURATION: 97 % | DIASTOLIC BLOOD PRESSURE: 64 MMHG | RESPIRATION RATE: 13 BRPM | BODY MASS INDEX: 23.72 KG/M2 | TEMPERATURE: 96.9 F | HEIGHT: 73 IN | WEIGHT: 179 LBS | HEART RATE: 58 BPM

## 2021-08-07 PROCEDURE — 74011250637 HC RX REV CODE- 250/637: Performed by: STUDENT IN AN ORGANIZED HEALTH CARE EDUCATION/TRAINING PROGRAM

## 2021-08-07 PROCEDURE — 99217 PR OBSERVATION CARE DISCHARGE MANAGEMENT: CPT | Performed by: SPECIALIST

## 2021-08-07 PROCEDURE — 99218 HC RM OBSERVATION: CPT

## 2021-08-07 RX ORDER — GUAIFENESIN 100 MG/5ML
81 LIQUID (ML) ORAL DAILY
Qty: 30 TABLET | Refills: 5 | Status: SHIPPED | OUTPATIENT
Start: 2021-08-08

## 2021-08-07 RX ORDER — ATORVASTATIN CALCIUM 40 MG/1
40 TABLET, FILM COATED ORAL DAILY
Qty: 30 TABLET | Refills: 5 | Status: SHIPPED | OUTPATIENT
Start: 2021-08-07 | End: 2022-06-08

## 2021-08-07 RX ADMIN — Medication 10 ML: at 05:51

## 2021-08-07 RX ADMIN — TICAGRELOR 90 MG: 90 TABLET ORAL at 06:49

## 2021-08-07 RX ADMIN — ASPIRIN 81 MG: 81 TABLET, CHEWABLE ORAL at 09:24

## 2021-08-07 NOTE — PROGRESS NOTES
Problem: Falls - Risk of  Goal: *Absence of Falls  Description: Document Jennifer Cody Fall Risk and appropriate interventions in the flowsheet.   Outcome: Progressing Towards Goal  Note: Fall Risk Interventions:            Medication Interventions: Bed/chair exit alarm, Evaluate medications/consider consulting pharmacy, Patient to call before getting OOB                   Problem: Patient Education: Go to Patient Education Activity  Goal: Patient/Family Education  Outcome: Progressing Towards Goal

## 2021-08-07 NOTE — DISCHARGE INSTRUCTIONS
Patient Discharge Instructions    Luca Good / [id number] : 1955    Admitted 2021 Discharged: 2021     Take Home Medications            · It is important that you take the medication exactly as they are prescribed. · Keep your medication in the bottles provided by the pharmacist and keep a list of the medication names, dosages, and times to be taken in your wallet. · Do not take other medications without consulting your doctor. What to do at Home    Recommended diet: Cardiac Diet, Diabetic Diet and Low fat, Low cholesterol    Recommended activity: Activity as tolerated,         Follow-up with Gi Leal MD  [facility] part of week of   BRING ALL of YOUR MEDICINES TO YOUR OFFICE VISIT with Dr. Page Woodard TEST :    Cath with LAD stent and no other disease  On brilanta can cause SOB. caffeine will help that and ensure you take aspirin 81 mg a day with brillanta  LDL or lousy cholesterol goal is less than 70     Call 911 anytime you think you may need emergency care. For example, call if:  You have signs of a heart attack. These may include:  Chest pain or pressure. Sweating. Shortness of breath. Nausea or vomiting. Pain that spreads from the chest to the neck, jaw, or one or both shoulders or arms. Dizziness or lightheadedness. A fast or irregular pulse. After calling 911, chew 1 adult-strength aspirin. Wait for an ambulance. Do not try to drive yourself. You passed out (lost consciousness). You feel like you are having another heart attack. Call your doctor now or seek immediate medical care if:  You have had any chest pain, even if it has gone away. You have new or increased shortness of breath. You are dizzy or lightheaded, or you feel like you may faint. Information obtained by :  I understand that if any problems occur once I am at home I am to contact my physician. I understand and acknowledge receipt of the instructions indicated above. Physician's or R.N.'s Signature                                                                  Date/Time                                                                                                                                              Patient or Representative Signature                                                          Date/Time

## 2021-08-07 NOTE — PROGRESS NOTES
Discharge instructions reviewed with patient and wife who verbalize understanding. Prescriptions have been sent to his preferred pharmacy. IV and tele removed. Signed discharge papers placed in patients chart.

## 2021-08-07 NOTE — PROGRESS NOTES
8/7/2021  12:09 PM    Patient admitted for SOB and abnormal EKG at PCP office    RUR:  N/A - under observation status  Risk Level: [x]Low []Moderate []High  Value-based purchasing: [] Yes [x] No  Bundle patient: [] Yes [x] No   Specify:   Observation notice provided in writing to patient and/or caregiver as well as verbal explanation of the policy. Patients who are in outpatient status also receive the Observation notice. Patient has received notice and or patient representative has received via secure email, fax, or certified mail based on patient representative's preference. Transition of care plan:  1. Medically stable with discharge order  2. Home with family assistance  3. Outpatient follow-up - cardiology, PCP  4. Pt's wife to transport    Care Management Interventions  PCP Verified by CM: Yes Mirian Head)  Mode of Transport at Discharge:  Other (see comment) (wife)  Transition of Care Consult (CM Consult): Discharge Planning  MyChart Signup: No  Discharge Durable Medical Equipment: No  Physical Therapy Consult: No  Occupational Therapy Consult: No  Speech Therapy Consult: No  Current Support Network: Lives with Spouse  Confirm Follow Up Transport: Family  The Plan for Transition of Care is Related to the Following Treatment Goals : shortness of breath  Discharge Location  Discharge Placement: Home with family assistance    Swati Wan RN

## 2021-08-07 NOTE — PROGRESS NOTES
1900- Bedside and Verbal shift change report given to Rock Hutton RN (oncoming nurse) by Marjan Naik RN (offgoing nurse). Report included the following information SBAR, Kardex, Procedure Summary, Intake/Output, MAR, Recent Results, Cardiac Rhythm NSR and Alarm Parameters . Primary Nurse Jose M Byers RN and ELI Oropeza performed a dual skin assessment on this patient Impairment noted- see wound doc flow sheet  Andres score is 19    2000- Pt resting in bed at this time. 2030- TR band removed. No signs of bleeding or hematoma. 0000- No changes on reassessment. 0400- No changes on reassessment. 0700- Bedside and Verbal shift change report given to Rudy Cleary RN (oncoming nurse) by Rock Hutton RN (offgoing nurse). Report included the following information SBAR, Kardex, Intake/Output, MAR, Recent Results, Cardiac Rhythm NSR and Alarm Parameters .

## 2021-08-07 NOTE — PROGRESS NOTES
CARDIOLOGY DISCHARGE NOTE    Berny Sarah MD,  [address], [place], [address] [facility]  Phone [phone]; Fax [phone]  Mobile [phone]   Voice Mail [phone]        2021 9:38 AM       Admit Date:           2021  Admit Diagnosis:  Chest pain, unspecified type [R07.9];CAD (coronary artery disease) [I25.10]  :          1955   MRN:          [id number]      Primary care: Toi Washington MD       ATTENTION:   This medical record was transcribed using an electronic medical records/speech recognition system. Although proofread, it may and can contain electronic, spelling and other errors. Corrections may be executed at a later time. Please feel free to contact us for any clarifications as needed. Impression Plan/Recommendation   1. CAD status post stenting of the LAD  2. Hypertension-this is demonstrated by the tortuosity of his blood vessels that indicate that he is had ongoing hypertension. 3. Dyslipidemia LDL was 71          Cardiac cath yesterday, left main okay, LAD diffuse disease at bifurcation with D2 90% focal stenosis, circumflex okay, RCA okay. Mid LAD was stented with a 2.5 x 23 mm Xience Elba GENARO deployed at 16 moiz and I believe postdilated with a 3 oh noncompliant balloon. 1.  Plan to discharge on Brilinta and aspirin and I reviewed with both patient and his wife that should he have any shortness of breath to try  caffeine with the Brilinta  2. Increase Lipitor from 10 to 40 mg and will need a repeat cholesterol in 2 months. May need [facility] heart labs to further evaluate other causes for CAD as his admission LDL was 71    I reviewed the cath films with the patient and he did not have an LV gram so we will plan on echo when he returns to the office. He should come back and see me the first week of .   We discussed the expected course, resolution and complications of the diagnosis(es) in detail. Medication risks, benefits, costs, interactions, and alternatives were discussed as indicated. ICD-10-CM ICD-9-CM    1. Chest pain, unspecified type  R07.9 786.50 CARDIAC PROCEDURE      CARDIAC PROCEDURE      DISCONTINUED: 0.9% sodium chloride infusion      DISCONTINUED: sodium chloride (NS) flush 5-40 mL      DISCONTINUED: sodium chloride (NS) flush 5-40 mL      CANCELED: FULL CODE      CANCELED: FULL CODE      CANCELED: INSERT PERIPHERAL IV      CANCELED: NURSING-MISCELLANEOUS:      CANCELED: VERIFY CONSENT HAS BEEN OBTAINED      CANCELED: VERIFY CONSENT HAS BEEN OBTAINED      CANCELED: VERIFY CONSENT HAS BEEN OBTAINED      CANCELED: INSERT PERIPHERAL IV      CANCELED: NURSING-MISCELLANEOUS:      CANCELED: VERIFY CONSENT HAS BEEN OBTAINED      CANCELED: VERIFY CONSENT HAS BEEN OBTAINED      CANCELED: VERIFY CONSENT HAS BEEN OBTAINED   2. SOB (shortness of breath)  R06.02 786.05 DISCONTINUED: 0.9% sodium chloride infusion      DISCONTINUED: sodium chloride (NS) flush 5-40 mL      DISCONTINUED: sodium chloride (NS) flush 5-40 mL      CANCELED: FULL CODE      CANCELED: FULL CODE      CANCELED: INSERT PERIPHERAL IV      CANCELED: NURSING-MISCELLANEOUS:      CANCELED: VERIFY CONSENT HAS BEEN OBTAINED      CANCELED: VERIFY CONSENT HAS BEEN OBTAINED      CANCELED: VERIFY CONSENT HAS BEEN OBTAINED      CANCELED: INSERT PERIPHERAL IV      CANCELED: NURSING-MISCELLANEOUS:      CANCELED: VERIFY CONSENT HAS BEEN OBTAINED      CANCELED: VERIFY CONSENT HAS BEEN OBTAINED      CANCELED: VERIFY CONSENT HAS BEEN OBTAINED   3.  Diaphoresis  R61 780.8 DISCONTINUED: 0.9% sodium chloride infusion      DISCONTINUED: sodium chloride (NS) flush 5-40 mL      DISCONTINUED: sodium chloride (NS) flush 5-40 mL      CANCELED: FULL CODE      CANCELED: FULL CODE      CANCELED: INSERT PERIPHERAL IV      CANCELED: NURSING-MISCELLANEOUS:      CANCELED: VERIFY CONSENT HAS BEEN OBTAINED      CANCELED: VERIFY CONSENT HAS BEEN OBTAINED CANCELED: VERIFY CONSENT HAS BEEN OBTAINED      CANCELED: INSERT PERIPHERAL IV      CANCELED: NURSING-MISCELLANEOUS:      CANCELED: VERIFY CONSENT HAS BEEN OBTAINED      CANCELED: VERIFY CONSENT HAS BEEN OBTAINED      CANCELED: VERIFY CONSENT HAS BEEN OBTAINED       No intake/output data recorded. Last 3 Recorded Weights in this Encounter    08/06/21 1519   Weight: 179 lb (81.2 kg)         08/05 1901 - 08/07 0700  In: -   Out: 200 [Urine:200]    SUBJECTIVE           Dr. Batool Kiran noted yesterday \"Seen for persistent shortness of breath.  It started around July 31st. [facility] had symptoms of sudden diaphoresis, shortness of breath, some pain in his back, not in his chest.  He went to [facility] Emergency Room where initial EKG was read as sinus rhythm with inferior Q waves.  Sats were in the 90's.  Chemistries is normal.  D-dimer was elevated.  CTA was normal.  No evidence of PE or aortic abnormality.  Troponin was normal.  Hemoglobin 14.9, hematocrit 45, white count 6000.  Subsequent EKG was read as showing AFib, which was felt to be new and question criteria for inferior MI. \"     A week ago - he got SOB while at work - broke out in a sweat and was SOB - drove himself to [facility] - worked up in ED and sent home. D-dimer was high but coronary CTA was OK. He has been SOB since then. This AM was SOB just walking. Has some epigastric discomfort but no CP. Has exertional complaints. But better at rest.    [facility] reports Is good and is ready for discharge. .      Current Facility-Administered Medications   Medication Dose Route Frequency    sodium chloride (NS) flush 5-40 mL  5-40 mL IntraVENous PRN    atorvastatin (LIPITOR) tablet 80 mg  80 mg Oral DAILY    lisinopriL (PRINIVIL, ZESTRIL) tablet 20 mg  20 mg Oral DAILY    aspirin chewable tablet 81 mg  81 mg Oral DAILY    ticagrelor (BRILINTA) tablet 90 mg  90 mg Oral Q12H    sodium chloride (NS) flush 5-40 mL  5-40 mL IntraVENous Q8H    sodium chloride (NS) flush 5-40 mL  5-40 mL IntraVENous PRN    acetaminophen (TYLENOL) tablet 650 mg  650 mg Oral Q4H PRN    HYDROcodone-acetaminophen (NORCO) 5-325 mg per tablet 1 Tablet  1 Tablet Oral Q4H PRN    morphine injection 4 mg  4 mg IntraVENous Q5MIN PRN      OBJECTIVE               Intake/Output Summary (Last 24 hours) at 8/7/2021 5955  Last data filed at 8/6/2021 2000  Gross per 24 hour   Intake    Output 200 ml   Net -200 ml       Review of Systems - History obtained from the patient AS PER  HPI        PHYSICAL EXAM        Visit Vitals  /64   Pulse (!) 58   Temp 96.9 °F (36.1 °C)   Resp 13   Ht 6' 1\" (1.854 m)   Wt 179 lb (81.2 kg)   SpO2 97%   BMI 23.62 kg/m²       Gen: Well-developed, well-nourished, in no acute distress  alert and oriented x 3  HEENT:  Pink conjunctivae, Hearing grossly normal.No scleral icterus or conjunctival, moist mucous membranes  Neck: Supple,No JVD, No Carotid Bruit, Thyroid- non tender No cervical lymphadenopathy  Resp: No accessory muscle use, Clear breath sounds, No rales or rhonchi  Card: Regular Rate,Rythm,Normal S1, S2, No murmurs, rubs or gallop. No thrills.    MSK: No cyanosis or clubbing, good capillary refill  Skin: No rashes or ulcers, no bruising  Neuro:  Cranial nerves are grossly intact, moving all four extremities, no focal deficit, follows commands appropriately  Psych:  Good insight, oriented to person, place and time, alert, Nml Affect  LE: No edema groin looks good with no evidence of [place] or bruit       DATA REVIEW              Laboratory and Imaging have been reviewed by me and are notable for  Recent Labs     08/06/21 2036 08/06/21  1515   TROIQ <0.05 <0.05     Recent Labs     08/06/21  1515      K 4.5   CO2 31   BUN 22*   CREA 1.05   *   WBC 6.8   HGB 14.7   HCT 46.0                Esmer Williamson MD

## 2021-08-09 ENCOUNTER — TELEPHONE (OUTPATIENT)
Dept: CARDIOLOGY CLINIC | Age: 66
End: 2021-08-09

## 2021-08-09 DIAGNOSIS — F41.9 ANXIETY: ICD-10-CM

## 2021-08-09 LAB
ATRIAL RATE: 67 BPM
ATRIAL RATE: 69 BPM
CALCULATED P AXIS, ECG09: 36 DEGREES
CALCULATED P AXIS, ECG09: 55 DEGREES
CALCULATED R AXIS, ECG10: 27 DEGREES
CALCULATED R AXIS, ECG10: 31 DEGREES
CALCULATED T AXIS, ECG11: 30 DEGREES
CALCULATED T AXIS, ECG11: 30 DEGREES
DIAGNOSIS, 93000: NORMAL
DIAGNOSIS, 93000: NORMAL
P-R INTERVAL, ECG05: 158 MS
P-R INTERVAL, ECG05: 174 MS
Q-T INTERVAL, ECG07: 384 MS
Q-T INTERVAL, ECG07: 398 MS
QRS DURATION, ECG06: 100 MS
QRS DURATION, ECG06: 94 MS
QTC CALCULATION (BEZET), ECG08: 405 MS
QTC CALCULATION (BEZET), ECG08: 426 MS
VENTRICULAR RATE, ECG03: 67 BPM
VENTRICULAR RATE, ECG03: 69 BPM

## 2021-08-09 RX ORDER — ALPRAZOLAM 0.5 MG/1
TABLET ORAL
Qty: 30 TABLET | Refills: 0 | Status: SHIPPED | OUTPATIENT
Start: 2021-08-09 | End: 2021-09-07

## 2021-08-09 NOTE — CARDIO/PULMONARY
[facility] Cardiopulmonary Rehab: 61 yo male admitted for cardiac cath. S/P PTCA with GENARO to LAD (8/6). LVEF not available. Patient was not seen by Cardiac Rehab RN prior to discharge on 8/7. UPDATE 8/9/21: Spoke to patient on telephone. 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 Pt indicated awareness that he needed a stent. CAD risk factors: HTN, HLD, family hx, age & gender. He has never smoked cigarettes. Discussed benefits of outpatient cardiac rehab program. Pt reported he will be scheduling a follow up appt with Dr Aggie coleman. Pt expressed desire to participate in cardiac rehab. Scheduled intake appt for 10 am on 8/24/21. All questions were answered. ,

## 2021-08-10 RX ORDER — CLOPIDOGREL BISULFATE 75 MG/1
75 TABLET ORAL DAILY
COMMUNITY
End: 2021-08-10 | Stop reason: SDUPTHER

## 2021-08-10 RX ORDER — CLOPIDOGREL BISULFATE 75 MG/1
75 TABLET ORAL DAILY
Qty: 30 TABLET | Refills: 3 | Status: SHIPPED | OUTPATIENT
Start: 2021-08-10 | End: 2021-08-16

## 2021-08-16 ENCOUNTER — HOSPITAL ENCOUNTER (INPATIENT)
Age: 66
LOS: 1 days | Discharge: HOME | DRG: 384 | End: 2021-08-17
Attending: EMERGENCY MEDICINE | Admitting: INTERNAL MEDICINE
Payer: COMMERCIAL

## 2021-08-16 ENCOUNTER — APPOINTMENT (OUTPATIENT)
Dept: CT IMAGING | Age: 66
DRG: 384 | End: 2021-08-16
Attending: EMERGENCY MEDICINE
Payer: COMMERCIAL

## 2021-08-16 ENCOUNTER — APPOINTMENT (OUTPATIENT)
Dept: GENERAL RADIOLOGY | Age: 66
DRG: 384 | End: 2021-08-16
Attending: EMERGENCY MEDICINE
Payer: COMMERCIAL

## 2021-08-16 DIAGNOSIS — R10.816 EPIGASTRIC ABDOMINAL TENDERNESS WITHOUT REBOUND TENDERNESS: ICD-10-CM

## 2021-08-16 DIAGNOSIS — K56.609 SMALL BOWEL OBSTRUCTION (HCC): Primary | ICD-10-CM

## 2021-08-16 PROBLEM — K29.80 DUODENITIS: Status: ACTIVE | Noted: 2021-08-16

## 2021-08-16 PROBLEM — K21.9 GERD (GASTROESOPHAGEAL REFLUX DISEASE): Status: ACTIVE | Noted: 2021-08-16

## 2021-08-16 PROBLEM — I10 HTN (HYPERTENSION), BENIGN: Status: ACTIVE | Noted: 2021-08-16

## 2021-08-16 PROBLEM — K56.50 SMALL BOWEL OBSTRUCTION DUE TO ADHESIONS (HCC): Status: ACTIVE | Noted: 2021-08-16

## 2021-08-16 PROBLEM — E87.6 HYPOKALEMIA: Status: ACTIVE | Noted: 2021-08-16

## 2021-08-16 LAB
ALBUMIN SERPL-MCNC: 3.6 G/DL (ref 3.5–5)
ALBUMIN/GLOB SERPL: 1.3 {RATIO} (ref 1.1–2.2)
ALP SERPL-CCNC: 66 U/L (ref 45–117)
ALT SERPL-CCNC: 21 U/L (ref 12–78)
ANION GAP SERPL CALC-SCNC: 4 MMOL/L (ref 5–15)
AST SERPL-CCNC: 16 U/L (ref 15–37)
BASOPHILS # BLD: 0 K/UL (ref 0–0.1)
BASOPHILS NFR BLD: 0 % (ref 0–1)
BILIRUB SERPL-MCNC: 0.8 MG/DL (ref 0.2–1)
BUN SERPL-MCNC: 12 MG/DL (ref 6–20)
BUN/CREAT SERPL: 13 (ref 12–20)
CALCIUM SERPL-MCNC: 8.4 MG/DL (ref 8.5–10.1)
CHLORIDE SERPL-SCNC: 108 MMOL/L (ref 97–108)
CK SERPL-CCNC: 68 U/L (ref 39–308)
CO2 SERPL-SCNC: 28 MMOL/L (ref 21–32)
COMMENT, HOLDF: NORMAL
CREAT SERPL-MCNC: 0.9 MG/DL (ref 0.7–1.3)
DIFFERENTIAL METHOD BLD: ABNORMAL
EOSINOPHIL # BLD: 0.1 K/UL (ref 0–0.4)
EOSINOPHIL NFR BLD: 1 % (ref 0–7)
ERYTHROCYTE [DISTWIDTH] IN BLOOD BY AUTOMATED COUNT: 12.1 % (ref 11.5–14.5)
GLOBULIN SER CALC-MCNC: 2.8 G/DL (ref 2–4)
GLUCOSE SERPL-MCNC: 108 MG/DL (ref 65–100)
HCT VFR BLD AUTO: 45.9 % (ref 36.6–50.3)
HGB BLD-MCNC: 15 G/DL (ref 12.1–17)
IMM GRANULOCYTES # BLD AUTO: 0 K/UL (ref 0–0.04)
IMM GRANULOCYTES NFR BLD AUTO: 0 % (ref 0–0.5)
LIPASE SERPL-CCNC: 14 U/L (ref 73–393)
LYMPHOCYTES # BLD: 0.9 K/UL (ref 0.8–3.5)
LYMPHOCYTES NFR BLD: 10 % (ref 12–49)
MCH RBC QN AUTO: 29.5 PG (ref 26–34)
MCHC RBC AUTO-ENTMCNC: 32.7 G/DL (ref 30–36.5)
MCV RBC AUTO: 90.2 FL (ref 80–99)
MONOCYTES # BLD: 0.6 K/UL (ref 0–1)
MONOCYTES NFR BLD: 6 % (ref 5–13)
NEUTS SEG # BLD: 7.7 K/UL (ref 1.8–8)
NEUTS SEG NFR BLD: 83 % (ref 32–75)
NRBC # BLD: 0 K/UL (ref 0–0.01)
NRBC BLD-RTO: 0 PER 100 WBC
PLATELET # BLD AUTO: 208 K/UL (ref 150–400)
PMV BLD AUTO: 9.2 FL (ref 8.9–12.9)
POTASSIUM SERPL-SCNC: 3.3 MMOL/L (ref 3.5–5.1)
PROT SERPL-MCNC: 6.4 G/DL (ref 6.4–8.2)
RBC # BLD AUTO: 5.09 M/UL (ref 4.1–5.7)
SAMPLES BEING HELD,HOLD: NORMAL
SODIUM SERPL-SCNC: 140 MMOL/L (ref 136–145)
TROPONIN I SERPL-MCNC: <0.05 NG/ML
WBC # BLD AUTO: 9.4 K/UL (ref 4.1–11.1)

## 2021-08-16 PROCEDURE — 84484 ASSAY OF TROPONIN QUANT: CPT

## 2021-08-16 PROCEDURE — 71045 X-RAY EXAM CHEST 1 VIEW: CPT

## 2021-08-16 PROCEDURE — 82550 ASSAY OF CK (CPK): CPT

## 2021-08-16 PROCEDURE — C9113 INJ PANTOPRAZOLE SODIUM, VIA: HCPCS | Performed by: INTERNAL MEDICINE

## 2021-08-16 PROCEDURE — 74177 CT ABD & PELVIS W/CONTRAST: CPT

## 2021-08-16 PROCEDURE — 74011250636 HC RX REV CODE- 250/636: Performed by: INTERNAL MEDICINE

## 2021-08-16 PROCEDURE — 74011250637 HC RX REV CODE- 250/637: Performed by: STUDENT IN AN ORGANIZED HEALTH CARE EDUCATION/TRAINING PROGRAM

## 2021-08-16 PROCEDURE — 74011000636 HC RX REV CODE- 636: Performed by: RADIOLOGY

## 2021-08-16 PROCEDURE — 65660000000 HC RM CCU STEPDOWN

## 2021-08-16 PROCEDURE — 93005 ELECTROCARDIOGRAM TRACING: CPT

## 2021-08-16 PROCEDURE — 36415 COLL VENOUS BLD VENIPUNCTURE: CPT

## 2021-08-16 PROCEDURE — 74011250637 HC RX REV CODE- 250/637: Performed by: EMERGENCY MEDICINE

## 2021-08-16 PROCEDURE — 99285 EMERGENCY DEPT VISIT HI MDM: CPT

## 2021-08-16 PROCEDURE — 83690 ASSAY OF LIPASE: CPT

## 2021-08-16 PROCEDURE — 80053 COMPREHEN METABOLIC PANEL: CPT

## 2021-08-16 PROCEDURE — 99223 1ST HOSP IP/OBS HIGH 75: CPT | Performed by: STUDENT IN AN ORGANIZED HEALTH CARE EDUCATION/TRAINING PROGRAM

## 2021-08-16 PROCEDURE — 85025 COMPLETE CBC W/AUTO DIFF WBC: CPT

## 2021-08-16 RX ORDER — NITROGLYCERIN 0.4 MG/1
0.4 TABLET SUBLINGUAL
Status: DISCONTINUED | OUTPATIENT
Start: 2021-08-16 | End: 2021-08-17 | Stop reason: HOSPADM

## 2021-08-16 RX ORDER — BUPRENORPHINE AND NALOXONE 8; 2 MG/1; MG/1
1 FILM, SOLUBLE BUCCAL; SUBLINGUAL DAILY
COMMUNITY

## 2021-08-16 RX ORDER — PROMETHAZINE HYDROCHLORIDE 25 MG/1
12.5 TABLET ORAL
Status: DISCONTINUED | OUTPATIENT
Start: 2021-08-16 | End: 2021-08-17 | Stop reason: HOSPADM

## 2021-08-16 RX ORDER — SODIUM CHLORIDE 0.9 % (FLUSH) 0.9 %
5-40 SYRINGE (ML) INJECTION EVERY 8 HOURS
Status: DISCONTINUED | OUTPATIENT
Start: 2021-08-16 | End: 2021-08-17 | Stop reason: HOSPADM

## 2021-08-16 RX ORDER — SODIUM CHLORIDE 0.9 % (FLUSH) 0.9 %
5-40 SYRINGE (ML) INJECTION AS NEEDED
Status: DISCONTINUED | OUTPATIENT
Start: 2021-08-16 | End: 2021-08-17 | Stop reason: HOSPADM

## 2021-08-16 RX ORDER — ACETAMINOPHEN 650 MG/1
650 SUPPOSITORY RECTAL
Status: DISCONTINUED | OUTPATIENT
Start: 2021-08-16 | End: 2021-08-17 | Stop reason: HOSPADM

## 2021-08-16 RX ORDER — GUAIFENESIN 100 MG/5ML
81 LIQUID (ML) ORAL DAILY
Status: DISCONTINUED | OUTPATIENT
Start: 2021-08-17 | End: 2021-08-17 | Stop reason: HOSPADM

## 2021-08-16 RX ORDER — POLYETHYLENE GLYCOL 3350 17 G/17G
17 POWDER, FOR SOLUTION ORAL DAILY PRN
Status: DISCONTINUED | OUTPATIENT
Start: 2021-08-16 | End: 2021-08-17 | Stop reason: HOSPADM

## 2021-08-16 RX ORDER — ONDANSETRON 2 MG/ML
4 INJECTION INTRAMUSCULAR; INTRAVENOUS
Status: DISCONTINUED | OUTPATIENT
Start: 2021-08-16 | End: 2021-08-17 | Stop reason: HOSPADM

## 2021-08-16 RX ORDER — CLOPIDOGREL BISULFATE 75 MG/1
75 TABLET ORAL DAILY
COMMUNITY
Start: 2021-09-06 | End: 2021-08-19

## 2021-08-16 RX ORDER — ACETAMINOPHEN 325 MG/1
650 TABLET ORAL
Status: DISCONTINUED | OUTPATIENT
Start: 2021-08-16 | End: 2021-08-17 | Stop reason: HOSPADM

## 2021-08-16 RX ORDER — PANTOPRAZOLE SODIUM 40 MG/1
40 TABLET, DELAYED RELEASE ORAL
Status: DISCONTINUED | OUTPATIENT
Start: 2021-08-17 | End: 2021-08-17 | Stop reason: HOSPADM

## 2021-08-16 RX ORDER — POTASSIUM CHLORIDE AND SODIUM CHLORIDE 900; 300 MG/100ML; MG/100ML
INJECTION, SOLUTION INTRAVENOUS CONTINUOUS
Status: DISCONTINUED | OUTPATIENT
Start: 2021-08-16 | End: 2021-08-17 | Stop reason: HOSPADM

## 2021-08-16 RX ORDER — SENNOSIDES 8.6 MG/1
1 TABLET ORAL DAILY PRN
Status: DISCONTINUED | OUTPATIENT
Start: 2021-08-16 | End: 2021-08-17 | Stop reason: HOSPADM

## 2021-08-16 RX ADMIN — NITROGLYCERIN 0.4 MG: 0.4 TABLET, ORALLY DISINTEGRATING SUBLINGUAL at 09:37

## 2021-08-16 RX ADMIN — TICAGRELOR 90 MG: 90 TABLET ORAL at 16:01

## 2021-08-16 RX ADMIN — PANTOPRAZOLE SODIUM 40 MG: 40 INJECTION, POWDER, FOR SOLUTION INTRAVENOUS at 16:02

## 2021-08-16 RX ADMIN — PANTOPRAZOLE SODIUM 40 MG: 40 INJECTION, POWDER, FOR SOLUTION INTRAVENOUS at 20:54

## 2021-08-16 RX ADMIN — Medication 10 ML: at 22:08

## 2021-08-16 RX ADMIN — POTASSIUM CHLORIDE AND SODIUM CHLORIDE: 900; 300 INJECTION, SOLUTION INTRAVENOUS at 18:52

## 2021-08-16 RX ADMIN — NITROGLYCERIN 0.4 MG: 0.4 TABLET, ORALLY DISINTEGRATING SUBLINGUAL at 10:16

## 2021-08-16 RX ADMIN — IOPAMIDOL 100 ML: 755 INJECTION, SOLUTION INTRAVENOUS at 10:50

## 2021-08-16 RX ADMIN — Medication 10 ML: at 16:03

## 2021-08-16 NOTE — ED TRIAGE NOTES
Pt arrives with the c.c. of chest pain that has been going on since last night, pt had stent placed ten days ago here, pt reports pain is the same as when he had last time, pain is more upper abdominal. By EMS patient received 324 ASA in route and 4 of zofran

## 2021-08-16 NOTE — ED NOTES
TRANSFER - OUT REPORT:    Verbal report given to Bing Mcgee (name) on Gillie Moritz  being transferred to [facility] (unit) for routine progression of care       Report consisted of patients Situation, Background, Assessment and   Recommendations(SBAR). Information from the following report(s) SBAR, ED Summary, MAR, Recent Results and Cardiac Rhythm NSR  was reviewed with the receiving nurse. Lines:   Peripheral IV 08/16/21 Right Antecubital (Active)   Site Assessment Clean, dry, & intact 08/16/21 0925   Phlebitis Assessment 0 08/16/21 0925   Dressing Status Clean, dry, & intact 08/16/21 0925   Hub Color/Line Status Green 08/16/21 0925   Action Taken Blood drawn 08/16/21 1242        Opportunity for questions and clarification was provided.       Patient transported with:   Octro
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
## 2021-08-16 NOTE — PROGRESS NOTES
[facility]: MED RECONCILIATION    Comments/Recommendations:   Prior to admission medication list updated per telephone conversation with patient's wife who was in ER room 6 with patient. Preferred pharmacy is [facility] at [facility]. Suboxone SL film: patient has his own with him and is amenable to using his own while inpatient. Ticagrelor: patient prescribed Ticagrelor 90 mg BID x 30 days; however, patient's wife was looking at Ticagrelor prescription bottle today and outpatient pharmacy incorrectly dispensed the 60 mg tablets. Patient has thus been inadvertently taking only 60 mg BID. Clopidogrel: to start in about 3 weeks, after 30-day Ticagrelor prescription is completed. Allergies: Codeine    Prior to Admission Medications   Prescriptions Last Dose Informant Patient Reported? Taking? ALPRAZolam (XANAX) 0.5 mg tablet 2021 at Unknown time Significant Other No Yes   Sig: TAKE 1 TABLET BY MOUTH EVERY 8 HOURS AS NEEDED FOR ANXIETY   aspirin 81 mg chewable tablet 2021 at Unknown time Significant Other No Yes   Sig: Take 1 Tablet by mouth daily. atorvastatin (LIPITOR) 40 mg tablet 8/15/2021 at Unknown time Significant Other No Yes   Sig: Take 1 Tablet by mouth daily. buprenorphine-naloxone (SUBOXONE) 8-2 mg film sublingaul film 8/15/2021 at Unknown time Significant Other Yes Yes   Si Film by SubLINGual route daily. clopidogreL (PLAVIX) 75 mg tab  Significant Other Yes Yes   Sig: Take 75 mg by mouth daily. ibuprofen (ADVIL) 200 mg tablet  Significant Other Yes Yes   Sig: Take 600 mg by mouth every six (6) hours as needed for Pain.   pantoprazole (PROTONIX) 40 mg tablet 8/15/2021 at Unknown time Significant Other No Yes   Sig: Take 1 Tab by mouth daily. Patient taking differently: Take 40 mg by mouth every evening.    tadalafiL (CIALIS) 10 mg tablet  Significant Other No Yes   Sig: TAKE ONE TABLET BY MOUTH DAILY AS NEEDED   testosterone cypionate (DEPOTESTOTERONE CYPIONATE) 200 mg/mL injection 2021 Significant Other Yes Yes   Si mg by IntraMUSCular route every seven (7) days. ticagrelor (BRILINTA) 90 mg tablet 8/15/2021 at Unknown time Significant Other Yes Yes   Sig: Take 90 mg by mouth two (2) times a day.         Liz Decker, Pharmacist
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
## 2021-08-16 NOTE — H&P
[facility]  [address], [facility] [address]  [phone]    Admission History and Physical      NAME:       Teresa Raygoza   :       1955   MRN:      [id number]     PCP:      Claudia Crook MD     Date of service:   2021     Chief  Complaint: Abdominal pain x 1 day    History Of Presenting Illness:       Mr. Enmanuel Mims is a 60 y.o. male who is being admitted for small bowel obstruction due to adhesions [facility]). Mr. Enmanuel Mims presented to our Emergency Department today complaining of  An acute onset of epigastric discomfort, mild to moderate that seemed to have been persistent. He denies any fever or chills. No nausea or vomiting. He however had some mild chest discomfort. He has a hx of CAD for which he had a cath and PCi on  and was to be on DAPT but had a lower than prescribed dose for Brilinta. He denies any fever or chills. No nausea or vomiting. In the ED, troponin was neg and EKG was non ischemic. CXR was clear and CT scan abdomen and pelvis showed findings compatible with small bowel obstruction likely related to adhesion formation in the mid abdomen. Suggestion of wall thickening at the pylorus/duodenal bulb junction may be related to underlying peptic ulcer disease. He will be admitted for further management. Allergies   Allergen Reactions    Codeine Rash     Over 40 years ago       Prior to Admission medications    Medication Sig Start Date End Date Taking? Authorizing Provider   buprenorphine-naloxone (SUBOXONE) 8-2 mg film sublingaul film 1 Film by SubLINGual route daily. Yes Provider, Historical   ticagrelor (BRILINTA) 90 mg tablet Take 90 mg by mouth two (2) times a day. Yes Provider, Historical   clopidogreL (PLAVIX) 75 mg tab Take 75 mg by mouth daily.  21  Yes Provider, Historical   ALPRAZolam (XANAX) 0.5 mg tablet TAKE 1 TABLET BY MOUTH EVERY 8 HOURS AS NEEDED FOR ANXIETY 8/9/21  Yes Wilfredo Guaman MD   atorvastatin (LIPITOR) 40 mg tablet Take 1 Tablet by mouth daily. 8/7/21  Yes Isra Jason MD   aspirin 81 mg chewable tablet Take 1 Tablet by mouth daily. 8/8/21  Yes Aneesh Martinez MD   tadalafiL (CIALIS) 10 mg tablet TAKE ONE TABLET BY MOUTH DAILY AS NEEDED 7/14/21  Yes Wilfredo Guaman MD   pantoprazole (PROTONIX) 40 mg tablet Take 1 Tab by mouth daily. Patient taking differently: Take 40 mg by mouth every evening. 11/17/20  Yes Sky Coffey MD   ibuprofen (ADVIL) 200 mg tablet Take 600 mg by mouth every six (6) hours as needed for Pain. Yes Provider, Historical   testosterone cypionate (DEPOTESTOTERONE CYPIONATE) 200 mg/mL injection 80 mg by IntraMUSCular route every seven (7) days.    Yes Provider, Historical       Past Medical History:   Diagnosis Date    Abnormal finding on EKG 2015    Infarct- ruled as a mis-read by cardiology after testing    Arthritis     Chronic back pain     s/p surgery    GERD (gastroesophageal reflux disease)     H/O colonoscopy 7/7/2020 6/18 5 year repeat    Hyperlipidemia     Hypertension     Hypogonadism, male 8/21/2013    total 97, free 1.8  5/13/13    Panic attacks         Past Surgical History:   Procedure Laterality Date    COLONOSCOPY N/A 6/8/2018    COLONOSCOPY performed by Dustin Sampson MD at [address] [facility] HX BLEPHAROPLASTY  2011    HX COLONOSCOPY      Dr. Alex Rodríguez?    HX FRACTURE TX Right     clavicle repair    HX HEART CATHETERIZATION  2011?    normal    HX KNEE ARTHROSCOPY Right 2014    HX KNEE ARTHROSCOPY Left 2015    x 5    HX KNEE ARTHROSCOPY Right 04/29/2019    HX ORTHOPAEDIC  1990    right wrist    HX ORTHOPAEDIC Right 2015    ankle surgery    HX ROTATOR CUFF REPAIR Right 2017    HX ROTATOR CUFF REPAIR Left 2018    HX SHOULDER ARTHROSCOPY Right 2007       Social History     Tobacco Use    Smoking status: Never Smoker    Smokeless tobacco: Never Used   Substance Use Topics    Alcohol use: No     Alcohol/week: 0.0 standard drinks     Comment: Rare glass of wine        Family History   Problem Relation Age of Onset    Heart Disease Father 46        CABG x5    Coronary Artery Disease Father         stents    Diabetes Sister     Heart Disease Sister         afib    Heart Failure Mother     No Known Problems Brother     No Known Problems Brother     Malignant Hyperthermia Neg Hx         Review of Systems:    Constitutional ROS: no fever, chills, rigors or night sweats  Respiratory ROS: no cough, sputum, hemoptysis, dyspnea or pleuritic pain. Cardiovascular ROS: no palpitations, orthopnea, PND or syncope  Endocrine ROS: no polydispsia, polyuria, heat or cold intolerance or major weight change. Gastrointestinal ROS: no dysphagia but abdominal discomfort. Had a BM     Genito-Urinary ROS: no dysuria, frequency, hematuria, retention or flank pain  Musculoskeletal ROS: no joint pain, swelling or muscular tenderness  Neurological ROS: no headache, confusion, focal weakness or any other neurological symptoms  Psychiatric ROS: no depression, anxiety, mood swings  Dermatological ROS: no rash, pruritis, or urticaria  Heme-Lymph ROS: no swollen glands, bleeding    Examination:    Constitutional:    Visit Vitals  /73   Pulse 73   Resp 14   Ht 6' 1\" (1.854 m)   Wt 81.2 kg (179 lb)   SpO2 95%   BMI 23.62 kg/m²         General:  Weak and ill looking patient in no acute distress  Eyes: Pink conjunctivae, PERRLA with no discharge. Normal eye movements  Ear, Nose, Mouth & Throat: No ottorrhea, rhinorrhea, non tender sinuses, dry mucous membranes  Respiratory:  No accessory muscle use, clear breath sounds without crackles or wheezes  Cardiovascular:  No JVD or murmurs, regular and normal S1, S2 without thrills, bruits or peripheral edema.    GI & :  Soft abdomen, mild discomfort epigastrium, non-distended, normoactive bowel sounds with no palpable organomegaly  Heme:  No cervical or axillary adenopathy. Musculoskeletal:  No cyanosis, clubbing, atrophy or deformities  Skin:  No rashes, bruising or ulcers   Neurological: Awake and alert, speech is clear, CNs 2-12 are grossly intact and otherwise non focal  Psychiatric:  Has a good insight and is oriented x 3  ________________________________________________________________________    Data Review:    Labs:    Recent Labs     08/16/21  0927   WBC 9.4   HGB 15.0   HCT 45.9        Recent Labs     08/16/21  0927      K 3.3*      CO2 28   *   BUN 12   CREA 0.90   CA 8.4*   ALB 3.6   ALT 21     No components found for: GLPOC  No results for input(s): PH, PCO2, PO2, HCO3, FIO2 in the last 72 hours. No results for input(s): INR, INREXT, INREXT in the last 72 hours. Imaging Studies:      Chest X-ray and CT scan abdomen - all reviewed     Personally reviewed 12 lead EKG: Normal rate, rhythm, axis and intervals. and No acute changes suggestive of ischemia    I have also reviewed available old medical records. Assessment & Impression:     Mr. Eddi Mcknight is a 60 y.o. male being evaluated for:     Principal Problem:    Small bowel obstruction due to adhesions ([facility] [address] (8/16/2021)    Active Problems:    CAD (coronary artery disease) (8/6/2021)      GERD (gastroesophageal reflux disease) (8/16/2021)      HTN (hypertension), benign (8/16/2021)      Duodenitis (8/16/2021)      Hypokalemia (8/16/2021)         Plan of management:    Small bowel obstruction due to adhesions ([address] (8/16/2021) POA: suspected on CT. He denies any prior abdominal surgery. Admit to hospital. Keep NPO. IV fluids and consult general surgery    CAD (coronary artery disease) (8/6/2021) POA: sp a recent cardiac cath and PCi. Resume Brilinta, Asprin and Lipitor.  Cardiology following    GERD (gastroesophageal reflux disease) (8/16/2021) / Duodenitis (8/16/2021) POA: has been taking Advil for knee pain, Likely has PUD based on CT findings. He denies any melena stools. Start IV PPi for now. May need an EGD at some point    HTN (hypertension), benign (8/16/2021) POA: BP well controlled.  Not on any medications     Hypokalemia (8/16/2021) POA: replete and follow K+     Code Status:  Full    Surrogate decision maker: Family    Risk of deterioration: high      Total time spent for the care of the patient: [address] discussed with: Patient, Family, Nursing Staff and ED physician    Discussed:  Code Status, Care Plan and D/C Planning    Prophylaxis:  SCD's and H2B/PPI    Probable Disposition:  Home w/Family           ___________________________________________________    Attending Physician: Alfa Sherman MD
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
## 2021-08-16 NOTE — CONSULTS
Dc Child, DO  [facility] [address] [place] [address], [address] [place] [address], [address] [place] [address]                                       Office [phone],GPM [phone]  Office [phone],UVD [phone]      Date of  Admission: 8/16/2021  9:19 AM  PCP- Fran Burroughs MD    Hermelinda Brunner is a 60 y.o. male, cardiology consulted due to history of coronary artery disease    Requested by Howard Zhao MD    Assessment/Plan      Small bowel obstruction secondary to adhesions    Possible peptic ulcer disease    Coronary artery disease status post recent stenting of his LAD    Patient recently presented to [facility] and subsequently to cardiology due to subacute onset of exertional dyspnea with shortness of breath. Underwent PCI of his LAD 8/10/2021 with drug-eluting stent. Patient felt great after PCI. Presented to the ED today after developing severe abdominal pain that was associated with mild diaphoresis. No ongoing chest pain or abdominal pain. -Recommend to continue dual antiplatelet therapy due to recent GENARO placement. Optimally would conservatively manage his small bowel obstruction unless urgent surgery is needed. If emergent surgery is needed, recommend to continue dual antiplatelet therapy if possible. If more semi-elective surgery is an option, can discontinue Brilinta 4 to 6 weeks after stent placement on 8/10/2021 with the continuation of aspirin. []    High complexity decision making was performed  []    Patient is at high-risk of decompensation with multiple organ involvement      I appreciate the opportunity to be involved in Mr. Roman. See below note for details. Please do not hesitate to contact us with questions or concerns. Ilya Loving DO      Subjective:  Hermelinda Brunner is a 60 y.o. male presents to the ED for evaluation of abdominal pain.   Patient recently evaluated at [facility] subsequently by cardiology for unstable angina symptoms. At that time he was having severe shortness of breath. Found to have severe LAD's disease, underwent GENARO placement. Reports feeling well after PCI. He does report his pharmacy given 60 mg of Brilinta instead of 90 mg of Brilinta. Presented to the ED today due to worsening abdominal pain symptoms. He reports last evening acute onset of severe abdominal pain with some diaphoresis. Symptoms persisted throughout the evening so he called the rescue squad. In the ER his troponins are negative. His EKG is nonischemic. CT scan shows findings consistent with small bowel obstruction and possible peptic ulcer disease.       Past Medical History:   Diagnosis Date    Abnormal finding on EKG 2015    Infarct- ruled as a mis-read by cardiology after testing    Arthritis     Chronic back pain     s/p surgery    GERD (gastroesophageal reflux disease)     H/O colonoscopy 7/7/2020 6/18 5 year repeat    Hyperlipidemia     Hypertension     Hypogonadism, male 8/21/2013    total 97, free 1.8  5/13/13    Panic attacks       Past Surgical History:   Procedure Laterality Date    COLONOSCOPY N/A 6/8/2018    COLONOSCOPY performed by Ariana Ortiz MD at [address] [facility] HX BLEPHAROPLASTY  2011   Tiffany Whiting COLONOSCOPY      Dr. Raymond Powers?    HX FRACTURE TX Right     clavicle repair    HX HEART CATHETERIZATION  2011?    normal    HX KNEE ARTHROSCOPY Right 2014    HX KNEE ARTHROSCOPY Left 2015    x 5    HX KNEE ARTHROSCOPY Right 04/29/2019    HX ORTHOPAEDIC  1990    right wrist    HX ORTHOPAEDIC Right 2015    ankle surgery    HX ROTATOR CUFF REPAIR Right 2017    HX ROTATOR CUFF REPAIR Left 2018    HX SHOULDER ARTHROSCOPY Right 2007     Allergies   Allergen Reactions    Codeine Rash     Over 40 years ago     Family History   Problem Relation Age of Onset    Heart Disease Father 46        CABG x5    Coronary Artery Disease Father         stents    Diabetes Sister     Heart Disease Sister afib    Heart Failure Mother     No Known Problems Brother     No Known Problems Brother     Malignant Hyperthermia Neg Hx       Social History     Tobacco Use    Smoking status: Never Smoker    Smokeless tobacco: Never Used   Substance Use Topics    Alcohol use: No     Alcohol/week: 0.0 standard drinks     Comment: Rare glass of wine    Drug use: Not Currently          Medications:  (Not in a hospital admission)    Current Facility-Administered Medications   Medication Dose Route Frequency    nitroglycerin (NITROSTAT) tablet 0.4 mg  0.4 mg SubLINGual Q5MIN PRN    sodium chloride (NS) flush 5-40 mL  5-40 mL IntraVENous Q8H    sodium chloride (NS) flush 5-40 mL  5-40 mL IntraVENous PRN    acetaminophen (TYLENOL) tablet 650 mg  650 mg Oral Q6H PRN    Or    acetaminophen (TYLENOL) suppository 650 mg  650 mg Rectal Q6H PRN    polyethylene glycol (MIRALAX) packet 17 g  17 g Oral DAILY PRN    senna (SENOKOT) tablet 8.6 mg  1 Tablet Oral DAILY PRN    promethazine (PHENERGAN) tablet 12.5 mg  12.5 mg Oral Q6H PRN    Or    ondansetron (ZOFRAN) injection 4 mg  4 mg IntraVENous Q6H PRN     Current Outpatient Medications   Medication Sig    buprenorphine-naloxone (SUBOXONE) 8-2 mg film sublingaul film 1 Film by SubLINGual route daily.  ticagrelor (BRILINTA) 90 mg tablet Take 90 mg by mouth two (2) times a day.  [START ON 9/6/2021] clopidogreL (PLAVIX) 75 mg tab Take 75 mg by mouth daily.  ALPRAZolam (XANAX) 0.5 mg tablet TAKE 1 TABLET BY MOUTH EVERY 8 HOURS AS NEEDED FOR ANXIETY    atorvastatin (LIPITOR) 40 mg tablet Take 1 Tablet by mouth daily.  aspirin 81 mg chewable tablet Take 1 Tablet by mouth daily.  tadalafiL (CIALIS) 10 mg tablet TAKE ONE TABLET BY MOUTH DAILY AS NEEDED    pantoprazole (PROTONIX) 40 mg tablet Take 1 Tab by mouth daily.  (Patient taking differently: Take 40 mg by mouth every evening.)    ibuprofen (ADVIL) 200 mg tablet Take 600 mg by mouth every six (6) hours as needed for Pain.  testosterone cypionate (DEPOTESTOTERONE CYPIONATE) 200 mg/mL injection 80 mg by IntraMUSCular route every seven (7) days. Review of Systems:  Pertinent Positive: Abdominal pain  Pertinent Negative: No chest pain, shortness of breath, orthopnea, PND  All Other systems reviewed and are negative for a Comprehensive ROS (10+)        Physical Exam:  Visit Vitals  /73   Pulse 73   Resp 14   Ht 6' 1\" (1.854 m)   Wt 179 lb (81.2 kg)   SpO2 95%   BMI 23.62 kg/m²           Gen: Well-developed, well-nourished, in no acute distress  HEENT:  Pink conjunctivae, hearing intact to voice, moist mucous membranes  Neck: Supple,No JVD, No Carotid Bruit  Resp: No accessory muscle use, Clear breath sounds, No rales or rhonchi  Card: Normal Rate,Regular Rythm,Normal S1, S2, No murmurs, rubs or gallop. No thrills. Abd:  Soft, non-tender, non-distended, normoactive bowel sounds are present   MSK: No cyanosis or clubbing  Skin: No rashes or ulcers  Neuro:  Cranial nerves are grossly intact, moving all four extremities, no focal deficit, follows commands appropriately  Psych:  Good insight, oriented to person, place and time, alert, Nml Affect  LE: No edema  Vascular:Distal Pulses 2+ and symmetric          EKG:           Cardiac Testing/ Procedures:    08/06/21    CARDIAC PROCEDURE 08/06/2021 8/6/2021    Conclusion  · Critical bifurcation stenosis of mid LAD and D2  · Mildly elevated LVEDP  · Mid LAD stented with a 2.5X 23 mm Xience Elba GENARO, optimized with a 2 5 and 3.0 noncompliant balloon. Access: Right radial artery, 6F. Required versa core wire to traverse radial artery. Suspect high takeoff origin of right radial artery. Able to complete diagnostic catheterization with 5 Jordanian catheters via the right radial.  Unable to track 6 Western Analisa guide through mid humeral region. Attempted balloon tracking with 2.0 compliant balloon along over run-through wire. Unable to track 6 Western Analisa guide.   Deferred doing PCI through 5 Western Analisa guide due to bifurcation lesion. Right common femoral access, 6 Western Analisa. Exchanged to 25 cm sheath due to iliac tortuosity. Catheters:  Left coronary: JL 3.5, 5F  Right coronary: JR4, 5F    Findings:  L Main: Large caliber vessel, no significant disease  LAD: Large caliber vessel, proximal mid vessel with diffuse mild disease, within the distal aspect of the mid LAD at the bifurcation of D2 there is a 90% focal stenosis with diffuse plaque proximal to the lesion on IVUS, moderate D1 without critical disease, small D2 with an ostial 70% stenosis  LCx: Large caliber vessel, supplies 3 moderate to large caliber obtuse marginals without significant disease  RCA: Large-caliber vessel, small to moderate PL branch, moderate PDA, no significant disease    LVEDP:  14 mmhg    PCI:  Systemic heparinization  Initially tried to engage left coronary artery with EBU 4, EBU 3.5, JL 4. Unable to cannulate left main. Left main was cannulated with JL 3.56 Western Analisa guide. Run-through wire was passed into distal LAD. Alberto blue was passed into D2. Mid LAD was postdilated with a 2.5 compliant balloon. D2 ostium was dilated with a 2.0 compliant balloon at 8 moiz. IVUS was used for vessel sizing. Mid LAD was stented with a 2.5X 23 mm Xience Elba GENARO deployed at 16 moiz. Distal to D2 was postdilated with a 2 5 noncompliant balloon at 16 moiz. Pot was performed with a 3.0 noncompliant balloon deployed at 16 to 20 moiz. Post angiography showed ALEX-3 flow into D2 and distal LAD. IVUS showed excellent stent apposition without evidence of dissection or perforation. The patient tolerated procedure well without any dynamic compromise.     Signed by: James Jimenez DO on 8/6/2021  5:43 PM      LABS:    Lab Results   Component Value Date/Time    WBC 9.4 08/16/2021 09:27 AM    HGB 15.0 08/16/2021 09:27 AM    HCT 45.9 08/16/2021 09:27 AM    PLATELET 739 41/03/4478 09:27 AM    MCV 90.2 08/16/2021 09:27 AM     Lab Results Component Value Date/Time    Sodium 140 08/16/2021 09:27 AM    Potassium 3.3 (L) 08/16/2021 09:27 AM    Chloride 108 08/16/2021 09:27 AM    CO2 28 08/16/2021 09:27 AM    Anion gap 4 (L) 08/16/2021 09:27 AM    Glucose 108 (H) 08/16/2021 09:27 AM    BUN 12 08/16/2021 09:27 AM    Creatinine 0.90 08/16/2021 09:27 AM    BUN/Creatinine ratio 13 08/16/2021 09:27 AM    GFR est AA >60 08/16/2021 09:27 AM    GFR est non-AA >60 08/16/2021 09:27 AM    Calcium 8.4 (L) 08/16/2021 09:27 AM     Lab Results   Component Value Date/Time     01/01/2014 07:30 AM    CK-MB Index 1.1 01/01/2014 07:30 AM    Troponin-I, Qt. <0.05 08/16/2021 09:27 AM     No results found for: APTT  No results found for: INR, PTMR, PTP, PT1, PT2, INREXT        Tran Grain, DO    ATTENTION:   This medical record was transcribed using an electronic medical records/speech recognition system. Although proofread, it may and can contain electronic, spelling and other errors. Corrections may be executed at a later time. Please feel free to contact us for any clarifications as needed.

## 2021-08-17 VITALS
HEART RATE: 69 BPM | TEMPERATURE: 98.4 F | HEIGHT: 73 IN | BODY MASS INDEX: 23.72 KG/M2 | WEIGHT: 179 LBS | RESPIRATION RATE: 15 BRPM | SYSTOLIC BLOOD PRESSURE: 128 MMHG | DIASTOLIC BLOOD PRESSURE: 77 MMHG | OXYGEN SATURATION: 96 %

## 2021-08-17 PROBLEM — R10.13 EPIGASTRIC PAIN: Status: ACTIVE | Noted: 2021-08-17

## 2021-08-17 PROBLEM — R93.5 ABNORMAL CT OF THE ABDOMEN: Status: ACTIVE | Noted: 2021-08-17

## 2021-08-17 LAB
ALBUMIN SERPL-MCNC: 3 G/DL (ref 3.5–5)
ALBUMIN/GLOB SERPL: 1.1 {RATIO} (ref 1.1–2.2)
ALP SERPL-CCNC: 57 U/L (ref 45–117)
ALT SERPL-CCNC: 20 U/L (ref 12–78)
ANION GAP SERPL CALC-SCNC: 1 MMOL/L (ref 5–15)
AST SERPL-CCNC: 28 U/L (ref 15–37)
BASOPHILS # BLD: 0 K/UL (ref 0–0.1)
BASOPHILS NFR BLD: 0 % (ref 0–1)
BILIRUB SERPL-MCNC: 1 MG/DL (ref 0.2–1)
BUN SERPL-MCNC: 12 MG/DL (ref 6–20)
BUN/CREAT SERPL: 13 (ref 12–20)
CALCIUM SERPL-MCNC: 7.7 MG/DL (ref 8.5–10.1)
CHLORIDE SERPL-SCNC: 109 MMOL/L (ref 97–108)
CO2 SERPL-SCNC: 29 MMOL/L (ref 21–32)
COMMENT, HOLDF: NORMAL
CREAT SERPL-MCNC: 0.96 MG/DL (ref 0.7–1.3)
DIFFERENTIAL METHOD BLD: NORMAL
EOSINOPHIL # BLD: 0.4 K/UL (ref 0–0.4)
EOSINOPHIL NFR BLD: 6 % (ref 0–7)
ERYTHROCYTE [DISTWIDTH] IN BLOOD BY AUTOMATED COUNT: 12.8 % (ref 11.5–14.5)
GLOBULIN SER CALC-MCNC: 2.7 G/DL (ref 2–4)
GLUCOSE SERPL-MCNC: 88 MG/DL (ref 65–100)
HCT VFR BLD AUTO: 40.1 % (ref 36.6–50.3)
HGB BLD-MCNC: 13.2 G/DL (ref 12.1–17)
IMM GRANULOCYTES # BLD AUTO: 0 K/UL (ref 0–0.04)
IMM GRANULOCYTES NFR BLD AUTO: 0 % (ref 0–0.5)
LYMPHOCYTES # BLD: 1.1 K/UL (ref 0.8–3.5)
LYMPHOCYTES NFR BLD: 17 % (ref 12–49)
MCH RBC QN AUTO: 30 PG (ref 26–34)
MCHC RBC AUTO-ENTMCNC: 32.9 G/DL (ref 30–36.5)
MCV RBC AUTO: 91.1 FL (ref 80–99)
MONOCYTES # BLD: 0.5 K/UL (ref 0–1)
MONOCYTES NFR BLD: 8 % (ref 5–13)
NEUTS SEG # BLD: 4.4 K/UL (ref 1.8–8)
NEUTS SEG NFR BLD: 69 % (ref 32–75)
NRBC # BLD: 0 K/UL (ref 0–0.01)
NRBC BLD-RTO: 0 PER 100 WBC
PLATELET # BLD AUTO: 212 K/UL (ref 150–400)
PMV BLD AUTO: 10.6 FL (ref 8.9–12.9)
POTASSIUM SERPL-SCNC: 4.6 MMOL/L (ref 3.5–5.1)
PROT SERPL-MCNC: 5.7 G/DL (ref 6.4–8.2)
RBC # BLD AUTO: 4.4 M/UL (ref 4.1–5.7)
SAMPLES BEING HELD,HOLD: NORMAL
SODIUM SERPL-SCNC: 139 MMOL/L (ref 136–145)
WBC # BLD AUTO: 6.3 K/UL (ref 4.1–11.1)

## 2021-08-17 PROCEDURE — 74011250636 HC RX REV CODE- 250/636: Performed by: INTERNAL MEDICINE

## 2021-08-17 PROCEDURE — 74011250637 HC RX REV CODE- 250/637: Performed by: INTERNAL MEDICINE

## 2021-08-17 PROCEDURE — 85025 COMPLETE CBC W/AUTO DIFF WBC: CPT

## 2021-08-17 PROCEDURE — 74011250637 HC RX REV CODE- 250/637: Performed by: STUDENT IN AN ORGANIZED HEALTH CARE EDUCATION/TRAINING PROGRAM

## 2021-08-17 PROCEDURE — 36415 COLL VENOUS BLD VENIPUNCTURE: CPT

## 2021-08-17 PROCEDURE — 80053 COMPREHEN METABOLIC PANEL: CPT

## 2021-08-17 RX ORDER — BUPRENORPHINE AND NALOXONE 8; 2 MG/1; MG/1
1 FILM, SOLUBLE BUCCAL; SUBLINGUAL DAILY
Status: DISCONTINUED | OUTPATIENT
Start: 2021-08-17 | End: 2021-08-17 | Stop reason: HOSPADM

## 2021-08-17 RX ADMIN — PANTOPRAZOLE SODIUM 40 MG: 40 TABLET, DELAYED RELEASE ORAL at 16:07

## 2021-08-17 RX ADMIN — Medication 10 ML: at 14:17

## 2021-08-17 RX ADMIN — TICAGRELOR 90 MG: 90 TABLET ORAL at 05:28

## 2021-08-17 RX ADMIN — ASPIRIN 81 MG: 81 TABLET, CHEWABLE ORAL at 09:13

## 2021-08-17 RX ADMIN — PANTOPRAZOLE SODIUM 40 MG: 40 TABLET, DELAYED RELEASE ORAL at 09:13

## 2021-08-17 RX ADMIN — BUPRENORPHINE AND NALOXONE 1 FILM: 8; 2 FILM, SOLUBLE BUCCAL; SUBLINGUAL at 11:56

## 2021-08-17 RX ADMIN — POTASSIUM CHLORIDE AND SODIUM CHLORIDE: 900; 300 INJECTION, SOLUTION INTRAVENOUS at 05:36

## 2021-08-17 RX ADMIN — Medication 10 ML: at 05:28

## 2021-08-17 RX ADMIN — TICAGRELOR 90 MG: 90 TABLET ORAL at 16:07

## 2021-08-17 NOTE — PROGRESS NOTES
0700 Bedside and Verbal shift change report given to 500 Gwen Bearden (oncoming nurse) by [facility] (offgoing nurse). Report included the following information SBAR, Kardex, ED Summary, Intake/Output, MAR, Recent Results and Cardiac Rhythm SB/NSR. This patient was assisted with Intentional Toileting every 2 hours during this shift as appropriate. Documentation of ambulation and output reflected on Flowsheet as appropriate. Purposeful hourly rounding was completed using AIDET and 5Ps. Outcomes of PHR documented as they occurred. Bed alarm in use as appropriate. Dual Suction and ambubag in place. 56 Notified Dr. Sabas Palmer that GI and GS signed off for Pt to be Discharged home once he tolerates dinner. That they want the Pt to schedule an outpatient EGD. [id number] Discharge paperwork reviewed with Pt and Pt Wife. All questions answered. Discharge paperwork signed and placed in Pt Chart. Pt to be transported home by Pt Wife.

## 2021-08-17 NOTE — PROGRESS NOTES
GI Note:  Saw pt this afternoon. He tolerated regular diet for lunch. Ok to discharge from GI perspective. Will make arrangements for EGD to be done as outpatient.      Kerry BROWNLEEJOHN

## 2021-08-17 NOTE — CONSULTS
[facility]. Rohit Velazco M.D.  [phone]                    GASTROENTEROLOGY CONSULTATION NOTE              NAME:  Rebecca Willis   :   1955   MRN:   [id number]       Referring Physician:    Dr. Cortes Ao Date:   2021 11:37 AM    Chief Complaint:    Mr. Bj Quiñones is a 60 y.o. male who is being admitted for epigastric pain. Mr. Bj Quiñones presented to our Emergency Department today complaining of  An acute onset of epigastric discomfort, mild to moderate that seemed to have been persistent. He denies any fever or chills. No nausea or vomiting. He however had some mild chest discomfort. He has a hx of CAD for which he had a cath and PCi on  and was to be on DAPT but had a lower than prescribed dose for Brilinta. He denies any fever or chills. No nausea or vomiting. CT scan abdomen and pelvis showed findings compatible with small bowel obstruction likely related to adhesion formation in the mid abdomen. Suggestion of wall thickening at the pylorus/duodenal bulb junction may be related to underlying peptic ulcer disease.      He is asymptomatic now  Denies any constipation or change in bowel habits  He is passing gas  Symptoms have resolved at this time    GI has been consulted for evaluation and management of his abnormal CT scan   History of Present Illness:    Patient is a 60 y.o. who      PMH:  Past Medical History:   Diagnosis Date    Abnormal finding on EKG     Infarct- ruled as a mis-read by cardiology after testing    Arthritis     Chronic back pain     s/p surgery    GERD (gastroesophageal reflux disease)     H/O colonoscopy 2020 5 year repeat    Hyperlipidemia     Hypertension     Hypogonadism, male 2013    total 97, free 1.8  13    Panic attacks        PSH:  Past Surgical History:   Procedure Laterality Date    COLONOSCOPY N/A 2018    COLONOSCOPY performed by Lisa Cummins MD at [address] [facility] HX BLEPHAROPLASTY      HX COLONOSCOPY       [facility]?    HX FRACTURE TX Right     clavicle repair    HX HEART CATHETERIZATION  2011?    normal    HX KNEE ARTHROSCOPY Right     HX KNEE ARTHROSCOPY Left 2015    x 5    HX KNEE ARTHROSCOPY Right 2019    HX ORTHOPAEDIC  1990    right wrist    HX ORTHOPAEDIC Right 2015    ankle surgery    HX ROTATOR CUFF REPAIR Right 2017    HX ROTATOR CUFF REPAIR Left 2018    HX SHOULDER ARTHROSCOPY Right        Allergies: Allergies   Allergen Reactions    Codeine Rash     Over 40 years ago       Home Medications:  Prior to Admission Medications   Prescriptions Last Dose Informant Patient Reported? Taking? ALPRAZolam (XANAX) 0.5 mg tablet 2021 at Unknown time Significant Other No Yes   Sig: TAKE 1 TABLET BY MOUTH EVERY 8 HOURS AS NEEDED FOR ANXIETY   aspirin 81 mg chewable tablet 2021 at Unknown time Significant Other No Yes   Sig: Take 1 Tablet by mouth daily. atorvastatin (LIPITOR) 40 mg tablet 8/15/2021 at Unknown time Significant Other No Yes   Sig: Take 1 Tablet by mouth daily. buprenorphine-naloxone (SUBOXONE) 8-2 mg film sublingaul film 8/15/2021 at Unknown time Significant Other Yes Yes   Si Film by SubLINGual route daily. clopidogreL (PLAVIX) 75 mg tab  Significant Other Yes Yes   Sig: Take 75 mg by mouth daily. ibuprofen (ADVIL) 200 mg tablet  Significant Other Yes Yes   Sig: Take 600 mg by mouth every six (6) hours as needed for Pain.   pantoprazole (PROTONIX) 40 mg tablet 8/15/2021 at Unknown time Significant Other No Yes   Sig: Take 1 Tab by mouth daily. Patient taking differently: Take 40 mg by mouth every evening. tadalafiL (CIALIS) 10 mg tablet  Significant Other No Yes   Sig: TAKE ONE TABLET BY MOUTH DAILY AS NEEDED   testosterone cypionate (DEPOTESTOTERONE CYPIONATE) 200 mg/mL injection 2021 Significant Other Yes Yes   Si mg by IntraMUSCular route every seven (7) days.    ticagrelor (BRILINTA) 90 mg tablet 8/15/2021 at Unknown time Significant Other Yes Yes   Sig: Take 90 mg by mouth two (2) times a day.       Facility-Administered Medications: None       Hospital Medications:  Current Facility-Administered Medications   Medication Dose Route Frequency    buprenorphine-naloxone (SUBOXONE) 8mg-2mg SL film (Patient Supplied)  1 Film SubLINGual DAILY    nitroglycerin (NITROSTAT) tablet 0.4 mg  0.4 mg SubLINGual Q5MIN PRN    sodium chloride (NS) flush 5-40 mL  5-40 mL IntraVENous Q8H    sodium chloride (NS) flush 5-40 mL  5-40 mL IntraVENous PRN    acetaminophen (TYLENOL) tablet 650 mg  650 mg Oral Q6H PRN    Or    acetaminophen (TYLENOL) suppository 650 mg  650 mg Rectal Q6H PRN    polyethylene glycol (MIRALAX) packet 17 g  17 g Oral DAILY PRN    senna (SENOKOT) tablet 8.6 mg  1 Tablet Oral DAILY PRN    promethazine (PHENERGAN) tablet 12.5 mg  12.5 mg Oral Q6H PRN    Or    ondansetron (ZOFRAN) injection 4 mg  4 mg IntraVENous Q6H PRN    aspirin chewable tablet 81 mg  81 mg Oral DAILY    ticagrelor (BRILINTA) tablet 90 mg  90 mg Oral Q12H    0.9% sodium chloride with KCl 40 mEq/L infusion   IntraVENous CONTINUOUS    pantoprazole (PROTONIX) tablet 40 mg  40 mg Oral ACB&D       Social History:  Social History     Tobacco Use    Smoking status: Never Smoker    Smokeless tobacco: Never Used   Substance Use Topics    Alcohol use: No     Alcohol/week: 0.0 standard drinks     Comment: Rare glass of wine       Family History:  Family History   Problem Relation Age of Onset    Heart Disease Father 46        CABG x5    Coronary Artery Disease Father         stents    Diabetes Sister     Heart Disease Sister         afib    Heart Failure Mother     No Known Problems Brother     No Known Problems Brother     Malignant Hyperthermia Neg Hx        Review of Systems:  A complete 12-point ROS was obtained and is as per the above HPI otherwise negative      Objective:     Patient Vitals for the past 8 hrs:   BP Temp Pulse Resp SpO2 08/17/21 1053 136/70 98.1 °F (36.7 °C) 65 16 98 %   08/17/21 0741 (!) 142/72 98.1 °F (36.7 °C) 64 15 98 %   08/17/21 0700   71       08/17 0701 - 08/17 1900  In: 0   Out: 200 [Urine:200]  08/15 1901 - 08/17 0700  In: 321.3 [I.V.:321.3]  Out: 200 [Urine:200]    EXAM:     NEURO-alert, oriented x3, affect appropriate, no focal neurological deficits, moves all extremities well, no involuntary movements, reflexes at knee and ankle intact   HEENT-Head: Normocephalic, no lesions, without obvious abnormality. LUNGS-clear to auscultation bilaterally    COR-regular rate and rhythym     ABD- soft, non-tender. Bowel sounds normal. No masses,  no organomegaly     EXT-no edema    Skin - No rash     Data Review     Recent Labs     08/17/21  0529 08/16/21  0927   WBC 6.3 9.4   HGB 13.2 15.0   HCT 40.1 45.9    208     Recent Labs     08/17/21  0640 08/16/21  0927    140   K 4.6 3.3*   * 108   CO2 29 28   BUN 12 12   CREA 0.96 0.90   GLU 88 108*   CA 7.7* 8.4*     Recent Labs     08/17/21  0640 08/16/21  0927   AP 57 66   TP 5.7* 6.4   ALB 3.0* 3.6   GLOB 2.7 2.8   LPSE  --  14*     No results for input(s): INR, PTP, APTT, INREXT, INREXT in the last 72 hours.     Patient Active Problem List   Diagnosis Code    Thoracic back pain M54.6    Hypogonadism, male E29.1    Abnormal cardiovascular stress test R94.39    Chronic back pain M54.9, G89.29    Pain R52    Primary osteoarthritis of right knee M17.11    H/O colonoscopy Z98.890    CAD (coronary artery disease) I25.10    Small bowel obstruction due to adhesions (HCC) K56.50    GERD (gastroesophageal reflux disease) K21.9    HTN (hypertension), benign I10    Duodenitis K29.80    Hypokalemia E87.6    Epigastric pain R10.13    Abnormal CT of the abdomen R93.5       Assessment and Plan:  - unsure about the significance of pyloric wall thickening - possibly under distention  - continue PPI once daily at this time for GI prophylaxis  - He is asymptomatic now - will give him a regular diet  - if symptoms recur will get a CTE or a SBFT for evaluation of possible bowel obstruction  - If he does well with lunch and dinner may consider discharge and do an EGD as an outpatient. We can otherwise proceed with an EGD early morning tomorrow    Thank you for allowing us to participate in the care of your patient. If you have any questions please don't hesitate to contact us.        Renu Salas M.D.  [phone]  [facility].     Signed By: Renu Salas MD     8/17/2021  11:37 AM

## 2021-08-17 NOTE — PROGRESS NOTES
Bedside and Verbal shift change report given to Pj Mathews RN (oncoming nurse) by Cristian Sevilla RN (offgoing nurse).  Report included the following information SBAR, Kardex and ED Summary

## 2021-08-17 NOTE — PROGRESS NOTES
Family at bedside    This patient was assisted with Intentional Toileting every 2 hours during this shift as appropriate. Documentation of ambulation and output reflected on Flowsheet as appropriate. Purposeful hourly rounding was completed using AIDET and 5Ps. Outcomes of PHR documented as they occurred. Bed alarm in use as appropriate. Dual Suction and ambubag in place.

## 2021-08-17 NOTE — PROGRESS NOTES
Reason for Admission:  Small bowel obstruction due to adhesions  Met with pt and his wife for d/c planning. Pt stated he was independent with ADL's, drives and has no DME at home. Medications obtained from [facility]. RUR Score:      8%              Plan for utilizing home health:    none      PCP: First and Last name:  Rodolfo mAador MD     Name of Practice:    Are you a current patient: Yes/No: yes   Approximate date of last visit: 2 weeks go   Can you participate in a virtual visit with your PCP: yes                    Current Advanced Directive/Advance Care Plan: Full Code  His wife is his next of kin    Healthcare Decision Maker:   Click here to complete [address] including selection of the Healthcare Decision Maker Relationship (ie \"Primary\")                             Transition of Care Plan:                    1. Gen surgery, cards, GI consults  2. Pt's wife to transport him home at d/c  3. CM following for any needs prior to d/c    Care Management Interventions  PCP Verified by CM: Yes  Mode of Transport at Discharge: Other (see comment)  Current Support Network: Lives with Spouse, Own Home (lives on ground floor of a 2 story house with 6 entry steps.)  Confirm Follow Up Transport: Family  Discharge Location  Discharge Placement: Home with family assistance  LUIZ Mane

## 2021-08-17 NOTE — DISCHARGE INSTRUCTIONS
HOSPITALIST DISCHARGE INSTRUCTIONS  NAME: Bharath Gold   :  1955   MRN:  [id number]     Date/Time:  2021 5:13 PM    ADMIT DATE: 2021     DISCHARGE DATE: 2021     ADMITTING DIAGNOSIS:  Abdominal Pain     DISCHARGE DIAGNOSIS:  Abdominal Pain     Patient Education        Abdominal Pain: Care Instructions  Your Care Instructions     Abdominal pain has many possible causes. Some aren't serious and get better on their own in a few days. Others need more testing and treatment. If your pain continues or gets worse, you need to be rechecked and may need more tests to find out what is wrong. You may need surgery to correct the problem. Don't ignore new symptoms, such as fever, nausea and vomiting, urination problems, pain that gets worse, and dizziness. These may be signs of a more serious problem. Your doctor may have recommended a follow-up visit in the next 8 to 12 hours. If you are not getting better, you may need more tests or treatment. The doctor has checked you carefully, but problems can develop later. If you notice any problems or new symptoms, get medical treatment right away. Follow-up care is a key part of your treatment and safety. Be sure to make and go to all appointments, and call your doctor if you are having problems. It's also a good idea to know your test results and keep a list of the medicines you take. How can you care for yourself at home? · Rest until you feel better. · To prevent dehydration, drink plenty of fluids. Choose water and other caffeine-free clear liquids until you feel better. If you have kidney, heart, or liver disease and have to limit fluids, talk with your doctor before you increase the amount of fluids you drink. · If your stomach is upset, eat mild foods, such as rice, dry toast or crackers, bananas, and applesauce. Try eating several small meals instead of two or three large ones.   · Wait until 48 hours after all symptoms have gone away before you have spicy foods, alcohol, and drinks that contain caffeine. · Do not eat foods that are high in fat. · Avoid anti-inflammatory medicines such as aspirin, ibuprofen (Advil, Motrin), and naproxen (Aleve). These can cause stomach upset. Talk to your doctor if you take daily aspirin for another health problem. When should you call for help? Call 911 anytime you think you may need emergency care. For example, call if:    · You passed out (lost consciousness).     · You pass maroon or very bloody stools.     · You vomit blood or what looks like coffee grounds.     · You have new, severe belly pain. Call your doctor now or seek immediate medical care if:    · Your pain gets worse, especially if it becomes focused in one area of your belly.     · You have a new or higher fever.     · Your stools are black and look like tar, or they have streaks of blood.     · You have unexpected vaginal bleeding.     · You have symptoms of a urinary tract infection. These may include:  ? Pain when you urinate. ? Urinating more often than usual.  ? Blood in your urine.     · You are dizzy or lightheaded, or you feel like you may faint. Watch closely for changes in your health, and be sure to contact your doctor if:    · You are not getting better after 1 day (24 hours). Where can you learn more? Go to http://www.gray.com/  Enter N380 in the search box to learn more about \"Abdominal Pain: Care Instructions. \"  Current as of: February 26, 2020               Content Version: 12.8  © 2006-2021 [employer]. Care instructions adapted under license by [employer] (which disclaims liability or warranty for this information). If you have questions about a medical condition or this instruction, always ask your healthcare professional. [address] any warranty or liability for your use of this information.            MEDICATIONS:    · It is important that you take the medication exactly as they are prescribed. · Keep your medication in the bottles provided by the pharmacist and keep a list of the medication names, dosages, and times to be taken in your wallet. · Do not take other medications without consulting your doctor     Pain Management: per above medications    What to do at Home    Recommended diet:  Cardiac Diet    Recommended activity: Activity as tolerated    1) Return to the hospital if you feel worse    2) If you experience any of the following symptoms then please call your primary care physician or return to the emergency room if you cannot get hold of your doctor:  Fever, chills, nausea, vomiting, diarrhea, change in mentation, falling, bleeding, shortness of breath, chest pain, severe headache, severe abdominal pain. Follow Up: Follow-up Information     Follow up With Specialties Details Why Contact Info    home medication    please return back to patient own suboxone film - in black safe. thanks    Benita Wilson MD Internal Medicine Schedule an appointment as soon as possible for a visit in 1 week [facility] [address] [address] [facility]      Maxine Graham MD Gastroenterology Schedule an appointment as soon as possible for a visit as soon as possible for EGD  [id number] [place] [address] Road  [phone]         Please follow up with your cardiologist as soon as possible. Information obtained by :  I understand that if any problems occur once I am at home I am to contact my physician. I understand and acknowledge receipt of the instructions indicated above.                                                                                                                                            Physician's or R.N.'s Signature                                                                  Date/Time Patient or Representative Signature                                                          Date/Time

## 2021-08-17 NOTE — DISCHARGE SUMMARY
Grey Lindo [address]ena [address]  [address] [facility], [address] [place] [address]  [phone]    Physician Discharge Summary     Patient ID:  Phil Beaver  [id number]  87 y.o.  1955    Admit date: 8/16/2021    Discharge date and time: 8/17/2021 5:30 PM    Admission Diagnoses: Small bowel obstruction due to adhesions [facility]) [K56.50]    Discharge Diagnoses:  Principal Diagnosis Small bowel obstruction due to adhesions [facility])                                            Principal Problem:    Small bowel obstruction due to adhesions ([address] (8/16/2021)    Active Problems:    CAD (coronary artery disease) (8/6/2021)      GERD (gastroesophageal reflux disease) (8/16/2021)      HTN (hypertension), benign (8/16/2021)      Duodenitis (8/16/2021)      Hypokalemia (8/16/2021)      Epigastric pain (8/17/2021)      Abnormal CT of the abdomen (8/17/2021)       Hospital Course:     Acute abd pain POA; initially concern for small bowel obstruction due to adhesions per CT, but evaluated by surgery who did not think pt was obstructed. S/p IVF. Possibly due to ulcer as also noted on CT abd. Evaluated  by GI who cleared patient for DC; plan to follow up with GI OP for EGD.      CAD (coronary artery disease) (8/6/2021) POA: sp a recent cardiac cath and PCI. COntinue Brilinta (plans to transition to plavix after 3 weeks per med rec), Asprin and Lipitor. Evaluated by cardiology while inpatient; follow up OP.      GERD (gastroesophageal reflux disease) (8/16/2021) / Duodenitis (8/16/2021) POA: has been taking NSAIDs for knee pain, Likely has PUD based on CT findings. He denies any melena stools. No further NSAIDS. Follow up with GI OP for EGD.      HTN (hypertension), benign (8/16/2021) POA: BP well controlled. Not on any medications      Hypokalemia (8/16/2021) POA: repleted.      PCP: Greyson Hanks MD     Consults: Cardiology, GI and General Surgery    Significant Diagnostic Studies:     CT ABD PELVIS  IMPRESSION  Findings compatible with small bowel obstruction likely related to adhesion  formation in the mid abdomen. Suggestion of wall thickening at the  pylorus/duodenal bulb junction may be related to underlying peptic ulcer  disease. Discharge Exam:  Physical Exam:    Gen: Well-developed, well-nourished, in no acute distress  HEENT:  Pink conjunctivae, PERRL, hearing intact to voice, moist mucous membranes  Resp: No accessory muscle use, clear breath sounds without wheezes rales or rhonchi  Card: No murmurs, normal S1, S2 without thrills, bruits or peripheral edema  Abd:  Soft, non-tender, non-distended, normoactive bowel sounds are present  Musc: No cyanosis or clubbing  Skin: No rashes or ulcers, skin turgor is good  Neuro:  Cranial nerves are grossly intact, no focal motor weakness, follows commands appropriately  Psych:  Good insight, oriented to person, place and time, alert    Disposition: home  Discharge Condition: Stable    Patient Instructions:   Current Discharge Medication List      CONTINUE these medications which have NOT CHANGED    Details   buprenorphine-naloxone (SUBOXONE) 8-2 mg film sublingaul film 1 Film by SubLINGual route daily. ticagrelor (BRILINTA) 90 mg tablet Take 90 mg by mouth two (2) times a day. clopidogreL (PLAVIX) 75 mg tab Take 75 mg by mouth daily. ALPRAZolam (XANAX) 0.5 mg tablet TAKE 1 TABLET BY MOUTH EVERY 8 HOURS AS NEEDED FOR ANXIETY  Qty: 30 Tablet, Refills: 0    Associated Diagnoses: Anxiety      atorvastatin (LIPITOR) 40 mg tablet Take 1 Tablet by mouth daily. Qty: 30 Tablet, Refills: 5      aspirin 81 mg chewable tablet Take 1 Tablet by mouth daily. Qty: 30 Tablet, Refills: 5      tadalafiL (CIALIS) 10 mg tablet TAKE ONE TABLET BY MOUTH DAILY AS NEEDED  Qty: 8 Tablet, Refills: 0      pantoprazole (PROTONIX) 40 mg tablet Take 1 Tab by mouth daily.   Qty: 90 Tab, Refills: 1    Associated Diagnoses: GERD without esophagitis      testosterone cypionate Connor Alfonso CYPIONATE) 200 mg/mL injection 80 mg by IntraMUSCular route every seven (7) days. STOP taking these medications       ibuprofen (ADVIL) 200 mg tablet Comments:   Reason for Stopping:             Activity: Activity as tolerated  Diet: Cardiac Diet  Wound Care: None needed    Follow-up with  Follow-up Information     Follow up With Specialties Details Why Contact Info    home medication    please return back to patient own suboxone film - in black safe. thanks    Greyson Hanks MD Internal Medicine Schedule an appointment as soon as possible for a visit in 1 week [facility] [place] Follow Up  [place] [address] Apryl Shea MD Gastroenterology Schedule an appointment as soon as possible for a visit as soon as possible for EGD  [address] [facility] [address]  [phone]         Please follow up with your cardiologist as soon as possible. Follow-up tests/labs as above.      Signed:  Neelima Rodarte DO  8/17/2021  5:30 PM  **I personally spent 35 min on discharge**

## 2021-08-17 NOTE — PROGRESS NOTES
General Surgery Daily Progress Note    Patient: Roger Robledo MRN: [id number]  SSN: [id number]    YOB: 1955  Age: 60 y.o.   Sex: male      Admit Date: 8/16/2021    POD * No surgery found *    Procedure: * No surgery found *    Subjective:   Denies any N/V  Denies any abdominal pain    Current Facility-Administered Medications   Medication Dose Route Frequency    buprenorphine-naloxone (SUBOXONE) 8mg-2mg SL film (Patient Supplied)  1 Film SubLINGual DAILY    nitroglycerin (NITROSTAT) tablet 0.4 mg  0.4 mg SubLINGual Q5MIN PRN    sodium chloride (NS) flush 5-40 mL  5-40 mL IntraVENous Q8H    sodium chloride (NS) flush 5-40 mL  5-40 mL IntraVENous PRN    acetaminophen (TYLENOL) tablet 650 mg  650 mg Oral Q6H PRN    Or    acetaminophen (TYLENOL) suppository 650 mg  650 mg Rectal Q6H PRN    polyethylene glycol (MIRALAX) packet 17 g  17 g Oral DAILY PRN    senna (SENOKOT) tablet 8.6 mg  1 Tablet Oral DAILY PRN    promethazine (PHENERGAN) tablet 12.5 mg  12.5 mg Oral Q6H PRN    Or    ondansetron (ZOFRAN) injection 4 mg  4 mg IntraVENous Q6H PRN    aspirin chewable tablet 81 mg  81 mg Oral DAILY    ticagrelor (BRILINTA) tablet 90 mg  90 mg Oral Q12H    0.9% sodium chloride with KCl 40 mEq/L infusion   IntraVENous CONTINUOUS    pantoprazole (PROTONIX) tablet 40 mg  40 mg Oral ACB&D        Objective:   08/17 0701 - 08/17 1900  In: 0   Out: 200 [Urine:200]  08/15 1901 - 08/17 0700  In: 321.3 [I.V.:321.3]  Out: 200 [Urine:200]  Patient Vitals for the past 8 hrs:   BP Temp Pulse Resp SpO2   08/17/21 1053 136/70 98.1 °F (36.7 °C) 65 16 98 %   08/17/21 0741 (!) 142/72 98.1 °F (36.7 °C) 64 15 98 %   08/17/21 0700   71         Physical Exam:  General: Alert, cooperative, NAD  Lungs: Unlabored  Abdomen: Soft, NT, ND  Extremities: Warm, moves all, no edema  Skin:  Warm and dry, no rash    Labs:   Recent Labs     08/17/21  0529   WBC 6.3   HGB 13.2   HCT 40.1        Recent Labs     08/17/21  0640    K 4.6   *   CO2 29   GLU 88   BUN 12   CREA 0.96   CA 7.7*   ALB 3.0*   TBILI 1.0   ALT 20       Assessment / Plan:     POD * No surgery found *    Procedure: * No surgery found *  Patient doing well  Was likely never obstructed.    Agree with regular diet  If does well with diet ok to go home from surgical standpoint  No surgical intervention        Sulema Murrieta MD

## 2021-08-18 LAB
ATRIAL RATE: 75 BPM
CALCULATED P AXIS, ECG09: 58 DEGREES
CALCULATED R AXIS, ECG10: 36 DEGREES
CALCULATED T AXIS, ECG11: 30 DEGREES
DIAGNOSIS, 93000: NORMAL
P-R INTERVAL, ECG05: 154 MS
Q-T INTERVAL, ECG07: 372 MS
QRS DURATION, ECG06: 102 MS
QTC CALCULATION (BEZET), ECG08: 415 MS
VENTRICULAR RATE, ECG03: 75 BPM

## 2021-08-19 ENCOUNTER — OFFICE VISIT (OUTPATIENT)
Dept: CARDIOLOGY CLINIC | Age: 66
End: 2021-08-19
Payer: COMMERCIAL

## 2021-08-19 ENCOUNTER — TELEPHONE (OUTPATIENT)
Dept: CARDIOLOGY CLINIC | Age: 66
End: 2021-08-19

## 2021-08-19 ENCOUNTER — DOCUMENTATION ONLY (OUTPATIENT)
Dept: CARDIOLOGY CLINIC | Age: 66
End: 2021-08-19

## 2021-08-19 VITALS
BODY MASS INDEX: 23.86 KG/M2 | HEIGHT: 73 IN | HEART RATE: 78 BPM | SYSTOLIC BLOOD PRESSURE: 138 MMHG | OXYGEN SATURATION: 98 % | DIASTOLIC BLOOD PRESSURE: 80 MMHG | WEIGHT: 180 LBS

## 2021-08-19 DIAGNOSIS — I25.750: Primary | ICD-10-CM

## 2021-08-19 DIAGNOSIS — R07.9 CHEST PAIN, UNSPECIFIED TYPE: ICD-10-CM

## 2021-08-19 DIAGNOSIS — R06.02 SOB (SHORTNESS OF BREATH): ICD-10-CM

## 2021-08-19 PROCEDURE — 99214 OFFICE O/P EST MOD 30 MIN: CPT | Performed by: SPECIALIST

## 2021-08-19 NOTE — PATIENT INSTRUCTIONS
1) echocardiogram within the next several weeks to look at heart function after stenting of the LAD    2) cholesterol needs to be checked in 2 months fasting at [facility]    3) when you have the opportunity I will get started in cardiac rehab    4) never stop your Brilinta    5) follow-up with me in 3 months

## 2021-08-19 NOTE — TELEPHONE ENCOUNTER
Per Dr Elis Tiwari - unable to return to work til after rehab and he has to be on the bottom for relations with wife.  - informed wife of response

## 2021-08-19 NOTE — PROGRESS NOTES
CARDIOLOGY OFFICE NOTE    Berny Alanis MD, [address], [place], [address] [facility]  Phone [phone]; Fax [phone]  Mobile [phone]   Voice Mail [phone]    Primary care: Fran Burroughs MD       ATTENTION:   This medical record was transcribed using an electronic medical records/speech recognition system. Although proofread, it may and can contain electronic, spelling and other errors. Corrections may be executed at a later time. Please feel free to contact us for any clarifications as needed. ICD-10-CM ICD-9-CM   1. Coronary artery disease involving transplanted heart with unstable angina pectoris, unspecified vessel or lesion type (HCC)  I25.750 414.06     413.9   2. SOB (shortness of breath)  R06.02 786.05   3. Chest pain, unspecified type  R07.9 786.50            Hermelinda Brunner is a 60 y.o. male with  referred for CAD status post stenting of the LAD          Cardiac risk factors: dyslipidemia, male gender, hypertension  I have personally obtained the history from the patient. HISTORY OF PRESENTING ILLNESS   Patient was seen by Dr. Salima Ritchie for shortness of breath earlier part of this month underwent cardiac catheterization had LAD lesion that was stented. Has insignificant disease elsewhere. He did have one episode of what appeared to be atrial fibrillation as I can see in one of the notes so I think at some point we need to place a monitor on him.             ACTIVE PROBLEM LIST     Patient Active Problem List    Diagnosis Date Noted    CAD (coronary artery disease) 08/06/2021    Epigastric pain 08/17/2021    Abnormal CT of the abdomen 08/17/2021    Small bowel obstruction due to adhesions ([address] 08/16/2021    GERD (gastroesophageal reflux disease) 08/16/2021    HTN (hypertension), benign 08/16/2021    Duodenitis 08/16/2021    Hypokalemia 08/16/2021    H/O colonoscopy 07/07/2020    Primary osteoarthritis of right knee 11/14/2019  Pain 04/29/2019    Thoracic back pain 08/21/2013    Hypogonadism, male 08/21/2013    Abnormal cardiovascular stress test     Chronic back pain            PAST MEDICAL HISTORY     Past Medical History:   Diagnosis Date    Abnormal finding on EKG 2015    Infarct- ruled as a mis-read by cardiology after testing    Arthritis     Chronic back pain     s/p surgery    GERD (gastroesophageal reflux disease)     H/O colonoscopy 7/7/2020 6/18 5 year repeat    Hyperlipidemia     Hypertension     Hypogonadism, male 8/21/2013    total 97, free 1.8  5/13/13    Panic attacks            PAST SURGICAL HISTORY     Past Surgical History:   Procedure Laterality Date    COLONOSCOPY N/A 6/8/2018    COLONOSCOPY performed by Josue Collier MD at [place] [address] HX BLEPHAROPLASTY  2011   [facility] COLONOSCOPY      Dr. Jenny Mendoza?    HX FRACTURE TX Right     clavicle repair    HX HEART CATHETERIZATION  2011?    normal    HX KNEE ARTHROSCOPY Right 2014    HX KNEE ARTHROSCOPY Left 2015    x 5    HX KNEE ARTHROSCOPY Right 04/29/2019    HX ORTHOPAEDIC  1990    right wrist    HX ORTHOPAEDIC Right 2015    ankle surgery    HX ROTATOR CUFF REPAIR Right 2017    HX ROTATOR CUFF REPAIR Left 2018    HX SHOULDER ARTHROSCOPY Right 2007          ALLERGIES     Allergies   Allergen Reactions    Codeine Rash     Over 40 years ago          FAMILY HISTORY     Family History   Problem Relation Age of Onset    Heart Disease Father 46        CABG x5    Coronary Artery Disease Father         stents    Diabetes Sister     Heart Disease Sister         afib    Heart Failure Mother     No Known Problems Brother     No Known Problems Brother     Malignant Hyperthermia Neg Hx     negative for cardiac disease       SOCIAL HISTORY     Social History     Socioeconomic History    Marital status:      Spouse name: Nabeel Bronson    Number of children: 1    Years of education: Not on file    Highest education level: Not on file Occupational History    Occupation: Refrigeration     Employer: SELF EMPLOYED   Tobacco Use    Smoking status: Never Smoker    Smokeless tobacco: Never Used   Substance and Sexual Activity    Alcohol use: No     Alcohol/week: 0.0 standard drinks     Comment: Rare glass of wine    Drug use: Not Currently     Social Determinants of Health     Financial Resource Strain:     Difficulty of Paying Living Expenses:    Food Insecurity:     Worried About Running Out of Food in the Last Year:     Ran Out of Food in the Last Year:    Transportation Needs:     Lack of Transportation (Medical):  Lack of Transportation (Non-Medical):    Physical Activity:     Days of Exercise per Week:     Minutes of Exercise per Session:    Stress:     Feeling of Stress :    Social Connections:     Frequency of Communication with Friends and Family:     Frequency of Social Gatherings with Friends and Family:     Attends Quaker Services:     Active Member of Clubs or Organizations:     Attends Club or Organization Meetings:     Marital Status:          MEDICATIONS     Current Outpatient Medications   Medication Sig    buprenorphine-naloxone (SUBOXONE) 8-2 mg film sublingaul film 1 Film by SubLINGual route daily.  ticagrelor (BRILINTA) 90 mg tablet Take 90 mg by mouth two (2) times a day.  [START ON 9/6/2021] clopidogreL (PLAVIX) 75 mg tab Take 75 mg by mouth daily.  ALPRAZolam (XANAX) 0.5 mg tablet TAKE 1 TABLET BY MOUTH EVERY 8 HOURS AS NEEDED FOR ANXIETY    atorvastatin (LIPITOR) 40 mg tablet Take 1 Tablet by mouth daily.  aspirin 81 mg chewable tablet Take 1 Tablet by mouth daily.  tadalafiL (CIALIS) 10 mg tablet TAKE ONE TABLET BY MOUTH DAILY AS NEEDED    pantoprazole (PROTONIX) 40 mg tablet Take 1 Tab by mouth daily.  (Patient taking differently: Take 40 mg by mouth every evening.)    testosterone cypionate (DEPOTESTOTERONE CYPIONATE) 200 mg/mL injection 80 mg by IntraMUSCular route every seven (7) days. No current facility-administered medications for this visit. I have reviewed the nurses notes, vitals, problem list, allergy list, medical history, family, social history and medications. REVIEW OF SYMPTOMS   Pertinent positive per HPI  General: Pt denies excessive weight gain or loss. Pt is able to conduct ADL's  HEENT: Denies blurred vision, headaches, hearing loss, epistaxis and difficulty swallowing. Respiratory: Denies cough, congestion, shortness of breath, VERDE, wheezing or stridor. Cardiovascular: Denies precordial pain, palpitations, edema or PND  Gastrointestinal: Denies poor appetite, indigestion, abdominal pain or blood in stool  Genitourinary: Denies hematuria, dysuria, increased urinary frequency  Musculoskeletal: Denies joint pain or swelling from muscles or joints  Neurologic: Denies tremor, paresthesias, headache, or sensory motor disturbance  Psychiatric: Denies confusion, insomnia, depression  Integumentray: Denies rash, itching or ulcers. Hematologic: Denies easy bruising, bleeding     PHYSICAL EXAMINATION      There were no vitals filed for this visit. General: Well developed, in no acute distress. HEENT: No jaundice, oral mucosa moist, no oral ulcers  Neck: Supple, no stiffness, no lymphadenopathy, supple  Heart:  Normal S1/S2 negative S3 or S4. Regular, no murmur, gallop or rub, no jugular venous distention  Respiratory: Clear bilaterally x 4, no wheezing or rales  Extremities:  No edema, normal cap refill, no cyanosis. Musculoskeletal: No clubbing, no deformities  Neuro: A&Ox3, speech clear, gait stable, cooperative, no focal neurologic deficits  Skin: Skin color is normal. No rashes or lesions. Non diaphoretic, moist.           DIAGNOSTIC DATA     1. Lipids   8/6/21- , HDL 60, LDL 71.2, TG 89     2.  Cardiac Cath   8/6/21- Critical bifurcation stenosis of mid LAD and D2   Mildly elevated LVEDP   Mid LAD stented with a 2.5X 23 mm Xience Elba GENARO, optimized with a 2 5 and 3.0 noncompliant balloon    3. Stress Test  5/11/16- Stress Echo- no ischemia, EF 55%, 10.7 mets         LABORATORY DATA            Lab Results   Component Value Date/Time    WBC 6.3 08/17/2021 05:29 AM    HGB 13.2 08/17/2021 05:29 AM    HCT 40.1 08/17/2021 05:29 AM    PLATELET 278 59/18/4434 05:29 AM    MCV 91.1 08/17/2021 05:29 AM      Lab Results   Component Value Date/Time    Sodium 139 08/17/2021 06:40 AM    Potassium 4.6 08/17/2021 06:40 AM    Chloride 109 (H) 08/17/2021 06:40 AM    CO2 29 08/17/2021 06:40 AM    Anion gap 1 (L) 08/17/2021 06:40 AM    Glucose 88 08/17/2021 06:40 AM    BUN 12 08/17/2021 06:40 AM    Creatinine 0.96 08/17/2021 06:40 AM    BUN/Creatinine ratio 13 08/17/2021 06:40 AM    GFR est AA >60 08/17/2021 06:40 AM    GFR est non-AA >60 08/17/2021 06:40 AM    Calcium 7.7 (L) 08/17/2021 06:40 AM    Bilirubin, total 1.0 08/17/2021 06:40 AM    Alk. phosphatase 57 08/17/2021 06:40 AM    Protein, total 5.7 (L) 08/17/2021 06:40 AM    Albumin 3.0 (L) 08/17/2021 06:40 AM    Globulin 2.7 08/17/2021 06:40 AM    A-G Ratio 1.1 08/17/2021 06:40 AM    ALT (SGPT) 20 08/17/2021 06:40 AM           ASSESSMENT/RECOMMENDATIONS:.      1. CAD status post stenting of the LAD for unstable angina  -He was seen in the emergency room recently with abdominal pain which was somewhat similar to his angina prior to his stenting but they determined that he had some sort of obstruction in his bowels and was n.p.o. for some time. He is scheduled to see Dr. Marva Chandra soon  -I have asked that he do cardiac rehab  -Brilinta was expensive so he will be switching to Plavix  2. Hypertension  -BP is borderline  3.  Dyslipidemia LDL was 71  -LDL goal should is under 70 and his Lipitor was increased from 10 mg up to 40.  4.  Question of atrial fibrillation on EKG I saw from [facility]  -Is not appear that he had any episodes during his last hospitalization here for stent but I feel it is prudent to go forward with a loop monitor for 4 weeks and if there is any abnormality he will need to be anticoagulated    [id number] [address]  with 5 kids. Enjoys golf. left main okay, LAD diffuse disease at bifurcation with D2 90% focal stenosis, circumflex okay, RCA okay. Mid LAD was stented with a 2.5 x 23 mm Xience Elba GENARO deployed at 16 moiz and I believe postdilated with a 3 oh noncompliant balloon. No orders of the defined types were placed in this encounter. We discussed the expected course, resolution and complications of the diagnosis(es) in detail. Medication risks, benefits, costs, interactions, and alternatives were discussed as indicated. I advised him to contact the office if his condition worsens, changes or fails to improve as anticipated. He expressed understanding with the diagnosis(es) and plan          Follow-up and Dispositions  ·   Return in about 6 months (around 2/19/2022). I have discussed the diagnosis with  Bharath Gold and the intended plan as seen in the above orders. Questions were answered concerning future plans. I have discussed medication side effects and warnings with the patient as well. Thank you,  Toi Washington MD for involving me in the care of  Bharath Gold. Please do not hesitate to contact me for further questions/concerns. Berny Martinez MD, [facility] [address]      [facility], [address] [place] [address] [facility]      [phone] / [phone] Fax

## 2021-08-19 NOTE — PROGRESS NOTES
Vik Jesus is a 60 y.o. male    There were no vitals taken for this visit.     Chief Complaint   Patient presents with    Hypertension    Cholesterol Problem       Chest pain NO  SOB NO  Dizziness NO  Swelling NO  Recent hospital visit [facility], 8/16/21 STOMACH ISSUE  Refills NO  COVID VACCINE STATUS YES

## 2021-08-19 NOTE — TELEPHONE ENCOUNTER
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 Also wants to know if he can relations with his wife.

## 2021-08-31 ENCOUNTER — TELEPHONE (OUTPATIENT)
Dept: CARDIOLOGY CLINIC | Age: 66
End: 2021-08-31

## 2021-08-31 RX ORDER — CLOPIDOGREL BISULFATE 75 MG/1
75 TABLET ORAL DAILY
Qty: 90 TABLET | Refills: 3 | Status: SHIPPED | OUTPATIENT
Start: 2021-08-31 | End: 2021-11-12 | Stop reason: ALTCHOICE

## 2021-08-31 NOTE — TELEPHONE ENCOUNTER
All orders entered per verbal orders of Dr. Es Cheatham was expensive so he will be switching to Plavix    Refill per VO of Dr. Tania Porter  Last appt: 8/20/2021  Future Appointments   Date Time Provider Sarah Crawley   9/2/2021 10:00 AM [facility] [address]    9/13/2021  8:00 AM RADHA BLACKBURN BS AMB   11/18/2021  1:40 PM Farnaz Martinez MD [facility] BS AMB       Requested Prescriptions     Signed Prescriptions Disp Refills    clopidogreL (Plavix) 75 mg tab 90 Tablet 3     Sig: Take 1 Tablet by mouth daily.      Authorizing Provider: Kinga Salinas     Ordering User: Brian Avalos

## 2021-09-02 ENCOUNTER — HOSPITAL ENCOUNTER (OUTPATIENT)
Dept: CARDIAC REHAB | Age: 66
Discharge: HOME | End: 2021-09-02
Payer: MEDICARE

## 2021-09-02 VITALS — BODY MASS INDEX: 24.43 KG/M2 | WEIGHT: 184.3 LBS | HEIGHT: 73 IN

## 2021-09-02 PROCEDURE — 93798 PHYS/QHP OP CAR RHAB W/ECG: CPT

## 2021-09-02 NOTE — CARDIO/PULMONARY
[facility] Cardiopulmonary Rehab Orientation:  Met with Sidney Warren/\"Christopher\" MAKENZIE:[birth year], for cardiac rehab orientation and exercise tolerance test today. Mr. Deena Saab is a 60year-old patient of Dr Gio Horowitz, S/P PCI of mid LAD (2.5 x 23 mm stent post dilated with 3 mm balloon) (8/6/2021) / with primary diagnosis of PCI. Cardiac hx also includes: HTN & HLD with LVEF 55% (most recent reading on stress test 2016 - pt scheduled for echo later this month). Cardiac risk factors include: HTN, HLD, age, gender and family hx. BMI 24.3. Pt has never smoked cigarettes. [place] CAD risk factors were reviewed with patient. Pt resides with his wife in [facility]. 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 He and his wife have raised 5 children. Depression score PHQ9 is 2. The result was discussed with patient, who affirms score to be accurate. Pt reported some anxiety related to his new diagnosis of CAD and changes associated with that diagnosis. Patient denied chest pain or SOB during 6 minute exercise tolerance test on treadmill at 3.0 mph, with peak HR 99, peak /78, and RPE12. Cardiac rhythm was SB/SR with an isolated PAC. Pt has no significant activity limitations. Pt has not been exercising regularly at home prior to diagnosis and has limited activity per MD advice since his PCI. He enjoys biking, golf and fishing. Pt was given the Cardiac Rehab manual and an exercise plan was developed. Pt will attend cardiac rehab 2-3 times per week. Pt verbalized plan to engage in home exercise of walking.

## 2021-09-07 ENCOUNTER — APPOINTMENT (OUTPATIENT)
Dept: CARDIAC REHAB | Age: 66
End: 2021-09-07
Payer: MEDICARE

## 2021-09-07 DIAGNOSIS — F41.9 ANXIETY: ICD-10-CM

## 2021-09-07 NOTE — TELEPHONE ENCOUNTER
Pt called and is totally out of his xanax and would like refill today if possible sent to [facility].

## 2021-09-08 RX ORDER — ALPRAZOLAM 0.5 MG/1
TABLET ORAL
Qty: 30 TABLET | Refills: 0 | Status: SHIPPED | OUTPATIENT
Start: 2021-09-08 | End: 2022-03-06

## 2021-09-09 ENCOUNTER — ANCILLARY PROCEDURE (OUTPATIENT)
Dept: CARDIOLOGY CLINIC | Age: 66
End: 2021-09-09
Payer: MEDICARE

## 2021-09-09 ENCOUNTER — APPOINTMENT (OUTPATIENT)
Dept: GENERAL RADIOLOGY | Age: 66
End: 2021-09-09
Attending: STUDENT IN AN ORGANIZED HEALTH CARE EDUCATION/TRAINING PROGRAM
Payer: MEDICARE

## 2021-09-09 ENCOUNTER — HOSPITAL ENCOUNTER (EMERGENCY)
Age: 66
Discharge: HOME | End: 2021-09-09
Attending: STUDENT IN AN ORGANIZED HEALTH CARE EDUCATION/TRAINING PROGRAM
Payer: MEDICARE

## 2021-09-09 ENCOUNTER — APPOINTMENT (OUTPATIENT)
Dept: CARDIAC REHAB | Age: 66
End: 2021-09-09
Payer: MEDICARE

## 2021-09-09 VITALS
DIASTOLIC BLOOD PRESSURE: 86 MMHG | HEIGHT: 73 IN | BODY MASS INDEX: 24.39 KG/M2 | SYSTOLIC BLOOD PRESSURE: 136 MMHG | WEIGHT: 184 LBS

## 2021-09-09 VITALS
TEMPERATURE: 98.6 F | SYSTOLIC BLOOD PRESSURE: 136 MMHG | RESPIRATION RATE: 14 BRPM | HEART RATE: 64 BPM | OXYGEN SATURATION: 94 % | DIASTOLIC BLOOD PRESSURE: 86 MMHG

## 2021-09-09 DIAGNOSIS — R07.9 CHEST PAIN, UNSPECIFIED TYPE: ICD-10-CM

## 2021-09-09 DIAGNOSIS — I25.750: ICD-10-CM

## 2021-09-09 DIAGNOSIS — R06.02 SOB (SHORTNESS OF BREATH): ICD-10-CM

## 2021-09-09 DIAGNOSIS — R07.9 ACUTE CHEST PAIN: Primary | ICD-10-CM

## 2021-09-09 LAB
ALBUMIN SERPL-MCNC: 3.7 G/DL (ref 3.5–5)
ALBUMIN/GLOB SERPL: 1.4 {RATIO} (ref 1.1–2.2)
ALP SERPL-CCNC: 58 U/L (ref 45–117)
ALT SERPL-CCNC: 23 U/L (ref 12–78)
ANION GAP SERPL CALC-SCNC: 2 MMOL/L (ref 5–15)
AST SERPL-CCNC: 11 U/L (ref 15–37)
ATRIAL RATE: 63 BPM
BASOPHILS # BLD: 0 K/UL (ref 0–0.1)
BASOPHILS NFR BLD: 0 % (ref 0–1)
BILIRUB SERPL-MCNC: 1 MG/DL (ref 0.2–1)
BNP SERPL-MCNC: 54 PG/ML
BUN SERPL-MCNC: 19 MG/DL (ref 6–20)
BUN/CREAT SERPL: 19 (ref 12–20)
CALCIUM SERPL-MCNC: 8.1 MG/DL (ref 8.5–10.1)
CALCULATED P AXIS, ECG09: 52 DEGREES
CALCULATED R AXIS, ECG10: 42 DEGREES
CALCULATED T AXIS, ECG11: 48 DEGREES
CHLORIDE SERPL-SCNC: 107 MMOL/L (ref 97–108)
CO2 SERPL-SCNC: 31 MMOL/L (ref 21–32)
COMMENT, HOLDF: NORMAL
CREAT SERPL-MCNC: 0.98 MG/DL (ref 0.7–1.3)
DIAGNOSIS, 93000: NORMAL
DIFFERENTIAL METHOD BLD: NORMAL
EOSINOPHIL # BLD: 0.2 K/UL (ref 0–0.4)
EOSINOPHIL NFR BLD: 3 % (ref 0–7)
ERYTHROCYTE [DISTWIDTH] IN BLOOD BY AUTOMATED COUNT: 12.7 % (ref 11.5–14.5)
GLOBULIN SER CALC-MCNC: 2.6 G/DL (ref 2–4)
GLUCOSE SERPL-MCNC: 86 MG/DL (ref 65–100)
HCT VFR BLD AUTO: 43.5 % (ref 36.6–50.3)
HGB BLD-MCNC: 14.5 G/DL (ref 12.1–17)
IMM GRANULOCYTES # BLD AUTO: 0 K/UL (ref 0–0.04)
IMM GRANULOCYTES NFR BLD AUTO: 0 % (ref 0–0.5)
LYMPHOCYTES # BLD: 0.8 K/UL (ref 0.8–3.5)
LYMPHOCYTES NFR BLD: 13 % (ref 12–49)
MAGNESIUM SERPL-MCNC: 2.1 MG/DL (ref 1.6–2.4)
MCH RBC QN AUTO: 30 PG (ref 26–34)
MCHC RBC AUTO-ENTMCNC: 33.3 G/DL (ref 30–36.5)
MCV RBC AUTO: 89.9 FL (ref 80–99)
MONOCYTES # BLD: 0.6 K/UL (ref 0–1)
MONOCYTES NFR BLD: 10 % (ref 5–13)
NEUTS SEG # BLD: 4.4 K/UL (ref 1.8–8)
NEUTS SEG NFR BLD: 74 % (ref 32–75)
NRBC # BLD: 0 K/UL (ref 0–0.01)
NRBC BLD-RTO: 0 PER 100 WBC
P-R INTERVAL, ECG05: 172 MS
PLATELET # BLD AUTO: 164 K/UL (ref 150–400)
PMV BLD AUTO: 9.6 FL (ref 8.9–12.9)
POTASSIUM SERPL-SCNC: 4.2 MMOL/L (ref 3.5–5.1)
PROT SERPL-MCNC: 6.3 G/DL (ref 6.4–8.2)
Q-T INTERVAL, ECG07: 396 MS
QRS DURATION, ECG06: 98 MS
QTC CALCULATION (BEZET), ECG08: 405 MS
RBC # BLD AUTO: 4.84 M/UL (ref 4.1–5.7)
RBC MORPH BLD: NORMAL
SAMPLES BEING HELD,HOLD: NORMAL
SODIUM SERPL-SCNC: 140 MMOL/L (ref 136–145)
TROPONIN I SERPL-MCNC: <0.05 NG/ML
VENTRICULAR RATE, ECG03: 63 BPM
WBC # BLD AUTO: 6 K/UL (ref 4.1–11.1)

## 2021-09-09 PROCEDURE — 99284 EMERGENCY DEPT VISIT MOD MDM: CPT

## 2021-09-09 PROCEDURE — 93005 ELECTROCARDIOGRAM TRACING: CPT

## 2021-09-09 PROCEDURE — 85025 COMPLETE CBC W/AUTO DIFF WBC: CPT

## 2021-09-09 PROCEDURE — 84484 ASSAY OF TROPONIN QUANT: CPT

## 2021-09-09 PROCEDURE — 80053 COMPREHEN METABOLIC PANEL: CPT

## 2021-09-09 PROCEDURE — 93306 TTE W/DOPPLER COMPLETE: CPT | Performed by: SPECIALIST

## 2021-09-09 PROCEDURE — 71046 X-RAY EXAM CHEST 2 VIEWS: CPT

## 2021-09-09 PROCEDURE — 83880 ASSAY OF NATRIURETIC PEPTIDE: CPT

## 2021-09-09 PROCEDURE — 36415 COLL VENOUS BLD VENIPUNCTURE: CPT

## 2021-09-09 PROCEDURE — 83735 ASSAY OF MAGNESIUM: CPT

## 2021-09-09 NOTE — PROGRESS NOTES
I have reviewed discharge instructions with the patient. The patient verbalized understanding. Pt declined wheelchair. Ambulatory to cardio meeting.
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
## 2021-09-09 NOTE — ED TRIAGE NOTES
Pt reports sudden onset of chest pain this am that woke him from his sleep.  Hx of stent x1 in august. Took 324 asa PTA

## 2021-09-10 LAB
ATRIAL RATE: 74 BPM
CALCULATED P AXIS, ECG09: 50 DEGREES
CALCULATED R AXIS, ECG10: 14 DEGREES
CALCULATED T AXIS, ECG11: 20 DEGREES
DIAGNOSIS, 93000: NORMAL
P-R INTERVAL, ECG05: 170 MS
Q-T INTERVAL, ECG07: 376 MS
QRS DURATION, ECG06: 94 MS
QTC CALCULATION (BEZET), ECG08: 417 MS
VENTRICULAR RATE, ECG03: 74 BPM

## 2021-09-14 ENCOUNTER — HOSPITAL ENCOUNTER (OUTPATIENT)
Dept: CARDIAC REHAB | Age: 66
Discharge: HOME | End: 2021-09-14
Payer: MEDICARE

## 2021-09-14 VITALS — BODY MASS INDEX: 23.3 KG/M2 | WEIGHT: 176.6 LBS

## 2021-09-14 PROCEDURE — 93798 PHYS/QHP OP CAR RHAB W/ECG: CPT

## 2021-09-16 ENCOUNTER — HOSPITAL ENCOUNTER (OUTPATIENT)
Dept: CARDIAC REHAB | Age: 66
End: 2021-09-16
Payer: MEDICARE

## 2021-09-17 LAB
ECHO AO ASC DIAM: 3.43 CM
ECHO AO ROOT DIAM: 4.19 CM
ECHO AV MEAN GRADIENT: 2.21 MMHG
ECHO AV PEAK GRADIENT: 4.1 MMHG
ECHO AV PEAK VELOCITY: 101.18 CM/S
ECHO AV VTI: 20.41 CM
ECHO EST RA PRESSURE: 3 MMHG
ECHO IVC PROX: 2.01 CM
ECHO LA AREA 4C: 20.56 CM2
ECHO LA MAJOR AXIS: 4.64 CM
ECHO LA MINOR AXIS: 2.23 CM
ECHO LA VOL 2C: 77.42 ML (ref 18–58)
ECHO LA VOL 4C: 63.76 ML (ref 18–58)
ECHO LA VOL BP: 76.84 ML (ref 18–58)
ECHO LA VOL/BSA BIPLANE: 36.94 ML/M2 (ref 16–28)
ECHO LA VOLUME INDEX A2C: 37.22 ML/M2 (ref 16–28)
ECHO LA VOLUME INDEX A4C: 30.65 ML/M2 (ref 16–28)
ECHO LV E' LATERAL VELOCITY: 9.13 CM/S
ECHO LV E' SEPTAL VELOCITY: 8.18 CM/S
ECHO LV EDV A2C: 119.31 ML
ECHO LV EDV A4C: 135.05 ML
ECHO LV EDV BP: 133.75 ML (ref 67–155)
ECHO LV EDV INDEX A4C: 64.9 ML/M2
ECHO LV EDV INDEX BP: 64.3 ML/M2
ECHO LV EDV NDEX A2C: 57.4 ML/M2
ECHO LV EJECTION FRACTION A2C: 56 PERCENT
ECHO LV EJECTION FRACTION A4C: 57 PERCENT
ECHO LV EJECTION FRACTION BIPLANE: 56.5 PERCENT (ref 55–100)
ECHO LV ESV A2C: 52.12 ML
ECHO LV ESV A4C: 57.44 ML
ECHO LV ESV BP: 58.13 ML (ref 22–58)
ECHO LV ESV INDEX A2C: 25.1 ML/M2
ECHO LV ESV INDEX A4C: 27.6 ML/M2
ECHO LV ESV INDEX BP: 27.9 ML/M2
ECHO LV INTERNAL DIMENSION DIASTOLIC: 5.68 CM (ref 4.2–5.9)
ECHO LV INTERNAL DIMENSION SYSTOLIC: 3.78 CM
ECHO LV IVSD: 0.92 CM (ref 0.6–1)
ECHO LV MASS 2D: 183.9 G (ref 88–224)
ECHO LV MASS INDEX 2D: 88.4 G/M2 (ref 49–115)
ECHO LV POSTERIOR WALL DIASTOLIC: 0.79 CM (ref 0.6–1)
ECHO LVOT PEAK GRADIENT: 3.69 MMHG
ECHO LVOT PEAK VELOCITY: 96.02 CM/S
ECHO LVOT VTI: 19.1 CM
ECHO MV A VELOCITY: 44.2 CM/S
ECHO MV E DECELERATION TIME (DT): 271.01 MS
ECHO MV E VELOCITY: 48.27 CM/S
ECHO MV E/A RATIO: 1.09
ECHO MV E/E' LATERAL: 5.29
ECHO MV E/E' RATIO (AVERAGED): 5.59
ECHO MV E/E' SEPTAL: 5.9
ECHO MV MAX VELOCITY: 62.06 CM/S
ECHO MV MEAN GRADIENT: 0.59 MMHG
ECHO MV PEAK GRADIENT: 1.54 MMHG
ECHO MV PRESSURE HALF TIME (PHT): 78.59 MS
ECHO MV VTI: 17.84 CM
ECHO RA AREA 4C: 15.73 CM2
ECHO RIGHT VENTRICULAR SYSTOLIC PRESSURE (RVSP): 23.89 MMHG
ECHO RV INTERNAL DIMENSION: 4.43 CM
ECHO RV TAPSE: 2.76 CM (ref 1.5–2)
ECHO TV REGURGITANT MAX VELOCITY: 228.51 CM/S
ECHO TV REGURGITANT PEAK GRADIENT: 20.89 MMHG

## 2021-09-21 ENCOUNTER — HOSPITAL ENCOUNTER (OUTPATIENT)
Dept: CARDIAC REHAB | Age: 66
Discharge: HOME | End: 2021-09-21
Payer: MEDICARE

## 2021-09-21 VITALS — BODY MASS INDEX: 23.01 KG/M2 | WEIGHT: 174.4 LBS

## 2021-09-21 PROCEDURE — 93798 PHYS/QHP OP CAR RHAB W/ECG: CPT

## 2021-09-23 ENCOUNTER — HOSPITAL ENCOUNTER (OUTPATIENT)
Dept: CARDIAC REHAB | Age: 66
Discharge: HOME | End: 2021-09-23
Payer: MEDICARE

## 2021-09-23 VITALS — BODY MASS INDEX: 22.82 KG/M2 | WEIGHT: 173 LBS

## 2021-09-23 PROCEDURE — 93798 PHYS/QHP OP CAR RHAB W/ECG: CPT

## 2021-09-28 ENCOUNTER — HOSPITAL ENCOUNTER (OUTPATIENT)
Dept: CARDIAC REHAB | Age: 66
Discharge: HOME | End: 2021-09-28
Payer: MEDICARE

## 2021-09-28 VITALS — BODY MASS INDEX: 22.44 KG/M2 | WEIGHT: 170.1 LBS

## 2021-09-28 PROCEDURE — 93798 PHYS/QHP OP CAR RHAB W/ECG: CPT

## 2021-09-30 ENCOUNTER — HOSPITAL ENCOUNTER (OUTPATIENT)
Dept: CARDIAC REHAB | Age: 66
Discharge: HOME | End: 2021-09-30
Payer: MEDICARE

## 2021-09-30 VITALS — BODY MASS INDEX: 22.56 KG/M2 | WEIGHT: 171 LBS

## 2021-09-30 PROCEDURE — 93798 PHYS/QHP OP CAR RHAB W/ECG: CPT

## 2021-10-02 NOTE — PERIOP NOTES
Informed patient of COVID requirements, patient to completed COVID curbside testing at [address] [place] [address] Monday, [place] between 0599-1183. Patient verbalized understanding that COVID test is required to proceed with procedure.

## 2021-10-04 ENCOUNTER — TRANSCRIBE ORDER (OUTPATIENT)
Dept: REGISTRATION | Age: 66
End: 2021-10-04

## 2021-10-04 ENCOUNTER — HOSPITAL ENCOUNTER (OUTPATIENT)
Dept: PREADMISSION TESTING | Age: 66
Discharge: HOME | End: 2021-10-04
Payer: MEDICARE

## 2021-10-04 DIAGNOSIS — Z01.812 PRE-PROCEDURE LAB EXAM: ICD-10-CM

## 2021-10-04 DIAGNOSIS — Z01.812 PRE-PROCEDURE LAB EXAM: Primary | ICD-10-CM

## 2021-10-04 PROCEDURE — U0005 INFEC AGEN DETEC AMPLI PROBE: HCPCS

## 2021-10-05 ENCOUNTER — HOSPITAL ENCOUNTER (OUTPATIENT)
Dept: CARDIAC REHAB | Age: 66
Discharge: HOME | End: 2021-10-05
Payer: MEDICARE

## 2021-10-05 VITALS — BODY MASS INDEX: 22.48 KG/M2 | WEIGHT: 170.4 LBS

## 2021-10-05 LAB
SARS-COV-2, XPLCVT: NOT DETECTED
SOURCE, COVRS: NORMAL

## 2021-10-05 PROCEDURE — 93798 PHYS/QHP OP CAR RHAB W/ECG: CPT

## 2021-10-07 ENCOUNTER — HOSPITAL ENCOUNTER (OUTPATIENT)
Age: 66
Setting detail: OUTPATIENT SURGERY
Discharge: HOME | End: 2021-10-07
Attending: INTERNAL MEDICINE | Admitting: INTERNAL MEDICINE

## 2021-10-07 ENCOUNTER — APPOINTMENT (OUTPATIENT)
Dept: CARDIAC REHAB | Age: 66
End: 2021-10-07
Payer: MEDICARE

## 2021-10-07 ENCOUNTER — ANESTHESIA (OUTPATIENT)
Dept: ENDOSCOPY | Age: 66
End: 2021-10-07

## 2021-10-07 ENCOUNTER — TELEPHONE (OUTPATIENT)
Dept: CARDIOLOGY CLINIC | Age: 66
End: 2021-10-07

## 2021-10-07 ENCOUNTER — ANESTHESIA EVENT (OUTPATIENT)
Dept: ENDOSCOPY | Age: 66
End: 2021-10-07

## 2021-10-07 VITALS
HEIGHT: 73 IN | BODY MASS INDEX: 21.65 KG/M2 | HEART RATE: 87 BPM | OXYGEN SATURATION: 98 % | SYSTOLIC BLOOD PRESSURE: 150 MMHG | DIASTOLIC BLOOD PRESSURE: 79 MMHG | TEMPERATURE: 97.9 F | RESPIRATION RATE: 15 BRPM | WEIGHT: 163.36 LBS

## 2021-10-07 NOTE — ANESTHESIA PREPROCEDURE EVALUATION
Anesthetic History   No history of anesthetic complications            Review of Systems / Medical History  Patient summary reviewed and nursing notes reviewed    Pulmonary  Within defined limits                 Neuro/Psych         Psychiatric history    Comments: Anxiety/depression Cardiovascular    Hypertension: well controlled          CAD, cardiac stents and hyperlipidemia    Exercise tolerance: >4 METS  Comments: Currently 30 day holter monitor   GI/Hepatic/Renal     GERD: well controlled          Comments: BPH Endo/Other        Arthritis     Other Findings              Physical Exam    Airway  Mallampati: II    Neck ROM: normal range of motion   Mouth opening: Normal     Cardiovascular    Rhythm: regular  Rate: normal         Dental  No notable dental hx       Pulmonary  Breath sounds clear to auscultation               Abdominal         Other Findings            Anesthetic Plan    ASA: 2  Anesthesia type: MAC            Anesthetic plan and risks discussed with: Patient      Informed consent obtained.

## 2021-10-07 NOTE — TELEPHONE ENCOUNTER
Patient wife called and her  needs an appointment before 11/18/21 when he is schedule for surgery. Patient needs cardiac clearance for upcoming endoscopy procedure.  informed patient that he would not clear him for surgery unless he comes into the office. Please give a call back.     Phone [phone]

## 2021-10-12 ENCOUNTER — APPOINTMENT (OUTPATIENT)
Dept: CARDIAC REHAB | Age: 66
End: 2021-10-12
Payer: MEDICARE

## 2021-10-12 NOTE — TELEPHONE ENCOUNTER
Patient spouse Katya Santoyo called back in regards to patient appointment. States that the patient really needs to have his  endoscopy procedure sooner than November. I advised the spouse there were no sooner appt times available. Spouse would like to speak with a nurse to have him squeezed in.        Phone: [phone]

## 2021-10-14 ENCOUNTER — APPOINTMENT (OUTPATIENT)
Dept: CARDIAC REHAB | Age: 66
End: 2021-10-14
Payer: MEDICARE

## 2021-10-19 ENCOUNTER — HOSPITAL ENCOUNTER (OUTPATIENT)
Dept: CARDIAC REHAB | Age: 66
Discharge: HOME | End: 2021-10-19
Payer: MEDICARE

## 2021-10-19 VITALS — WEIGHT: 170.4 LBS | BODY MASS INDEX: 22.48 KG/M2

## 2021-10-19 PROCEDURE — 93798 PHYS/QHP OP CAR RHAB W/ECG: CPT

## 2021-10-19 NOTE — TELEPHONE ENCOUNTER
Pt scheduled for 11/9/21 to see Dr Aruna Howard to get clearance for endoscopy. If you have anything available sooner please call wife.

## 2021-10-20 LAB
ALBUMIN SERPL-MCNC: 4.3 G/DL (ref 3.8–4.8)
ALP SERPL-CCNC: 56 IU/L (ref 44–121)
ALT SERPL-CCNC: 16 IU/L (ref 0–44)
AST SERPL-CCNC: 16 IU/L (ref 0–40)
BILIRUB DIRECT SERPL-MCNC: 0.24 MG/DL (ref 0–0.4)
BILIRUB SERPL-MCNC: 0.9 MG/DL (ref 0–1.2)
CHOLEST SERPL-MCNC: 104 MG/DL (ref 100–199)
HDLC SERPL-MCNC: 39 MG/DL
IMP & REVIEW OF LAB RESULTS: NORMAL
LDLC SERPL CALC-MCNC: 50 MG/DL (ref 0–99)
PROT SERPL-MCNC: 6.3 G/DL (ref 6–8.5)
TRIGL SERPL-MCNC: 71 MG/DL (ref 0–149)
VLDLC SERPL CALC-MCNC: 15 MG/DL (ref 5–40)

## 2021-10-21 ENCOUNTER — APPOINTMENT (OUTPATIENT)
Dept: CARDIAC REHAB | Age: 66
End: 2021-10-21
Payer: MEDICARE

## 2021-10-22 DIAGNOSIS — I25.750: Primary | ICD-10-CM

## 2021-11-04 ENCOUNTER — APPOINTMENT (OUTPATIENT)
Dept: CARDIAC REHAB | Age: 66
End: 2021-11-04

## 2021-11-08 NOTE — PROGRESS NOTES
CARDIOLOGY OFFICE NOTE    Berny Arevalo MD, [address], [place], [address] [facility]  Phone [phone]; Fax [phone]  Mobile [phone]   Voice Mail [phone]    Primary care: Tonya Qiu MD       ATTENTION:   This medical record was transcribed using an electronic medical records/speech recognition system. Although proofread, it may and can contain electronic, spelling and other errors. Corrections may be executed at a later time. Please feel free to contact us for any clarifications as needed. ICD-10-CM ICD-9-CM   1. Coronary artery disease involving transplanted heart with unstable angina pectoris, unspecified vessel or lesion type ([facility])  I25.750 414.06     413.9   2. Atrial fibrillation, unspecified type ([address]  I48.91 427.31   3. SOB (shortness of breath)  R06.02 786.05            Kusum Oriental orthodox is a 73 y.o. male with  referred for CAD status post stenting of the LAD          Cardiac risk factors: dyslipidemia, male gender, hypertension  I have personally obtained the history from the patient. He is doing well overall  HISTORY OF PRESENTING ILLNESS   He is doing well. No chest pain or shortness of breath. He did cardiac rehab. He wants to know if he can have his endoscopy and he can if they do not need to stop his Plavix. He cannot have his knee surgery for 6 to 9 months secondary to drug-eluting stent. He did have LAD lesion of 90% and a small diagonal which at 75% lesion we talked about that today.        ACTIVE PROBLEM LIST     Patient Active Problem List    Diagnosis Date Noted    CAD (coronary artery disease) 08/06/2021    Epigastric pain 08/17/2021    Abnormal CT of the abdomen 08/17/2021    Small bowel obstruction due to adhesions ([address] 08/16/2021    GERD (gastroesophageal reflux disease) 08/16/2021    HTN (hypertension), benign 08/16/2021    Duodenitis 08/16/2021    Hypokalemia 08/16/2021    H/O colonoscopy 07/07/2020  Primary osteoarthritis of right knee 11/14/2019    Pain 04/29/2019    Thoracic back pain 08/21/2013    Hypogonadism, male 08/21/2013    Abnormal cardiovascular stress test     Chronic back pain            PAST MEDICAL HISTORY     Past Medical History:   Diagnosis Date    Abnormal finding on EKG 2015    Infarct- ruled as a mis-read by cardiology after testing    Arthritis     CAD (coronary artery disease) 08/06/2021    PC/stent mid LAD     Chronic back pain     s/p surgery    GERD (gastroesophageal reflux disease)     H/O colonoscopy 7/7/2020 6/18 5 year repeat    Hyperlipidemia     Hypertension     Hypogonadism, male 8/21/2013    total 97, free 1.8  5/13/13    Panic attacks            PAST SURGICAL HISTORY     Past Surgical History:   Procedure Laterality Date    COLONOSCOPY N/A 6/8/2018    COLONOSCOPY performed by Stevenson Byrnes MD at [facility] HX BLEPHAROPLASTY  2011   [facility] COLONOSCOPY      Dr. William Leblanc?    HX FRACTURE TX Right     clavicle repair    HX HEART CATHETERIZATION  2011?    normal    HX KNEE ARTHROSCOPY Right 2014    HX KNEE ARTHROSCOPY Left 2015    x 5    HX KNEE ARTHROSCOPY Right 04/29/2019    HX KNEE REPLACEMENT Right     HX ORTHOPAEDIC  1990    right wrist    HX ORTHOPAEDIC Right 2015    ankle surgery    HX ROTATOR CUFF REPAIR Right 2017    HX ROTATOR CUFF REPAIR Left 2018    HX SHOULDER ARTHROSCOPY Right 2007    wire and screw          ALLERGIES     Allergies   Allergen Reactions    Codeine Rash     Over 40 years ago          FAMILY HISTORY     Family History   Problem Relation Age of Onset    Heart Disease Father 46        CABG x5    Coronary Art Dis Father         stents    Diabetes Sister     Heart Disease Sister         afib    Heart Failure Mother     Heart Disease Brother     No Known Problems Brother     Malignant Hyperthermia Neg Hx     negative for cardiac disease       SOCIAL HISTORY     Social History     Socioeconomic History    Marital status:      Spouse name: Jarrod Lu Number of children: 5    Highest education level: High school graduate   Occupational History    Occupation: Refrigeration     Employer: SELF EMPLOYED   Tobacco Use    Smoking status: Never Smoker    Smokeless tobacco: Never Used   Vaping Use    Vaping Use: Never used   Substance and Sexual Activity    Alcohol use: No     Alcohol/week: 0.0 standard drinks     Comment: Rare glass of wine    Drug use: Not Currently   Other Topics Concern     Service No    Caffeine Concern No     Comment: drinks 1-2 caffeinated beverages         MEDICATIONS     Current Outpatient Medications   Medication Sig    ALPRAZolam (XANAX) 0.5 mg tablet TAKE 1 TABLET BY MOUTH EVERY 8 HOURS AS NEEDED FOR ANXIETY    clopidogreL (Plavix) 75 mg tab Take 1 Tablet by mouth daily.  buprenorphine-naloxone (SUBOXONE) 8-2 mg film sublingaul film 1 Film by SubLINGual route daily.  atorvastatin (LIPITOR) 40 mg tablet Take 1 Tablet by mouth daily.  aspirin 81 mg chewable tablet Take 1 Tablet by mouth daily.  pantoprazole (PROTONIX) 40 mg tablet Take 1 Tab by mouth daily. (Patient taking differently: Take 40 mg by mouth every evening.)    testosterone cypionate (DEPOTESTOTERONE CYPIONATE) 200 mg/mL injection 80 mg by IntraMUSCular route every seven (7) days. No current facility-administered medications for this visit. I have reviewed the nurses notes, vitals, problem list, allergy list, medical history, family, social history and medications. REVIEW OF SYMPTOMS   Pertinent positive per HPI  General: Pt denies excessive weight gain or loss. Pt is able to conduct ADL's  HEENT: Denies blurred vision, headaches, hearing loss, epistaxis and difficulty swallowing. Respiratory: Denies cough, congestion, shortness of breath, VERDE, wheezing or stridor.   Cardiovascular: Denies precordial pain, palpitations, edema or PND  Gastrointestinal: Denies poor appetite, indigestion, abdominal pain or blood in stool  Genitourinary: Denies hematuria, dysuria, increased urinary frequency  Musculoskeletal: Denies joint pain or swelling from muscles or joints  Neurologic: Denies tremor, paresthesias, headache, or sensory motor disturbance  Psychiatric: Denies confusion, insomnia, depression  Integumentray: Denies rash, itching or ulcers. Hematologic: Denies easy bruising, bleeding     PHYSICAL EXAMINATION      Vitals:    11/09/21 1140 11/09/21 1143   BP: (!) 158/78 (!) 160/68   Pulse: 74    Resp: 15    SpO2: 99%    Weight: 171 lb 3.2 oz (77.7 kg)    Height: 6' 1\" (1.854 m)      General: Well developed, in no acute distress. HEENT: No jaundice, oral mucosa moist, no oral ulcers  Neck: Supple, no stiffness, no lymphadenopathy, supple  Heart:  Normal S1/S2 negative S3 or S4. Regular, no murmur, gallop or rub, no jugular venous distention  Respiratory: Clear bilaterally x 4, no wheezing or rales  Extremities:  No edema, normal cap refill, no cyanosis. Musculoskeletal: No clubbing, no deformities  Neuro: A&Ox3, speech clear, gait stable, cooperative, no focal neurologic deficits  Skin: Skin color is normal. No rashes or lesions. Non diaphoretic, moist.           DIAGNOSTIC DATA     1. Lipids   8/6/21- , HDL 60, LDL 71.2, TG 89   10/19/21- , HDL 39, LDL 50, TG 71    2. Cardiac Cath   8/6/21- Critical bifurcation stenosis of mid LAD and D2   Mildly elevated LVEDP   Mid LAD stented with a 2.5X 23 mm Xience Elba GENARO, optimized with a 2 5 and 3.0 noncompliant balloon    3. Stress Test  5/11/16- Stress Echo- no ischemia, EF 55%, 10.7 mets    4. Echo  9/9/21-EF 57%, LAE, RVSP 24 mmHg, AO: Mild sinuses of Valsalva dilatation. Diameter is 4.2 cm.          LABORATORY DATA            Lab Results   Component Value Date/Time    WBC 6.0 09/09/2021 09:01 AM    HGB 14.5 09/09/2021 09:01 AM    HCT 43.5 09/09/2021 09:01 AM    PLATELET 913 61/94/1648 09:01 AM    MCV 89.9 09/09/2021 09:01 AM      Lab Results Component Value Date/Time    Sodium 140 09/09/2021 09:01 AM    Potassium 4.2 09/09/2021 09:01 AM    Chloride 107 09/09/2021 09:01 AM    CO2 31 09/09/2021 09:01 AM    Anion gap 2 (L) 09/09/2021 09:01 AM    Glucose 86 09/09/2021 09:01 AM    BUN 19 09/09/2021 09:01 AM    Creatinine 0.98 09/09/2021 09:01 AM    BUN/Creatinine ratio 19 09/09/2021 09:01 AM    GFR est AA >60 09/09/2021 09:01 AM    GFR est non-AA >60 09/09/2021 09:01 AM    Calcium 8.1 (L) 09/09/2021 09:01 AM    Bilirubin, total 0.9 10/19/2021 10:59 AM    Alk. phosphatase 56 10/19/2021 10:59 AM    Protein, total 6.3 10/19/2021 10:59 AM    Albumin 4.3 10/19/2021 10:59 AM    Globulin 2.6 09/09/2021 09:01 AM    A-G Ratio 1.4 09/09/2021 09:01 AM    ALT (SGPT) 16 10/19/2021 10:59 AM           ASSESSMENT/RECOMMENDATIONS:.      1. CAD status post stenting of the LAD for unstable angina  -Continue Plavix for at least 6 months  -Hold on any surgeries or endoscopy unless they can do the procedure on Plavix  left main okay, LAD diffuse disease at bifurcation with D2 90% focal stenosis, circumflex okay, RCA okay. Mid LAD was stented with a 2.5 x 23 mm Xience Elba GENARO deployed at 16 moiz and I believe postdilated with a 3 oh noncompliant balloon. 2. Hypertension  -BP is elevated I am unsure how he came off his lisinopril it was listed that he was not taking it in the hospital so we will resume the 20 mg lisinopril and will have a repeat blood pressure check in 6 to 8 weeks either in the office for virtually  3. Dyslipidemia LDL was 71  -LDL is excellent in the 50s but his HDL dropped significantly. Will repeat his labs in 2 months  4. Question of atrial fibrillation on EKG I saw from [facility]  -His event loop monitor sure did not demonstrate any evidence of an arrhythmia    [address] [address] [place].  with 5 kids. Enjoys golf. No orders of the defined types were placed in this encounter.       We discussed the expected course, resolution and complications of the diagnosis(es) in detail. Medication risks, benefits, costs, interactions, and alternatives were discussed as indicated. I advised him to contact the office if his condition worsens, changes or fails to improve as anticipated. He expressed understanding with the diagnosis(es) and plan          Follow-up and Dispositions  ·   Return in about 6 months (around 5/9/2022). I have discussed the diagnosis with  Bubba Gonzalez IV and the intended plan as seen in the above orders. Questions were answered concerning future plans. I have discussed medication side effects and warnings with the patient as well. Thank you,  Ale Collins MD for involving me in the care of  Bubba Gonzalez IV. Please do not hesitate to contact me for further questions/concerns. Berny Martinez MD, [facility] [address]      [facility], [address] [place] [address] Drive      [phone] / [phone] Fax

## 2021-11-09 ENCOUNTER — APPOINTMENT (OUTPATIENT)
Dept: CARDIAC REHAB | Age: 66
End: 2021-11-09

## 2021-11-09 ENCOUNTER — OFFICE VISIT (OUTPATIENT)
Dept: CARDIOLOGY CLINIC | Age: 66
End: 2021-11-09
Payer: MEDICARE

## 2021-11-09 VITALS
SYSTOLIC BLOOD PRESSURE: 160 MMHG | OXYGEN SATURATION: 99 % | HEIGHT: 73 IN | RESPIRATION RATE: 15 BRPM | BODY MASS INDEX: 22.69 KG/M2 | WEIGHT: 171.2 LBS | HEART RATE: 74 BPM | DIASTOLIC BLOOD PRESSURE: 68 MMHG

## 2021-11-09 DIAGNOSIS — I48.91 ATRIAL FIBRILLATION, UNSPECIFIED TYPE (HCC): ICD-10-CM

## 2021-11-09 DIAGNOSIS — I25.750: Primary | ICD-10-CM

## 2021-11-09 DIAGNOSIS — R06.02 SOB (SHORTNESS OF BREATH): ICD-10-CM

## 2021-11-09 DIAGNOSIS — I21.4 NON-ST ELEVATION MYOCARDIAL INFARCTION (NSTEMI) (HCC): ICD-10-CM

## 2021-11-09 DIAGNOSIS — R06.02 SOB (SHORTNESS OF BREATH): Primary | ICD-10-CM

## 2021-11-09 PROBLEM — I21.9 ACUTE MYOCARDIAL INFARCTION, UNSPECIFIED (HCC): Status: ACTIVE | Noted: 2021-11-09

## 2021-11-09 PROBLEM — I21.3 ST ELEVATION (STEMI) MYOCARDIAL INFARCTION OF UNSPECIFIED SITE (HCC): Status: ACTIVE | Noted: 2021-11-09

## 2021-11-09 PROCEDURE — 1101F PT FALLS ASSESS-DOCD LE1/YR: CPT | Performed by: SPECIALIST

## 2021-11-09 PROCEDURE — G8420 CALC BMI NORM PARAMETERS: HCPCS | Performed by: SPECIALIST

## 2021-11-09 PROCEDURE — 3017F COLORECTAL CA SCREEN DOC REV: CPT | Performed by: SPECIALIST

## 2021-11-09 PROCEDURE — G0463 HOSPITAL OUTPT CLINIC VISIT: HCPCS | Performed by: SPECIALIST

## 2021-11-09 PROCEDURE — G8754 DIAS BP LESS 90: HCPCS | Performed by: SPECIALIST

## 2021-11-09 PROCEDURE — G8510 SCR DEP NEG, NO PLAN REQD: HCPCS | Performed by: SPECIALIST

## 2021-11-09 PROCEDURE — 99214 OFFICE O/P EST MOD 30 MIN: CPT | Performed by: SPECIALIST

## 2021-11-09 PROCEDURE — G8753 SYS BP > OR = 140: HCPCS | Performed by: SPECIALIST

## 2021-11-09 PROCEDURE — G8427 DOCREV CUR MEDS BY ELIG CLIN: HCPCS | Performed by: SPECIALIST

## 2021-11-09 PROCEDURE — G8536 NO DOC ELDER MAL SCRN: HCPCS | Performed by: SPECIALIST

## 2021-11-09 RX ORDER — LISINOPRIL 10 MG/1
10 TABLET ORAL DAILY
Qty: 30 TABLET | Refills: 5 | Status: SHIPPED | OUTPATIENT
Start: 2021-11-09 | End: 2021-11-15 | Stop reason: SDUPTHER

## 2021-11-09 NOTE — LETTER
11/9/2021    Patient: Flaca Loza IV   YOB: 1955   Date of Visit: 11/9/2021     Nathalie Waters, [address] [facility]  [address]  [facility]  Via In Basket    Dear Nathalie Waters MD,      Thank you for referring Mr. Boy Pinto to [facility] for evaluation. My notes for this consultation are attached. If you have questions, please do not hesitate to call me. I look forward to following your patient along with you.       Sincerely,    Rolf Hebert MD

## 2021-11-09 NOTE — PROGRESS NOTES
Skinny Kearney is a 73 y.o. male    Chief Complaint   Patient presents with    Surgical Clearance    Hypertension    Coronary Artery Disease    Cholesterol Problem       Chest pain : No  SOB : No  Dizziness : No  Edema :No  Refills : No    Visit Vitals  BP (!) 160/68   Pulse 74   Resp 15   Ht 6' 1\" (1.854 m)   Wt 171 lb 3.2 oz (77.7 kg)   SpO2 99%   BMI 22.59 kg/m²       1. Have you been to the ER, urgent care clinic since your last visit? Hospitalized since your last visit? Yes Aug 13, 2021 at [facility] , due to abdominal pain. 2. Have you seen or consulted any other health care providers outside of the [address] [place] [address] since your last visit? Include any pap smears or colon screening.  No

## 2021-11-09 NOTE — PATIENT INSTRUCTIONS
1) your blood pressure is elevated today it is currently 160/68. Somewhere along the way the lisinopril got lost in terms of on your medicine list.  We will restart the lisinopril 20 mg a day    2) I would like you to follow-up in 6 to 8 weeks either in the office or with a virtual visit to review your blood pressure. Blood pressure goal should be 140/80 or lower    3) I would like for you to get your cholesterol rechecked as we reviewed as we talked about just to ensure that your HDL did not drop significantly. 4) your event loop monitor did not show any significant arrhythmias and good news    5), follow-up with me and 6 months after your 6 to 8-week appointment. 6) will arrange for a sleep evaluation secondary to your bradycardia with heart rate going down to the 30s at night.

## 2021-11-11 ENCOUNTER — APPOINTMENT (OUTPATIENT)
Dept: CARDIAC REHAB | Age: 66
End: 2021-11-11

## 2021-11-11 ENCOUNTER — DOCUMENTATION ONLY (OUTPATIENT)
Dept: CARDIOLOGY CLINIC | Age: 66
End: 2021-11-11

## 2021-11-11 NOTE — PROGRESS NOTES
Liss Tatum,    I am okay with you switching to Brilinta but when you take the Brilinta you have to take 81 mg of aspirin and no higher dose. I will have Aditi Overton call for same.     Linda Jeter MD

## 2021-11-15 RX ORDER — LISINOPRIL 10 MG/1
10 TABLET ORAL DAILY
Qty: 30 TABLET | Refills: 5 | Status: SHIPPED | OUTPATIENT
Start: 2021-11-15 | End: 2022-04-22

## 2021-11-16 ENCOUNTER — TRANSCRIBE ORDER (OUTPATIENT)
Dept: REGISTRATION | Age: 66
End: 2021-11-16

## 2021-11-16 ENCOUNTER — APPOINTMENT (OUTPATIENT)
Dept: CARDIAC REHAB | Age: 66
End: 2021-11-16

## 2021-11-16 ENCOUNTER — HOSPITAL ENCOUNTER (OUTPATIENT)
Dept: LAB | Age: 66
Discharge: HOME | End: 2021-11-16
Payer: MEDICARE

## 2021-11-16 DIAGNOSIS — Z01.812 PRE-PROCEDURAL LABORATORY EXAMINATIONS: ICD-10-CM

## 2021-11-16 DIAGNOSIS — Z01.812 PRE-PROCEDURAL LABORATORY EXAMINATIONS: Primary | ICD-10-CM

## 2021-11-16 PROCEDURE — U0005 INFEC AGEN DETEC AMPLI PROBE: HCPCS

## 2021-11-17 LAB
SARS-COV-2, XPLCVT: NOT DETECTED
SOURCE, COVRS: NORMAL

## 2021-11-17 NOTE — PROGRESS NOTES
[address]  Endoscopy Preprocedure Instructions      1. On the day of your surgery, please report to registration located on the 2nd floor of the  [facility]. yes    2. You must have a responsible adult to drive you to the hospital, stay at the hospital during your procedure and drive you home. If they leave your procedure will not be started (no exceptions). yes    3. Do not have anything to eat or drink (including water, gum, mints, coffee, and juice) after midnight. This does not apply to the medications you were instructed to take by your physician. yes  If you are currently taking Plavix, Coumadin, Aspirin, or other blood-thinning agents, contact your physician for special instructions. Yes, Per pt Dr. Brad Malone aware that he is remaining on the [address] [place] [address] per cardiologist.    4. If you are having a procedure that requires bowel prep: We recommend that you have only clear liquids the day before your procedure and begin your bowel prep by 5:00 pm.  You may continue to drink clear liquids until midnight. If for any reason you are not able to complete your prep please notify your physician immediately. not applicable    5. Have a list of all current medications, including vitamins, herbal supplements and any other over the counter medications. yes    6. If you wear glasses, contacts, dentures and/or hearing aids, they may be removed prior to procedure, please bring a case to store them in. yes    7. You should understand that if you do not follow these instructions your procedure may be cancelled. If your physical condition changes (I.e. fever, cold or flu) please contact your doctor as soon as possible. 8. It is important that you be on time. If for any reason you are unable to keep your appointment please call [phone] the day of or your physicians office prior to your scheduled procedure    9.  Have you received your COVID Vaccine? yes If no, you will need to receive a COVID test/swab here at [facility] the MOB parking lot Monday - Friday 8a - 11am. There are no Saturday or Sunday swabbing at any [facility]. (patient verbalizes understanding) yes

## 2021-11-18 ENCOUNTER — HOSPITAL ENCOUNTER (OUTPATIENT)
Age: 66
Setting detail: OUTPATIENT SURGERY
Discharge: HOME | End: 2021-11-18
Attending: INTERNAL MEDICINE | Admitting: INTERNAL MEDICINE
Payer: MEDICARE

## 2021-11-18 ENCOUNTER — ANESTHESIA (OUTPATIENT)
Dept: ENDOSCOPY | Age: 66
End: 2021-11-18
Payer: MEDICARE

## 2021-11-18 ENCOUNTER — APPOINTMENT (OUTPATIENT)
Dept: CARDIAC REHAB | Age: 66
End: 2021-11-18

## 2021-11-18 ENCOUNTER — TRANSCRIBE ORDER (OUTPATIENT)
Dept: SCHEDULING | Age: 66
End: 2021-11-18

## 2021-11-18 ENCOUNTER — ANESTHESIA EVENT (OUTPATIENT)
Dept: ENDOSCOPY | Age: 66
End: 2021-11-18
Payer: MEDICARE

## 2021-11-18 VITALS
WEIGHT: 164.9 LBS | SYSTOLIC BLOOD PRESSURE: 107 MMHG | TEMPERATURE: 97.7 F | HEART RATE: 73 BPM | RESPIRATION RATE: 20 BRPM | DIASTOLIC BLOOD PRESSURE: 80 MMHG | HEIGHT: 74 IN | OXYGEN SATURATION: 99 % | BODY MASS INDEX: 21.16 KG/M2

## 2021-11-18 DIAGNOSIS — R10.13 ABDOMINAL PAIN, EPIGASTRIC: Primary | ICD-10-CM

## 2021-11-18 PROCEDURE — 76040000019: Performed by: INTERNAL MEDICINE

## 2021-11-18 PROCEDURE — 74011250636 HC RX REV CODE- 250/636: Performed by: NURSE ANESTHETIST, CERTIFIED REGISTERED

## 2021-11-18 PROCEDURE — 2709999900 HC NON-CHARGEABLE SUPPLY: Performed by: INTERNAL MEDICINE

## 2021-11-18 PROCEDURE — 76060000031 HC ANESTHESIA FIRST 0.5 HR: Performed by: INTERNAL MEDICINE

## 2021-11-18 RX ORDER — SODIUM CHLORIDE 9 MG/ML
50 INJECTION, SOLUTION INTRAVENOUS CONTINUOUS
Status: DISCONTINUED | OUTPATIENT
Start: 2021-11-18 | End: 2021-11-18 | Stop reason: HOSPADM

## 2021-11-18 RX ORDER — EPINEPHRINE 0.1 MG/ML
1 INJECTION INTRACARDIAC; INTRAVENOUS
Status: DISCONTINUED | OUTPATIENT
Start: 2021-11-18 | End: 2021-11-18 | Stop reason: HOSPADM

## 2021-11-18 RX ORDER — MIDAZOLAM HYDROCHLORIDE 1 MG/ML
.25-5 INJECTION, SOLUTION INTRAMUSCULAR; INTRAVENOUS
Status: DISCONTINUED | OUTPATIENT
Start: 2021-11-18 | End: 2021-11-18 | Stop reason: HOSPADM

## 2021-11-18 RX ORDER — NALOXONE HYDROCHLORIDE 0.4 MG/ML
0.4 INJECTION, SOLUTION INTRAMUSCULAR; INTRAVENOUS; SUBCUTANEOUS
Status: DISCONTINUED | OUTPATIENT
Start: 2021-11-18 | End: 2021-11-18 | Stop reason: HOSPADM

## 2021-11-18 RX ORDER — ATROPINE SULFATE 0.1 MG/ML
0.4 INJECTION INTRAVENOUS
Status: DISCONTINUED | OUTPATIENT
Start: 2021-11-18 | End: 2021-11-18 | Stop reason: HOSPADM

## 2021-11-18 RX ORDER — FLUMAZENIL 0.1 MG/ML
0.2 INJECTION INTRAVENOUS
Status: DISCONTINUED | OUTPATIENT
Start: 2021-11-18 | End: 2021-11-18 | Stop reason: HOSPADM

## 2021-11-18 RX ORDER — DEXTROMETHORPHAN/PSEUDOEPHED 2.5-7.5/.8
1.2 DROPS ORAL
Status: DISCONTINUED | OUTPATIENT
Start: 2021-11-18 | End: 2021-11-18 | Stop reason: HOSPADM

## 2021-11-18 RX ORDER — PROPOFOL 10 MG/ML
INJECTION, EMULSION INTRAVENOUS AS NEEDED
Status: DISCONTINUED | OUTPATIENT
Start: 2021-11-18 | End: 2021-11-18 | Stop reason: HOSPADM

## 2021-11-18 RX ADMIN — PROPOFOL INJECTABLE EMULSION 20 MG: 10 INJECTION, EMULSION INTRAVENOUS at 11:01

## 2021-11-18 RX ADMIN — PROPOFOL INJECTABLE EMULSION 100 MG: 10 INJECTION, EMULSION INTRAVENOUS at 10:56

## 2021-11-18 RX ADMIN — PROPOFOL INJECTABLE EMULSION 30 MG: 10 INJECTION, EMULSION INTRAVENOUS at 10:58

## 2021-11-18 RX ADMIN — PROPOFOL INJECTABLE EMULSION 20 MG: 10 INJECTION, EMULSION INTRAVENOUS at 11:00

## 2021-11-18 NOTE — PROGRESS NOTES
Navneet Delarosa IV  1955  [id number]    Situation:  Verbal report received from: Maddi العراقي RN  Procedure: Procedure(s):  ESOPHAGOGASTRODUODENOSCOPY (EGD)    Background:    Preoperative diagnosis: Abdominal pain  Postoperative diagnosis: EGD-hiatal hernia    :  Dr. Gary Feliciano  Assistant(s): Endoscopy RN-1: Farhana Spencer RN  Endoscopy RN-2: Miriam Burnett RN    Specimens: * No specimens in log *  H. Pylori  no    Assessment:  Intra-procedure medications   Anesthesia gave intra-procedure sedation and medications, see anesthesia flow sheet yes    Intravenous fluids: NS@ KVO     Vital signs stable yes    Abdominal assessment: round and soft yes    Recommendation:  Discharge patient per MD order yes.   Return to floor na  Family or Friend family  Permission to share finding with family or friend yes

## 2021-11-18 NOTE — H&P
The patient is a 73year old male who presents with a complaint of Abdominal Pain. Note for \"Abdominal Pain\": .    73yo male with hx CAD with stent placed 8/6 on Brilinta, is seen in clinic for EGD consult. He reports some epigastric abd pain that started about 2 months ago. Has indigestion and belching. He had CT 8/16 that showed possible PUD and thickening of gastric wall. He is on protonix 40mg once a day for about 15yrs. No prior EGD. Cardiologist does not recommend stopping brilinta. No dysphagia, n/v, diarrhea, constipation, melena or rectal bleeding. No other complaints. Up to date on colonoscopy  Father had colon polyps. No other significant family history. Past Surgical History Miranda Fernando; 9/17/2021 11:39 AM)  Knee Replacement, Total    Arthroscopic Ankle Surgery - Right      Allergies Miranda Fernando; 9/17/2021 11:39 AM)  Codeine/Codeine Derivatives      Medication History Miranda Fernando; 9/17/2021 11:48 AM)  June Lacer (90MG Tablet, 2 Oral daily) Active. Protonix  (40MG Tablet DR, Oral daily) Active. Aspirin 81  (81MG Tablet Chewable, Oral daily) Active. Atorvastatin Calcium  (40MG Tablet, Oral daily) Active. Testoderm  (4MG/24HR Patch 24HR, Transdermal weekly) Active. Medications Reconciled     Family History Miranda Fernando; 9/17/2021 11:40 AM)  Heart Disease   Father. Diabetes Mellitus   Sister. Hypertension      Social History Miranda Fernando; 9/17/2021 11:43 AM)  Drug Use   no  Alcohol Use   no  Employment status   Full-time. Highest Education Level Attained   High school graduate. Marital status   . Tobacco Use   Never smoker. Health Maintenance History Miranda Fernando; 9/17/2021 11:43 AM)  Annual Eye Exam    COVID-19 vaccine    Flu Vaccine    Pneumovax    Blood Pressure Monitoring          Review of Systems Miranda Fernando; 9/17/2021 11:33 AM)  General Not Present- Chronic Fatigue, Poor Appetite, Weight Gain and Weight Loss.   Skin Not Present- Itching, Rash and Skin Color Changes. HEENT Not Present- Hearing Loss and Vertigo. Respiratory Not Present- Difficulty Breathing and TB exposure. Cardiovascular Not Present- Chest Pain, Use of Antibiotics before Dental Procedures and Use of Blood Thinners. Gastrointestinal Present- See HPI. Musculoskeletal Not Present- Arthritis, Hip Replacement Surgery and Knee Replacement Surgery. Neurological Not Present- Weakness. Psychiatric Not Present- Depression. Endocrine Not Present- Diabetes and Thyroid Problems. Hematology Not Present- Anemia. Vitals Mariangel Sullivan; 9/17/2021 11:38 AM)  9/17/2021 11:33 AM  Weight: 175 lb   Height: 74 in   Body Surface Area: 2.05 m²   Body Mass Index: 22.47 kg/m²    Temp.: 97.1° F     BP: 137/72(Sitting, Left Arm, Standard)              Physical Exam (Sherryle Greenhouse PA-C; 9/17/2021 12:04 PM)  The physical exam findings are as follows:  Note:  - General/Const: Awake, Alert, No acute distress  - Eyes: Conjunctiva NL, Eyelids NL  - ENT: Airway patent, Mucous membranes moist  - Head: NL inspection  - Neck: NL inspection, Supple, No swelling  - Skin: Warm, Dry  - Neuro: Oriented X3, Speech NL, No gross Motor or Sensory deficits  - Resp: No resp distress, symmetric expansion  - Cardio: Heart rate NL, Nl cap refill  - Abdomen/GI: Soft, Non-tender, No distention  - Back: Inspection NL  - Upper Ext: Inspection NL, No swelling  - Lower Ext/Pelvis: Inspection NL, No edema  - : Exam deferred  - Rectal: Exam deferred  - Psych: NL mood, NL affect        Assessment & Plan Holly Hewitt PA-C; 9/17/2021 12:16 PM)  Abnormal CT scan (R93.89)  Impression: Pt with abnormal CT 8/16 with wall thickening at pyloric/duodenal bulb junction. No prior EGD  On protonix for 15yrs  having epigastric pain. Unable to stop brilinta d/t recent stent placement. He is scheduled for EGD 10/7. Discussed risks vs benefits of doing EGD while on anticoagulation and will avoid biopsy and dilations.  Pt agreeable to this plan. Add Carafate  f/u after procedure  Current Plans  Started Protonix 40 MG Oral Tablet Delayed Release, daily, 09/17/2021, No Refill. Started Carafate 1 GM Oral Tablet, 1 (one) Tablet qid, #120, 09/17/2021, No Refill. Plan of care was completed in collaboration with Dr. Benitez Boothe. Instructed to avoid non-steroidal anti-inflammatory drugs. Pt Education - How to access health information online: discussed with patient and provided information. Outside medical records reviewed and incorporated into the medical decision-making.   Follow up office visit after procedure  Long term current use of anticoagulant (Z79.01)        Signed electronically by Anjail Tavera PA-C (9/17/2021 12:18 PM)

## 2021-11-18 NOTE — ANESTHESIA POSTPROCEDURE EVALUATION
Procedure(s):  ESOPHAGOGASTRODUODENOSCOPY (EGD). MAC    Anesthesia Post Evaluation        Patient location during evaluation: PACU  Patient participation: complete - patient participated  Level of consciousness: sleepy but conscious  Pain management: adequate  Airway patency: patent  Anesthetic complications: no  Cardiovascular status: acceptable and stable  Respiratory status: acceptable and unassisted  Hydration status: acceptable  Comments: The patient was seen and evaluated in the post-operative period. The time of my evaluation may not match the time of this note. The patient denied uncontrolled pain or nausea, and there were no significant complications evident.     Kathy Cool MD      Post anesthesia nausea and vomiting:  none  Final Post Anesthesia Temperature Assessment:  Normothermia (36.0-37.5 degrees C)      INITIAL Post-op Vital signs:   Vitals Value Taken Time   /80 11/18/21 1129   Temp 36.5 °C (97.7 °F) 11/18/21 1108   Pulse 73 11/18/21 1129   Resp 20 11/18/21 1129   SpO2 99 % 11/18/21 1129

## 2021-11-18 NOTE — PROGRESS NOTES
Endoscopy discharge instructions have been reviewed and given to patient. The patient verbalized understanding and acceptance of instructions. Dr. Ba Chase discussed with patient procedure findings and next steps.

## 2021-11-18 NOTE — DISCHARGE INSTRUCTIONS
Xiomy Machado M.D.  [phone]           2021  Joan Lewis IV  :  1955  [facility] Medical Record Number:  [id number]        ENDOSCOPY FINDINGS:   Your endoscopy showed a small hiatal hernia, otherwise mucosa within normal throughout the esophagus, stomach and duodenum. EGD DISCHARGE INSTRUCTIONS    DISCOMFORT:  Sore throat- throat lozenges or warm salt water gargle  redness at IV site- apply warm compress to area; if redness or soreness persist- contact your physician  Gaseous discomfort- walking, belching will help relieve any discomfort  You may not operate a vehicle for 12 hours  You may not engage in an occupation involving machinery or appliances for rest of today  You may not drink alcoholic beverages for at least 12 hours  Avoid making any critical decisions for at least 24 hour    DIET:   You may resume your regular diet. ACTIVITY  Spend the remainder of the day resting -  avoid any strenuous activity. Avoid driving or operating machinery. CALL M.D. ANY SIGN OF   Increasing pain, nausea, vomiting  Abdominal distension (swelling)  New increased bleeding (oral or rectal)  Fever (chills)  Pain in chest area  Bloody discharge from nose or mouth  Shortness of breath    Follow-up Instructions:   Call Dr. Jaycob Aquino for any questions or problems. Telephone # [phone]     Continue same medications. Will order further testing on the gallbladder function. Follow up in the office.

## 2021-11-18 NOTE — PROCEDURES
Amada Durand M.D.  [phone]           2021                EGD Operative Report  Toni Quinteros IV  :  1955  [address] [place] [address] Medical Record Number:  [id number]      Indication:  Abdominal pain, epigastric     : Andrew Martini MD    Referring Provider:  Ana Paula Richards MD      Anesthesia/Sedation:  MAC anesthesia    Airway assessment: No airway problems anticipated    Pre-Procedural Exam:      Airway: clear, no airway problems anticipated  Heart: RRR, without gallops or rubs  Lungs: clear bilaterally without wheezes, crackles, or rhonchi  Abdomen: soft, nontender, nondistended, bowel sounds present  Mental Status: awake, alert and oriented to person, place and time       Procedure Details     After infomed consent was obtained for the procedure, with all risks and benefits of procedure explained the patient was taken to the endoscopy suite and placed in the left lateral decubitus position. Following sequential administration of sedation as per above, the endoscope was inserted into the mouth and advanced under direct vision to second portion of the duodenum. A careful inspection was made as the gastroscope was withdrawn, including a retroflexed view of the proximal stomach; findings and interventions are described below. Findings:   Esophagus:Mucosa within normal throughout the esophagus, no lesions or inflammation seen, normal GE-junction noted. Stomach: Mucosa within normal throughout the stomach, no ulcers seen, no thickening of the mucosa noted at the pyloric level except for a mucosal fold seen going into the pylorus. No lesions seen. Duodenum/jejunum: normal    Therapies:  none    Specimens: none           Complications:   None; patient tolerated the procedure well. EBL:  None.            Impression:    EGD-hiatal hernia      Recommendations:    -Continue acid suppression.  -Will proceed with HIDA scan and follow-up office visit    Andrew Martini MD

## 2021-11-18 NOTE — ANESTHESIA PREPROCEDURE EVALUATION
Relevant Problems   No relevant active problems       Anesthetic History   No history of anesthetic complications            Review of Systems / Medical History  Patient summary reviewed and pertinent labs reviewed    Pulmonary  Within defined limits                 Neuro/Psych           Pertinent negatives: No seizures, TIA and CVA   Cardiovascular    Hypertension          Past MI, CAD and cardiac stents  Pertinent negatives: No angina  Exercise tolerance: >4 METS  Comments: Cardiac cath 8/6/21:  · Critical bifurcation stenosis of mid LAD and D2  · Mildly elevated LVEDP  · Mid LAD stented with a 2.5X 23 mm Xience Elba GENARO, optimized with a 2 5 and 3.0 noncompliant balloon. TTE 9/9/21    · LV: Calculated LVEF is 57%. Biplane method used to measure ejection fraction. Normal cavity size, wall thickness and systolic function (ejection fraction normal). Age-appropriate left ventricular diastolic function. · RV: Mildly dilated right ventricle. Normal global systolic function. · LA: Mildly dilated left atrium. Left Atrium volume index is 37 mL/m2. · TV: Right Ventricular Arterial Pressure (RVSP) is 24 mmHg. · AO: Mild sinuses of Valsalva dilatation. Diameter is 4.2 cm. Normal EF with slight increase in the aortic dimension and no cause for concern. Will follow up with echos          GI/Hepatic/Renal     GERD           Endo/Other        Arthritis  Pertinent negatives: No diabetes   Other Findings            Physical Exam    Airway  Mallampati: II  TM Distance: 4 - 6 cm  Neck ROM: normal range of motion   Mouth opening: Normal     Cardiovascular      Rate: normal         Dental  No notable dental hx       Pulmonary  Breath sounds clear to auscultation               Abdominal         Other Findings            Anesthetic Plan    ASA: 3  Anesthesia type: MAC          Induction: Intravenous  Anesthetic plan and risks discussed with: Patient      Recent stent in August, on Brilinta.  Endorses good exercise tolerance, no angina.

## 2021-11-23 ENCOUNTER — APPOINTMENT (OUTPATIENT)
Dept: CARDIAC REHAB | Age: 66
End: 2021-11-23

## 2021-11-30 ENCOUNTER — APPOINTMENT (OUTPATIENT)
Dept: CARDIAC REHAB | Age: 66
End: 2021-11-30

## 2021-12-02 ENCOUNTER — APPOINTMENT (OUTPATIENT)
Dept: CARDIAC REHAB | Age: 66
End: 2021-12-02

## 2021-12-09 ENCOUNTER — TRANSCRIBE ORDER (OUTPATIENT)
Dept: SCHEDULING | Age: 66
End: 2021-12-09

## 2021-12-09 DIAGNOSIS — R10.13 ABDOMINAL PAIN, EPIGASTRIC: Primary | ICD-10-CM

## 2021-12-14 ENCOUNTER — HOSPITAL ENCOUNTER (OUTPATIENT)
Dept: GENERAL RADIOLOGY | Age: 66
Discharge: HOME | End: 2021-12-14
Attending: INTERNAL MEDICINE
Payer: MEDICARE

## 2021-12-14 DIAGNOSIS — R10.13 ABDOMINAL PAIN, EPIGASTRIC: ICD-10-CM

## 2021-12-14 PROCEDURE — 74248 X-RAY SM INT F-THRU STD: CPT

## 2021-12-15 LAB
ALBUMIN SERPL-MCNC: 4.3 G/DL (ref 3.8–4.8)
ALP SERPL-CCNC: 62 IU/L (ref 44–121)
ALT SERPL-CCNC: 13 IU/L (ref 0–44)
AST SERPL-CCNC: 12 IU/L (ref 0–40)
BILIRUB DIRECT SERPL-MCNC: 0.22 MG/DL (ref 0–0.4)
BILIRUB SERPL-MCNC: 0.7 MG/DL (ref 0–1.2)
CHOLEST SERPL-MCNC: 104 MG/DL (ref 100–199)
HDLC SERPL-MCNC: 43 MG/DL
IMP & REVIEW OF LAB RESULTS: NORMAL
LDLC SERPL CALC-MCNC: 47 MG/DL (ref 0–99)
PROT SERPL-MCNC: 6.5 G/DL (ref 6–8.5)
TRIGL SERPL-MCNC: 66 MG/DL (ref 0–149)
VLDLC SERPL CALC-MCNC: 14 MG/DL (ref 5–40)

## 2021-12-17 DIAGNOSIS — I25.750: Primary | ICD-10-CM

## 2021-12-27 ENCOUNTER — VIRTUAL VISIT (OUTPATIENT)
Dept: CARDIOLOGY CLINIC | Age: 66
End: 2021-12-27

## 2021-12-27 DIAGNOSIS — I48.91 ATRIAL FIBRILLATION, UNSPECIFIED TYPE (HCC): ICD-10-CM

## 2021-12-27 DIAGNOSIS — R06.02 SOB (SHORTNESS OF BREATH): ICD-10-CM

## 2021-12-27 DIAGNOSIS — I25.750: Primary | ICD-10-CM

## 2021-12-27 NOTE — PROGRESS NOTES
CARDIOLOGY OFFICE NOTE    Berny Styles MD, [address], [place], [address] [facility]  Phone [phone]; Fax [phone]  Mobile [phone]   Voice Mail [phone]    Primary care: Orion Zaidi MD       ATTENTION:   This medical record was transcribed using an electronic medical records/speech recognition system. Although proofread, it may and can contain electronic, spelling and other errors. Corrections may be executed at a later time. Please feel free to contact us for any clarifications as needed. ICD-10-CM ICD-9-CM   1. Coronary artery disease involving transplanted heart with unstable angina pectoris, unspecified vessel or lesion type ([facility])  I25.750 414.06     413.9   2. Atrial fibrillation, unspecified type ([address]  I48.91 427.31   3. SOB (shortness of breath)  R06.02 786.05            Clara Burnette is a 73 y.o. male with  referred for CAD status post stenting of the LAD          Cardiac risk factors: dyslipidemia, male gender, hypertension  I have personally obtained the history from the patient. He is doing well overall  HISTORY OF PRESENTING ILLNESS   He is doing well. No chest pain or shortness of breath. He did cardiac rehab. He wants to know if he can have his endoscopy and he can if they do not need to stop his Plavix. He cannot have his knee surgery for 6 to 9 months secondary to drug-eluting stent. He did have LAD lesion of 90% and a small diagonal which at 75% lesion we talked about that today.        ACTIVE PROBLEM LIST     Patient Active Problem List    Diagnosis Date Noted    CAD (coronary artery disease) 08/06/2021    Acute myocardial infarction, unspecified 11/09/2021    Non-ST elevation myocardial infarction (NSTEMI) 11/09/2021    ST elevation (STEMI) myocardial infarction of unspecified site 11/09/2021    Epigastric pain 08/17/2021    Abnormal CT of the abdomen 08/17/2021    Small bowel obstruction due to adhesions ([facility] [address] 08/16/2021    GERD (gastroesophageal reflux disease) 08/16/2021    HTN (hypertension), benign 08/16/2021    Duodenitis 08/16/2021    Hypokalemia 08/16/2021    H/O colonoscopy 07/07/2020    Primary osteoarthritis of right knee 11/14/2019    Pain 04/29/2019    Thoracic back pain 08/21/2013    Hypogonadism, male 08/21/2013    Abnormal cardiovascular stress test     Chronic back pain            PAST MEDICAL HISTORY     Past Medical History:   Diagnosis Date    Abnormal finding on EKG 2015    Infarct- ruled as a mis-read by cardiology after testing    Arthritis     CAD (coronary artery disease) 08/06/2021    PC/stent mid LAD     Chronic back pain     s/p surgery    GERD (gastroesophageal reflux disease)     H/O colonoscopy 7/7/2020 6/18 5 year repeat    Hyperlipidemia     Hypertension     Hypogonadism, male 8/21/2013    total 97, free 1.8  5/13/13    Panic attacks            PAST SURGICAL HISTORY     Past Surgical History:   Procedure Laterality Date    COLONOSCOPY N/A 6/8/2018    COLONOSCOPY performed by Dhaval Araujo MD at [facility] HX BLEPHAROPLASTY  2011   Prudy Lean COLONOSCOPY      Dr. Up Eis?    HX FRACTURE TX Right     clavicle repair    HX HEART CATHETERIZATION  2011?    normal    HX HEART CATHETERIZATION      stent placement 2021/8    HX KNEE ARTHROSCOPY Right 2014    HX KNEE ARTHROSCOPY Left 2015    x 5    HX KNEE ARTHROSCOPY Right 04/29/2019    HX KNEE REPLACEMENT Right     HX ORTHOPAEDIC  1990    right wrist    HX ORTHOPAEDIC Right 2015    ankle surgery    HX ROTATOR CUFF REPAIR Right 2017    HX ROTATOR CUFF REPAIR Left 2018    HX SHOULDER ARTHROSCOPY Right 2007    wire and screw          ALLERGIES     Allergies   Allergen Reactions    Codeine Rash     Over 40 years ago          FAMILY HISTORY     Family History   Problem Relation Age of Onset    Heart Disease Father 46        CABG x5    Coronary Art Dis Father         stents    Diabetes Sister     Heart Disease Sister         afib    Heart Failure Mother     Heart Disease Brother     No Known Problems Brother     Malignant Hyperthermia Neg Hx     negative for cardiac disease       SOCIAL HISTORY     Social History     Socioeconomic History    Marital status:      Spouse name: Carey New Number of children: 5    Highest education level: High school graduate   Occupational History    Occupation: Refrigeration     Employer: SELF EMPLOYED   Tobacco Use    Smoking status: Never Smoker    Smokeless tobacco: Never Used   Vaping Use    Vaping Use: Never used   Substance and Sexual Activity    Alcohol use: No     Alcohol/week: 0.0 standard drinks     Comment: Rare glass of wine    Drug use: Not Currently   Other Topics Concern     Service No    Caffeine Concern No     Comment: drinks 1-2 caffeinated beverages         MEDICATIONS     Current Outpatient Medications   Medication Sig    lisinopriL (PRINIVIL, ZESTRIL) 10 mg tablet Take 1 Tablet by mouth daily.  ticagrelor (BRILINTA) 90 mg tablet Take 1 Tablet by mouth two (2) times a day.  ALPRAZolam (XANAX) 0.5 mg tablet TAKE 1 TABLET BY MOUTH EVERY 8 HOURS AS NEEDED FOR ANXIETY    buprenorphine-naloxone (SUBOXONE) 8-2 mg film sublingaul film 1 Film by SubLINGual route daily.  atorvastatin (LIPITOR) 40 mg tablet Take 1 Tablet by mouth daily.  aspirin 81 mg chewable tablet Take 1 Tablet by mouth daily.  pantoprazole (PROTONIX) 40 mg tablet Take 1 Tab by mouth daily. (Patient taking differently: Take 40 mg by mouth every evening.)    testosterone cypionate (DEPOTESTOTERONE CYPIONATE) 200 mg/mL injection 80 mg by IntraMUSCular route every seven (7) days. No current facility-administered medications for this visit. I have reviewed the nurses notes, vitals, problem list, allergy list, medical history, family, social history and medications.        REVIEW OF SYMPTOMS   Pertinent positive per HPI  General: Pt denies excessive weight gain or loss. Pt is able to conduct ADL's  HEENT: Denies blurred vision, headaches, hearing loss, epistaxis and difficulty swallowing. Respiratory: Denies cough, congestion, shortness of breath, VERDE, wheezing or stridor. Cardiovascular: Denies precordial pain, palpitations, edema or PND  Gastrointestinal: Denies poor appetite, indigestion, abdominal pain or blood in stool  Genitourinary: Denies hematuria, dysuria, increased urinary frequency  Musculoskeletal: Denies joint pain or swelling from muscles or joints  Neurologic: Denies tremor, paresthesias, headache, or sensory motor disturbance  Psychiatric: Denies confusion, insomnia, depression  Integumentray: Denies rash, itching or ulcers. Hematologic: Denies easy bruising, bleeding     PHYSICAL EXAMINATION      There were no vitals filed for this visit. General: Well developed, in no acute distress. HEENT: No jaundice, oral mucosa moist, no oral ulcers  Neck: Supple, no stiffness, no lymphadenopathy, supple  Heart:  Normal S1/S2 negative S3 or S4. Regular, no murmur, gallop or rub, no jugular venous distention  Respiratory: Clear bilaterally x 4, no wheezing or rales  Extremities:  No edema, normal cap refill, no cyanosis. Musculoskeletal: No clubbing, no deformities  Neuro: A&Ox3, speech clear, gait stable, cooperative, no focal neurologic deficits  Skin: Skin color is normal. No rashes or lesions. Non diaphoretic, moist.           DIAGNOSTIC DATA     1. Lipids   8/6/21- , HDL 60, LDL 71.2, TG 89   10/19/21- , HDL 39, LDL 50, TG 71  12/14/21- , HDL 43, LDL 47, TG 66      2. Cardiac Cath   8/6/21- Critical bifurcation stenosis of mid LAD and D2   Mildly elevated LVEDP   Mid LAD stented with a 2.5X 23 mm Xience Elba GENARO, optimized with a 2 5 and 3.0 noncompliant balloon    3. Stress Test  5/11/16- Stress Echo- no ischemia, EF 55%, 10.7 mets    4. Echo  9/9/21-EF 57%, LAE, RVSP 24 mmHg, AO: Mild sinuses of Valsalva dilatation.  Diameter is 4.2 cm.         LABORATORY DATA            Lab Results   Component Value Date/Time    WBC 6.0 09/09/2021 09:01 AM    HGB 14.5 09/09/2021 09:01 AM    HCT 43.5 09/09/2021 09:01 AM    PLATELET 364 07/41/0636 09:01 AM    MCV 89.9 09/09/2021 09:01 AM      Lab Results   Component Value Date/Time    Sodium 140 09/09/2021 09:01 AM    Potassium 4.2 09/09/2021 09:01 AM    Chloride 107 09/09/2021 09:01 AM    CO2 31 09/09/2021 09:01 AM    Anion gap 2 (L) 09/09/2021 09:01 AM    Glucose 86 09/09/2021 09:01 AM    BUN 19 09/09/2021 09:01 AM    Creatinine 0.98 09/09/2021 09:01 AM    BUN/Creatinine ratio 19 09/09/2021 09:01 AM    GFR est AA >60 09/09/2021 09:01 AM    GFR est non-AA >60 09/09/2021 09:01 AM    Calcium 8.1 (L) 09/09/2021 09:01 AM    Bilirubin, total 0.7 12/14/2021 09:47 AM    Alk. phosphatase 62 12/14/2021 09:47 AM    Protein, total 6.5 12/14/2021 09:47 AM    Albumin 4.3 12/14/2021 09:47 AM    Globulin 2.6 09/09/2021 09:01 AM    A-G Ratio 1.4 09/09/2021 09:01 AM    ALT (SGPT) 13 12/14/2021 09:47 AM           ASSESSMENT/RECOMMENDATIONS:.      1. CAD status post stenting of the LAD for unstable angina  -Continue Plavix for at least 6 months  -Hold on any surgeries or endoscopy unless they can do the procedure on Plavix  left main okay, LAD diffuse disease at bifurcation with D2 90% focal stenosis, circumflex okay, RCA okay. Mid LAD was stented with a 2.5 x 23 mm Xience Elba GENARO deployed at 16 moiz and I believe postdilated with a 3 oh noncompliant balloon. 2. Hypertension  -BP is elevated I am unsure how he came off his lisinopril it was listed that he was not taking it in the hospital so we will resume the 20 mg lisinopril and will have a repeat blood pressure check in 6 to 8 weeks either in the office for virtually  3. Dyslipidemia LDL was 71  -LDL is excellent in the 50s but his HDL dropped significantly. Will repeat his labs in 2 months  4.   Question of atrial fibrillation on EKG I saw from [facility]  -His event loop monitor sure did not demonstrate any evidence of an arrhythmia    [address] [place].  with 5 kids. Enjoys golf. No orders of the defined types were placed in this encounter. We discussed the expected course, resolution and complications of the diagnosis(es) in detail. Medication risks, benefits, costs, interactions, and alternatives were discussed as indicated. I advised him to contact the office if his condition worsens, changes or fails to improve as anticipated. He expressed understanding with the diagnosis(es) and plan          Follow-up and Dispositions  ·   Return in about 6 months (around 6/27/2022). I have discussed the diagnosis with  Kasi Robles IV and the intended plan as seen in the above orders. Questions were answered concerning future plans. I have discussed medication side effects and warnings with the patient as well. Thank you,  Mark Daniels MD for involving me in the care of  Kasi Robles IV. Please do not hesitate to contact me for further questions/concerns. Berny Martinez MD, [facility] [address] [place] [address], [address] [place] [address] Drive      [phone] / [phone] Fax

## 2022-01-14 ENCOUNTER — TRANSCRIBE ORDER (OUTPATIENT)
Dept: SCHEDULING | Age: 67
End: 2022-01-14

## 2022-01-14 DIAGNOSIS — R10.13 ABDOMINAL PAIN, EPIGASTRIC: Primary | ICD-10-CM

## 2022-01-24 ENCOUNTER — HOSPITAL ENCOUNTER (OUTPATIENT)
Dept: NUCLEAR MEDICINE | Age: 67
Discharge: HOME | End: 2022-01-24
Attending: INTERNAL MEDICINE
Payer: MEDICARE

## 2022-01-24 DIAGNOSIS — R10.13 ABDOMINAL PAIN, EPIGASTRIC: ICD-10-CM

## 2022-01-24 PROCEDURE — 78226 HEPATOBILIARY SYSTEM IMAGING: CPT

## 2022-01-24 RX ORDER — KIT FOR THE PREPARATION OF TECHNETIUM TC 99M MEBROFENIN 45 MG/10ML
5.9 INJECTION, POWDER, LYOPHILIZED, FOR SOLUTION INTRAVENOUS
Status: COMPLETED | OUTPATIENT
Start: 2022-01-24 | End: 2022-01-24

## 2022-01-24 RX ADMIN — KIT FOR THE PREPARATION OF TECHNETIUM TC 99M MEBROFENIN 5.9 MILLICURIE: 45 INJECTION, POWDER, LYOPHILIZED, FOR SOLUTION INTRAVENOUS at 10:45

## 2022-02-07 RX ORDER — TADALAFIL 10 MG/1
TABLET ORAL
Qty: 8 TABLET | Refills: 0 | Status: SHIPPED | OUTPATIENT
Start: 2022-02-07 | End: 2022-06-16

## 2022-03-04 DIAGNOSIS — F41.9 ANXIETY: ICD-10-CM

## 2022-03-06 RX ORDER — ALPRAZOLAM 0.5 MG/1
TABLET ORAL
Qty: 30 TABLET | Refills: 0 | Status: SHIPPED | OUTPATIENT
Start: 2022-03-06 | End: 2022-05-24 | Stop reason: SDUPTHER

## 2022-03-07 NOTE — TELEPHONE ENCOUNTER
Edevate message sent: It is time to schedule a medication follow up appointment. Please respond to this message with availability or contact the office at [phone] so we can assist you in scheduling the appointment.

## 2022-03-18 PROBLEM — I21.4 NON-ST ELEVATION MYOCARDIAL INFARCTION (NSTEMI) (HCC): Status: ACTIVE | Noted: 2021-11-09

## 2022-03-18 PROBLEM — K29.80 DUODENITIS: Status: ACTIVE | Noted: 2021-08-16

## 2022-03-18 PROBLEM — K56.50 SMALL BOWEL OBSTRUCTION DUE TO ADHESIONS (HCC): Status: ACTIVE | Noted: 2021-08-16

## 2022-03-19 PROBLEM — E87.6 HYPOKALEMIA: Status: ACTIVE | Noted: 2021-08-16

## 2022-03-19 PROBLEM — K21.9 GERD (GASTROESOPHAGEAL REFLUX DISEASE): Status: ACTIVE | Noted: 2021-08-16

## 2022-03-19 PROBLEM — R10.13 EPIGASTRIC PAIN: Status: ACTIVE | Noted: 2021-08-17

## 2022-03-19 PROBLEM — I21.9 ACUTE MYOCARDIAL INFARCTION, UNSPECIFIED (HCC): Status: ACTIVE | Noted: 2021-11-09

## 2022-03-19 PROBLEM — I10 HTN (HYPERTENSION), BENIGN: Status: ACTIVE | Noted: 2021-08-16

## 2022-03-19 PROBLEM — Z98.890 H/O COLONOSCOPY: Status: ACTIVE | Noted: 2020-07-07

## 2022-03-19 PROBLEM — R93.5 ABNORMAL CT OF THE ABDOMEN: Status: ACTIVE | Noted: 2021-08-17

## 2022-03-19 PROBLEM — I21.3 ST ELEVATION (STEMI) MYOCARDIAL INFARCTION OF UNSPECIFIED SITE (HCC): Status: ACTIVE | Noted: 2021-11-09

## 2022-03-19 PROBLEM — R52 PAIN: Status: ACTIVE | Noted: 2019-04-29

## 2022-03-19 PROBLEM — M17.11 PRIMARY OSTEOARTHRITIS OF RIGHT KNEE: Status: ACTIVE | Noted: 2019-11-14

## 2022-03-19 PROBLEM — I25.10 CAD (CORONARY ARTERY DISEASE): Status: ACTIVE | Noted: 2021-08-06

## 2022-04-14 ENCOUNTER — OFFICE VISIT (OUTPATIENT)
Dept: INTERNAL MEDICINE CLINIC | Age: 67
End: 2022-04-14
Payer: MEDICARE

## 2022-04-14 VITALS
DIASTOLIC BLOOD PRESSURE: 66 MMHG | OXYGEN SATURATION: 96 % | RESPIRATION RATE: 16 BRPM | HEART RATE: 77 BPM | WEIGHT: 161.8 LBS | HEIGHT: 74 IN | BODY MASS INDEX: 20.76 KG/M2 | SYSTOLIC BLOOD PRESSURE: 118 MMHG

## 2022-04-14 DIAGNOSIS — D17.1 LIPOMA OF TORSO: ICD-10-CM

## 2022-04-14 DIAGNOSIS — G89.29 CHRONIC LEFT SHOULDER PAIN: Primary | ICD-10-CM

## 2022-04-14 DIAGNOSIS — I48.91 ATRIAL FIBRILLATION, UNSPECIFIED TYPE (HCC): ICD-10-CM

## 2022-04-14 DIAGNOSIS — K44.9 HIATAL HERNIA: ICD-10-CM

## 2022-04-14 DIAGNOSIS — I25.10 CORONARY ARTERY DISEASE INVOLVING NATIVE CORONARY ARTERY OF NATIVE HEART WITHOUT ANGINA PECTORIS: ICD-10-CM

## 2022-04-14 DIAGNOSIS — M25.512 CHRONIC LEFT SHOULDER PAIN: Primary | ICD-10-CM

## 2022-04-14 PROCEDURE — 1101F PT FALLS ASSESS-DOCD LE1/YR: CPT | Performed by: INTERNAL MEDICINE

## 2022-04-14 PROCEDURE — G0463 HOSPITAL OUTPT CLINIC VISIT: HCPCS | Performed by: INTERNAL MEDICINE

## 2022-04-14 PROCEDURE — 99214 OFFICE O/P EST MOD 30 MIN: CPT | Performed by: INTERNAL MEDICINE

## 2022-04-14 PROCEDURE — G8754 DIAS BP LESS 90: HCPCS | Performed by: INTERNAL MEDICINE

## 2022-04-14 PROCEDURE — G8432 DEP SCR NOT DOC, RNG: HCPCS | Performed by: INTERNAL MEDICINE

## 2022-04-14 PROCEDURE — G8536 NO DOC ELDER MAL SCRN: HCPCS | Performed by: INTERNAL MEDICINE

## 2022-04-14 PROCEDURE — G8752 SYS BP LESS 140: HCPCS | Performed by: INTERNAL MEDICINE

## 2022-04-14 PROCEDURE — G8420 CALC BMI NORM PARAMETERS: HCPCS | Performed by: INTERNAL MEDICINE

## 2022-04-14 PROCEDURE — G8427 DOCREV CUR MEDS BY ELIG CLIN: HCPCS | Performed by: INTERNAL MEDICINE

## 2022-04-14 PROCEDURE — 3017F COLORECTAL CA SCREEN DOC REV: CPT | Performed by: INTERNAL MEDICINE

## 2022-04-14 RX ORDER — NORTRIPTYLINE HYDROCHLORIDE 10 MG/1
10 CAPSULE ORAL
Qty: 30 CAPSULE | Refills: 1 | Status: SHIPPED | OUTPATIENT
Start: 2022-04-14 | End: 2022-06-12

## 2022-04-14 RX ORDER — CLOPIDOGREL BISULFATE 75 MG/1
TABLET ORAL DAILY
COMMUNITY
End: 2022-07-05 | Stop reason: SDUPTHER

## 2022-04-14 NOTE — PROGRESS NOTES
HISTORY OF PRESENT ILLNESS  Jason Marrero is a 73 y.o. male. HPI  Since our last visit he did have a stent placed in August, apparently 90% blockage. He was originally on aspirin and Brilinta, but because of cost is now on Plavix. Came off the aspirin on his own and I suggested resuming this and following up with cardiology to discuss the length of need for dual agents. Comes in today to discuss sharp pain behind left shoulder blade, present for over a year, not worse with eating, not affected by activity. No odynophagia. Does sometimes keep him from sleeping. Describes it as sharp, knife-like and at times burning in a very focal area behind left shoulder blade. Again, predates any of the cardiac evaluation and cardiac cath. No rash seen. Review of Systems   Constitutional: Negative for chills, fever and weight loss. Respiratory: Negative for cough, shortness of breath and wheezing. Cardiovascular: Negative for chest pain, palpitations, orthopnea, leg swelling and PND. Gastrointestinal: Negative for abdominal pain, diarrhea, heartburn, nausea and vomiting. Musculoskeletal: Positive for back pain. Negative for falls and myalgias. Neurological: Negative for dizziness, sensory change and headaches. Psychiatric/Behavioral: The patient is nervous/anxious and has insomnia. Physical Exam  Vitals and nursing note reviewed. Constitutional:       Appearance: He is well-developed. HENT:      Head: Normocephalic and atraumatic. Neck:      Thyroid: No thyromegaly. Vascular: No carotid bruit. Cardiovascular:      Rate and Rhythm: Normal rate and regular rhythm. Heart sounds: Normal heart sounds. Pulmonary:      Effort: Pulmonary effort is normal. No respiratory distress. Breath sounds: Normal breath sounds. No wheezing or rales. Musculoskeletal:      Cervical back: Normal range of motion and neck supple. Right lower leg: No edema. Left lower leg: No edema. Comments: Focal pain just behind left shoulder blade and in same area palpable walnut sized soft tissue mass cw lipoma   Skin:     Findings: No rash. Neurological:      Mental Status: He is alert and oriented to person, place, and time. Psychiatric:         Behavior: Behavior normal.         ASSESSMENT and PLAN  Diagnoses and all orders for this visit:    1. Chronic left shoulder pain-focal and sounds like a neuralgia from description -present for over 1 year  Not impacted by activity  ? Nerve compression from the lipoma  Trial of med sofi tin 2 mo if not improving consider surgery eval for excision of lipoma  -     nortriptyline (PAMELOR) 10 mg capsule; Take 1 Capsule by mouth nightly. 2. Atrial fibrillation, unspecified type ([address]    3. Hiatal hernia    4. Coronary artery disease involving native coronary artery of native heart without angina pectoris=resume the asa with plavix due to stent  See cardiology    5.  Lipoma of torso

## 2022-04-22 DIAGNOSIS — K21.9 GERD WITHOUT ESOPHAGITIS: ICD-10-CM

## 2022-04-22 RX ORDER — PANTOPRAZOLE SODIUM 40 MG/1
TABLET, DELAYED RELEASE ORAL
Qty: 30 TABLET | Refills: 3 | Status: SHIPPED | OUTPATIENT
Start: 2022-04-22 | End: 2022-08-29

## 2022-04-22 RX ORDER — LISINOPRIL 10 MG/1
TABLET ORAL
Qty: 30 TABLET | Refills: 1 | Status: SHIPPED | OUTPATIENT
Start: 2022-04-22 | End: 2022-06-29

## 2022-05-23 ENCOUNTER — APPOINTMENT (OUTPATIENT)
Dept: CT IMAGING | Age: 67
End: 2022-05-23
Attending: EMERGENCY MEDICINE
Payer: MEDICARE

## 2022-05-23 ENCOUNTER — HOSPITAL ENCOUNTER (EMERGENCY)
Age: 67
Discharge: HOME | End: 2022-05-23
Attending: EMERGENCY MEDICINE
Payer: MEDICARE

## 2022-05-23 VITALS
HEART RATE: 60 BPM | DIASTOLIC BLOOD PRESSURE: 81 MMHG | RESPIRATION RATE: 14 BRPM | BODY MASS INDEX: 21.46 KG/M2 | WEIGHT: 164.9 LBS | SYSTOLIC BLOOD PRESSURE: 103 MMHG | OXYGEN SATURATION: 98 % | TEMPERATURE: 98 F

## 2022-05-23 DIAGNOSIS — R20.0 LEFT SIDED NUMBNESS: Primary | ICD-10-CM

## 2022-05-23 LAB
ALBUMIN SERPL-MCNC: 3.9 G/DL (ref 3.5–5)
ALBUMIN/GLOB SERPL: 1.3 {RATIO} (ref 1.1–2.2)
ALP SERPL-CCNC: 70 U/L (ref 45–117)
ALT SERPL-CCNC: 25 U/L (ref 12–78)
ANION GAP SERPL CALC-SCNC: 6 MMOL/L (ref 5–15)
APTT PPP: 23.8 SEC (ref 22.1–31)
AST SERPL-CCNC: 19 U/L (ref 15–37)
BASOPHILS # BLD: 0 K/UL (ref 0–0.1)
BASOPHILS NFR BLD: 0 % (ref 0–1)
BILIRUB SERPL-MCNC: 0.6 MG/DL (ref 0.2–1)
BUN SERPL-MCNC: 17 MG/DL (ref 6–20)
BUN/CREAT SERPL: 17 (ref 12–20)
CALCIUM SERPL-MCNC: 8.6 MG/DL (ref 8.5–10.1)
CHLORIDE SERPL-SCNC: 103 MMOL/L (ref 97–108)
CO2 SERPL-SCNC: 29 MMOL/L (ref 21–32)
CREAT SERPL-MCNC: 1.03 MG/DL (ref 0.7–1.3)
DIFFERENTIAL METHOD BLD: ABNORMAL
EOSINOPHIL # BLD: 0.2 K/UL (ref 0–0.4)
EOSINOPHIL NFR BLD: 2 % (ref 0–7)
ERYTHROCYTE [DISTWIDTH] IN BLOOD BY AUTOMATED COUNT: 12.6 % (ref 11.5–14.5)
GLOBULIN SER CALC-MCNC: 2.9 G/DL (ref 2–4)
GLUCOSE BLD STRIP.AUTO-MCNC: 89 MG/DL (ref 65–117)
GLUCOSE SERPL-MCNC: 106 MG/DL (ref 65–100)
HCT VFR BLD AUTO: 40.9 % (ref 36.6–50.3)
HGB BLD-MCNC: 13.4 G/DL (ref 12.1–17)
IMM GRANULOCYTES # BLD AUTO: 0.1 K/UL (ref 0–0.04)
IMM GRANULOCYTES NFR BLD AUTO: 1 % (ref 0–0.5)
INR PPP: 1 (ref 0.9–1.1)
LYMPHOCYTES # BLD: 1.2 K/UL (ref 0.8–3.5)
LYMPHOCYTES NFR BLD: 20 % (ref 12–49)
MCH RBC QN AUTO: 29.9 PG (ref 26–34)
MCHC RBC AUTO-ENTMCNC: 32.8 G/DL (ref 30–36.5)
MCV RBC AUTO: 91.3 FL (ref 80–99)
MONOCYTES # BLD: 0.5 K/UL (ref 0–1)
MONOCYTES NFR BLD: 8 % (ref 5–13)
NEUTS SEG # BLD: 4.2 K/UL (ref 1.8–8)
NEUTS SEG NFR BLD: 69 % (ref 32–75)
NRBC # BLD: 0 K/UL (ref 0–0.01)
NRBC BLD-RTO: 0 PER 100 WBC
PLATELET # BLD AUTO: 166 K/UL (ref 150–400)
PMV BLD AUTO: 9.7 FL (ref 8.9–12.9)
POTASSIUM SERPL-SCNC: 3.9 MMOL/L (ref 3.5–5.1)
PROT SERPL-MCNC: 6.8 G/DL (ref 6.4–8.2)
PROTHROMBIN TIME: 10.3 SEC (ref 9–11.1)
RBC # BLD AUTO: 4.48 M/UL (ref 4.1–5.7)
SERVICE CMNT-IMP: NORMAL
SODIUM SERPL-SCNC: 138 MMOL/L (ref 136–145)
THERAPEUTIC RANGE,PTTT: NORMAL SECS (ref 58–77)
TROPONIN-HIGH SENSITIVITY: 5 NG/L (ref 0–76)
WBC # BLD AUTO: 6.2 K/UL (ref 4.1–11.1)

## 2022-05-23 PROCEDURE — 85730 THROMBOPLASTIN TIME PARTIAL: CPT

## 2022-05-23 PROCEDURE — 84484 ASSAY OF TROPONIN QUANT: CPT

## 2022-05-23 PROCEDURE — 85610 PROTHROMBIN TIME: CPT

## 2022-05-23 PROCEDURE — 80053 COMPREHEN METABOLIC PANEL: CPT

## 2022-05-23 PROCEDURE — 36415 COLL VENOUS BLD VENIPUNCTURE: CPT

## 2022-05-23 PROCEDURE — 70450 CT HEAD/BRAIN W/O DYE: CPT

## 2022-05-23 PROCEDURE — 85025 COMPLETE CBC W/AUTO DIFF WBC: CPT

## 2022-05-23 PROCEDURE — 93005 ELECTROCARDIOGRAM TRACING: CPT

## 2022-05-23 PROCEDURE — 82962 GLUCOSE BLOOD TEST: CPT

## 2022-05-23 PROCEDURE — 74011000636 HC RX REV CODE- 636: Performed by: EMERGENCY MEDICINE

## 2022-05-23 PROCEDURE — 74011250636 HC RX REV CODE- 250/636: Performed by: EMERGENCY MEDICINE

## 2022-05-23 PROCEDURE — 99285 EMERGENCY DEPT VISIT HI MDM: CPT

## 2022-05-23 PROCEDURE — 70496 CT ANGIOGRAPHY HEAD: CPT

## 2022-05-23 PROCEDURE — 74011250637 HC RX REV CODE- 250/637: Performed by: EMERGENCY MEDICINE

## 2022-05-23 RX ORDER — CLOPIDOGREL BISULFATE 75 MG/1
75 TABLET ORAL
Status: COMPLETED | OUTPATIENT
Start: 2022-05-23 | End: 2022-05-23

## 2022-05-23 RX ORDER — SODIUM CHLORIDE 0.9 % (FLUSH) 0.9 %
5-40 SYRINGE (ML) INJECTION AS NEEDED
Status: DISCONTINUED | OUTPATIENT
Start: 2022-05-23 | End: 2022-05-23 | Stop reason: HOSPADM

## 2022-05-23 RX ORDER — GUAIFENESIN 100 MG/5ML
81 LIQUID (ML) ORAL
Status: DISCONTINUED | OUTPATIENT
Start: 2022-05-23 | End: 2022-05-23

## 2022-05-23 RX ORDER — SODIUM CHLORIDE 0.9 % (FLUSH) 0.9 %
5-40 SYRINGE (ML) INJECTION EVERY 8 HOURS
Status: DISCONTINUED | OUTPATIENT
Start: 2022-05-23 | End: 2022-05-23 | Stop reason: HOSPADM

## 2022-05-23 RX ADMIN — IOPAMIDOL 100 ML: 755 INJECTION, SOLUTION INTRAVENOUS at 16:29

## 2022-05-23 RX ADMIN — SODIUM CHLORIDE 1000 ML: 9 INJECTION, SOLUTION INTRAVENOUS at 16:31

## 2022-05-23 RX ADMIN — CLOPIDOGREL 75 MG: 75 TABLET, FILM COATED ORAL at 16:30

## 2022-05-23 NOTE — DISCHARGE INSTRUCTIONS
You are seen in the emergency department today for new onset left-sided numbness. We did a head CT on you with and without contrast to evaluate for stroke. Your symptoms resolved while you were here. Your lab work otherwise was normal.  Neurology recommended you stay in the hospital for further evaluation but you declined at this time. Please continue to take your aspirin and Plavix and follow-up with your primary care doctor. If you have any worsening or recurrent symptoms, please return to the ER immediately for reevaluation.

## 2022-05-23 NOTE — ED TRIAGE NOTES
Pt arrives with L arm and leg numbness that started at approx 1:45 pm today. Pt taken straight to CT scan at this time. Level 1 code S initiated.

## 2022-05-23 NOTE — ED PROVIDER NOTES
Date: 5/23/2022  Patient Name: Roxy Amado IV    History of Presenting Illness     Chief Complaint   Patient presents with    Numbness       History Provided By: Patient  HPI: Daphne Vásquez, 73 y.o. male presents to the ED with cc of new onset left-sided numbness. Patient states that he was driving up to [address] for work when he started having numbness that began in his left arm and then started going down his left leg. He denied any weakness, vision changes or headache. The symptoms started at 1:45 in the afternoon. It seemed to wax and wane in nature. He said that he had a \"chilled feeling\" in his left leg. He had a stent placed in his heart 1 year ago and became concerned that he was having another cardiac episode. However he denies any chest pain or shortness of breath. He is already on Plavix and took 3 aspirin prior to arrival.  He has had no other recent changes to his medications. There are no other complaints, changes, or physical findings at this time. PCP: Rich Mojica MD    No current facility-administered medications on file prior to encounter. Current Outpatient Medications on File Prior to Encounter   Medication Sig Dispense Refill    pantoprazole (PROTONIX) 40 mg tablet TAKE 1 TABLET BY MOUTH EVERY DAY 30 Tablet 3    aspirin 81 mg chewable tablet Take 1 Tablet by mouth daily. 30 Tablet 5    lisinopriL (PRINIVIL, ZESTRIL) 10 mg tablet TAKE 1 TABLET BY MOUTH EVERY DAY 30 Tablet 1    clopidogrel bisulfate (PLAVIX PO) Take  by mouth daily.  nortriptyline (PAMELOR) 10 mg capsule Take 1 Capsule by mouth nightly. 30 Capsule 1    ALPRAZolam (XANAX) 0.5 mg tablet TAKE 1 TABLET BY MOUTH EVERY 8 HOURS AS NEEDED FOR ANXIETY 30 Tablet 0    tadalafiL (CIALIS) 10 mg tablet TAKE ONE TABLET BY MOUTH DAILY AS NEEDED 8 Tablet 0    buprenorphine-naloxone (SUBOXONE) 8-2 mg film sublingaul film 1 Film by SubLINGual route daily.       atorvastatin (LIPITOR) 40 mg tablet Take 1 Tablet by mouth daily. 30 Tablet 5    testosterone cypionate (DEPOTESTOTERONE CYPIONATE) 200 mg/mL injection 80 mg by IntraMUSCular route every seven (7) days.          Past History     Past Medical History:  Past Medical History:   Diagnosis Date    Abnormal finding on EKG 2015    Infarct- ruled as a mis-read by cardiology after testing    Arthritis     CAD (coronary artery disease) 08/06/2021    PC/stent mid LAD     Chronic back pain     s/p surgery    GERD (gastroesophageal reflux disease)     H/O colonoscopy 7/7/2020 6/18 5 year repeat    Hiatal hernia 4/14/2022    egd dr richter 2021    Hyperlipidemia     Hypertension     Hypogonadism, male 8/21/2013    total 97, free 1.8  5/13/13    Panic attacks        Past Surgical History:  Past Surgical History:   Procedure Laterality Date    COLONOSCOPY N/A 6/8/2018    COLONOSCOPY performed by Bret Chen MD at [facility] HX BLEPHAROPLASTY  2011    HX COLONOSCOPY      Dr. Alissa Jaquez?    HX FRACTURE TX Right     clavicle repair    HX HEART CATHETERIZATION  2011?    normal    HX HEART CATHETERIZATION      stent placement 2021/8    HX KNEE ARTHROSCOPY Right 2014    HX KNEE ARTHROSCOPY Left 2015    x 5    HX KNEE ARTHROSCOPY Right 04/29/2019    HX KNEE REPLACEMENT Right     HX ORTHOPAEDIC  1990    right wrist    HX ORTHOPAEDIC Right 2015    ankle surgery    HX ROTATOR CUFF REPAIR Right 2017    HX ROTATOR CUFF REPAIR Left 2018    HX SHOULDER ARTHROSCOPY Right 2007    wire and screw       Family History:  Family History   Problem Relation Age of Onset    Heart Disease Father 46        CABG x5    Coronary Art Dis Father         stents    Diabetes Sister     Heart Disease Sister         afib    Heart Failure Mother     Heart Disease Brother     No Known Problems Brother     Malignant Hyperthermia Neg Hx        Social History:  Social History     Tobacco Use    Smoking status: Never Smoker    Smokeless tobacco: Never Used   Vaping Use    Vaping Use: Never used   Substance Use Topics    Alcohol use: No     Alcohol/week: 0.0 standard drinks     Comment: Rare glass of wine    Drug use: Not Currently       Allergies: Allergies   Allergen Reactions    Codeine Rash     Over 40 years ago         Review of Systems   Review of Systems   All other systems reviewed and are negative. Physical Exam   Physical Exam  Vitals and nursing note reviewed. Constitutional:       General: He is not in acute distress. Appearance: Normal appearance. He is well-developed. HENT:      Head: Normocephalic and atraumatic. Eyes:      Extraocular Movements: Extraocular movements intact. Conjunctiva/sclera: Conjunctivae normal.   Neck:      Vascular: No JVD. Trachea: No tracheal deviation. Cardiovascular:      Rate and Rhythm: Normal rate and regular rhythm. Heart sounds: No murmur heard. No friction rub. No gallop. Pulmonary:      Effort: Pulmonary effort is normal. No respiratory distress. Breath sounds: Normal breath sounds. No stridor. No wheezing. Abdominal:      General: Bowel sounds are normal. There is no distension. Palpations: Abdomen is soft. There is no mass. Tenderness: There is no abdominal tenderness. There is no guarding. Musculoskeletal:         General: No tenderness. Normal range of motion. Cervical back: Neck supple. Comments: No deformity   Skin:     General: Skin is warm and dry. Findings: No rash. Neurological:      General: No focal deficit present. Mental Status: He is alert and oriented to person, place, and time. Cranial Nerves: No cranial nerve deficit. Sensory: No sensory deficit. Motor: No weakness. Coordination: Coordination normal.      Comments: No focal deficits. No pronator drift, no facial droop, no slurred speech. 5 out of 5 muscle strength his bilateral upper and lower extremities, sensation is intact bilaterally.    Psychiatric:         Behavior: Behavior normal.         Thought Content: Thought content normal.         Judgment: Judgment normal.         Diagnostic Study Results     Labs -     Recent Results (from the past 72 hour(s))   GLUCOSE, POC    Collection Time: 05/23/22  2:49 PM   Result Value Ref Range    Glucose (POC) 89 65 - 117 mg/dL    Performed by Nidia Tristan    METABOLIC PANEL, COMPREHENSIVE    Collection Time: 05/23/22  2:57 PM   Result Value Ref Range    Sodium 138 136 - 145 mmol/L    Potassium 3.9 3.5 - 5.1 mmol/L    Chloride 103 97 - 108 mmol/L    CO2 29 21 - 32 mmol/L    Anion gap 6 5 - 15 mmol/L    Glucose 106 (H) 65 - 100 mg/dL    BUN 17 6 - 20 MG/DL    Creatinine 1.03 0.70 - 1.30 MG/DL    BUN/Creatinine ratio 17 12 - 20      GFR est AA >60 >60 ml/min/1.73m2    GFR est non-AA >60 >60 ml/min/1.73m2    Calcium 8.6 8.5 - 10.1 MG/DL    Bilirubin, total 0.6 0.2 - 1.0 MG/DL    ALT (SGPT) 25 12 - 78 U/L    AST (SGOT) 19 15 - 37 U/L    Alk. phosphatase 70 45 - 117 U/L    Protein, total 6.8 6.4 - 8.2 g/dL    Albumin 3.9 3.5 - 5.0 g/dL    Globulin 2.9 2.0 - 4.0 g/dL    A-G Ratio 1.3 1.1 - 2.2     CBC WITH AUTOMATED DIFF    Collection Time: 05/23/22  2:57 PM   Result Value Ref Range    WBC 6.2 4.1 - 11.1 K/uL    RBC 4.48 4.10 - 5.70 M/uL    HGB 13.4 12.1 - 17.0 g/dL    HCT 40.9 36.6 - 50.3 %    MCV 91.3 80.0 - 99.0 FL    MCH 29.9 26.0 - 34.0 PG    MCHC 32.8 30.0 - 36.5 g/dL    RDW 12.6 11.5 - 14.5 %    PLATELET 182 352 - 455 K/uL    MPV 9.7 8.9 - 12.9 FL    NRBC 0.0 0  WBC    ABSOLUTE NRBC 0.00 0.00 - 0.01 K/uL    NEUTROPHILS 69 32 - 75 %    LYMPHOCYTES 20 12 - 49 %    MONOCYTES 8 5 - 13 %    EOSINOPHILS 2 0 - 7 %    BASOPHILS 0 0 - 1 %    IMMATURE GRANULOCYTES 1 (H) 0.0 - 0.5 %    ABS. NEUTROPHILS 4.2 1.8 - 8.0 K/UL    ABS. LYMPHOCYTES 1.2 0.8 - 3.5 K/UL    ABS. MONOCYTES 0.5 0.0 - 1.0 K/UL    ABS. EOSINOPHILS 0.2 0.0 - 0.4 K/UL    ABS. BASOPHILS 0.0 0.0 - 0.1 K/UL    ABS. IMM.  GRANS. 0.1 (H) 0.00 - 0.04 K/UL    DF AUTOMATED     PROTHROMBIN TIME + INR    Collection Time: 05/23/22  2:57 PM   Result Value Ref Range    INR 1.0 0.9 - 1.1      Prothrombin time 10.3 9.0 - 11.1 sec   PTT    Collection Time: 05/23/22  2:57 PM   Result Value Ref Range    aPTT 23.8 22.1 - 31.0 sec    aPTT, therapeutic range     58.0 - 77.0 SECS   TROPONIN-HIGH SENSITIVITY    Collection Time: 05/23/22  2:57 PM   Result Value Ref Range    Troponin-High Sensitivity 5 0 - 76 ng/L   EKG, 12 LEAD, INITIAL    Collection Time: 05/23/22  3:02 PM   Result Value Ref Range    Ventricular Rate 69 BPM    Atrial Rate 69 BPM    P-R Interval 160 ms    QRS Duration 96 ms    Q-T Interval 378 ms    QTC Calculation (Bezet) 405 ms    Calculated P Axis 64 degrees    Calculated R Axis 50 degrees    Calculated T Axis 48 degrees    Diagnosis       Normal sinus rhythm  Normal ECG  When compared with ECG of 09-SEP-2021 09:11,  No significant change was found         Radiologic Studies -   CTA CODE NEURO HEAD AND NECK W CONT         CT CODE NEURO HEAD WO CONTRAST   Final Result   No acute intracranial abnormality. Mild age-related change. CT Results  (Last 48 hours)               05/23/22 1630  CTA CODE NEURO HEAD AND NECK W CONT Preliminary result    Narrative:          **PRELIMINARY REPORT       Exam: CTA head and neck. INDICATION: Stroke. Preliminary findings: No large vessel occlusion, aneurysm, dissection or   flow-limiting stenosis. Preliminary report was provided by Dr. Tabby Khan, the on-call radiologist, at   1729 hours       Final report to follow. **END PRELIMINARY REPORT**                               05/23/22 1448  CT CODE NEURO HEAD WO CONTRAST Final result    Impression:  No acute intracranial abnormality. Mild age-related change. Narrative:  INDICATION:  stroke       EXAM: CT HEAD WITHOUT CONTRAST. COMPARISON: None.        PROCEDURE: Sequential axial images of the head were performed without   intravenous contrast. Soft tissue and bone windows were examined. Images were   reformatted in the sagittal and coronal planes. .CT dose reduction was achieved   through use of a standardized protocol tailored for this examination and   automatic exposure control for dose modulation. FINDINGS: Generalized prominence of cerebral sulci and ventricles is mildly   increased but upper normal for age. Periventricular white matter low-density is   mild and diffuse. The brain parenchyma and ventricular system are otherwise   unremarkable in appearance for age. There is no parenchymal mass or hemorrhage   and no shift of midline structures or extra-axial collection. No obvious acute   ischemia. No bony abnormality. CXR Results  (Last 48 hours)    None          Medical Decision Making   I am the first provider for this patient. I reviewed the vital signs, available nursing notes, past medical history, past surgical history, family history and social history. Vital Signs-Reviewed the patient's vital signs. Patient Vitals for the past 12 hrs:   Temp Pulse Resp BP SpO2   05/23/22 1815  (!) 56 12 103/81 99 %   05/23/22 1808  64 14 124/71 98 %   05/23/22 1733  (!) 51 11 115/67 99 %   05/23/22 1633  (!) 58  126/60 100 %   05/23/22 1625  64 13 133/65 100 %   05/23/22 1505 98 °F (36.7 °C) 70 15 138/87 100 %   05/23/22 1458  76 17  100 %   05/23/22 1457  72 13  100 %   05/23/22 1456  69 20  99 %   05/23/22 1455  76 16  100 %       EKG interpretation: (Preliminary)  EKG interpreted by me. Shows a normal sinus rhythm with a heart rate of 69. No ST elevations or depressions concerning for ischemia. Normal intervals. Records Reviewed: Nursing Notes and Old Medical Records    Provider Notes (Medical Decision Making):   Patient presenting to the emergency department with new onset left-sided numbness that began at 145 this afternoon. Level One Code S was called and patient was taken to CT. Labs and EKG done. Neurology consulted.     ED Course:   Initial assessment performed. The patients presenting problems have been discussed, and they are in agreement with the care plan formulated and outlined with them. I have encouraged them to ask questions as they arise throughout their visit. Progress Note:  Teleneurology recommended that the patient be given 75 mg of Plavix on top of his home Plavix. He also recommended CTA head and neck, to ensure the patient got 325 of aspirin and to admit for stroke work-up. Patient is not a tPA candidate. 6:20 pm    Updated patient on his lab and imaging results. CT and CTA of the head and neck were within normal limits. Patient states his symptoms have completely resolved. He does not want to stay in the hospital for admission. Discussed the risks and benefits of staying and patient would not like to stay. He understands 80s any worsening or recurrent symptoms to return to the ER for reevaluation. He will follow-up with his primary care doctor. Disposition:  DC- Adult Discharges: All of the diagnostic tests were reviewed and questions answered. Diagnosis, care plan and treatment options were discussed. The patient understands the instructions and will follow up as directed. The patients results have been reviewed with them. They have been counseled regarding their diagnosis. The patient verbally convey understanding and agreement of the signs, symptoms, diagnosis, treatment and prognosis and additionally agrees to follow up as recommended with their PCP in 24 - 48 hours. They also agree with the care-plan and convey that all of their questions have been answered. I have also put together some discharge instructions for them that include: 1) educational information regarding their diagnosis, 2) how to care for their diagnosis at home, as well a 3) list of reasons why they would want to return to the ED prior to their follow-up appointment, should their condition change.       DISCHARGE PLAN:  1. Discharge Medication List as of 5/23/2022  6:25 PM        2. Follow-up Information     Follow up With Specialties Details Why Contact Info    Desmond Snyder MD Internal Medicine Physician Schedule an appointment as soon as possible for a visit   [address]  [place] [id number]  [phone]          3. Return to ED if worse     Diagnosis     Clinical Impression:   1. Left sided numbness        Attestations:    Donnell Blood, DO    Please note that this dictation was completed with [employer]APQU[employer], the computer voice recognition software. Quite often unanticipated grammatical, syntax, homophones, and other interpretive errors are inadvertently transcribed by the computer software. Please disregard these errors. Please excuse any errors that have escaped final proofreading. Thank you.

## 2022-05-24 ENCOUNTER — PATIENT MESSAGE (OUTPATIENT)
Dept: INTERNAL MEDICINE CLINIC | Age: 67
End: 2022-05-24

## 2022-05-24 DIAGNOSIS — F41.9 ANXIETY: ICD-10-CM

## 2022-05-24 LAB
ATRIAL RATE: 69 BPM
CALCULATED P AXIS, ECG09: 64 DEGREES
CALCULATED R AXIS, ECG10: 50 DEGREES
CALCULATED T AXIS, ECG11: 48 DEGREES
DIAGNOSIS, 93000: NORMAL
P-R INTERVAL, ECG05: 160 MS
Q-T INTERVAL, ECG07: 378 MS
QRS DURATION, ECG06: 96 MS
QTC CALCULATION (BEZET), ECG08: 405 MS
VENTRICULAR RATE, ECG03: 69 BPM

## 2022-05-24 RX ORDER — ALPRAZOLAM 0.5 MG/1
0.5 TABLET ORAL
Qty: 30 TABLET | Refills: 0 | Status: SHIPPED | OUTPATIENT
Start: 2022-05-24 | End: 2022-08-31

## 2022-05-24 NOTE — TELEPHONE ENCOUNTER
From: Enrrique Ledesma IV  To: Cesar Cowan MD  Sent: 5/24/2022 12:56 PM EDT  Subject: refill    will you please send a refill for alprazolam to [facility] pharmacy.  Thank you

## 2022-06-03 NOTE — CARDIO/PULMONARY
[address] Cardiac Rehab- Discharge Note  Bryce Ahr IV  73 y.o. With diagnosis of PCI attended phase II cardiac rehab for 8 exercise sessions from 9/2/21 and 10/19/21. Pt did not schedule further participation sessions after the 10/19/21 date. Pt's insurance driven discharge date was 5/12/2022.     Pt is discharged from [address] [facility], RN  6/3/2022

## 2022-06-08 RX ORDER — ATORVASTATIN CALCIUM 40 MG/1
TABLET, FILM COATED ORAL
Qty: 90 TABLET | Refills: 1 | Status: SHIPPED | OUTPATIENT
Start: 2022-06-08

## 2022-06-08 NOTE — TELEPHONE ENCOUNTER
Requested Prescriptions     Signed Prescriptions Disp Refills    atorvastatin (LIPITOR) 40 mg tablet 90 Tablet 1     Sig: TAKE 1 TABLET BY MOUTH EVERY DAY     Authorizing Provider: Cheryle Grief     Ordering User: Kamila Lucio S     Refills per verbal order from Dr. José Fisher.  Last visit:11/11/21  Next visit:6/16/22

## 2022-06-10 LAB
ALBUMIN SERPL-MCNC: 4.6 G/DL (ref 3.8–4.8)
ALBUMIN/GLOB SERPL: 2.2 {RATIO} (ref 1.2–2.2)
ALP SERPL-CCNC: 64 IU/L (ref 44–121)
ALT SERPL-CCNC: 14 IU/L (ref 0–44)
AST SERPL-CCNC: 15 IU/L (ref 0–40)
BILIRUB SERPL-MCNC: 0.9 MG/DL (ref 0–1.2)
BUN SERPL-MCNC: 19 MG/DL (ref 8–27)
BUN/CREAT SERPL: 20 (ref 10–24)
CALCIUM SERPL-MCNC: 9.5 MG/DL (ref 8.6–10.2)
CHLORIDE SERPL-SCNC: 102 MMOL/L (ref 96–106)
CHOLEST SERPL-MCNC: 121 MG/DL (ref 100–199)
CO2 SERPL-SCNC: 26 MMOL/L (ref 20–29)
CREAT SERPL-MCNC: 0.94 MG/DL (ref 0.76–1.27)
EGFR: 90 ML/MIN/1.73
GLOBULIN SER CALC-MCNC: 2.1 G/DL (ref 1.5–4.5)
GLUCOSE SERPL-MCNC: 94 MG/DL (ref 65–99)
HDLC SERPL-MCNC: 53 MG/DL
IMP & REVIEW OF LAB RESULTS: NORMAL
LDLC SERPL CALC-MCNC: 52 MG/DL (ref 0–99)
POTASSIUM SERPL-SCNC: 5 MMOL/L (ref 3.5–5.2)
PROT SERPL-MCNC: 6.7 G/DL (ref 6–8.5)
SODIUM SERPL-SCNC: 141 MMOL/L (ref 134–144)
TRIGL SERPL-MCNC: 84 MG/DL (ref 0–149)
VLDLC SERPL CALC-MCNC: 16 MG/DL (ref 5–40)

## 2022-06-12 DIAGNOSIS — G89.29 CHRONIC LEFT SHOULDER PAIN: ICD-10-CM

## 2022-06-12 DIAGNOSIS — M25.512 CHRONIC LEFT SHOULDER PAIN: ICD-10-CM

## 2022-06-12 RX ORDER — NORTRIPTYLINE HYDROCHLORIDE 10 MG/1
CAPSULE ORAL
Qty: 30 CAPSULE | Refills: 2 | Status: SHIPPED | OUTPATIENT
Start: 2022-06-12 | End: 2022-08-11 | Stop reason: ALTCHOICE

## 2022-06-15 DIAGNOSIS — I10 HTN (HYPERTENSION), BENIGN: ICD-10-CM

## 2022-06-15 DIAGNOSIS — E78.5 HYPERLIPIDEMIA, UNSPECIFIED HYPERLIPIDEMIA TYPE: ICD-10-CM

## 2022-06-15 DIAGNOSIS — I25.10 CORONARY ARTERY DISEASE INVOLVING NATIVE CORONARY ARTERY OF NATIVE HEART WITHOUT ANGINA PECTORIS: Primary | ICD-10-CM

## 2022-06-15 NOTE — PATIENT INSTRUCTIONS

## 2022-06-15 NOTE — PROGRESS NOTES
CARDIOLOGY OFFICE NOTE    Berny Dickerson MD, [address], [place], [address] [facility]  Phone [phone]; Fax [phone]  Mobile [phone]   Voice Mail [phone]    Primary care: Abhijit Burger MD       ATTENTION:   This medical record was transcribed using an electronic medical records/speech recognition system. Although proofread, it may and can contain electronic, spelling and other errors. Corrections may be executed at a later time. Please feel free to contact us for any clarifications as needed. ICD-10-CM ICD-9-CM   1. Coronary artery disease involving transplanted heart with unstable angina pectoris, unspecified vessel or lesion type ([facility])  I25.750 414.06     413.9   2. Atrial fibrillation, unspecified type ([address]  I48.91 427.31   3. SOB (shortness of breath)  R06.02 786.05   4. Chest pain, unspecified type  R07.9 786.50            Curt Cordero is a 73 y.o. male with  referred for CAD status post stenting of the LAD          Cardiac risk factors: dyslipidemia, male gender, hypertension  I have personally obtained the history from the patient. He is doing well overall  HISTORY OF PRESENTING ILLNESS     Doing well with no interval cardiac issues. Completed cardiac rehab h no chest pain or shortness of breath. I did show him his catheterization as well as the stenting . Porfirio Russ        ACTIVE PROBLEM LIST     Patient Active Problem List    Diagnosis Date Noted    CAD (coronary artery disease) 08/06/2021    Hiatal hernia 04/14/2022    Acute myocardial infarction, unspecified 11/09/2021    Non-ST elevation myocardial infarction (NSTEMI) 11/09/2021    ST elevation (STEMI) myocardial infarction of unspecified site 11/09/2021    Epigastric pain 08/17/2021    Abnormal CT of the abdomen 08/17/2021    Small bowel obstruction due to adhesions ([address] 08/16/2021    GERD (gastroesophageal reflux disease) 08/16/2021    HTN (hypertension), benign 08/16/2021    Duodenitis 08/16/2021    Hypokalemia 08/16/2021    H/O colonoscopy 07/07/2020    Primary osteoarthritis of right knee 11/14/2019    Pain 04/29/2019    Thoracic back pain 08/21/2013    Hypogonadism, male 08/21/2013    Abnormal cardiovascular stress test     Chronic back pain            PAST MEDICAL HISTORY     Past Medical History:   Diagnosis Date    Abnormal finding on EKG 2015    Infarct- ruled as a mis-read by cardiology after testing    Arthritis     CAD (coronary artery disease) 08/06/2021    PC/stent mid LAD     Chronic back pain     s/p surgery    GERD (gastroesophageal reflux disease)     H/O colonoscopy 7/7/2020 6/18 5 year repeat    Hiatal hernia 4/14/2022    egd dr richter 2021    Hyperlipidemia     Hypertension     Hypogonadism, male 8/21/2013    total 97, free 1.8  5/13/13    Panic attacks            PAST SURGICAL HISTORY     Past Surgical History:   Procedure Laterality Date    COLONOSCOPY N/A 6/8/2018    COLONOSCOPY performed by eLvi Cook MD at [facility] HX BLEPHAROPLASTY  2011    HX COLONOSCOPY      Dr. Perry Grimaldo?    HX FRACTURE TX Right     clavicle repair    HX HEART CATHETERIZATION  2011?    normal    HX HEART CATHETERIZATION      stent placement 2021/8    HX KNEE ARTHROSCOPY Right 2014    HX KNEE ARTHROSCOPY Left 2015    x 5    HX KNEE ARTHROSCOPY Right 04/29/2019    HX KNEE REPLACEMENT Right     HX ORTHOPAEDIC  1990    right wrist    HX ORTHOPAEDIC Right 2015    ankle surgery    HX ROTATOR CUFF REPAIR Right 2017    HX ROTATOR CUFF REPAIR Left 2018    HX SHOULDER ARTHROSCOPY Right 2007    wire and screw          ALLERGIES     Allergies   Allergen Reactions    Codeine Rash     Over 40 years ago          FAMILY HISTORY     Family History   Problem Relation Age of Onset    Heart Disease Father 46        CABG x5    Coronary Art Dis Father         stents    Diabetes Sister     Heart Disease Sister         afib    Heart Failure Mother    Melvin Heart Disease Brother     No Known Problems Brother     Malignant Hyperthermia Neg Hx     negative for cardiac disease       SOCIAL HISTORY     Social History     Socioeconomic History    Marital status:      Spouse name: Tara Young Number of children: 5    Highest education level: High school graduate   Occupational History    Occupation: Refrigeration     Employer: SELF EMPLOYED   Tobacco Use    Smoking status: Never Smoker    Smokeless tobacco: Never Used   Vaping Use    Vaping Use: Never used   Substance and Sexual Activity    Alcohol use: No     Alcohol/week: 0.0 standard drinks     Comment: Rare glass of wine    Drug use: Not Currently   Other Topics Concern     Service No    Caffeine Concern No     Comment: drinks 1-2 caffeinated beverages         MEDICATIONS     Current Outpatient Medications   Medication Sig    atorvastatin (LIPITOR) 40 mg tablet TAKE 1 TABLET BY MOUTH EVERY DAY    ALPRAZolam (XANAX) 0.5 mg tablet Take 1 Tablet by mouth every eight (8) hours as needed for Anxiety. Max Daily Amount: 1.5 mg.    lisinopriL (PRINIVIL, ZESTRIL) 10 mg tablet TAKE 1 TABLET BY MOUTH EVERY DAY    pantoprazole (PROTONIX) 40 mg tablet TAKE 1 TABLET BY MOUTH EVERY DAY    clopidogreL (Plavix) 75 mg tab Take  by mouth daily.  tadalafiL (CIALIS) 10 mg tablet TAKE ONE TABLET BY MOUTH DAILY AS NEEDED    buprenorphine-naloxone (SUBOXONE) 8-2 mg film sublingaul film 1 Film by SubLINGual route daily.  aspirin 81 mg chewable tablet Take 1 Tablet by mouth daily.  testosterone cypionate (DEPOTESTOTERONE CYPIONATE) 200 mg/mL injection 80 mg by IntraMUSCular route every seven (7) days.  nortriptyline (PAMELOR) 10 mg capsule TAKE 1 CAPSULE BY MOUTH EVERY DAY AT NIGHT (Patient not taking: Reported on 6/16/2022)     No current facility-administered medications for this visit.        I have reviewed the nurses notes, vitals, problem list, allergy list, medical history, family, social history and medications. REVIEW OF SYMPTOMS   Pertinent positive per HPI  General: Pt denies excessive weight gain or loss. Pt is able to conduct ADL's  HEENT: Denies blurred vision, headaches, hearing loss, epistaxis and difficulty swallowing. Respiratory: Denies cough, congestion, shortness of breath, VERDE, wheezing or stridor. Cardiovascular: Denies precordial pain, palpitations, edema or PND  Gastrointestinal: Denies poor appetite, indigestion, abdominal pain or blood in stool  Genitourinary: Denies hematuria, dysuria, increased urinary frequency  Musculoskeletal: Denies joint pain or swelling from muscles or joints  Neurologic: Denies tremor, paresthesias, headache, or sensory motor disturbance  Psychiatric: Denies confusion, insomnia, depression  Integumentray: Denies rash, itching or ulcers. Hematologic: Denies easy bruising, bleeding     PHYSICAL EXAMINATION      Vitals:    06/16/22 0953   BP: 130/64   Pulse: 84   SpO2: 98%   Weight: 158 lb (71.7 kg)   Height: 6' 1.5\" (1.867 m)     General: Well developed, in no acute distress. HEENT: No jaundice, oral mucosa moist, no oral ulcers  Neck: Supple, no stiffness, no lymphadenopathy, supple  Heart:  Normal S1/S2 negative S3 or S4. Regular, no murmur, gallop or rub, no jugular venous distention  Respiratory: Clear bilaterally x 4, no wheezing or rales  Extremities:  No edema, normal cap refill, no cyanosis. Musculoskeletal: No clubbing, no deformities  Neuro: A&Ox3, speech clear, gait stable, cooperative, no focal neurologic deficits  Skin: Skin color is normal. No rashes or lesions. Non diaphoretic, moist.           DIAGNOSTIC DATA     1. Lipids   8/6/21- , HDL 60, LDL 71.2, TG 89   10/19/21- , HDL 39, LDL 50, TG 71  12/14/21- , HDL 43, LDL 47, TG 66      2.  Cardiac Cath   8/6/21- Critical bifurcation stenosis of mid LAD and D2   Mildly elevated LVEDP   Mid LAD stented with a 2.5X 23 mm Xience Elba GENARO, optimized with a 2 5 and 3.0 noncompliant balloon    3. Stress Test  5/11/16- Stress Echo- no ischemia, EF 55%, 10.7 mets    4. Echo  9/9/21-EF 57%, LAE, RVSP 24 mmHg, AO: Mild sinuses of Valsalva dilatation. Diameter is 4.2 cm. LABORATORY DATA            Lab Results   Component Value Date/Time    WBC 6.2 05/23/2022 02:57 PM    HGB 13.4 05/23/2022 02:57 PM    HCT 40.9 05/23/2022 02:57 PM    PLATELET 648 51/57/9603 02:57 PM    MCV 91.3 05/23/2022 02:57 PM      Lab Results   Component Value Date/Time    Sodium 141 06/09/2022 09:06 AM    Potassium 5.0 06/09/2022 09:06 AM    Chloride 102 06/09/2022 09:06 AM    CO2 26 06/09/2022 09:06 AM    Anion gap 6 05/23/2022 02:57 PM    Glucose 94 06/09/2022 09:06 AM    BUN 19 06/09/2022 09:06 AM    Creatinine 0.94 06/09/2022 09:06 AM    BUN/Creatinine ratio 20 06/09/2022 09:06 AM    GFR est AA >60 05/23/2022 02:57 PM    GFR est non-AA >60 05/23/2022 02:57 PM    Calcium 9.5 06/09/2022 09:06 AM    Bilirubin, total 0.9 06/09/2022 09:06 AM    Alk. phosphatase 64 06/09/2022 09:06 AM    Protein, total 6.7 06/09/2022 09:06 AM    Albumin 4.6 06/09/2022 09:06 AM    Globulin 2.9 05/23/2022 02:57 PM    A-G Ratio 2.2 06/09/2022 09:06 AM    ALT (SGPT) 14 06/09/2022 09:06 AM           ASSESSMENT/RECOMMENDATIONS:.      1. CAD status post stenting of the LAD in August 2021 for unstable angina  -He may stop his Plavix at the end of July and will double his aspirin to 162 mg for 1 month and give a 80  -LAD diffuse disease at bifurcation with D2 90% focal stenosis, circumflex okay, RCA okay. Mid LAD was stented with a 2.5 x 23 mm Xience Elba GENARO deployed at 16 moiz and I believe postdilated with a 3 oh noncompliant balloon.  -He can proceed with his knee surgery in the fall  2. Hypertension  -BP is is good on current medical regimen  3. Dyslipidemia  -Last LDL was June 9, 2022 and was 52 at goal  -Repeat cholesterol in 6 months      [address].  with 5 kids. Enjoys golf.            No orders of the defined types were placed in this encounter. We discussed the expected course, resolution and complications of the diagnosis(es) in detail. Medication risks, benefits, costs, interactions, and alternatives were discussed as indicated. I advised him to contact the office if his condition worsens, changes or fails to improve as anticipated. He expressed understanding with the diagnosis(es) and plan          Follow-up and Dispositions  ·   Return in about 6 months (around 12/16/2022). I have discussed the diagnosis with  Kenna Valencia IV and the intended plan as seen in the above orders. Questions were answered concerning future plans. I have discussed medication side effects and warnings with the patient as well. Thank you,  Familia Pal MD for involving me in the care of  Kenna Valencia IV. Please do not hesitate to contact me for further questions/concerns. Berny Martinez MD, [address] [place] [address] Rd[address]      [facility], [address] [place] [address] [facility]      [phone] / [phone] Fax

## 2022-06-16 ENCOUNTER — OFFICE VISIT (OUTPATIENT)
Dept: CARDIOLOGY CLINIC | Age: 67
End: 2022-06-16
Payer: MEDICARE

## 2022-06-16 VITALS
WEIGHT: 158 LBS | SYSTOLIC BLOOD PRESSURE: 130 MMHG | HEIGHT: 74 IN | OXYGEN SATURATION: 98 % | HEART RATE: 84 BPM | BODY MASS INDEX: 20.28 KG/M2 | DIASTOLIC BLOOD PRESSURE: 64 MMHG

## 2022-06-16 DIAGNOSIS — I25.750: Primary | ICD-10-CM

## 2022-06-16 DIAGNOSIS — I48.91 ATRIAL FIBRILLATION, UNSPECIFIED TYPE (HCC): ICD-10-CM

## 2022-06-16 DIAGNOSIS — R06.02 SOB (SHORTNESS OF BREATH): ICD-10-CM

## 2022-06-16 DIAGNOSIS — R07.9 CHEST PAIN, UNSPECIFIED TYPE: ICD-10-CM

## 2022-06-16 PROCEDURE — 99214 OFFICE O/P EST MOD 30 MIN: CPT | Performed by: SPECIALIST

## 2022-06-16 PROCEDURE — G8754 DIAS BP LESS 90: HCPCS | Performed by: SPECIALIST

## 2022-06-16 PROCEDURE — G8510 SCR DEP NEG, NO PLAN REQD: HCPCS | Performed by: SPECIALIST

## 2022-06-16 PROCEDURE — G8752 SYS BP LESS 140: HCPCS | Performed by: SPECIALIST

## 2022-06-16 PROCEDURE — 3017F COLORECTAL CA SCREEN DOC REV: CPT | Performed by: SPECIALIST

## 2022-06-16 PROCEDURE — G8536 NO DOC ELDER MAL SCRN: HCPCS | Performed by: SPECIALIST

## 2022-06-16 PROCEDURE — 1101F PT FALLS ASSESS-DOCD LE1/YR: CPT | Performed by: SPECIALIST

## 2022-06-16 PROCEDURE — G0463 HOSPITAL OUTPT CLINIC VISIT: HCPCS | Performed by: SPECIALIST

## 2022-06-16 PROCEDURE — G8427 DOCREV CUR MEDS BY ELIG CLIN: HCPCS | Performed by: SPECIALIST

## 2022-06-16 PROCEDURE — 1123F ACP DISCUSS/DSCN MKR DOCD: CPT | Performed by: SPECIALIST

## 2022-06-16 PROCEDURE — G8420 CALC BMI NORM PARAMETERS: HCPCS | Performed by: SPECIALIST

## 2022-06-16 RX ORDER — TADALAFIL 10 MG/1
TABLET ORAL
Qty: 8 TABLET | Refills: 0 | Status: SHIPPED | OUTPATIENT
Start: 2022-06-16 | End: 2022-08-28

## 2022-06-16 NOTE — LETTER
6/16/2022    Patient: Jose Hawkins IV   YOB: 1955   Date of Visit: 6/16/2022     Michelle Montgomery [address] [facility]  [address]  [address] [facility]  Via In Basket    Dear Gianfranco Alfonso MD,      Thank you for referring Mr. Garrick Hernandez to [facility] for evaluation. My notes for this consultation are attached. If you have questions, please do not hesitate to call me. I look forward to following your patient along with you.       Sincerely,    Isaac Rabago MD

## 2022-06-16 NOTE — PROGRESS NOTES
Marilyn Leavitt is a 73 y.o. male    Chief Complaint   Patient presents with    Follow-up     6 month f/u, Afib    Coronary Artery Disease    Shortness of Breath       Chest pain No    SOB No    Dizziness No    Swelling No    Refills No    Visit Vitals  /64 (BP 1 Location: Left upper arm, BP Patient Position: Sitting)   Pulse 84   Ht 6' 1.5\" (1.867 m)   Wt 158 lb (71.7 kg)   SpO2 98%   BMI 20.56 kg/m²       1. Have you been to the ER, urgent care clinic since your last visit? Hospitalized since your last visit? Urgent care left side numbness 5/23/22    2. Have you seen or consulted any other health care providers outside of the [address] [place] [address] since your last visit? Include any pap smears or colon screening.   No

## 2022-06-24 ENCOUNTER — TRANSCRIBE ORDER (OUTPATIENT)
Dept: SCHEDULING | Age: 67
End: 2022-06-24

## 2022-06-24 DIAGNOSIS — M17.12 PRIMARY LOCALIZED OSTEOARTHRITIS OF LEFT KNEE: Primary | ICD-10-CM

## 2022-06-29 RX ORDER — LISINOPRIL 10 MG/1
10 TABLET ORAL DAILY
Qty: 90 TABLET | Refills: 2 | Status: SHIPPED | OUTPATIENT
Start: 2022-06-29

## 2022-06-29 NOTE — TELEPHONE ENCOUNTER
Requested Prescriptions     Signed Prescriptions Disp Refills    lisinopriL (PRINIVIL, ZESTRIL) 10 mg tablet 90 Tablet 2     Sig: Take 1 Tablet by mouth daily.      Authorizing Provider: Andrews Castano     Ordering User: Louisa Yancey     Refills per verbal order from Dr. Corinne Carrillo.  Last visit: 6/16/22  Next visit: 12/20/22

## 2022-07-05 RX ORDER — CLOPIDOGREL BISULFATE 75 MG/1
75 TABLET ORAL DAILY
Qty: 30 TABLET | Refills: 0 | Status: SHIPPED | OUTPATIENT
Start: 2022-07-05 | End: 2022-08-11 | Stop reason: ALTCHOICE

## 2022-08-01 RX ORDER — CLOPIDOGREL BISULFATE 75 MG/1
TABLET ORAL
Qty: 30 TABLET | Refills: 0 | OUTPATIENT
Start: 2022-08-01

## 2022-08-01 NOTE — TELEPHONE ENCOUNTER
Requested Prescriptions     Refused Prescriptions Disp Refills    clopidogreL (PLAVIX) 75 mg tab [Pharmacy Med Name: CLOPIDOGREL 75 MG TABLET] 30 Tablet 0     Sig: TAKE 1 TABLET BY MOUTH EVERY DAY     Refused By: Bhumika Armijo     Reason for Refusal: Refill not appropriate     Refills per verbal order from Dr. Jessa Sams.  Last visit: 6/16/22  Next visit: 12/20/22  Plavix to be discontinued at the end of July 2022 per Dr. Jessa Sams.

## 2022-08-11 ENCOUNTER — OFFICE VISIT (OUTPATIENT)
Dept: ORTHOPEDIC SURGERY | Age: 67
End: 2022-08-11
Payer: MEDICARE

## 2022-08-11 VITALS — BODY MASS INDEX: 20.94 KG/M2 | HEIGHT: 73 IN | WEIGHT: 158 LBS

## 2022-08-11 DIAGNOSIS — M20.41 HAMMERTOE OF RIGHT FOOT: ICD-10-CM

## 2022-08-11 DIAGNOSIS — M77.41 METATARSALGIA OF RIGHT FOOT: ICD-10-CM

## 2022-08-11 DIAGNOSIS — M79.671 MECHANICAL FOOT PAIN, RIGHT: Primary | ICD-10-CM

## 2022-08-11 DIAGNOSIS — S86.311A TEAR OF PERONEAL TENDON OF RIGHT FOOT: ICD-10-CM

## 2022-08-11 DIAGNOSIS — S99.911A INJURY OF RIGHT ANKLE, INITIAL ENCOUNTER: ICD-10-CM

## 2022-08-11 DIAGNOSIS — M21.6X1 CAVOVARUS DEFORMITY OF FOOT, ACQUIRED, RIGHT: ICD-10-CM

## 2022-08-11 PROCEDURE — 1123F ACP DISCUSS/DSCN MKR DOCD: CPT | Performed by: ORTHOPAEDIC SURGERY

## 2022-08-11 PROCEDURE — 3017F COLORECTAL CA SCREEN DOC REV: CPT | Performed by: ORTHOPAEDIC SURGERY

## 2022-08-11 PROCEDURE — G8420 CALC BMI NORM PARAMETERS: HCPCS | Performed by: ORTHOPAEDIC SURGERY

## 2022-08-11 PROCEDURE — G8756 NO BP MEASURE DOC: HCPCS | Performed by: ORTHOPAEDIC SURGERY

## 2022-08-11 PROCEDURE — G8536 NO DOC ELDER MAL SCRN: HCPCS | Performed by: ORTHOPAEDIC SURGERY

## 2022-08-11 PROCEDURE — G8427 DOCREV CUR MEDS BY ELIG CLIN: HCPCS | Performed by: ORTHOPAEDIC SURGERY

## 2022-08-11 PROCEDURE — 99203 OFFICE O/P NEW LOW 30 MIN: CPT | Performed by: ORTHOPAEDIC SURGERY

## 2022-08-11 PROCEDURE — G8432 DEP SCR NOT DOC, RNG: HCPCS | Performed by: ORTHOPAEDIC SURGERY

## 2022-08-11 PROCEDURE — 1101F PT FALLS ASSESS-DOCD LE1/YR: CPT | Performed by: ORTHOPAEDIC SURGERY

## 2022-08-11 RX ORDER — METHYLPREDNISOLONE 4 MG/1
TABLET ORAL
Qty: 1 DOSE PACK | Refills: 0 | Status: SHIPPED | OUTPATIENT
Start: 2022-08-11 | End: 2022-08-23

## 2022-08-11 NOTE — LETTER
8/15/2022    Patient: Bren Salcido   YOB: 1955   Date of Visit: 8/11/2022     Michelle Saxena [address] [facility]  [address]  [address] [facility]  Via In Basket    Dear Jane Song MD,      Thank you for referring Mr. Barbi Dinero to [facility] for evaluation. My notes for this consultation are attached. If you have questions, please do not hesitate to call me. I look forward to following your patient along with you.       Sincerely,    Jeffery Menon MD
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
## 2022-08-28 RX ORDER — TADALAFIL 10 MG/1
TABLET ORAL
Qty: 8 TABLET | Refills: 0 | Status: SHIPPED | OUTPATIENT
Start: 2022-08-28

## 2022-08-29 DIAGNOSIS — K21.9 GERD WITHOUT ESOPHAGITIS: ICD-10-CM

## 2022-08-29 RX ORDER — PANTOPRAZOLE SODIUM 40 MG/1
TABLET, DELAYED RELEASE ORAL
Qty: 30 TABLET | Refills: 3 | Status: SHIPPED | OUTPATIENT
Start: 2022-08-29

## 2022-08-31 DIAGNOSIS — F41.9 ANXIETY: ICD-10-CM

## 2022-08-31 RX ORDER — ALPRAZOLAM 0.5 MG/1
TABLET ORAL
Qty: 30 TABLET | Refills: 0 | Status: SHIPPED | OUTPATIENT
Start: 2022-08-31

## 2022-08-31 NOTE — TELEPHONE ENCOUNTER
Message per provider:  I did fill his xanax but do need to see him in office for ongoing refills thanks. Please assist with scheduling an in-office appt. Will need appt prior to needing next fill.

## 2022-09-14 ENCOUNTER — OFFICE VISIT (OUTPATIENT)
Dept: INTERNAL MEDICINE CLINIC | Age: 67
End: 2022-09-14
Payer: MEDICARE

## 2022-09-14 VITALS
WEIGHT: 163 LBS | DIASTOLIC BLOOD PRESSURE: 70 MMHG | TEMPERATURE: 97.6 F | HEART RATE: 67 BPM | BODY MASS INDEX: 21.6 KG/M2 | OXYGEN SATURATION: 97 % | RESPIRATION RATE: 14 BRPM | HEIGHT: 73 IN | SYSTOLIC BLOOD PRESSURE: 130 MMHG

## 2022-09-14 DIAGNOSIS — M79.89 SWELLING OF RIGHT FOOT: Primary | ICD-10-CM

## 2022-09-14 DIAGNOSIS — E78.2 MIXED HYPERLIPIDEMIA: ICD-10-CM

## 2022-09-14 DIAGNOSIS — I10 HTN, GOAL BELOW 130/80: ICD-10-CM

## 2022-09-14 PROCEDURE — G0463 HOSPITAL OUTPT CLINIC VISIT: HCPCS | Performed by: INTERNAL MEDICINE

## 2022-09-14 PROCEDURE — G8754 DIAS BP LESS 90: HCPCS | Performed by: INTERNAL MEDICINE

## 2022-09-14 PROCEDURE — 99215 OFFICE O/P EST HI 40 MIN: CPT | Performed by: INTERNAL MEDICINE

## 2022-09-14 PROCEDURE — G8752 SYS BP LESS 140: HCPCS | Performed by: INTERNAL MEDICINE

## 2022-09-14 PROCEDURE — G8427 DOCREV CUR MEDS BY ELIG CLIN: HCPCS | Performed by: INTERNAL MEDICINE

## 2022-09-14 PROCEDURE — 3017F COLORECTAL CA SCREEN DOC REV: CPT | Performed by: INTERNAL MEDICINE

## 2022-09-14 PROCEDURE — 1101F PT FALLS ASSESS-DOCD LE1/YR: CPT | Performed by: INTERNAL MEDICINE

## 2022-09-14 PROCEDURE — G8536 NO DOC ELDER MAL SCRN: HCPCS | Performed by: INTERNAL MEDICINE

## 2022-09-14 PROCEDURE — G8510 SCR DEP NEG, NO PLAN REQD: HCPCS | Performed by: INTERNAL MEDICINE

## 2022-09-14 PROCEDURE — G8420 CALC BMI NORM PARAMETERS: HCPCS | Performed by: INTERNAL MEDICINE

## 2022-09-14 NOTE — PROGRESS NOTES
Juan Francisco Arteaga IV (: 1955) is a 78 y.o. male, established patient, here for evaluation of the following chief complaint(s):  No chief complaint on file. ASSESSMENT/PLAN:  Below is the assessment and plan developed based on review of pertinent history, physical exam, labs, studies, and medications. 1. Swelling of right foot-I reviewed with the patient that I very much do trust Dr. Basilio Mcgregor and following conservative treatment does make sense at first like stretching and doing physical therapy but if the pain is worsening he should follow-up with her as they may do an MRI and then discuss further if he really does need surgery. The patient was in a tremendous amount of pain today when I palpated on the dorsum of his foot and cannot walk or push off his foot and is using a crutch cannot currently  2. HTN, goal below 130/80-continue current dose of lisinopril 10 mg a day  3. Mixed hyperlipidemia continue current dose of Lipitor 40 mg a day    No follow-ups on file. SUBJECTIVE/OBJECTIVE:  HPI     His right foot has been swollen ? Has been to podiatry= swollen - on side of foot underneath foot ; cannot get to be on his toes - sharp shooting pain and pins and needles - it causes burning across the feet ; had two cortisone shots and didn't really help   Just reviewed Dr. Vito Breen notes:  He is experiencing mechanical overload of the lateral part of his foot and forefoot due to the cavovarus foot position. This all seems to have exacerbated after an injury sustained. He also has a very tight plantar fascia which could cause the plantar foot pain he also describes. He had some problems with his peroneal tendons in the past, he has had treatment for peroneal tendinitis by Dr. Zach Marquis. There is likely possible small tear at the peroneal tendon causing some symptoms versus peroneal tendinitis/tendinosis. Cavovarus contributes also to the peroneal tendinitis/tendinopathy.   We discussed stretching program as demonstrated today in clinic overall improved flexibility of right lower extremity. Demonstrate correct way to do the exercises. Recommended different types of shoes to try more stabilized hindfoot type of shoes, avoid high arch supports which could place the foot more cavovarus position. she gave steroids as well    Subjective:   Ruma Cody is a 78 y.o. male with hypertension. Hypertension ROS: taking medications as instructed, no medication side effects noted, no TIA's, no chest pain on exertion, no dyspnea on exertion, no swelling of ankles. New concerns: 130/70. Subjective:   Ruma Cody is a 78 y.o. male with hyperlipidemia. Cardiovascular risk analysis - 78 y.o. male LDL goal is under 100. ROS: taking medications as instructed, no medication side effects noted, no TIA's, no chest pain on exertion, no dyspnea on exertion, no swelling of ankles. Tolerating meds, no myalgias or other side effects noted  New concerns: taking medicine LDL 52  Lab Results   Component Value Date/Time    Cholesterol, total 121 06/09/2022 09:06 AM    HDL Cholesterol 53 06/09/2022 09:06 AM    LDL, calculated 52 06/09/2022 09:06 AM          VLDL, calculated 16 06/09/2022 09:06 AM    VLDL, calculated 17.8 08/06/2021 03:15 PM    Triglyceride 84 06/09/2022 09:06 AM    CHOL/HDL Ratio 2.5 08/06/2021 03:15 PM    .        Review of Systems   All other systems reviewed and are negative. Physical Exam  Vitals and nursing note reviewed. Constitutional:       Appearance: He is well-developed. HENT:      Head: Normocephalic and atraumatic. Right Ear: External ear normal.      Left Ear: External ear normal.      Nose: Nose normal.   Eyes:      Conjunctiva/sclera: Conjunctivae normal.      Pupils: Pupils are equal, round, and reactive to light. Neck:      Thyroid: No thyromegaly. Vascular: No carotid bruit, hepatojugular reflux or JVD. Cardiovascular:      Rate and Rhythm: Normal rate and regular rhythm. Heart sounds: Normal heart sounds. Pulmonary:      Effort: Pulmonary effort is normal.      Breath sounds: Normal breath sounds. Abdominal:      General: Bowel sounds are normal.      Palpations: Abdomen is soft. Musculoskeletal:         General: Normal range of motion. Cervical back: Normal range of motion and neck supple. Comments: Tenderness on bottom of foot that created 10/10 pain ; cannot push off on her toes    Skin:     General: Skin is warm and dry. Neurological:      Mental Status: He is alert and oriented to person, place, and time. Psychiatric:         Behavior: Behavior normal.         Thought Content: Thought content normal.         Judgment: Judgment normal.         On this date 09/14/2022 I have spent 40 minutes reviewing previous notes, test results and face to face with the patient discussing the diagnosis and importance of compliance with the treatment plan as well as documenting on the day of the visit. An electronic signature was used to authenticate this note.   -- Ladi Reid MD

## 2022-09-21 ENCOUNTER — OFFICE VISIT (OUTPATIENT)
Dept: ORTHOPEDIC SURGERY | Age: 67
End: 2022-09-21
Payer: MEDICARE

## 2022-09-21 VITALS — HEIGHT: 73 IN | BODY MASS INDEX: 21.6 KG/M2 | WEIGHT: 163 LBS

## 2022-09-21 DIAGNOSIS — S86.311A TEAR OF PERONEAL TENDON OF RIGHT FOOT: ICD-10-CM

## 2022-09-21 DIAGNOSIS — M79.671 MECHANICAL FOOT PAIN, RIGHT: ICD-10-CM

## 2022-09-21 DIAGNOSIS — M21.6X1 ACQUIRED CAVOVARUS FOOT DEFORMITY, RIGHT: ICD-10-CM

## 2022-09-21 DIAGNOSIS — M77.41 METATARSALGIA OF RIGHT FOOT: Primary | ICD-10-CM

## 2022-09-21 PROCEDURE — 1123F ACP DISCUSS/DSCN MKR DOCD: CPT | Performed by: ORTHOPAEDIC SURGERY

## 2022-09-21 PROCEDURE — 3017F COLORECTAL CA SCREEN DOC REV: CPT | Performed by: ORTHOPAEDIC SURGERY

## 2022-09-21 PROCEDURE — G8420 CALC BMI NORM PARAMETERS: HCPCS | Performed by: ORTHOPAEDIC SURGERY

## 2022-09-21 PROCEDURE — G8536 NO DOC ELDER MAL SCRN: HCPCS | Performed by: ORTHOPAEDIC SURGERY

## 2022-09-21 PROCEDURE — G8756 NO BP MEASURE DOC: HCPCS | Performed by: ORTHOPAEDIC SURGERY

## 2022-09-21 PROCEDURE — 99213 OFFICE O/P EST LOW 20 MIN: CPT | Performed by: ORTHOPAEDIC SURGERY

## 2022-09-21 PROCEDURE — G8427 DOCREV CUR MEDS BY ELIG CLIN: HCPCS | Performed by: ORTHOPAEDIC SURGERY

## 2022-09-21 PROCEDURE — 1101F PT FALLS ASSESS-DOCD LE1/YR: CPT | Performed by: ORTHOPAEDIC SURGERY

## 2022-09-21 PROCEDURE — G8432 DEP SCR NOT DOC, RNG: HCPCS | Performed by: ORTHOPAEDIC SURGERY

## 2022-09-21 NOTE — LETTER
9/23/2022    Patient: James Virk   YOB: 1955   Date of Visit: 9/21/2022     [address] [facility]  [address]  [facility]  Via In Basket    Dear Adamaris Miller MD,      Thank you for referring Mr. Darion Larios to [facility] for evaluation. My notes for this consultation are attached. If you have questions, please do not hesitate to call me. I look forward to following your patient along with you.       Sincerely,    Sara Sawant MD
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
## 2022-09-24 ENCOUNTER — HOSPITAL ENCOUNTER (OUTPATIENT)
Dept: MRI IMAGING | Age: 67
Discharge: HOME | End: 2022-09-24
Attending: ORTHOPAEDIC SURGERY
Payer: MEDICARE

## 2022-09-24 DIAGNOSIS — M77.41 METATARSALGIA OF RIGHT FOOT: ICD-10-CM

## 2022-09-24 DIAGNOSIS — M79.671 MECHANICAL FOOT PAIN, RIGHT: ICD-10-CM

## 2022-09-24 DIAGNOSIS — S86.311A TEAR OF PERONEAL TENDON OF RIGHT FOOT: ICD-10-CM

## 2022-09-24 PROCEDURE — 73718 MRI LOWER EXTREMITY W/O DYE: CPT

## 2022-09-26 RX ORDER — CLOPIDOGREL BISULFATE 75 MG/1
TABLET ORAL
Qty: 30 TABLET | Refills: 0 | OUTPATIENT
Start: 2022-09-26

## 2022-09-29 ENCOUNTER — OFFICE VISIT (OUTPATIENT)
Dept: ORTHOPEDIC SURGERY | Age: 67
End: 2022-09-29

## 2022-09-29 VITALS — BODY MASS INDEX: 21.6 KG/M2 | HEIGHT: 73 IN | WEIGHT: 163 LBS

## 2022-09-29 DIAGNOSIS — M21.6X1 ACQUIRED CAVOVARUS FOOT DEFORMITY, RIGHT: ICD-10-CM

## 2022-09-29 DIAGNOSIS — M79.671 MECHANICAL FOOT PAIN, RIGHT: ICD-10-CM

## 2022-09-29 DIAGNOSIS — M84.374A METATARSAL STRESS FRACTURE, RIGHT, INITIAL ENCOUNTER: Primary | ICD-10-CM

## 2022-09-29 PROCEDURE — G8536 NO DOC ELDER MAL SCRN: HCPCS | Performed by: ORTHOPAEDIC SURGERY

## 2022-09-29 PROCEDURE — 1101F PT FALLS ASSESS-DOCD LE1/YR: CPT | Performed by: ORTHOPAEDIC SURGERY

## 2022-09-29 PROCEDURE — 99213 OFFICE O/P EST LOW 20 MIN: CPT | Performed by: ORTHOPAEDIC SURGERY

## 2022-09-29 PROCEDURE — 1123F ACP DISCUSS/DSCN MKR DOCD: CPT | Performed by: ORTHOPAEDIC SURGERY

## 2022-09-29 PROCEDURE — L4387 NON-PNEUM WALK BOOT PRE OTS: HCPCS | Performed by: ORTHOPAEDIC SURGERY

## 2022-09-29 PROCEDURE — G8420 CALC BMI NORM PARAMETERS: HCPCS | Performed by: ORTHOPAEDIC SURGERY

## 2022-09-29 PROCEDURE — G8756 NO BP MEASURE DOC: HCPCS | Performed by: ORTHOPAEDIC SURGERY

## 2022-09-29 PROCEDURE — G8432 DEP SCR NOT DOC, RNG: HCPCS | Performed by: ORTHOPAEDIC SURGERY

## 2022-09-29 PROCEDURE — 3017F COLORECTAL CA SCREEN DOC REV: CPT | Performed by: ORTHOPAEDIC SURGERY

## 2022-09-29 PROCEDURE — G8427 DOCREV CUR MEDS BY ELIG CLIN: HCPCS | Performed by: ORTHOPAEDIC SURGERY

## 2022-09-29 NOTE — LETTER
10/3/2022    Patient: Mariama Lorenz IV   YOB: 1955   Date of Visit: 9/29/2022     Michelle Smith [address] [facility]  [address]  [address] [facility]  Via In Basket    Dear Adilia Wiggins MD,      Thank you for referring Mr. Ashu Phelps to [facility] for evaluation. My notes for this consultation are attached. If you have questions, please do not hesitate to call me. I look forward to following your patient along with you.       Sincerely,    Madai Mitchell MD
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
## 2022-10-10 ENCOUNTER — OFFICE VISIT (OUTPATIENT)
Dept: INTERNAL MEDICINE CLINIC | Age: 67
End: 2022-10-10
Payer: MEDICARE

## 2022-10-10 VITALS
HEART RATE: 85 BPM | WEIGHT: 158 LBS | OXYGEN SATURATION: 98 % | BODY MASS INDEX: 20.94 KG/M2 | TEMPERATURE: 97.5 F | SYSTOLIC BLOOD PRESSURE: 132 MMHG | DIASTOLIC BLOOD PRESSURE: 74 MMHG | HEIGHT: 73 IN | RESPIRATION RATE: 16 BRPM

## 2022-10-10 DIAGNOSIS — I10 HTN, GOAL BELOW 130/80: Primary | ICD-10-CM

## 2022-10-10 DIAGNOSIS — M81.6 LOCALIZED OSTEOPOROSIS WITHOUT CURRENT PATHOLOGICAL FRACTURE: ICD-10-CM

## 2022-10-10 DIAGNOSIS — Z00.00 MEDICARE ANNUAL WELLNESS VISIT, SUBSEQUENT: ICD-10-CM

## 2022-10-10 DIAGNOSIS — M84.374A STRESS FRACTURE OF METATARSAL BONE OF RIGHT FOOT, INITIAL ENCOUNTER: ICD-10-CM

## 2022-10-10 DIAGNOSIS — Z51.81 MEDICATION MONITORING ENCOUNTER: ICD-10-CM

## 2022-10-10 DIAGNOSIS — K21.9 GERD WITHOUT ESOPHAGITIS: ICD-10-CM

## 2022-10-10 PROBLEM — M84.30XA: Status: ACTIVE | Noted: 2022-10-10

## 2022-10-10 LAB
AMPHETAMINE QL URINE POC: NEGATIVE
BARBITURATES UR POC: NEGATIVE
BENZODIAZEPINES UR POC: NORMAL
COCAINE QL URINE POC: NEGATIVE
LOT EXP DATE POC: NORMAL
LOT NUMBER POC: NORMAL
MARIJUANA (THC) QL URINE POC: NEGATIVE
MDMA/ECSTASY UR POC: NEGATIVE
METHADONE QL URINE POC: 0
METHAMPHETAMINE QL URINE POC: NEGATIVE
NTI OTHER MICRO TRANSPORT 977598: 0
OPIATES QL URINE POC: NEGATIVE
OXYCODONE UR POC: NEGATIVE
VALID INTERNAL CONTROL?: YES

## 2022-10-10 PROCEDURE — G8420 CALC BMI NORM PARAMETERS: HCPCS | Performed by: INTERNAL MEDICINE

## 2022-10-10 PROCEDURE — 99214 OFFICE O/P EST MOD 30 MIN: CPT | Performed by: INTERNAL MEDICINE

## 2022-10-10 PROCEDURE — G8754 DIAS BP LESS 90: HCPCS | Performed by: INTERNAL MEDICINE

## 2022-10-10 PROCEDURE — G8752 SYS BP LESS 140: HCPCS | Performed by: INTERNAL MEDICINE

## 2022-10-10 PROCEDURE — 1101F PT FALLS ASSESS-DOCD LE1/YR: CPT | Performed by: INTERNAL MEDICINE

## 2022-10-10 PROCEDURE — G8427 DOCREV CUR MEDS BY ELIG CLIN: HCPCS | Performed by: INTERNAL MEDICINE

## 2022-10-10 PROCEDURE — 3017F COLORECTAL CA SCREEN DOC REV: CPT | Performed by: INTERNAL MEDICINE

## 2022-10-10 PROCEDURE — 80305 DRUG TEST PRSMV DIR OPT OBS: CPT | Performed by: INTERNAL MEDICINE

## 2022-10-10 PROCEDURE — G0463 HOSPITAL OUTPT CLINIC VISIT: HCPCS | Performed by: INTERNAL MEDICINE

## 2022-10-10 PROCEDURE — G0438 PPPS, INITIAL VISIT: HCPCS | Performed by: INTERNAL MEDICINE

## 2022-10-10 PROCEDURE — G8432 DEP SCR NOT DOC, RNG: HCPCS | Performed by: INTERNAL MEDICINE

## 2022-10-10 PROCEDURE — G8536 NO DOC ELDER MAL SCRN: HCPCS | Performed by: INTERNAL MEDICINE

## 2022-10-10 NOTE — PATIENT INSTRUCTIONS
Medicare Wellness Visit, Male    The best way to live healthy is to have a lifestyle where you eat a well-balanced diet, exercise regularly, limit alcohol use, and quit all forms of tobacco/nicotine, if applicable. Regular preventive services are another way to keep healthy. Preventive services (vaccines, screening tests, monitoring & exams) can help personalize your care plan, which helps you manage your own care. Screening tests can find health problems at the earliest stages, when they are easiest to treat. [facility] follows the current, evidence-based guidelines published by the [facility] ([facility]) when recommending preventive services for our patients. Because we follow these guidelines, sometimes recommendations change over time as research supports it. (For example, a prostate screening blood test is no longer routinely recommended for men with no symptoms). Of course, you and your doctor may decide to screen more often for some diseases, based on your risk and co-morbidities (chronic disease you are already diagnosed with). Preventive services for you include:  - Medicare offers their members a free annual wellness visit, which is time for you and your primary care provider to discuss and plan for your preventive service needs. Take advantage of this benefit every year!  -All adults over age 72 should receive the recommended pneumonia vaccines. Current USPSTF guidelines recommend a series of two vaccines for the best pneumonia protection.   -All adults should have a flu vaccine yearly and tetanus vaccine every 10 years.  -All adults age 48 and older should receive the shingles vaccines (series of two vaccines).        -All adults age 38-68 who are overweight should have a diabetes screening test once every three years.   -Other screening tests & preventive services for persons with diabetes include: an eye exam to screen for diabetic retinopathy, a kidney function test, a foot exam, and stricter control over your cholesterol.   -Cardiovascular screening for adults with routine risk involves an electrocardiogram (ECG) at intervals determined by the provider.   -Colorectal cancer screening should be done for adults age 54-65 with no increased risk factors for colorectal cancer. There are a number of acceptable methods of screening for this type of cancer. Each test has its own benefits and drawbacks. Discuss with your provider what is most appropriate for you during your annual wellness visit. The different tests include: colonoscopy (considered the best screening method), a fecal occult blood test, a fecal DNA test, and sigmoidoscopy.  -All adults born between [facility] should be screened once for Hepatitis C.  -An Abdominal Aortic Aneurysm (AAA) Screening is recommended for men age 73-68 who has ever smoked in their lifetime.      Here is a list of your current Health Maintenance items (your personalized list of preventive services) with a due date:  Health Maintenance Due   Topic Date Due    Shingles Vaccine (1 of 2) Never done    Annual Well Visit  Never done    Pneumococcal Vaccine (2 - PPSV23 or PCV20) 09/11/2021    COVID-19 Vaccine (3 - Booster for Vicki April series) 09/26/2021    Yearly Flu Vaccine (1) 08/01/2022

## 2022-10-10 NOTE — PROGRESS NOTES
HISTORY OF PRESENT ILLNESS  Arun Anderson is a 78 y.o. male. JACKI Mcguire is seen for follow up and wellness review. Since our last visit he has been diagnosed with four stress fractures in his right foot, currently in a boot and working with Dr. Mahi Rey. On discussion, because of his coronary disease, he has severely restricted dairy intake, including no yogurt, no cheeses, and his BMI is currently only 20. He is not smoking, no alcohol. Did discuss increasing vitamin D and calcium and he has recently started a supplement. Will also check bone density, parathyroid and thyroid levels. Hypogonadism, followed by Dr. Laura Heck from urology, who does regular PSAs. Remains on testosterone shots every week. Remains on Protonix for GERD. This may be contributing to malabsorption of minerals. Discussed GERD symptoms are currently controlled. Heart disease, followed by Dr. Burr Guardifrankie.  Remains on Atorvastatin 40 and Lisinopril 10 and baby aspirin. Review of Systems   Constitutional:  Negative for chills, diaphoresis, fever, malaise/fatigue and weight loss. Respiratory:  Negative for cough, shortness of breath and wheezing. Cardiovascular:  Negative for chest pain, palpitations, orthopnea, leg swelling and PND. Gastrointestinal:  Negative for abdominal pain, blood in stool, heartburn, nausea and vomiting. Genitourinary:  Negative for dysuria. Musculoskeletal:  Positive for back pain and joint pain. Negative for falls and myalgias. Neurological:  Negative for dizziness and headaches. Psychiatric/Behavioral:  Negative for depression, memory loss and substance abuse. Physical Exam  Vitals and nursing note reviewed. Constitutional:       Appearance: He is well-developed. HENT:      Head: Normocephalic and atraumatic. Neck:      Thyroid: No thyromegaly. Vascular: No carotid bruit. Cardiovascular:      Rate and Rhythm: Normal rate and regular rhythm.       Heart sounds: Normal heart sounds. Pulmonary:      Effort: Pulmonary effort is normal. No respiratory distress. Breath sounds: Normal breath sounds. No wheezing or rales. Musculoskeletal:      Cervical back: Normal range of motion and neck supple. Comments: Right foot in a boot   Neurological:      Mental Status: He is alert and oriented to person, place, and time. Psychiatric:         Behavior: Behavior normal.       ASSESSMENT and PLAN  Diagnoses and all orders for this visit:    1. HTN, goal below 313/33  -     METABOLIC PANEL, COMPREHENSIVE; Future    2. Medication monitoring encounter  -     AMB POC ALERE ICUP DX DRUG SCREEN 10    3. GERD without esophagitis-on protonix 40  Consider dec dose given recent stress fxt  Start by adding [employer] calcium in diet discussed yogurt      4. Medicare annual wellness visit, subsequent    5. Stress fracture of metatarsal bone of right foot, initial encounter  -     DEXA BONE DENSITY STUDY AXIAL; Future    6. Localized osteoporosis-of foot suspected given stress fxts  -     PTH INTACT; Future  -     TSH 3RD GENERATION; Future  -     CBC WITH AUTOMATED DIFF; Future  This is an Initial Medicare Annual Wellness Exam (AWV) (Performed 12 months after IPPE or effective date of Medicare Part B enrollment, Once in a lifetime)    I have reviewed the patient's medical history in detail and updated the computerized patient record. Assessment/Plan   Education and counseling provided:  Are appropriate based on today's review and evaluation  Influenza Vaccine  Prostate cancer screening tests (PSA, covered annually)  Bone mass measurement (DEXA)    1. HTN, goal below 678/26  -     METABOLIC PANEL, COMPREHENSIVE; Future  2. Medication monitoring encounter  -     AMB POC ALERE ICUP DX DRUG SCREEN 10  3. GERD without esophagitis  4. Medicare annual wellness visit, subsequent  5.  Stress fracture of metatarsal bone of right foot, initial encounter  -     DEXA BONE DENSITY STUDY AXIAL; Future  6. Localized osteoporosis without current pathological fracture  -     PTH INTACT; Future  -     TSH 3RD GENERATION; Future  -     CBC WITH AUTOMATED DIFF; Future       Depression Risk Factor Screening     3 most recent PHQ Screens 9/14/2022   PHQ Not Done -   Little interest or pleasure in doing things Not at all   Feeling down, depressed, irritable, or hopeless Not at all   Total Score PHQ 2 0   Trouble falling or staying asleep, or sleeping too much -   Feeling tired or having little energy -   Poor appetite, weight loss, or overeating -   Feeling bad about yourself - or that you are a failure or have let yourself or your family down -   Trouble concentrating on things such as school, work, reading, or watching TV -   Moving or speaking so slowly that other people could have noticed; or the opposite being so fidgety that others notice -   Thoughts of being better off dead, or hurting yourself in some way -   PHQ 9 Score -   How difficult have these problems made it for you to do your work, take care of your home and get along with others -       Alcohol & Drug Abuse Risk Screen    Do you average more than 1 drink per night or more than 7 drinks a week: No    In the past three months have you have had more than 4 drinks containing alcohol on one occasion: No          Functional Ability and Level of Safety    Hearing: Hearing is good. Activities of Daily Living: The home contains: no safety equipment. Patient does total self care     Ambulation: with no difficulty      Fall Risk:  Fall Risk Assessment, last 12 mths 9/14/2022   Able to walk? Yes   Fall in past 12 months? 0   Do you feel unsteady?  0   Are you worried about falling 0      Abuse Screen:  Patient is not abused       Cognitive Screening    Has your family/caregiver stated any concerns about your memory: no     Cognitive Screening: Normal - Verbal Fluency Test    Health Maintenance Due     Health Maintenance Due   Topic Date Due Shingrix Vaccine Age 50> (1 of 2) Never done    Medicare Yearly Exam  Never done    Pneumococcal 65+ years (2 - PPSV23 or PCV20) 09/11/2021    COVID-19 Vaccine (3 - Booster for Westly Poisson series) 09/26/2021    Flu Vaccine (1) 08/01/2022       Patient Care Team   Patient Care Team:  Stacy Wilkes MD as PCP - General (Internal Medicine Physician)  Stacy Wilkes MD as PCP - [facility]  Raiza Dupont MD (Gastroenterology)  Alysia Sesay MD (Orthopedic Surgery)    History     Patient Active Problem List   Diagnosis Code    Thoracic back pain M54.6    Hypogonadism, male E29.1    Abnormal cardiovascular stress test R94.39    Chronic back pain M54.9, G89.29    Pain R52    Primary osteoarthritis of right knee M17.11    H/O colonoscopy Z98.890    CAD (coronary artery disease) I25.10    Small bowel obstruction due to adhesions (HCC) K56.50    GERD (gastroesophageal reflux disease) K21.9    HTN (hypertension), benign I10    Duodenitis K29.80    Hypokalemia E87.6    Epigastric pain R10.13    Abnormal CT of the abdomen R93.5    Acute myocardial infarction, unspecified I21.9    Non-ST elevation myocardial infarction (NSTEMI) I21.4    ST elevation (STEMI) myocardial infarction of unspecified site I21.3    Hiatal hernia K44.9    Fractures, stress M84.30XA     Past Medical History:   Diagnosis Date    Abnormal finding on EKG 2015    Infarct- ruled as a mis-read by cardiology after testing    Arthritis     CAD (coronary artery disease) 08/06/2021    PC/stent mid LAD     Chronic back pain     s/p surgery    Fractures, stress 10/10/2022    4 stress fxt dr Misael Rai    GERD (gastroesophageal reflux disease)     H/O colonoscopy 7/7/2020 6/18 5 year repeat    Hiatal hernia 4/14/2022    egd dr richter 2021    Hyperlipidemia     Hypertension     Hypogonadism, male 8/21/2013    total 97, free 1.8  5/13/13    Panic attacks       Past Surgical History:   Procedure Laterality Date    COLONOSCOPY N/A 06/08/2018    COLONOSCOPY performed by Eduar Leroy MD at [facility] ENDOSCOPY    FOOT/TOES SURGERY PROC UNLISTED      HX BLEPHAROPLASTY  2011    HX COLONOSCOPY      Dr. Toni Hobson? HX FRACTURE TX Right     clavicle repair    HX HEART CATHETERIZATION  2011?    normal    HX HEART CATHETERIZATION      stent placement 2021/8    HX KNEE ARTHROSCOPY Right 2014    HX KNEE ARTHROSCOPY Left 2015    x 5    HX KNEE ARTHROSCOPY Right 04/29/2019    HX KNEE REPLACEMENT Right     HX ORTHOPAEDIC  1990    right wrist    HX ORTHOPAEDIC Right 2015    ankle surgery    HX ROTATOR CUFF REPAIR Right 2017    HX ROTATOR CUFF REPAIR Left 2018    HX SHOULDER ARTHROSCOPY Right 2007    wire and screw    HX WRIST FRACTURE TX      MD ANESTH,SURGERY OF SHOULDER       Current Outpatient Medications   Medication Sig Dispense Refill    ALPRAZolam (XANAX) 0.5 mg tablet TAKE 1 TABLET EVERY 8 HOURS AS NEEDED FOR ANXIETY MAX 3 TABS PER DAY 30 Tablet 0    pantoprazole (PROTONIX) 40 mg tablet TAKE 1 TABLET BY MOUTH EVERY DAY 30 Tablet 3    tadalafiL (CIALIS) 10 mg tablet TAKE 1 TABLET BY MOUTH DAILY AS NEEDED 8 Tablet 0    lisinopriL (PRINIVIL, ZESTRIL) 10 mg tablet Take 1 Tablet by mouth daily. 90 Tablet 2    atorvastatin (LIPITOR) 40 mg tablet TAKE 1 TABLET BY MOUTH EVERY DAY 90 Tablet 1    buprenorphine-naloxone (SUBOXONE) 8-2 mg film sublingaul film 1 Film by SubLINGual route daily. aspirin 81 mg chewable tablet Take 1 Tablet by mouth daily. 30 Tablet 5    testosterone cypionate (DEPOTESTOTERONE CYPIONATE) 200 mg/mL injection 80 mg by IntraMUSCular route every seven (7) days.        Allergies   Allergen Reactions    Codeine Rash     Over 40 years ago       Family History   Problem Relation Age of Onset    Heart Disease Father 46        CABG x5    Coronary Art Dis Father         stents    Diabetes Sister     Heart Disease Sister         afib    Heart Failure Mother     Heart Disease Brother     No Known Problems Brother     Malignant Hyperthermia Neg Hx      Social History Tobacco Use    Smoking status: Never    Smokeless tobacco: Never   Substance Use Topics    Alcohol use: No     Alcohol/week: 0.0 standard drinks     Comment: Rare glass of wine       Adore Goodman MD

## 2022-10-11 ENCOUNTER — HOSPITAL ENCOUNTER (OUTPATIENT)
Dept: GENERAL RADIOLOGY | Age: 67
Discharge: HOME | End: 2022-10-11
Payer: MEDICARE

## 2022-10-11 ENCOUNTER — TELEPHONE (OUTPATIENT)
Dept: INTERNAL MEDICINE CLINIC | Age: 67
End: 2022-10-11

## 2022-10-11 DIAGNOSIS — M54.50 LUMBAR SPINE PAIN: Primary | ICD-10-CM

## 2022-10-11 DIAGNOSIS — M54.50 LUMBAR SPINE PAIN: ICD-10-CM

## 2022-10-11 LAB
ALBUMIN SERPL-MCNC: 3.9 G/DL (ref 3.5–5)
ALBUMIN/GLOB SERPL: 1.6 {RATIO} (ref 1.1–2.2)
ALP SERPL-CCNC: 60 U/L (ref 45–117)
ALT SERPL-CCNC: 22 U/L (ref 12–78)
ANION GAP SERPL CALC-SCNC: 4 MMOL/L (ref 5–15)
AST SERPL-CCNC: 13 U/L (ref 15–37)
BASOPHILS # BLD: 0 K/UL (ref 0–0.1)
BASOPHILS NFR BLD: 1 % (ref 0–1)
BILIRUB SERPL-MCNC: 0.8 MG/DL (ref 0.2–1)
BUN SERPL-MCNC: 21 MG/DL (ref 6–20)
BUN/CREAT SERPL: 19 (ref 12–20)
CALCIUM SERPL-MCNC: 8.9 MG/DL (ref 8.5–10.1)
CALCIUM SERPL-MCNC: 9 MG/DL (ref 8.5–10.1)
CHLORIDE SERPL-SCNC: 106 MMOL/L (ref 97–108)
CO2 SERPL-SCNC: 31 MMOL/L (ref 21–32)
CREAT SERPL-MCNC: 1.1 MG/DL (ref 0.7–1.3)
DIFFERENTIAL METHOD BLD: NORMAL
EOSINOPHIL # BLD: 0.3 K/UL (ref 0–0.4)
EOSINOPHIL NFR BLD: 6 % (ref 0–7)
ERYTHROCYTE [DISTWIDTH] IN BLOOD BY AUTOMATED COUNT: 12.3 % (ref 11.5–14.5)
GLOBULIN SER CALC-MCNC: 2.5 G/DL (ref 2–4)
GLUCOSE SERPL-MCNC: 76 MG/DL (ref 65–100)
HCT VFR BLD AUTO: 46.6 % (ref 36.6–50.3)
HGB BLD-MCNC: 15.1 G/DL (ref 12.1–17)
IMM GRANULOCYTES # BLD AUTO: 0 K/UL (ref 0–0.04)
IMM GRANULOCYTES NFR BLD AUTO: 0 % (ref 0–0.5)
LYMPHOCYTES # BLD: 1.4 K/UL (ref 0.8–3.5)
LYMPHOCYTES NFR BLD: 24 % (ref 12–49)
MCH RBC QN AUTO: 31.5 PG (ref 26–34)
MCHC RBC AUTO-ENTMCNC: 32.4 G/DL (ref 30–36.5)
MCV RBC AUTO: 97.3 FL (ref 80–99)
MONOCYTES # BLD: 0.5 K/UL (ref 0–1)
MONOCYTES NFR BLD: 8 % (ref 5–13)
NEUTS SEG # BLD: 3.5 K/UL (ref 1.8–8)
NEUTS SEG NFR BLD: 61 % (ref 32–75)
NRBC # BLD: 0 K/UL (ref 0–0.01)
NRBC BLD-RTO: 0 PER 100 WBC
PLATELET # BLD AUTO: 181 K/UL (ref 150–400)
PMV BLD AUTO: 10.4 FL (ref 8.9–12.9)
POTASSIUM SERPL-SCNC: 4.7 MMOL/L (ref 3.5–5.1)
PROT SERPL-MCNC: 6.4 G/DL (ref 6.4–8.2)
PTH-INTACT SERPL-MCNC: 47.7 PG/ML (ref 18.4–88)
RBC # BLD AUTO: 4.79 M/UL (ref 4.1–5.7)
SODIUM SERPL-SCNC: 141 MMOL/L (ref 136–145)
TSH SERPL DL<=0.05 MIU/L-ACNC: 4.4 UIU/ML (ref 0.36–3.74)
WBC # BLD AUTO: 5.8 K/UL (ref 4.1–11.1)

## 2022-10-11 PROCEDURE — 72100 X-RAY EXAM L-S SPINE 2/3 VWS: CPT

## 2022-10-11 RX ORDER — MELOXICAM 15 MG/1
15 TABLET ORAL DAILY
Qty: 30 TABLET | Refills: 0 | Status: SHIPPED | OUTPATIENT
Start: 2022-10-11

## 2022-10-11 NOTE — TELEPHONE ENCOUNTER
He reports increased low back paiin no falls but does have stress fxt in his foot and I worry about his bone strength  Reports pain worse with bending and standing up  Stop advil use mobic and check xray

## 2022-10-12 ENCOUNTER — TELEPHONE (OUTPATIENT)
Dept: INTERNAL MEDICINE CLINIC | Age: 67
End: 2022-10-12

## 2022-10-12 NOTE — TELEPHONE ENCOUNTER
I called to discuss xr and labs  Advised see dr Natalie Kiser for help with back and take the mobic  Appt with  me in 2 mo re borderline tsh  No new fxt in spine seen

## 2022-11-10 ENCOUNTER — OFFICE VISIT (OUTPATIENT)
Dept: ORTHOPEDIC SURGERY | Age: 67
End: 2022-11-10
Payer: MEDICARE

## 2022-11-10 VITALS — BODY MASS INDEX: 20.94 KG/M2 | HEIGHT: 73 IN | WEIGHT: 158 LBS

## 2022-11-10 DIAGNOSIS — M21.6X1 ACQUIRED CAVOVARUS FOOT DEFORMITY, RIGHT: ICD-10-CM

## 2022-11-10 DIAGNOSIS — S99.191A CLOSED FRACTURE OF BASE OF FIFTH METATARSAL BONE OF RIGHT FOOT AT METAPHYSEAL-DIAPHYSEAL JUNCTION, INITIAL ENCOUNTER: Primary | ICD-10-CM

## 2022-11-10 PROCEDURE — G8756 NO BP MEASURE DOC: HCPCS | Performed by: ORTHOPAEDIC SURGERY

## 2022-11-10 PROCEDURE — 99213 OFFICE O/P EST LOW 20 MIN: CPT | Performed by: ORTHOPAEDIC SURGERY

## 2022-11-10 PROCEDURE — G8432 DEP SCR NOT DOC, RNG: HCPCS | Performed by: ORTHOPAEDIC SURGERY

## 2022-11-10 PROCEDURE — G8427 DOCREV CUR MEDS BY ELIG CLIN: HCPCS | Performed by: ORTHOPAEDIC SURGERY

## 2022-11-10 PROCEDURE — G8536 NO DOC ELDER MAL SCRN: HCPCS | Performed by: ORTHOPAEDIC SURGERY

## 2022-11-10 PROCEDURE — G8420 CALC BMI NORM PARAMETERS: HCPCS | Performed by: ORTHOPAEDIC SURGERY

## 2022-11-10 PROCEDURE — 1101F PT FALLS ASSESS-DOCD LE1/YR: CPT | Performed by: ORTHOPAEDIC SURGERY

## 2022-11-10 PROCEDURE — 1123F ACP DISCUSS/DSCN MKR DOCD: CPT | Performed by: ORTHOPAEDIC SURGERY

## 2022-11-10 PROCEDURE — 3017F COLORECTAL CA SCREEN DOC REV: CPT | Performed by: ORTHOPAEDIC SURGERY

## 2022-11-10 NOTE — LETTER
11/10/2022    Patient: Comfort Hendrickson   YOB: 1955   Date of Visit: 11/10/2022     Fany De La O, [address] [facility]  [address]  [address] [facility]  Via In Basket    Dear Fany De La O MD,      Thank you for referring Mr. Radha Rodriguez to [facility] for evaluation. My notes for this consultation are attached. If you have questions, please do not hesitate to call me. I look forward to following your patient along with you.       Sincerely,    Kathi Sahu MD
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
## 2022-11-30 RX ORDER — ATORVASTATIN CALCIUM 40 MG/1
TABLET, FILM COATED ORAL
Qty: 90 TABLET | Refills: 0 | Status: SHIPPED | OUTPATIENT
Start: 2022-11-30

## 2022-11-30 RX ORDER — CLOPIDOGREL BISULFATE 75 MG/1
TABLET ORAL
Qty: 30 TABLET | Refills: 0 | OUTPATIENT
Start: 2022-11-30

## 2022-12-01 ENCOUNTER — TELEPHONE (OUTPATIENT)
Dept: ORTHOPEDIC SURGERY | Age: 67
End: 2022-12-01

## 2022-12-01 DIAGNOSIS — S99.191A CLOSED FRACTURE OF BASE OF FIFTH METATARSAL BONE OF RIGHT FOOT AT METAPHYSEAL-DIAPHYSEAL JUNCTION, INITIAL ENCOUNTER: Primary | ICD-10-CM

## 2022-12-01 NOTE — TELEPHONE ENCOUNTER
I called pt to inform him of his upcoming sx of 12/12/2022, and that he needs to see his cardiologist first. He is aware and will make an appt to see cardio.

## 2022-12-09 ENCOUNTER — OFFICE VISIT (OUTPATIENT)
Dept: ORTHOPEDIC SURGERY | Age: 67
End: 2022-12-09
Payer: MEDICARE

## 2022-12-09 VITALS — WEIGHT: 158 LBS | BODY MASS INDEX: 20.94 KG/M2 | HEIGHT: 73 IN

## 2022-12-09 DIAGNOSIS — S99.191G CLOSED FRACTURE OF BASE OF FIFTH METATARSAL BONE OF RIGHT FOOT AT METAPHYSEAL-DIAPHYSEAL JUNCTION WITH DELAYED HEALING, SUBSEQUENT ENCOUNTER: Primary | ICD-10-CM

## 2022-12-09 DIAGNOSIS — M21.6X1 ACQUIRED CAVOVARUS FOOT DEFORMITY, RIGHT: ICD-10-CM

## 2022-12-09 DIAGNOSIS — M79.671 MECHANICAL FOOT PAIN, RIGHT: ICD-10-CM

## 2022-12-09 NOTE — LETTER
12/14/2022    Patient: Maria Elena Kiran   YOB: 1955   Date of Visit: 12/9/2022     [facility] [address] [facility]  [address]  [address] [facility]  Via In Basket    Dear Leah Guillen MD,      Thank you for referring Mr. Suri Gates to [facility] for evaluation. My notes for this consultation are attached. If you have questions, please do not hesitate to call me. I look forward to following your patient along with you.       Sincerely,    Sonya Hodgson MD

## 2022-12-12 ENCOUNTER — TELEPHONE (OUTPATIENT)
Dept: CARDIOLOGY CLINIC | Age: 67
End: 2022-12-12

## 2022-12-12 NOTE — TELEPHONE ENCOUNTER
Patient states that Dr Edmond Caban will be performing foot surgery to repair a broken bone and cardiac clearance is needed. States that the physician is willing to work him in once he is cleared. Requests a call back at [phone].     Thanks,  George Roberts
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
## 2022-12-20 ENCOUNTER — OFFICE VISIT (OUTPATIENT)
Dept: CARDIOLOGY CLINIC | Age: 67
End: 2022-12-20
Payer: MEDICARE

## 2022-12-20 VITALS
SYSTOLIC BLOOD PRESSURE: 150 MMHG | HEART RATE: 68 BPM | OXYGEN SATURATION: 98 % | WEIGHT: 163.4 LBS | DIASTOLIC BLOOD PRESSURE: 82 MMHG | HEIGHT: 73 IN | BODY MASS INDEX: 21.66 KG/M2

## 2022-12-20 DIAGNOSIS — R06.02 SOB (SHORTNESS OF BREATH): ICD-10-CM

## 2022-12-20 DIAGNOSIS — M79.671 MECHANICAL FOOT PAIN, RIGHT: ICD-10-CM

## 2022-12-20 DIAGNOSIS — Z01.818 PRE-OP TESTING: Primary | ICD-10-CM

## 2022-12-20 DIAGNOSIS — I25.10 CORONARY ARTERY DISEASE INVOLVING NATIVE CORONARY ARTERY OF NATIVE HEART WITHOUT ANGINA PECTORIS: Primary | ICD-10-CM

## 2022-12-20 DIAGNOSIS — I25.750: ICD-10-CM

## 2022-12-20 DIAGNOSIS — I48.91 ATRIAL FIBRILLATION, UNSPECIFIED TYPE (HCC): ICD-10-CM

## 2022-12-20 DIAGNOSIS — S99.191G CLOSED FRACTURE OF BASE OF FIFTH METATARSAL BONE OF RIGHT FOOT AT METAPHYSEAL-DIAPHYSEAL JUNCTION WITH DELAYED HEALING, SUBSEQUENT ENCOUNTER: ICD-10-CM

## 2022-12-20 DIAGNOSIS — M21.6X1 ACQUIRED CAVOVARUS FOOT DEFORMITY, RIGHT: ICD-10-CM

## 2022-12-20 PROCEDURE — G0463 HOSPITAL OUTPT CLINIC VISIT: HCPCS | Performed by: SPECIALIST

## 2022-12-20 PROCEDURE — G8754 DIAS BP LESS 90: HCPCS | Performed by: SPECIALIST

## 2022-12-20 PROCEDURE — G8427 DOCREV CUR MEDS BY ELIG CLIN: HCPCS | Performed by: SPECIALIST

## 2022-12-20 PROCEDURE — G8510 SCR DEP NEG, NO PLAN REQD: HCPCS | Performed by: SPECIALIST

## 2022-12-20 PROCEDURE — 3078F DIAST BP <80 MM HG: CPT | Performed by: SPECIALIST

## 2022-12-20 PROCEDURE — G8753 SYS BP > OR = 140: HCPCS | Performed by: SPECIALIST

## 2022-12-20 PROCEDURE — 1123F ACP DISCUSS/DSCN MKR DOCD: CPT | Performed by: SPECIALIST

## 2022-12-20 PROCEDURE — 93010 ELECTROCARDIOGRAM REPORT: CPT | Performed by: SPECIALIST

## 2022-12-20 PROCEDURE — G8420 CALC BMI NORM PARAMETERS: HCPCS | Performed by: SPECIALIST

## 2022-12-20 PROCEDURE — 99214 OFFICE O/P EST MOD 30 MIN: CPT | Performed by: SPECIALIST

## 2022-12-20 PROCEDURE — 3017F COLORECTAL CA SCREEN DOC REV: CPT | Performed by: SPECIALIST

## 2022-12-20 PROCEDURE — 1101F PT FALLS ASSESS-DOCD LE1/YR: CPT | Performed by: SPECIALIST

## 2022-12-20 PROCEDURE — 93005 ELECTROCARDIOGRAM TRACING: CPT | Performed by: SPECIALIST

## 2022-12-20 PROCEDURE — G8536 NO DOC ELDER MAL SCRN: HCPCS | Performed by: SPECIALIST

## 2022-12-20 PROCEDURE — 3074F SYST BP LT 130 MM HG: CPT | Performed by: SPECIALIST

## 2022-12-20 NOTE — LETTER
12/20/2022    Patient: Arun Anderson   YOB: 1955   Date of Visit: 12/20/2022     Michelle Wolff [address] [facility] [address]  [address] [facility]  Via In Basket    Dear Lisa Medrano MD,      Thank you for referring Mr. Beth Zimmerman to [facility] for evaluation. My notes for this consultation are attached. If you have questions, please do not hesitate to call me. I look forward to following your patient along with you.       Sincerely,    Alexus Ghosh MD

## 2022-12-20 NOTE — PROGRESS NOTES
Chief Complaint   Patient presents with    Follow-up     6month W/ per op clearance     Coronary Artery Disease    Hypertension       Vitals:    12/20/22 1046 12/20/22 1059   BP: (!) 148/80 (!) 150/82   BP 1 Location: Left arm Right arm   Pulse: 68    Height: 6' 1\" (1.854 m)    Weight: 163 lb 6.4 oz (74.1 kg)    SpO2: 98%          Chest pain: no    SOB: no    Palpitations: no    Dizziness: no    Swelling: no    Refills:       Have you been to the ER, urgent care clinic since your last visit? Hospitalized since your last visit? Have you seen or consulted other health care providers outside of the [facility] since your last visit?  (Include any pap smears or colon screening.)      R Foot Surgery- No set date

## 2022-12-20 NOTE — PROGRESS NOTES
CARDIOLOGY OFFICE NOTE    Berny Rivas MD, [address], [place], [address] [facility]  Phone [phone]; Fax [phone]  Mobile [phone]   Voice Mail [phone]    Primary care: Alton Sweeney MD       ATTENTION:   This medical record was transcribed using an electronic medical records/speech recognition system. Although proofread, it may and can contain electronic, spelling and other errors. Corrections may be executed at a later time. Please feel free to contact us for any clarifications as needed. ICD-10-CM ICD-9-CM   1. Coronary artery disease involving native coronary artery of native heart without angina pectoris  I25.10 414.01   2. Coronary artery disease involving transplanted heart with unstable angina pectoris, unspecified vessel or lesion type ([facility])  I25.750 414.06     413.9   3. Atrial fibrillation, unspecified type ([address]  I48.91 427.31   4. SOB (shortness of breath)  R06.02 786.05            Luis A Geiger is a 78 y.o. male with  referred for CAD status post stenting of the LAD          Cardiac risk factors: dyslipidemia, male gender, hypertension  I have personally obtained the history from the patient. He is doing well overall  HISTORY OF PRESENTING ILLNESS     Chest pain or shortness of breath is very active. He is scheduled to have foot surgery in the surgery. No longer on Plavix he is over a year since stenting.        ACTIVE PROBLEM LIST     Patient Active Problem List    Diagnosis Date Noted    CAD (coronary artery disease) 08/06/2021    Fractures, stress 10/10/2022    Hiatal hernia 04/14/2022    Acute myocardial infarction, unspecified 11/09/2021    Non-ST elevation myocardial infarction (NSTEMI) 11/09/2021    ST elevation (STEMI) myocardial infarction of unspecified site 11/09/2021    Epigastric pain 08/17/2021    Abnormal CT of the abdomen 08/17/2021    Small bowel obstruction due to adhesions ([address] 08/16/2021    GERD (gastroesophageal reflux disease) 08/16/2021    HTN (hypertension), benign 08/16/2021    Duodenitis 08/16/2021    Hypokalemia 08/16/2021    H/O colonoscopy 07/07/2020    Primary osteoarthritis of right knee 11/14/2019    Pain 04/29/2019    Thoracic back pain 08/21/2013    Hypogonadism, male 08/21/2013    Abnormal cardiovascular stress test     Chronic back pain            PAST MEDICAL HISTORY     Past Medical History:   Diagnosis Date    Abnormal finding on EKG 2015    Infarct- ruled as a mis-read by cardiology after testing    Arthritis     CAD (coronary artery disease) 08/06/2021    PC/stent mid LAD     Chronic back pain     s/p surgery    Fractures, stress 10/10/2022    4 stress fxt dr Marcie Adler    GERD (gastroesophageal reflux disease)     H/O colonoscopy 7/7/2020 6/18 5 year repeat    Hiatal hernia 4/14/2022    egd dr richter 2021    Hyperlipidemia     Hypertension     Hypogonadism, male 8/21/2013    total 97, free 1.8  5/13/13    Panic attacks            PAST SURGICAL HISTORY     Past Surgical History:   Procedure Laterality Date    COLONOSCOPY N/A 06/08/2018    COLONOSCOPY performed by Randee Negrete MD at [facility]    FOOT/TOES SURGERY PROC UNLISTED      HX BLEPHAROPLASTY  2011    HX COLONOSCOPY      Dr. Olivia Alonzo?     HX FRACTURE TX Right     clavicle repair    HX HEART CATHETERIZATION  2011?    normal    HX HEART CATHETERIZATION      stent placement 2021/8    HX KNEE ARTHROSCOPY Right 2014    HX KNEE ARTHROSCOPY Left 2015    x 5    HX KNEE ARTHROSCOPY Right 04/29/2019    HX KNEE REPLACEMENT Right     HX ORTHOPAEDIC  1990    right wrist    HX ORTHOPAEDIC Right 2015    ankle surgery    HX ROTATOR CUFF REPAIR Right 2017    HX ROTATOR CUFF REPAIR Left 2018    HX SHOULDER ARTHROSCOPY Right 2007    wire and screw    HX WRIST FRACTURE TX      [place] ANESTH,SURGERY OF SHOULDER            ALLERGIES     Allergies   Allergen Reactions    Codeine Rash     Over 40 years ago          FAMILY HISTORY     Family History   Problem Relation Age of Onset    Heart Disease Father 46        CABG x5    Coronary Art Dis Father         stents    Diabetes Sister     Heart Disease Sister         afib    Heart Failure Mother     Heart Disease Brother     No Known Problems Brother     Malignant Hyperthermia Neg Hx     negative for cardiac disease       SOCIAL HISTORY     Social History     Socioeconomic History    Marital status:      Spouse name: Laya Birch    Number of children: 5    Highest education level: High school graduate   Occupational History    Occupation: Refrigeration     Employer: SELF EMPLOYED   Tobacco Use    Smoking status: Never    Smokeless tobacco: Never   Vaping Use    Vaping Use: Never used   Substance and Sexual Activity    Alcohol use: No     Alcohol/week: 0.0 standard drinks     Comment: Rare glass of wine    Drug use: Not Currently   Other Topics Concern     Service No    Caffeine Concern No     Comment: drinks 1-2 caffeinated beverages         MEDICATIONS     Current Outpatient Medications   Medication Sig    atorvastatin (LIPITOR) 40 mg tablet TAKE 1 TABLET BY MOUTH EVERY DAY    ALPRAZolam (XANAX) 0.5 mg tablet Take 1 Tablet by mouth two (2) times daily as needed for Anxiety. Max Daily Amount: 1 mg.    pantoprazole (PROTONIX) 40 mg tablet TAKE 1 TABLET BY MOUTH EVERY DAY    tadalafiL (CIALIS) 10 mg tablet TAKE 1 TABLET BY MOUTH DAILY AS NEEDED    lisinopriL (PRINIVIL, ZESTRIL) 10 mg tablet Take 1 Tablet by mouth daily. buprenorphine-naloxone (SUBOXONE) 8-2 mg film sublingaul film 1 Film by SubLINGual route daily. aspirin 81 mg chewable tablet Take 1 Tablet by mouth daily. testosterone cypionate (DEPOTESTOTERONE CYPIONATE) 200 mg/mL injection 80 mg by IntraMUSCular route every seven (7) days. meloxicam (MOBIC) 15 mg tablet Take 1 Tablet by mouth daily. No current facility-administered medications for this visit.        I have reviewed the nurses notes, vitals, problem list, allergy list, medical history, family, social history and medications. REVIEW OF SYMPTOMS   Pertinent positive per HPI  General: Pt denies excessive weight gain or loss. Pt is able to conduct ADL's  HEENT: Denies blurred vision, headaches, hearing loss, epistaxis and difficulty swallowing. Respiratory: Denies cough, congestion, shortness of breath, VERDE, wheezing or stridor. Cardiovascular: Denies precordial pain, palpitations, edema or PND  Gastrointestinal: Denies poor appetite, indigestion, abdominal pain or blood in stool  Genitourinary: Denies hematuria, dysuria, increased urinary frequency  Musculoskeletal: Denies joint pain or swelling from muscles or joints  Neurologic: Denies tremor, paresthesias, headache, or sensory motor disturbance  Psychiatric: Denies confusion, insomnia, depression  Integumentray: Denies rash, itching or ulcers. Hematologic: Denies easy bruising, bleeding     PHYSICAL EXAMINATION      Vitals:    12/20/22 1046 12/20/22 1059   BP: (!) 148/80 (!) 150/82   Pulse: 68    SpO2: 98%    Weight: 163 lb 6.4 oz (74.1 kg)    Height: 6' 1\" (1.854 m)      General: Well developed, in no acute distress. HEENT: No jaundice, oral mucosa moist, no oral ulcers  Neck: Supple, no stiffness, no lymphadenopathy, supple  Heart:  Normal S1/S2 negative S3 or S4. Regular, no murmur, gallop or rub, no jugular venous distention  Respiratory: Clear bilaterally x 4, no wheezing or rales  Extremities:  No edema, normal cap refill, no cyanosis. Musculoskeletal: No clubbing, no deformities  Neuro: A&Ox3, speech clear, gait stable, cooperative, no focal neurologic deficits  Skin: Skin color is normal. No rashes or lesions. Non diaphoretic, moist.           DIAGNOSTIC DATA     1. Lipids   8/6/21- , HDL 60, LDL 71.2, TG 89   10/19/21- , HDL 39, LDL 50, TG 71  12/14/21- , HDL 43, LDL 47, TG 66  6/9/22- , HDL 53, LDL 52, TG 84    2.  Cardiac Cath   8/6/21- Critical bifurcation stenosis of mid LAD and D2   Mildly elevated LVEDP   Mid LAD stented with a 2.5X 23 mm Xience Elba GENARO, optimized with a 2 5 and 3.0 noncompliant balloon    3. Stress Test  5/11/16- Stress Echo- no ischemia, EF 55%, 10.7 mets    4. Echo  9/9/21-EF 57%, LAE, RVSP 24 mmHg, AO: Mild sinuses of Valsalva dilatation. Diameter is 4.2 cm. LABORATORY DATA            Lab Results   Component Value Date/Time    WBC 5.8 10/10/2022 09:21 AM    HGB 15.1 10/10/2022 09:21 AM    HCT 46.6 10/10/2022 09:21 AM    PLATELET 769 23/60/0962 09:21 AM    MCV 97.3 10/10/2022 09:21 AM      Lab Results   Component Value Date/Time    Sodium 141 10/10/2022 09:21 AM    Potassium 4.7 10/10/2022 09:21 AM    Chloride 106 10/10/2022 09:21 AM    CO2 31 10/10/2022 09:21 AM    Anion gap 4 (L) 10/10/2022 09:21 AM    Glucose 76 10/10/2022 09:21 AM    BUN 21 (H) 10/10/2022 09:21 AM    Creatinine 1.10 10/10/2022 09:21 AM    BUN/Creatinine ratio 19 10/10/2022 09:21 AM    GFR est AA >60 05/23/2022 02:57 PM    GFR est non-AA >60 05/23/2022 02:57 PM    Calcium 9.0 10/10/2022 09:21 AM    Calcium 8.9 10/10/2022 09:21 AM    Bilirubin, total 0.7 12/19/2022 09:20 AM    Alk. phosphatase 57 12/19/2022 09:20 AM    Protein, total 6.4 12/19/2022 09:20 AM    Albumin 3.9 12/19/2022 09:20 AM    Globulin 2.5 12/19/2022 09:20 AM    A-G Ratio 1.6 12/19/2022 09:20 AM    ALT (SGPT) 20 12/19/2022 09:20 AM           ASSESSMENT/RECOMMENDATIONS:.      CAD status post stenting of the LAD in August 2021 for unstable angina  -He may stop his Plavix at the end of July and will double his aspirin to 162 mg for 1 month and give a 80  -LAD diffuse disease at bifurcation with D2 90% focal stenosis, circumflex okay, RCA okay.   Mid LAD was stented with a 2.5 x 23 mm Xience Elba GENARO deployed at 16 moiz and I believe postdilated with a 3 oh noncompliant balloon.  -no cardiac testing indicated  Hypertension  -BP is up slightly but he states it is low at home.  -he will check track of his BP numbers and report those to me  Dyslipidemia  -most recent LDL  was 53  -Repeat cholesterol in 6 months  4. Cardiac preoperative risk stratification  -he is a low cardiac operative risk  -he is very active and walks up and down ladders carrying heavy containers and has no complaints of chest pain or SOB. -he may go forward with Knees and right foot surgery      [id number] [place]  with 5 kids. Enjoys golf. Orders Placed This Encounter    AMB POC EKG ROUTINE W/ 12 LEADS, INTER & REP     Order Specific Question:   Reason for Exam:     Answer:   CAD       We discussed the expected course, resolution and complications of the diagnosis(es) in detail. Medication risks, benefits, costs, interactions, and alternatives were discussed as indicated. I advised him to contact the office if his condition worsens, changes or fails to improve as anticipated. He expressed understanding with the diagnosis(es) and plan                  I have discussed the diagnosis with  Chapin Schulz IV and the intended plan as seen in the above orders. Questions were answered concerning future plans. I have discussed medication side effects and warnings with the patient as well. Thank you,  Victor Manuel Mcclelland MD for involving me in the care of  Chapin Schulz IV. Please do not hesitate to contact me for further questions/concerns. Berny Martinez MD, [address]      [facility] [address] [place] [address]      [phone] / [phone] Fax

## 2022-12-20 NOTE — PATIENT INSTRUCTIONS

## 2022-12-22 RX ORDER — TADALAFIL 10 MG/1
TABLET ORAL
Qty: 8 TABLET | Refills: 0 | Status: SHIPPED | OUTPATIENT
Start: 2022-12-22

## 2023-01-09 DIAGNOSIS — K21.9 GERD WITHOUT ESOPHAGITIS: ICD-10-CM

## 2023-01-09 RX ORDER — PANTOPRAZOLE SODIUM 40 MG/1
TABLET, DELAYED RELEASE ORAL
Qty: 30 TABLET | Refills: 3 | Status: SHIPPED | OUTPATIENT
Start: 2023-01-09 | End: 2023-01-11

## 2023-01-11 ENCOUNTER — HOSPITAL ENCOUNTER (OUTPATIENT)
Dept: CT IMAGING | Age: 68
Discharge: HOME | End: 2023-01-11
Attending: ORTHOPAEDIC SURGERY
Payer: MEDICARE

## 2023-01-11 ENCOUNTER — HOSPITAL ENCOUNTER (OUTPATIENT)
Dept: PREADMISSION TESTING | Age: 68
Discharge: HOME | End: 2023-01-11
Payer: MEDICARE

## 2023-01-11 VITALS
OXYGEN SATURATION: 97 % | DIASTOLIC BLOOD PRESSURE: 64 MMHG | HEIGHT: 73 IN | HEART RATE: 78 BPM | TEMPERATURE: 97.7 F | WEIGHT: 164.5 LBS | BODY MASS INDEX: 21.8 KG/M2 | RESPIRATION RATE: 18 BRPM | SYSTOLIC BLOOD PRESSURE: 127 MMHG

## 2023-01-11 DIAGNOSIS — M17.12 PRIMARY LOCALIZED OSTEOARTHRITIS OF LEFT KNEE: ICD-10-CM

## 2023-01-11 LAB
ALBUMIN SERPL-MCNC: 4.3 G/DL (ref 3.5–5)
ALBUMIN/GLOB SERPL: 1.8 (ref 1.1–2.2)
ALP SERPL-CCNC: 63 U/L (ref 45–117)
ALT SERPL-CCNC: 23 U/L (ref 12–78)
ANION GAP SERPL CALC-SCNC: 3 MMOL/L (ref 5–15)
APPEARANCE UR: CLEAR
AST SERPL-CCNC: 11 U/L (ref 15–37)
ATRIAL RATE: 75 BPM
BACTERIA URNS QL MICRO: NEGATIVE /HPF
BILIRUB SERPL-MCNC: 0.7 MG/DL (ref 0.2–1)
BILIRUB UR QL: NEGATIVE
BUN SERPL-MCNC: 23 MG/DL (ref 6–20)
BUN/CREAT SERPL: 19 (ref 12–20)
CALCIUM SERPL-MCNC: 9 MG/DL (ref 8.5–10.1)
CALCULATED P AXIS, ECG09: 56 DEGREES
CALCULATED R AXIS, ECG10: 56 DEGREES
CALCULATED T AXIS, ECG11: 54 DEGREES
CHLORIDE SERPL-SCNC: 103 MMOL/L (ref 97–108)
CO2 SERPL-SCNC: 32 MMOL/L (ref 21–32)
COLOR UR: ABNORMAL
CREAT SERPL-MCNC: 1.2 MG/DL (ref 0.7–1.3)
CRP SERPL-MCNC: <0.29 MG/DL (ref 0–0.6)
DIAGNOSIS, 93000: NORMAL
EPITH CASTS URNS QL MICRO: ABNORMAL /LPF
ERYTHROCYTE [DISTWIDTH] IN BLOOD BY AUTOMATED COUNT: 12.5 % (ref 11.5–14.5)
ERYTHROCYTE [SEDIMENTATION RATE] IN BLOOD: 3 MM/HR (ref 0–20)
EST. AVERAGE GLUCOSE BLD GHB EST-MCNC: 105 MG/DL
GLOBULIN SER CALC-MCNC: 2.4 G/DL (ref 2–4)
GLUCOSE SERPL-MCNC: 98 MG/DL (ref 65–100)
GLUCOSE UR STRIP.AUTO-MCNC: NEGATIVE MG/DL
HBA1C MFR BLD: 5.3 % (ref 4–5.6)
HCT VFR BLD AUTO: 40.7 % (ref 36.6–50.3)
HGB BLD-MCNC: 13.4 G/DL (ref 12.1–17)
HGB UR QL STRIP: NEGATIVE
HYALINE CASTS URNS QL MICRO: ABNORMAL /LPF (ref 0–2)
KETONES UR QL STRIP.AUTO: ABNORMAL MG/DL
LEUKOCYTE ESTERASE UR QL STRIP.AUTO: NEGATIVE
MCH RBC QN AUTO: 30.5 PG (ref 26–34)
MCHC RBC AUTO-ENTMCNC: 32.9 G/DL (ref 30–36.5)
MCV RBC AUTO: 92.7 FL (ref 80–99)
NITRITE UR QL STRIP.AUTO: NEGATIVE
NRBC # BLD: 0 K/UL (ref 0–0.01)
NRBC BLD-RTO: 0 PER 100 WBC
P-R INTERVAL, ECG05: 158 MS
PH UR STRIP: 5 (ref 5–8)
PLATELET # BLD AUTO: 186 K/UL (ref 150–400)
PMV BLD AUTO: 9.5 FL (ref 8.9–12.9)
POTASSIUM SERPL-SCNC: 4.6 MMOL/L (ref 3.5–5.1)
PROT SERPL-MCNC: 6.7 G/DL (ref 6.4–8.2)
PROT UR STRIP-MCNC: NEGATIVE MG/DL
Q-T INTERVAL, ECG07: 380 MS
QRS DURATION, ECG06: 92 MS
QTC CALCULATION (BEZET), ECG08: 424 MS
RBC # BLD AUTO: 4.39 M/UL (ref 4.1–5.7)
RBC #/AREA URNS HPF: ABNORMAL /HPF (ref 0–5)
SODIUM SERPL-SCNC: 138 MMOL/L (ref 136–145)
SP GR UR REFRACTOMETRY: 1.03 (ref 1–1.03)
UA: UC IF INDICATED,UAUC: ABNORMAL
UROBILINOGEN UR QL STRIP.AUTO: 1 EU/DL (ref 0.2–1)
VENTRICULAR RATE, ECG03: 75 BPM
WBC # BLD AUTO: 5.9 K/UL (ref 4.1–11.1)
WBC URNS QL MICRO: ABNORMAL /HPF (ref 0–4)

## 2023-01-11 PROCEDURE — 93005 ELECTROCARDIOGRAM TRACING: CPT

## 2023-01-11 PROCEDURE — 85652 RBC SED RATE AUTOMATED: CPT

## 2023-01-11 PROCEDURE — 80053 COMPREHEN METABOLIC PANEL: CPT

## 2023-01-11 PROCEDURE — 36415 COLL VENOUS BLD VENIPUNCTURE: CPT

## 2023-01-11 PROCEDURE — 81001 URINALYSIS AUTO W/SCOPE: CPT

## 2023-01-11 PROCEDURE — 84466 ASSAY OF TRANSFERRIN: CPT

## 2023-01-11 PROCEDURE — 73700 CT LOWER EXTREMITY W/O DYE: CPT

## 2023-01-11 PROCEDURE — 83036 HEMOGLOBIN GLYCOSYLATED A1C: CPT

## 2023-01-11 PROCEDURE — 86140 C-REACTIVE PROTEIN: CPT

## 2023-01-11 PROCEDURE — 85027 COMPLETE CBC AUTOMATED: CPT

## 2023-01-11 RX ORDER — ATORVASTATIN CALCIUM 40 MG/1
40 TABLET, FILM COATED ORAL
COMMUNITY

## 2023-01-11 RX ORDER — PANTOPRAZOLE SODIUM 40 MG/1
40 TABLET, DELAYED RELEASE ORAL
COMMUNITY

## 2023-01-11 RX ORDER — GUAIFENESIN 100 MG/5ML
81 LIQUID (ML) ORAL
COMMUNITY

## 2023-01-11 RX ORDER — LISINOPRIL 10 MG/1
10 TABLET ORAL
COMMUNITY

## 2023-01-11 RX ORDER — FAMOTIDINE 20 MG/1
20 TABLET, FILM COATED ORAL
COMMUNITY

## 2023-01-11 NOTE — PERIOP NOTES
[address] , [address] [facility]   MAIN OR                                  [phone]   MAIN PRE OP                          [phone]                                                                                AMBULATORY PRE OP          [phone]  PRE-ADMISSION TESTING    [phone]   Surgery Date:  Thursday 1/19/23       Is surgery arrival time given by surgeon? NO  If NO, [address] [facility] staff will call you between 3 and 7pm the day before your surgery with your arrival time. (If your surgery is on a Monday, we will call you the Friday before.)    Call [phone] after 7pm Monday-Friday if you did not receive this call. INSTRUCTIONS BEFORE YOUR SURGERY   When You  Arrive Arrive at the [address] [facility] on the day of your surgery  Have your insurance card, photo ID, and any copayment (if needed)   Food   and   Drink NO food or drink after midnight the night before surgery    This means NO water, gum, mints, coffee, juice, etc.  No alcohol (beer, wine, liquor) 24 hours before and after surgery   Medications to   TAKE   Morning of Surgery MEDICATIONS TO TAKE THE MORNING OF SURGERY WITH A SIP OF WATER:   Xanax if needed  Pepcid if needed     Medications  To  STOP      7 days before surgery Non-Steroidal anti-inflammatory Drugs (NSAID's): for example, Ibuprofen (Advil, Motrin), Naproxen (Aleve)  Aspirin, if taking for pain   Herbal supplements, vitamins, and fish oil  Other:  (Pain medications not listed above, including Tylenol may be taken)   Blood  Thinners If you take  Aspirin, Plavix, Coumadin, or any blood-thinning or anti-blood clot medicine, talk to the doctor who prescribed the medications for pre-operative instructions.    Bathing Clothing  Jewelry  Valuables     If you shower the morning of surgery, please do not apply anything to your skin (lotions, powders, deodorant.)  Follow Chlorhexidine Care Fusion body wash instructions provided to you during PAT appointment. Begin 3 days prior to surgery. Do not shave or trim anywhere 24 hours before surgery  Wear loose, comfortable, clean clothes  Wear glasses instead of contacts  Leave money, valuables, and jewelry, including body piercings, at home  If you were given an [email or web address], bring it on day of surgery. Going Home - or Spending the Night SAME-DAY SURGERY: You must have a responsible adult drive you home and stay with you 24 hours after surgery  ADMITS: If your doctor is keeping you in the hospital after surgery, leave personal belongings/luggage in your car until you have a hospital room number. Hospital discharge time is 12 noon  Drivers must be here before 12 noon unless you are told differently   Special Instructions 16. Special Instructions:  Use Chlorhexidine Care Fusion wash and sponges 3 days prior to surgery as instructed. Incentive spirometer given with instructions to practice at home and bring back to the hospital on the day of surgery. [facility] will contact you if your Hemoglobin A1C is greater than 7.5. Ensure/Glucerna   Pain pamphlet and Call Don't Fall reminder reviewed with patient.  parking is complimentary Monday - Friday 7 am - 5:30 pm  Bring PTA Medication list day of surgery with the last doses taken documented   Do not bring medication bottles the day of surgery  Do not take  your Cialis medication for 3 days before surgery        Follow all instructions so your surgery wont be cancelled. Please, be on time. If a situation occurs and you are delayed the day of surgery, call [phone]. If your physical condition changes (like a fever, cold, flu, etc.) call your surgeon. Home medication(s) reviewed and verified via  LIST   VERBAL   during PAT appointment.     The patient was contacted by   IN-PERSON  The patient verbalizes understanding of all instructions and   DOES NOT   need reinforcement.

## 2023-01-12 LAB
BACTERIA SPEC CULT: NORMAL
BACTERIA SPEC CULT: NORMAL
SERVICE CMNT-IMP: NORMAL

## 2023-01-12 NOTE — PERIOP NOTES
1/12/23  ASA plan received. Per recommendation, proceeding with surgery while remaining on ASA 81mg is preferred. Spoke with Sidney Flood who will notify Dr. Maddy Suarez. Call to patient, ASA instructions reviewed. Received a call from Sidney Flood, Dr. Maddy Suarez is aware that the patient will not be stopping his ASA. Anesthesia to be changed to general, Sidney Flood will send new orders and have posting corrected. 1/13/23  Call to patient who states that he has spoken with Dr. Shaista Brambila for instructions with his suboxone.

## 2023-01-13 LAB — TRANSFERRIN SERPL-MCNC: 206 MG/DL (ref 177–329)

## 2023-01-16 DIAGNOSIS — F41.9 ANXIETY: ICD-10-CM

## 2023-01-17 ENCOUNTER — ANESTHESIA EVENT (OUTPATIENT)
Dept: SURGERY | Age: 68
End: 2023-01-17
Payer: MEDICARE

## 2023-01-17 RX ORDER — ALPRAZOLAM 0.5 MG/1
0.5 TABLET ORAL
Qty: 30 TABLET | Refills: 0 | Status: SHIPPED | OUTPATIENT
Start: 2023-01-17

## 2023-01-18 RX ORDER — ONDANSETRON 2 MG/ML
4 INJECTION INTRAMUSCULAR; INTRAVENOUS
Status: CANCELLED | OUTPATIENT
Start: 2023-01-18 | End: 2023-01-19

## 2023-01-18 RX ORDER — DIPHENHYDRAMINE HYDROCHLORIDE 50 MG/ML
12.5 INJECTION, SOLUTION INTRAMUSCULAR; INTRAVENOUS
Status: CANCELLED | OUTPATIENT
Start: 2023-01-18 | End: 2023-01-19

## 2023-01-18 RX ORDER — FAMOTIDINE 20 MG/1
20 TABLET, FILM COATED ORAL
Status: CANCELLED | OUTPATIENT
Start: 2023-01-18

## 2023-01-18 RX ORDER — LISINOPRIL 5 MG/1
10 TABLET ORAL
Status: CANCELLED | OUTPATIENT
Start: 2023-01-19

## 2023-01-18 RX ORDER — ALPRAZOLAM 0.5 MG/1
0.5 TABLET ORAL
Status: CANCELLED | OUTPATIENT
Start: 2023-01-18

## 2023-01-18 RX ORDER — OXYCODONE HYDROCHLORIDE 5 MG/1
5 TABLET ORAL
Status: CANCELLED | OUTPATIENT
Start: 2023-01-18

## 2023-01-18 RX ORDER — PREGABALIN 75 MG/1
75 CAPSULE ORAL
Status: CANCELLED | OUTPATIENT
Start: 2023-01-18

## 2023-01-18 RX ORDER — PANTOPRAZOLE SODIUM 40 MG/1
40 TABLET, DELAYED RELEASE ORAL
Status: CANCELLED | OUTPATIENT
Start: 2023-01-18

## 2023-01-18 RX ORDER — ASPIRIN 81 MG/1
81 TABLET ORAL 2 TIMES DAILY
Status: CANCELLED | OUTPATIENT
Start: 2023-01-18

## 2023-01-18 RX ORDER — ACETAMINOPHEN 325 MG/1
650 TABLET ORAL EVERY 6 HOURS
Status: CANCELLED | OUTPATIENT
Start: 2023-01-18

## 2023-01-18 RX ORDER — AMOXICILLIN 250 MG
1 CAPSULE ORAL 2 TIMES DAILY
Status: CANCELLED | OUTPATIENT
Start: 2023-01-18

## 2023-01-18 RX ORDER — NALOXONE HYDROCHLORIDE 0.4 MG/ML
0.4 INJECTION, SOLUTION INTRAMUSCULAR; INTRAVENOUS; SUBCUTANEOUS AS NEEDED
Status: CANCELLED | OUTPATIENT
Start: 2023-01-18

## 2023-01-18 RX ORDER — SODIUM CHLORIDE 0.9 % (FLUSH) 0.9 %
5-40 SYRINGE (ML) INJECTION EVERY 8 HOURS
Status: CANCELLED | OUTPATIENT
Start: 2023-01-18

## 2023-01-18 RX ORDER — POLYETHYLENE GLYCOL 3350 17 G/17G
17 POWDER, FOR SOLUTION ORAL DAILY
Status: CANCELLED | OUTPATIENT
Start: 2023-01-19

## 2023-01-18 RX ORDER — OXYCODONE HYDROCHLORIDE 5 MG/1
10 TABLET ORAL
Status: CANCELLED | OUTPATIENT
Start: 2023-01-18

## 2023-01-18 RX ORDER — CELECOXIB 100 MG/1
200 CAPSULE ORAL 2 TIMES DAILY
Status: CANCELLED | OUTPATIENT
Start: 2023-01-18

## 2023-01-18 RX ORDER — ATORVASTATIN CALCIUM 20 MG/1
40 TABLET, FILM COATED ORAL
Status: CANCELLED | OUTPATIENT
Start: 2023-01-18

## 2023-01-18 RX ORDER — SODIUM CHLORIDE 0.9 % (FLUSH) 0.9 %
5-40 SYRINGE (ML) INJECTION AS NEEDED
Status: CANCELLED | OUTPATIENT
Start: 2023-01-18

## 2023-01-18 RX ORDER — BUPRENORPHINE AND NALOXONE 8; 2 MG/1; MG/1
1 FILM, SOLUBLE BUCCAL; SUBLINGUAL
Status: CANCELLED | OUTPATIENT
Start: 2023-01-19

## 2023-01-18 RX ORDER — FAMOTIDINE 20 MG/1
20 TABLET, FILM COATED ORAL 2 TIMES DAILY
Status: CANCELLED | OUTPATIENT
Start: 2023-01-18

## 2023-01-18 RX ORDER — HYDROMORPHONE HYDROCHLORIDE 2 MG/ML
0.5 INJECTION, SOLUTION INTRAMUSCULAR; INTRAVENOUS; SUBCUTANEOUS
Status: CANCELLED | OUTPATIENT
Start: 2023-01-18 | End: 2023-01-19

## 2023-01-18 RX ORDER — FACIAL-BODY WIPES
10 EACH TOPICAL DAILY PRN
Status: CANCELLED | OUTPATIENT
Start: 2023-01-20

## 2023-01-18 RX ORDER — SODIUM CHLORIDE 9 MG/ML
125 INJECTION, SOLUTION INTRAVENOUS CONTINUOUS
Status: CANCELLED | OUTPATIENT
Start: 2023-01-18 | End: 2023-01-19

## 2023-01-18 NOTE — DISCHARGE INSTRUCTIONS
TOTAL KNEE DISCHARGE INSTRUCTIONS      Patient: Lilliana Muñoz MRN: [id number]  SSN: [id number]              Please take the time to review the following instructions before you leave the hospital and use them as guidelines during your recovery from surgery. If you have any questions you may contact my office at [phone]  After business hours or during the weekend you can contact me through [address] or text / call at [phone] (cell phone) for emergency's. Please use the office number during regular business hours. SPECIAL INSTRUCTIONS :   1. Full extension at the knee is the most important aspect of your range of motion. Avoid placing a pillow or bump behind the knee. Rather, place the heel up on a bump or pillow and allow gravity to help straighten the knee. 2. You may weight bear as tolerated on the knee and during the day you should bend the knee as much as possible. 3. Drainage from the incision more than 4 days from surgery is concerning. Contact my office if there is any question [phone].   4. You may contact me directly through [employer] if there are specific questions or text / call using my cell number [phone]. DRESSING :     Post-op Dressings : This should be removed by physical therapy or you may remove this yourself 7 days after the date of your surgery. If there is no drainage, then a simple dressing may be used or no dressing at all. Other dressing options can be purchased over the counter at a local pharmacy or medical supply vendor. A porous adhesive dressing such as pictured above can be purchased online ([address] [facility] [address]) or at your local [facility] or [employer]. You only need to keep the incision covered for 7 days after showers. A dressing may be used for longer if there are issues with clothing clinging to the incision. Showering/ Bathing: You may shower with the Post-op dressing in place.  This is left in place for 7 days following discharge from the hospital. If your incision is dry without drainage you may shower following your discharge home. After 7 days your dressing should be removed for showering. It is fine to have water run over the incision. Do not vigorously scrub your incision. Apply a clean, dry dressing after you have dried your incision. Do not take a bath or get into a swimming pool / [employer] until you follow up with Dr. Gina Villalpando. Do not soak your incision under water. If there is continued drainage or you are concerned contact Dr Amanda Raza office prior to showering [phone] ext [phone] . Diet:  You may advance to your regular diet as tolerated. Increase your clear liquid intake for the next 2-3 days. Medication:      You will be given prescriptions for pain medication when you are discharged from the hospital. The side effects of these medications can be substantial and the narcotic medications are not mandatory. You may substitute these medications with Tylenol or Alleve / Motrin. Please use the medications as prescribed. Pain medications may cause constipation- Colace twice daily and Miralax one scoop daily while taking the narcotic medication should help prevent constipation. Please discuss with your local pharmacist regarding increasing this dosage if constipation persists. Other possible side effects of pain medication are dizziness, headache, nausea, vomiting, and urinary retention. Discontinue the pain medication if you develop itching, rash, shortness of breath, or difficulties swallowing. If these symptoms become severe or are not relieved by discontinuing the medication, you should seek immediate medical attention. Refills of pain medication are authorized during office hours only (8 AM- 5 PM  Monday thru Friday). Many of these medication will require you or a family member to pick-up a physical prescription at the office.    Medications other than antiinflammatories will not be called into the pharmacy after business hours. You may resume the medication(s) you were taking prior to your surgery. Narcotics may change the effects of some antidepressant medication(s). If you have any questions about possible interactions between your regular medications and the pain medication, you should ask the pharmacist or contact the prescribing physician. If you have constipation which is not improved by oral stool softeners then a Ducolax suppository should be purchased over the counter. Continue the blood thinner (Aspirin or Lovenox) for a total of 30 days following surgery. Follow up appointment:    Please call our office at [phone] for your follow up appointment. This should be scheduled 14 days following the date of surgery. You should also have a scheduled appointment for physical therapy following surgery. Physical Therapy / Nursing:    Physical Therapy following surgery will be arranged as an outpatient or at home. They have specific instructions for rehab and wound care. It is fine to have physical therapy remove your dressing at 7 days following surgery. Returning to work:    Normal return to work is 6-12 weeks following surgery. Depending on your progression following surgery and specific job duties you may take longer for a full return to work. DRIVING    You should not return to driving until you are off all opioid pain medications and able to safely and quickly apply the brakes. This is normally 2-6 weeks for left sided surgery and 2-8 weeks for right sided surgery. Important Signs and Symptoms:    If any of the following signs or symptoms occur, you should contact Dr. Urbano Mcgovern office. Please be advised if a problem arises which you feel requires immediate medical attention or you are unable to contact Dr. Urbano Mcgovern office you should seek immediate medical attention at the ER or other health care facility you have access to.     A sudden increase in swelling and/or redness or warmth at the area your surgery was performed which isnt relieved by rest, ice, and elevation. Oral temperature greater than 101 degrees for 12 hours or more which isnt relieved by an increase in fluid intake and taking 2 Tylenol every 4-6 hours. Excessive drainage from your incisions, or drainage which hasnt stopped by 72 hours after your surgery. Fever, chills, shortness of breath, chest pain, nausea, vomiting or other signs and symptoms which are of concern to you. YOUR TOTAL JOINT REPLACEMENT  FREQUENTLY ASKED QUESTIONS  What should I take for pain? You will be discharged with four medications for pain (Oxycodone, Tramadol, Ibuprofen and Tylenol). These may vary slightly depending on what you were taking in the hospital.   1st Line - Tylenol Arthritis Strength - 325-650 mg every 4 hours (scheduled for the 1st 24 hours)  2nd Line -  Ibuprofen 400 (2 tabs) - 800 (4 tabs) mg every 8 hours  (scheduled for the 1st 24 hours)  3rd Line - Oxycodone 5 mg (1-2 tablets every 4-6 hrs)  4th Line - Tramadol 50 mg (1-2 tablets every 4-6 hours) - take these between Oxycodone doses if your pain is not alleviated. When should I call for advice regarding my pain? If your pain is still uncontrolled after being on the regimen above for at least 12 hours, please call the office [phone] or text / call my cell after hours [phone]. Can I get refills? Opioid refills are provided for the first 2-6 weeks following surgery. Use Tylenol 500 mg along with Aleve 220mg twice daily or Motrin 200-800mg every 4-6 hours during the daytime hours after two weeks. After two weeks, I suggest the opioid pain medications be used only 1 hour prior to your physical therapy appointment and 1 hour before sleeping at night. Use Tylenol and Ibuprofen at other times during the day. Keep in mind that you will need to discontinue opioids before you resume driving.    Is swelling normal?  Almost everyone has some degree of swelling following surgery. Following hip and knee replacement surgery, swelling can be normal below the incision for the first few weeks. This swelling peaks around 5-7 days after surgery. It is not unusual to have some bruising about the back of the thigh, calf, ankle, and foot. What should I do for the swelling? Keep the limb elevated above the level of your heart - 'Toes above Nose'. Apply compression socks (knee high for total knees and up to the mid-thigh for total hips). Use the ice packs that you are discharged home with several times a day for the first several weeks. How long should I remain on blood thinners following surgery? 30 days  Which blood thinners will I be on? Can I take them with Tylenol? Aspirin 81 mg twice daily - these should be taken with meals and can be used with Tylenol. In certain instances, you may be sent home on Lovenox for 30 days. For short periods of time (30 days), aspirin and anti-inflammatories (i.e. Aleve, Motrin / Advil / ibuprofen, diclofenac, etc. can be taken together). When can I drive? Once you have stopped using regular narcotic pain medications (Oxycodone, Percocet, Lortab, Norco etc.) and can safely apply the brakes without hesitation, (emergency braking). When can I shower? You may shower immediately if your Optifoam bandage is dry and without discharge. The Optifoam dressing should be removed 7 days following surgery, after which you may continue to shower. No submersion of the incision, bathing or swimming for 14 days following surgery or until cleared by Dr Vitor Joaquin. Can I remove this dressing? Yes, this is removed just like removing a band aid. If you are concerned, this can be removed by your therapist.   What do I do with the dressing when I shower? The Optifoam dressing is waterproof and you may shower with it.    The incision is sealed with Dermabond, a biologic skin glue, which also serves as a watertight seal. If your incision is draining, it is no longer considered to be watertight - you should contact our office prior to showering if you experience any drainage. Which dressing should I purchase after I remove my Optifoam?  An occlusive dressing which covers your entire incision. This does not have to be waterproof, but will need to be removed when you shower and then replaced. (Example Only)  How active should I be following surgery? Progress activities in moderation and at your own pace. Walking room to room in your house is encouraged. Walk each day and set progressive goals with small increments (1st week - ?block of walking, 2nd week - 1 block, 3rd week - 2 blocks, etc.)  Will I need help at home? You will likely need a caretaker who should be available for the first week following surgery. It is fine for family members to work during the day, as long as they are available by phone. Planning ahead makes coming home from the hospital a much easier transition. How long will my surgery take? On average, total joint replacement takes approximately 1-2 hour. The entire process, including pre-op and post-op care can last as long as 4- 5 hours before you are transferred to your room. stroke, pulmonary embolism (a clot going from the legs to the lungs), and even death with surgery. Will I be given antibiotics? Will I need antibiotics at discharge? Antibiotics will be given to you both before and after your procedure. To further minimize the risk of infection, we have streamlined the surgical procedure to take less time in the operating room. You do not require antibiotics following surgery.       Please do not hesitate to contact me through [employer] or by text / call me at [phone] (cell phone) for questions following surgery - MD Ciarra Warren MD  Cell [phone]  Hung Vincent PA-C  Cell [phone]  Medical Assistants: [facility] [phone]

## 2023-01-19 ENCOUNTER — ANESTHESIA (OUTPATIENT)
Dept: SURGERY | Age: 68
End: 2023-01-19
Payer: MEDICARE

## 2023-01-19 ENCOUNTER — APPOINTMENT (OUTPATIENT)
Dept: GENERAL RADIOLOGY | Age: 68
End: 2023-01-19
Attending: PHYSICIAN ASSISTANT
Payer: MEDICARE

## 2023-01-19 ENCOUNTER — HOSPITAL ENCOUNTER (OUTPATIENT)
Age: 68
Setting detail: OBSERVATION
Discharge: HOME | End: 2023-01-19
Attending: ORTHOPAEDIC SURGERY | Admitting: ORTHOPAEDIC SURGERY
Payer: MEDICARE

## 2023-01-19 VITALS
OXYGEN SATURATION: 99 % | HEART RATE: 75 BPM | WEIGHT: 158.73 LBS | HEIGHT: 73 IN | TEMPERATURE: 97.8 F | SYSTOLIC BLOOD PRESSURE: 136 MMHG | BODY MASS INDEX: 21.04 KG/M2 | DIASTOLIC BLOOD PRESSURE: 73 MMHG | RESPIRATION RATE: 15 BRPM

## 2023-01-19 DIAGNOSIS — M17.12 ARTHRITIS OF LEFT KNEE: Primary | ICD-10-CM

## 2023-01-19 PROCEDURE — 73560 X-RAY EXAM OF KNEE 1 OR 2: CPT

## 2023-01-19 PROCEDURE — 74011250636 HC RX REV CODE- 250/636: Performed by: NURSE ANESTHETIST, CERTIFIED REGISTERED

## 2023-01-19 PROCEDURE — 77030018723 HC ELCTRD BLD COVD -A: Performed by: ORTHOPAEDIC SURGERY

## 2023-01-19 PROCEDURE — C1776 JOINT DEVICE (IMPLANTABLE): HCPCS | Performed by: ORTHOPAEDIC SURGERY

## 2023-01-19 PROCEDURE — 76030000021 HC AMB SURG 2 TO 2.5 HR INTENSV-TIER 1: Performed by: ORTHOPAEDIC SURGERY

## 2023-01-19 PROCEDURE — 77030040922 HC BLNKT HYPOTHRM STRY -A

## 2023-01-19 PROCEDURE — 77030029828 HC FEM TIB CKPNT KT DISP STRY -B: Performed by: ORTHOPAEDIC SURGERY

## 2023-01-19 PROCEDURE — 77030040393 HC DRSG OPTIFOAM GENT MDII -B: Performed by: ORTHOPAEDIC SURGERY

## 2023-01-19 PROCEDURE — 74011250637 HC RX REV CODE- 250/637: Performed by: PHYSICIAN ASSISTANT

## 2023-01-19 PROCEDURE — 77030002933 HC SUT MCRYL J&J -A: Performed by: ORTHOPAEDIC SURGERY

## 2023-01-19 PROCEDURE — 97116 GAIT TRAINING THERAPY: CPT

## 2023-01-19 PROCEDURE — 2709999900 HC NON-CHARGEABLE SUPPLY: Performed by: ORTHOPAEDIC SURGERY

## 2023-01-19 PROCEDURE — 97161 PT EVAL LOW COMPLEX 20 MIN: CPT

## 2023-01-19 PROCEDURE — 77030028907 HC WRP KNEE WO BGS SOLM -B

## 2023-01-19 PROCEDURE — 76210000037 HC AMBSU PH I REC 2 TO 2.5 HR: Performed by: ORTHOPAEDIC SURGERY

## 2023-01-19 PROCEDURE — 77030006835 HC BLD SAW SAG STRY -B: Performed by: ORTHOPAEDIC SURGERY

## 2023-01-19 PROCEDURE — 97530 THERAPEUTIC ACTIVITIES: CPT

## 2023-01-19 PROCEDURE — 64999 UNLISTED PX NERVOUS SYSTEM: CPT

## 2023-01-19 PROCEDURE — 77030038149 HC BLD SAW SAG STRY -D: Performed by: ORTHOPAEDIC SURGERY

## 2023-01-19 PROCEDURE — G0378 HOSPITAL OBSERVATION PER HR: HCPCS

## 2023-01-19 PROCEDURE — 77030035236 HC SUT PDS STRATFX BARB J&J -B: Performed by: ORTHOPAEDIC SURGERY

## 2023-01-19 PROCEDURE — 77030029820: Performed by: ORTHOPAEDIC SURGERY

## 2023-01-19 PROCEDURE — 77030040361 HC SLV COMPR DVT MDII -B

## 2023-01-19 PROCEDURE — 74011000250 HC RX REV CODE- 250: Performed by: PHYSICIAN ASSISTANT

## 2023-01-19 PROCEDURE — 74011250637 HC RX REV CODE- 250/637: Performed by: ANESTHESIOLOGY

## 2023-01-19 PROCEDURE — 74011250636 HC RX REV CODE- 250/636: Performed by: ANESTHESIOLOGY

## 2023-01-19 PROCEDURE — 77030031139 HC SUT VCRL2 J&J -A: Performed by: ORTHOPAEDIC SURGERY

## 2023-01-19 PROCEDURE — 77030026438 HC STYL ET INTUB CARD -A: Performed by: ANESTHESIOLOGY

## 2023-01-19 PROCEDURE — 74011000250 HC RX REV CODE- 250: Performed by: NURSE ANESTHETIST, CERTIFIED REGISTERED

## 2023-01-19 PROCEDURE — 77030013079 HC BLNKT BAIR HGGR 3M -A: Performed by: ANESTHESIOLOGY

## 2023-01-19 PROCEDURE — 76060000064 HC AMB SURG ANES 2 TO 2.5 HR: Performed by: ORTHOPAEDIC SURGERY

## 2023-01-19 PROCEDURE — 74011250636 HC RX REV CODE- 250/636: Performed by: PHYSICIAN ASSISTANT

## 2023-01-19 PROCEDURE — 77030008684 HC TU ET CUF COVD -B: Performed by: ANESTHESIOLOGY

## 2023-01-19 PROCEDURE — 76210000050 HC AMBSU PH II REC 0.5 TO 1 HR: Performed by: ORTHOPAEDIC SURGERY

## 2023-01-19 DEVICE — TIBIAL COMPONENT
Type: IMPLANTABLE DEVICE | Site: KNEE | Status: FUNCTIONAL
Brand: TRIATHLON

## 2023-01-19 DEVICE — CRUCIATE RETAINING FEMORAL
Type: IMPLANTABLE DEVICE | Site: KNEE | Status: FUNCTIONAL
Brand: TRIATHLON

## 2023-01-19 DEVICE — TIBIAL BEARING INSERT - CR
Type: IMPLANTABLE DEVICE | Site: KNEE | Status: FUNCTIONAL
Brand: TRIATHLON

## 2023-01-19 DEVICE — KNEE K2 TOT HEMI ADV CMTLS -- IMPL CAPPED K2: Type: IMPLANTABLE DEVICE | Status: FUNCTIONAL

## 2023-01-19 DEVICE — PATELLA
Type: IMPLANTABLE DEVICE | Site: KNEE | Status: FUNCTIONAL
Brand: TRIATHLON

## 2023-01-19 RX ORDER — ACETAMINOPHEN 325 MG/1
975 TABLET ORAL ONCE
Status: COMPLETED | OUTPATIENT
Start: 2023-01-19 | End: 2023-01-19

## 2023-01-19 RX ORDER — FENTANYL CITRATE 50 UG/ML
25 INJECTION, SOLUTION INTRAMUSCULAR; INTRAVENOUS
Status: DISCONTINUED | OUTPATIENT
Start: 2023-01-19 | End: 2023-01-19 | Stop reason: HOSPADM

## 2023-01-19 RX ORDER — HYDROMORPHONE HYDROCHLORIDE 1 MG/ML
.25-1 INJECTION, SOLUTION INTRAMUSCULAR; INTRAVENOUS; SUBCUTANEOUS
Status: DISCONTINUED | OUTPATIENT
Start: 2023-01-19 | End: 2023-01-19 | Stop reason: HOSPADM

## 2023-01-19 RX ORDER — ONDANSETRON 2 MG/ML
4 INJECTION INTRAMUSCULAR; INTRAVENOUS AS NEEDED
Status: DISCONTINUED | OUTPATIENT
Start: 2023-01-19 | End: 2023-01-19 | Stop reason: HOSPADM

## 2023-01-19 RX ORDER — ONDANSETRON 8 MG/1
4 TABLET, ORALLY DISINTEGRATING ORAL
Qty: 30 TABLET | Refills: 0 | Status: SHIPPED | OUTPATIENT
Start: 2023-01-19

## 2023-01-19 RX ORDER — OXYCODONE HCL 10 MG/1
10 TABLET, FILM COATED, EXTENDED RELEASE ORAL ONCE
Status: COMPLETED | OUTPATIENT
Start: 2023-01-19 | End: 2023-01-19

## 2023-01-19 RX ORDER — SODIUM CHLORIDE, SODIUM LACTATE, POTASSIUM CHLORIDE, CALCIUM CHLORIDE 600; 310; 30; 20 MG/100ML; MG/100ML; MG/100ML; MG/100ML
75 INJECTION, SOLUTION INTRAVENOUS CONTINUOUS
Status: DISCONTINUED | OUTPATIENT
Start: 2023-01-19 | End: 2023-01-19 | Stop reason: HOSPADM

## 2023-01-19 RX ORDER — TRANEXAMIC ACID 100 MG/ML
INJECTION, SOLUTION INTRAVENOUS AS NEEDED
Status: DISCONTINUED | OUTPATIENT
Start: 2023-01-19 | End: 2023-01-19 | Stop reason: HOSPADM

## 2023-01-19 RX ORDER — ONDANSETRON 2 MG/ML
INJECTION INTRAMUSCULAR; INTRAVENOUS AS NEEDED
Status: DISCONTINUED | OUTPATIENT
Start: 2023-01-19 | End: 2023-01-19 | Stop reason: HOSPADM

## 2023-01-19 RX ORDER — LIDOCAINE HYDROCHLORIDE 20 MG/ML
INJECTION, SOLUTION EPIDURAL; INFILTRATION; INTRACAUDAL; PERINEURAL AS NEEDED
Status: DISCONTINUED | OUTPATIENT
Start: 2023-01-19 | End: 2023-01-19 | Stop reason: HOSPADM

## 2023-01-19 RX ORDER — DIPHENHYDRAMINE HYDROCHLORIDE 50 MG/ML
12.5 INJECTION, SOLUTION INTRAMUSCULAR; INTRAVENOUS AS NEEDED
Status: DISCONTINUED | OUTPATIENT
Start: 2023-01-19 | End: 2023-01-19 | Stop reason: HOSPADM

## 2023-01-19 RX ORDER — ASPIRIN 81 MG/1
81 TABLET ORAL 2 TIMES DAILY
Qty: 60 TABLET | Refills: 0 | Status: SHIPPED | OUTPATIENT
Start: 2023-01-19

## 2023-01-19 RX ORDER — SUCCINYLCHOLINE CHLORIDE 20 MG/ML
INJECTION INTRAMUSCULAR; INTRAVENOUS AS NEEDED
Status: DISCONTINUED | OUTPATIENT
Start: 2023-01-19 | End: 2023-01-19 | Stop reason: HOSPADM

## 2023-01-19 RX ORDER — DEXAMETHASONE SODIUM PHOSPHATE 4 MG/ML
INJECTION, SOLUTION INTRA-ARTICULAR; INTRALESIONAL; INTRAMUSCULAR; INTRAVENOUS; SOFT TISSUE AS NEEDED
Status: DISCONTINUED | OUTPATIENT
Start: 2023-01-19 | End: 2023-01-19 | Stop reason: HOSPADM

## 2023-01-19 RX ORDER — OXYCODONE HYDROCHLORIDE 5 MG/1
5 TABLET ORAL
Qty: 42 TABLET | Refills: 0 | Status: SHIPPED | OUTPATIENT
Start: 2023-01-19 | End: 2023-01-26

## 2023-01-19 RX ORDER — ROCURONIUM BROMIDE 10 MG/ML
INJECTION, SOLUTION INTRAVENOUS AS NEEDED
Status: DISCONTINUED | OUTPATIENT
Start: 2023-01-19 | End: 2023-01-19 | Stop reason: HOSPADM

## 2023-01-19 RX ORDER — ACETAMINOPHEN 500 MG
975 TABLET ORAL
Qty: 60 TABLET | Refills: 1 | Status: SHIPPED | OUTPATIENT
Start: 2023-01-19

## 2023-01-19 RX ORDER — PROPOFOL 10 MG/ML
INJECTION, EMULSION INTRAVENOUS
Status: DISCONTINUED | OUTPATIENT
Start: 2023-01-19 | End: 2023-01-19 | Stop reason: HOSPADM

## 2023-01-19 RX ORDER — MIDAZOLAM HYDROCHLORIDE 1 MG/ML
INJECTION, SOLUTION INTRAMUSCULAR; INTRAVENOUS AS NEEDED
Status: DISCONTINUED | OUTPATIENT
Start: 2023-01-19 | End: 2023-01-19 | Stop reason: HOSPADM

## 2023-01-19 RX ORDER — IBUPROFEN 800 MG/1
800 TABLET ORAL
Qty: 50 TABLET | Refills: 2 | Status: SHIPPED | OUTPATIENT
Start: 2023-01-19

## 2023-01-19 RX ORDER — PREGABALIN 75 MG/1
75 CAPSULE ORAL ONCE
Status: COMPLETED | OUTPATIENT
Start: 2023-01-19 | End: 2023-01-19

## 2023-01-19 RX ORDER — FAMOTIDINE 10 MG/ML
INJECTION INTRAVENOUS AS NEEDED
Status: DISCONTINUED | OUTPATIENT
Start: 2023-01-19 | End: 2023-01-19 | Stop reason: HOSPADM

## 2023-01-19 RX ORDER — GLYCOPYRROLATE 0.2 MG/ML
INJECTION INTRAMUSCULAR; INTRAVENOUS AS NEEDED
Status: DISCONTINUED | OUTPATIENT
Start: 2023-01-19 | End: 2023-01-19 | Stop reason: HOSPADM

## 2023-01-19 RX ORDER — TRAMADOL HYDROCHLORIDE 50 MG/1
50 TABLET ORAL
Qty: 28 TABLET | Refills: 0 | Status: SHIPPED | OUTPATIENT
Start: 2023-01-19 | End: 2023-01-26

## 2023-01-19 RX ORDER — PREGABALIN 75 MG/1
75 CAPSULE ORAL
Qty: 30 CAPSULE | Refills: 0 | Status: SHIPPED | OUTPATIENT
Start: 2023-01-19

## 2023-01-19 RX ORDER — LIDOCAINE HYDROCHLORIDE 10 MG/ML
0.1 INJECTION, SOLUTION EPIDURAL; INFILTRATION; INTRACAUDAL; PERINEURAL AS NEEDED
Status: DISCONTINUED | OUTPATIENT
Start: 2023-01-19 | End: 2023-01-19 | Stop reason: HOSPADM

## 2023-01-19 RX ORDER — FENTANYL CITRATE 50 UG/ML
INJECTION, SOLUTION INTRAMUSCULAR; INTRAVENOUS AS NEEDED
Status: DISCONTINUED | OUTPATIENT
Start: 2023-01-19 | End: 2023-01-19 | Stop reason: HOSPADM

## 2023-01-19 RX ORDER — OXYCODONE HYDROCHLORIDE 5 MG/1
10 TABLET ORAL
Status: COMPLETED | OUTPATIENT
Start: 2023-01-19 | End: 2023-01-19

## 2023-01-19 RX ORDER — PROPOFOL 10 MG/ML
INJECTION, EMULSION INTRAVENOUS AS NEEDED
Status: DISCONTINUED | OUTPATIENT
Start: 2023-01-19 | End: 2023-01-19 | Stop reason: HOSPADM

## 2023-01-19 RX ORDER — NEOSTIGMINE METHYLSULFATE 1 MG/ML
INJECTION, SOLUTION INTRAVENOUS AS NEEDED
Status: DISCONTINUED | OUTPATIENT
Start: 2023-01-19 | End: 2023-01-19 | Stop reason: HOSPADM

## 2023-01-19 RX ORDER — ROPIVACAINE HYDROCHLORIDE 5 MG/ML
INJECTION, SOLUTION EPIDURAL; INFILTRATION; PERINEURAL AS NEEDED
Status: DISCONTINUED | OUTPATIENT
Start: 2023-01-19 | End: 2023-01-19 | Stop reason: HOSPADM

## 2023-01-19 RX ORDER — SODIUM CHLORIDE, SODIUM LACTATE, POTASSIUM CHLORIDE, CALCIUM CHLORIDE 600; 310; 30; 20 MG/100ML; MG/100ML; MG/100ML; MG/100ML
125 INJECTION, SOLUTION INTRAVENOUS CONTINUOUS
Status: DISCONTINUED | OUTPATIENT
Start: 2023-01-19 | End: 2023-01-19 | Stop reason: HOSPADM

## 2023-01-19 RX ADMIN — TRANEXAMIC ACID 1 G: 100 INJECTION, SOLUTION INTRAVENOUS at 11:50

## 2023-01-19 RX ADMIN — CEFAZOLIN SODIUM 2 G: 1 POWDER, FOR SOLUTION INTRAMUSCULAR; INTRAVENOUS at 11:54

## 2023-01-19 RX ADMIN — FENTANYL CITRATE 100 MCG: 50 INJECTION, SOLUTION INTRAMUSCULAR; INTRAVENOUS at 10:55

## 2023-01-19 RX ADMIN — ONDANSETRON HYDROCHLORIDE 4 MG: 2 SOLUTION INTRAMUSCULAR; INTRAVENOUS at 13:05

## 2023-01-19 RX ADMIN — NEOSTIGMINE METHYLSULFATE 3 MG: 1 INJECTION, SOLUTION INTRAVENOUS at 13:05

## 2023-01-19 RX ADMIN — GLYCOPYRROLATE 0.4 MG: 0.2 INJECTION INTRAMUSCULAR; INTRAVENOUS at 13:05

## 2023-01-19 RX ADMIN — PREGABALIN 75 MG: 75 CAPSULE ORAL at 10:34

## 2023-01-19 RX ADMIN — FAMOTIDINE 20 MG: 10 INJECTION INTRAVENOUS at 12:10

## 2023-01-19 RX ADMIN — HYDROMORPHONE HYDROCHLORIDE 0.5 MG: 1 INJECTION, SOLUTION INTRAMUSCULAR; INTRAVENOUS; SUBCUTANEOUS at 14:02

## 2023-01-19 RX ADMIN — MIDAZOLAM HYDROCHLORIDE 2 MG: 1 INJECTION, SOLUTION INTRAMUSCULAR; INTRAVENOUS at 10:55

## 2023-01-19 RX ADMIN — SODIUM CHLORIDE, POTASSIUM CHLORIDE, SODIUM LACTATE AND CALCIUM CHLORIDE 75 ML/HR: 600; 310; 30; 20 INJECTION, SOLUTION INTRAVENOUS at 10:35

## 2023-01-19 RX ADMIN — OXYCODONE HYDROCHLORIDE 10 MG: 5 TABLET ORAL at 16:11

## 2023-01-19 RX ADMIN — OXYCODONE HYDROCHLORIDE 10 MG: 10 TABLET, FILM COATED, EXTENDED RELEASE ORAL at 10:34

## 2023-01-19 RX ADMIN — ROPIVACAINE HYDROCHLORIDE 30 ML: 5 INJECTION, SOLUTION EPIDURAL; INFILTRATION; PERINEURAL at 11:01

## 2023-01-19 RX ADMIN — FENTANYL CITRATE 25 MCG: 50 INJECTION, SOLUTION INTRAMUSCULAR; INTRAVENOUS at 14:03

## 2023-01-19 RX ADMIN — HYDROMORPHONE HYDROCHLORIDE 0.5 MG: 1 INJECTION, SOLUTION INTRAMUSCULAR; INTRAVENOUS; SUBCUTANEOUS at 14:46

## 2023-01-19 RX ADMIN — LIDOCAINE HYDROCHLORIDE 100 MG: 20 INJECTION, SOLUTION EPIDURAL; INFILTRATION; INTRACAUDAL; PERINEURAL at 11:39

## 2023-01-19 RX ADMIN — PROPOFOL 100 MCG/KG/MIN: 10 INJECTION, EMULSION INTRAVENOUS at 11:46

## 2023-01-19 RX ADMIN — DEXAMETHASONE SODIUM PHOSPHATE 8 MG: 4 INJECTION, SOLUTION INTRAMUSCULAR; INTRAVENOUS at 12:10

## 2023-01-19 RX ADMIN — TRANEXAMIC ACID 1 G: 100 INJECTION, SOLUTION INTRAVENOUS at 13:00

## 2023-01-19 RX ADMIN — PROPOFOL 200 MG: 10 INJECTION, EMULSION INTRAVENOUS at 11:39

## 2023-01-19 RX ADMIN — FENTANYL CITRATE 25 MCG: 50 INJECTION, SOLUTION INTRAMUSCULAR; INTRAVENOUS at 14:10

## 2023-01-19 RX ADMIN — ACETAMINOPHEN 975 MG: 325 TABLET ORAL at 10:34

## 2023-01-19 RX ADMIN — ROCURONIUM BROMIDE 10 MG: 10 INJECTION INTRAVENOUS at 11:39

## 2023-01-19 RX ADMIN — HYDROMORPHONE HYDROCHLORIDE 0.5 MG: 1 INJECTION, SOLUTION INTRAMUSCULAR; INTRAVENOUS; SUBCUTANEOUS at 13:52

## 2023-01-19 RX ADMIN — SUCCINYLCHOLINE CHLORIDE 100 MG: 20 INJECTION, SOLUTION INTRAMUSCULAR; INTRAVENOUS at 11:39

## 2023-01-19 NOTE — PROGRESS NOTES
PHYSICAL THERAPY EVALUATION-cleared fast track  Patient: Sherley Avilez (43 y.o. male)  Date: 1/19/2023  Primary Diagnosis: Primary osteoarthritis of left knee [M17.12]  Procedure(s) (LRB):  LEFT TOTAL KNEE ARTHROPLASTY (NEGRO) (Left) Day of Surgery   Precautions: WBAT        ASSESSMENT  Based on the objective data described below, the patient presents with typically expected deficits with strength, ROM, balance, and increased pain. The patient was received in supine with HOB elevated. The patient displayed good motor control and sensation was intact. After reviewing TKA HEP and performing bed level exercises, the patient was able to get to EOB with SBA using RW. The patient ambulated ~90 feet, quickly progressing to a reciprocal gait with SBA using RW with min cues for walker management and sequencing, and performed 6 stairs using right hand rail with cues for step sequencing and SBA. Patient was left supine in bed with call bell needs in reach. Patient is expected to continue progressing well and appears appropriate for discharge to home with wife. Functional Outcome Measure: The patient scored 22/28 on the Tinetti outcome measure which is indicative of moderate fall risk. Other factors to consider for discharge: none     Further skilled acute physical therapy is not indicated at this time. PLAN :  Recommendation for discharge: (in order for the patient to meet his/her long term goals)  Outpatient physical therapy follow up recommended for optimization of ROM, gait, and balance. This discharge recommendation:  Has been made in collaboration with the attending provider and/or case management    IF patient discharges home will need the following DME: rolling walker       SUBJECTIVE:   Patient stated My wife would love it if I can go home.     OBJECTIVE DATA SUMMARY:   HISTORY:    Past Medical History:   Diagnosis Date    Arthritis     CAD (coronary artery disease) 08/06/2021    PC/stent mid LAD Chronic back pain     s/p surgery    Fractures, stress 10/10/2022    4 stress fxt dr Mya Doll    GERD (gastroesophageal reflux disease)     H/O colonoscopy 07/07/2020 6/18 5 year repeat    Hiatal hernia 04/14/2022    egd dr richter 2021    Hyperlipidemia     Hypertension     Hypogonadism, male 08/21/2013    total 97, free 1.8  5/13/13    Panic attacks      Past Surgical History:   Procedure Laterality Date    COLONOSCOPY N/A 06/08/2018    COLONOSCOPY performed by Hanna Queen MD at [address]  2011    HX COLONOSCOPY      Dr. Josué Orellana?     HX FRACTURE TX Right     clavicle repair    HX HEART CATHETERIZATION  2011?    normal    HX HEART CATHETERIZATION  08/2021    stent placement    HX KNEE ARTHROSCOPY Right 2014    HX KNEE ARTHROSCOPY Left 2015    x 5    HX KNEE ARTHROSCOPY Right 04/29/2019    HX KNEE REPLACEMENT Right     HX ORTHOPAEDIC  1990    right wrist    HX ORTHOPAEDIC Right 2015    ankle surgery    HX ROTATOR CUFF REPAIR Right 2017    HX ROTATOR CUFF REPAIR Left 2018    HX SHOULDER ARTHROSCOPY Right 2007    wire and screw    HX WRIST FRACTURE TX Right     ND ANES NRV MUSC TNDN FSCIA BURSA SHOULDER & AXILLA      ND UNLISTED PROCEDURE FOOT/TOES      cord repair in right foot       Prior level of function: independent  Personal factors and/or comorbidities impacting plan of care: CAD, hypertension, anxiety/panic attacks    Home Situation  Home Environment: Private residence  # Steps to Enter: 5  Rails to Enter: Yes  Hand Rails : Right  One/Two Story Residence: Two story, live on 1st floor  # of Interior Steps: 15  Interior Rails: Right  Living Alone: No  Support Systems: Spouse/Significant Other  Patient Expects to be Discharged to[de-identified] Home  Current DME Used/Available at Home: Cane, straight  Tub or Shower Type: Shower    EXAMINATION/PRESENTATION/DECISION MAKING:   Critical Behavior:  Neurologic State: Alert  Orientation Level: Oriented X4       Range Of Motion:  AROM: Generally decreased, functional                       Strength:    Strength: Generally decreased, functional                    Tone & Sensation:                  Sensation: Intact               Coordination:     Vision:      Functional Mobility:  Bed Mobility:     Supine to Sit: Independent  Sit to Supine: Independent     Transfers:  Sit to Stand: Stand-by assistance  Stand to Sit: Stand-by assistance                       Balance:   Sitting: Intact  Standing: Intact; With support  Standing - Static: Good (with RW)  Standing - Dynamic : Good (with RW)  Ambulation/Gait Training:  Distance (ft): 120 Feet (ft)  Assistive Device: Walker, rolling  Ambulation - Level of Assistance: Stand-by assistance     Gait Description (WDL): Exceptions to WDL  Gait Abnormalities: Antalgic  Right Side Weight Bearing: Full  Left Side Weight Bearing: As tolerated  Base of Support: Shift to right     Speed/Gracie: Slow  Step Length: Right shortened (progressed to reciprocal stepping)  Swing Pattern: Left asymmetrical              Stairs:  Number of Stairs Trained: 6  Stairs - Level of Assistance: Stand-by assistance   Rail Use: Right     Therapeutic Exercises: Ankle pumps, quad sets, HS sets, heel slides    Functional Measure:    Tinetti test:    Sitting Balance: 1  Arises: 1  Attempts to Rise: 2  Immediate Standing Balance: 1  Standing Balance: 1  Nudged: 1  Eyes Closed: 1  Turn 360 Degrees - Continuous/Discontinuous: 1  Turn 360 Degrees - Steady/Unsteady: 1  Sitting Down: 1  Balance Score: 11 Balance total score  Indication of Gait: 1  R Step Length/Height: 1  L Step Length/Height: 1  R Foot Clearance: 1  L Foot Clearance: 1  Step Symmetry: 1  Step Continuity: 1  Path: 1  Trunk: 2  Walking Time: 1  Gait Score: 11 Gait total score  Total Score: 22/28 Overall total score         Tinetti Tool Score Risk of Falls  <19 = High Fall Risk  19-24 = Moderate Fall Risk  25-28 = Low Fall Risk  Issaetti ME.  Performance-Oriented Assessment of Mobility Problems in Elderly Patients. [facility] 66; [id number]. (Scoring Description: PT Bulletin Feb. 10, 1993)    Older adults: Tigist Agustin et al, 2009; n = 1000 [facility] elderly evaluated with ABC, SAVANA, ADL, and IADL)  · Mean SAVANA score for males aged 69-68 years = 26.21(3.40)  · Mean SAVANA score for females age 69-68 years = 25.16(4.30)  · Mean SAVANA score for males over 80 years = 23.29(6.02)  · Mean SAVANA score for females over 80 years = 17.20(8.32)         Physical Therapy Evaluation Charge Determination   History Examination Presentation Decision-Making   MEDIUM  Complexity : 1-2 comorbidities / personal factors will impact the outcome/ POC  LOW Complexity : 1-2 Standardized tests and measures addressing body structure, function, activity limitation and / or participation in recreation  LOW Complexity : Stable, uncomplicated  Other outcome measures Tinetti  LOW       Based on the above components, the patient evaluation is determined to be of the following complexity level: LOW     Pain Ratin/10 with ambulation, previously rated 8/10    Activity Tolerance:   Good      After treatment patient left in no apparent distress:   Supine in bed, Call bell within reach, Caregiver / family present, and Side rails x 3    COMMUNICATION/EDUCATION:   The patients plan of care was discussed with: Registered nurse and Physician. Fall prevention education was provided and the patient/caregiver indicated understanding., Patient/family have participated as able in goal setting and plan of care. , and Patient/family agree to work toward stated goals and plan of care.     Thank you for this referral.  Eduardo Choi, SPT

## 2023-01-19 NOTE — PERIOP NOTES
Patient tolerated therapy. He is cleared to go home. A walker was issued and adjusted by PT. After therapy patient received 2 oxycodone for lingering pain. His discharge instructions were given to his wife at the bedside. Pain dropped to a 3 and the patient remained alert oriented and comfortable enough to be discharged home. All belongings were returned and he was DST with his wife.

## 2023-01-19 NOTE — ANESTHESIA PROCEDURE NOTES
Peripheral Block    Start time: 1/19/2023 10:55 AM  End time: 1/19/2023 11:02 AM  Performed by: Pamela Burkett MD  Authorized by: Pamela Burkett MD       Pre-procedure:    Indications: post-op pain management    Preanesthetic Checklist: patient identified, risks and benefits discussed, site marked, timeout performed, anesthesia consent given, patient being monitored and fire risk safety assessment completed and verbalized    Timeout Time: 10:55 EST      Block Type:   Block Type:  IPACK  Laterality:  Left  Monitoring:  Continuous pulse ox, heart rate and oxygen  Injection Technique:  Single shot  Procedures: ultrasound guided    Patient Position: right lateral decubitus  Prep: chlorhexidine    Location:  Lower thigh  Med Admin Time: 1/19/2023 10:58 AM    Assessment:  Number of attempts:  1  Injection Assessment:   Patient tolerance:  Patient tolerated the procedure well with no immediate complications

## 2023-01-19 NOTE — PERIOP NOTES
Bedside, Verbal, and Written shift change report given to Colton Zhao (oncoming nurse) by Chandra Vegas (offgoing nurse). Report included the following information SBAR, OR Summary, Procedure Summary, Intake/Output, MAR, and Recent Results.

## 2023-01-19 NOTE — ANESTHESIA PREPROCEDURE EVALUATION
Relevant Problems   CARDIOVASCULAR   (+) Acute myocardial infarction, unspecified   (+) CAD (coronary artery disease)   (+) HTN (hypertension), benign   (+) Non-ST elevation myocardial infarction (NSTEMI)   (+) ST elevation (STEMI) myocardial infarction of unspecified site      GASTROINTESTINAL   (+) GERD (gastroesophageal reflux disease)   (+) Hiatal hernia       Anesthetic History   No history of anesthetic complications            Review of Systems / Medical History  Patient summary reviewed and pertinent labs reviewed    Pulmonary  Within defined limits                 Neuro/Psych   Within defined limits           Cardiovascular    Hypertension: well controlled          CAD and cardiac stents    Exercise tolerance: >4 METS  Comments: Low cardiac risk  Per cardiologist   GI/Hepatic/Renal     GERD: well controlled           Endo/Other        Arthritis     Other Findings            Physical Exam    Airway  Mallampati: III  TM Distance: > 6 cm  Neck ROM: normal range of motion        Cardiovascular  Regular rate and rhythm,  S1 and S2 normal,  no murmur, click, rub, or gallop             Dental  No notable dental hx       Pulmonary  Breath sounds clear to auscultation               Abdominal         Other Findings            Anesthetic Plan    ASA: 3  Anesthesia type: general and regional - IPACK block          Induction: Intravenous  Anesthetic plan and risks discussed with: Patient and Spouse

## 2023-01-19 NOTE — ANESTHESIA POSTPROCEDURE EVALUATION
Procedure(s):  LEFT TOTAL KNEE ARTHROPLASTY (NEGRO). general, regional    Anesthesia Post Evaluation      Multimodal analgesia: multimodal analgesia not used between 6 hours prior to anesthesia start to PACU discharge  Patient location during evaluation: bedside  Level of consciousness: awake and alert  Pain score: 0  Pain management: satisfactory to patient  Airway patency: patent  Anesthetic complications: no  Cardiovascular status: acceptable  Respiratory status: acceptable  Hydration status: acceptable  Post anesthesia nausea and vomiting:  controlled  Final Post Anesthesia Temperature Assessment:  Normothermia (36.0-37.5 degrees C)      INITIAL Post-op Vital signs:   Vitals Value Taken Time   /78 01/19/23 1542   Temp 36.6 °C (97.8 °F) 01/19/23 1343   Pulse 74 01/19/23 1601   Resp 22 01/19/23 1601   SpO2 100 % 01/19/23 1542   Vitals shown include unvalidated device data.

## 2023-01-19 NOTE — OP NOTES
OPERATIVE REPORT     Admit Date: 1/19/2023  Admit Diagnosis: Primary osteoarthritis of left knee [M17.12]    Date of Procedure: 1/19/2023   Preoperative Diagnosis: Primary osteoarthritis of left knee [M17.12]  Postoperative Diagnosis: Primary osteoarthritis of left knee [M17.12]    Procedure: Procedure(s):  LEFT TOTAL KNEE ARTHROPLASTY (NEGRO)  Surgeon: Ronaldo Austin MD  Assistant(s): Molly Kilpatrick PA-C  Anesthesia: General   Estimated Blood Loss: 300cc  Specimens: * No specimens in log *   Complications: None       INDICATIONS:   The patient is a 78 y.o., male who has complained of a long history of knee pain. The patient  has failed conservative treatment and presents for definitive operative care. Informed consent obtained including a discussion of the risks and benefits, which include, but are not limited to, bleeding, infection, neurovascular damage, wound complications, pain and stiffness in the knee, periprosthetic loosening, fracture dislocation and DVT, the patient consented for the procedure. DESCRIPTION OF PROCEDURE:        The patient was seen in the preoperative holding area. The patient was positively identified. The limb was initialed,  questions were answered. The patient was given Ancef preop for an antibiotic. The patient was subsequently taken to the operating room. The patient underwent spinal anesthesia. The patient was positioned in the supine position. All bony prominences were well padded. The limb was prepped and draped in a sterile fashion. The appropriate pause for safety was performed. A mid-line incision was created. Utilizing an incision from above the superior pole of the patella distally to the tibial tubercle. The incision was taken down through the skin and subcutaneous tissue until the retinaculum could be identified. This was sharply incised utilizing a medial parapatellar incision. The femoral array was placed at the superior portion of the patella.  The patellar was everted, a planar resurfacing was performed and the drill guide was placed with the appropriate rotation. The medial-based soft tissue was then retracted as well as well as the lateral tissue. Duy pins were placed within the incision for thetibial array (one hand breadth proximal to the superior pole of the patella). The femoral and tibial trackers were placed. The hip center or rotation was established. Registration was performed on the femur and tibia. Osteophytes were removed. The knee was balanced in both flexion and extension with force applied. The computer model of implants and position was verified. Once this was complete the MAKOplasty robot was positioned. Standard cuts were made for the tibia first. The tibial cut was removed. The remaining femoral cuts were made with the robot. The blade was changed and the medial meniscus was removed. The tibial preparation was completed. Including removal of residual medial and lateral mensci. Tibial baseplate placement and keel preparation were performed along with drill holes for the tibial baseplate. Final bony osteophytes were removed and the meniscal rim was removed. The knee was injected with 60cc of Morphine / Ketoralac and Lidocaine. Trial implants were placed and range of motion along with overall fit was established with very good integrity of the MCL noted. The duy pins and trackers were removed and accounted for prior to closure. All the bony surfaces were irrigated. The tibia was placed first, then the poly, residual osteophytes were removed and then the femur. Finally, the patella was placed and press-fit with the clamp / impaction. There was normal tracking of the patella at the conclusion of the case. The knee was very stable after it was taken through a full range of motion. The wound was closed with 0 Vicryl and then number 2 Quill proximally.  Once this had been completed, the skin was closed with 2-0 Vicryl 4-0 monocryl suture and Dermabond. A sterile dressing was applied. The patient was taken from the operating room in stable condition. OPERATIVE FINDINGS : Severe left knee OA , Final ROM 0-135    IMPLANTS :   Implant Name Type Inv. Item Serial No.  Lot No. LRB No. Used Action   BASEPLATE TIB SZ 8 [id number] ML85MM KEEL W58MM D28MM PEG L12MM - SNA  BASEPLATE TIB SZ 8 [id number] ML85MM KEEL W58MM D28MM PEG L12MM NA [employer] Sprinkle_Rundown App [id number] Left 1 Implanted   INSERT TIB CR 8 9 MM KNEE 5/PK X3 TRIATHLON - SNA  INSERT TIB CR 8 9 MM KNEE 5/PK X3 TRIATHLON NA [employer] [id number] Left 1 Implanted   PAT ASYM MTL-BK 11MM SZ A38 -- TRIATHLON - SNA  PAT ASYM MTL-BK 11MM SZ A38 -- TRIATHLON NA [employer] [id number] Left 1 Implanted   COMPONENT FEM SZ 7 L KNEE BHANU APATITE CRUCE RET CEMENTLESS - SNA  COMPONENT FEM SZ 7 L KNEE BHANU APATITE CRUCE RET CEMENTLESS NA [employer] [id number] Left 1 Implanted       POST OPERATIVE CONSIDERATIONS :  WBAT    JUSTIFICATION FOR SURGICAL ASSISTANT:   Surgical Assistant, was requried and necessary in this case, to help with soft tissue retraction, extremity positioning, equiment management, implant management, and wound closure.     Tomasa Nava MD

## 2023-01-20 NOTE — PERIOP NOTES
Post Op Call: Pt stated he's \"very sore\" and his pain is not controlled as desired. Pt reported he texted Dr. Billie Koroma, but has not had a response. Patient to follow Dr. Nathalie Kirby pain management plan on d/c instruction under FAQ section, increasing current pain regime frequency and dosage. Pt agreed to this pain plan; pt to reassess pain and decide whether or not to call Dr. Nathalie Kirby office later this afternoon. Pt provided pacu number as well and encouraged to call back for assistance if needed.

## 2023-02-14 ENCOUNTER — TELEPHONE (OUTPATIENT)
Dept: ORTHOPEDIC SURGERY | Age: 68
End: 2023-02-14

## 2023-02-14 DIAGNOSIS — M79.671 MECHANICAL FOOT PAIN, RIGHT: ICD-10-CM

## 2023-02-14 DIAGNOSIS — S99.191G CLOSED FRACTURE OF BASE OF FIFTH METATARSAL BONE OF RIGHT FOOT AT METAPHYSEAL-DIAPHYSEAL JUNCTION WITH DELAYED HEALING, SUBSEQUENT ENCOUNTER: ICD-10-CM

## 2023-02-14 DIAGNOSIS — Z01.818 PRE-OP TESTING: ICD-10-CM

## 2023-02-14 DIAGNOSIS — M21.6X1 ACQUIRED CAVOVARUS FOOT DEFORMITY, RIGHT: Primary | ICD-10-CM

## 2023-02-14 NOTE — TELEPHONE ENCOUNTER
Rtned pt call patient interested in previous xray images advised on how to access. Also advised regarding pre op testing a CT was order and cardiac clearance req prior to surgery. Pt currently recovering from knee surgery will give us a call when ready.

## 2023-03-01 RX ORDER — ATORVASTATIN CALCIUM 40 MG/1
TABLET, FILM COATED ORAL
Qty: 90 TABLET | Refills: 1 | Status: SHIPPED | OUTPATIENT
Start: 2023-03-01

## 2023-03-03 ENCOUNTER — TELEPHONE (OUTPATIENT)
Dept: ORTHOPEDIC SURGERY | Age: 68
End: 2023-03-03

## 2023-03-03 DIAGNOSIS — Z01.818 PRE-OP TESTING: ICD-10-CM

## 2023-03-03 DIAGNOSIS — M79.671 MECHANICAL FOOT PAIN, RIGHT: ICD-10-CM

## 2023-03-03 DIAGNOSIS — M21.6X1 ACQUIRED CAVOVARUS FOOT DEFORMITY, RIGHT: Primary | ICD-10-CM

## 2023-03-09 DIAGNOSIS — M79.671 MECHANICAL FOOT PAIN, RIGHT: ICD-10-CM

## 2023-03-09 DIAGNOSIS — S99.191G CLOSED FRACTURE OF BASE OF FIFTH METATARSAL BONE OF RIGHT FOOT AT METAPHYSEAL-DIAPHYSEAL JUNCTION WITH DELAYED HEALING, SUBSEQUENT ENCOUNTER: ICD-10-CM

## 2023-03-09 DIAGNOSIS — Z01.818 PRE-OP TESTING: Primary | ICD-10-CM

## 2023-03-09 DIAGNOSIS — M21.6X1 ACQUIRED CAVOVARUS FOOT DEFORMITY, RIGHT: ICD-10-CM

## 2023-03-09 DIAGNOSIS — M21.6X1 ACQUIRED CAVOVARUS FOOT DEFORMITY, RIGHT: Primary | ICD-10-CM

## 2023-03-09 DIAGNOSIS — Z01.818 PRE-OP TESTING: ICD-10-CM

## 2023-03-09 LAB
INR PPP: 1 (ref 0.9–1.1)
PROTHROMBIN TIME: 10.5 SEC (ref 9–11.1)

## 2023-03-10 LAB
ANION GAP SERPL CALC-SCNC: 6 MMOL/L (ref 5–15)
BASOPHILS # BLD: 0 K/UL (ref 0–0.1)
BASOPHILS NFR BLD: 1 % (ref 0–1)
BUN SERPL-MCNC: 17 MG/DL (ref 6–20)
BUN/CREAT SERPL: 16 (ref 12–20)
CALCIUM SERPL-MCNC: 8.9 MG/DL (ref 8.5–10.1)
CHLORIDE SERPL-SCNC: 105 MMOL/L (ref 97–108)
CO2 SERPL-SCNC: 29 MMOL/L (ref 21–32)
CREAT SERPL-MCNC: 1.07 MG/DL (ref 0.7–1.3)
DIFFERENTIAL METHOD BLD: ABNORMAL
EOSINOPHIL # BLD: 0.1 K/UL (ref 0–0.4)
EOSINOPHIL NFR BLD: 2 % (ref 0–7)
ERYTHROCYTE [DISTWIDTH] IN BLOOD BY AUTOMATED COUNT: 12.1 % (ref 11.5–14.5)
GLUCOSE SERPL-MCNC: 98 MG/DL (ref 65–100)
HCT VFR BLD AUTO: 35.8 % (ref 36.6–50.3)
HGB BLD-MCNC: 11.6 G/DL (ref 12.1–17)
IMM GRANULOCYTES # BLD AUTO: 0 K/UL (ref 0–0.04)
IMM GRANULOCYTES NFR BLD AUTO: 0 % (ref 0–0.5)
LYMPHOCYTES # BLD: 0.8 K/UL (ref 0.8–3.5)
LYMPHOCYTES NFR BLD: 21 % (ref 12–49)
MCH RBC QN AUTO: 30.4 PG (ref 26–34)
MCHC RBC AUTO-ENTMCNC: 32.4 G/DL (ref 30–36.5)
MCV RBC AUTO: 93.7 FL (ref 80–99)
MONOCYTES # BLD: 0.4 K/UL (ref 0–1)
MONOCYTES NFR BLD: 9 % (ref 5–13)
NEUTS SEG # BLD: 2.8 K/UL (ref 1.8–8)
NEUTS SEG NFR BLD: 67 % (ref 32–75)
NRBC # BLD: 0 K/UL (ref 0–0.01)
NRBC BLD-RTO: 0 PER 100 WBC
PLATELET # BLD AUTO: 203 K/UL (ref 150–400)
PMV BLD AUTO: 9.9 FL (ref 8.9–12.9)
POTASSIUM SERPL-SCNC: 4.6 MMOL/L (ref 3.5–5.1)
RBC # BLD AUTO: 3.82 M/UL (ref 4.1–5.7)
SODIUM SERPL-SCNC: 140 MMOL/L (ref 136–145)
WBC # BLD AUTO: 4.1 K/UL (ref 4.1–11.1)

## 2023-03-13 DIAGNOSIS — S99.191K CLOSED FRACTURE OF BASE OF FIFTH METATARSAL BONE OF RIGHT FOOT AT METAPHYSEAL-DIAPHYSEAL JUNCTION WITH NONUNION, SUBSEQUENT ENCOUNTER: Primary | ICD-10-CM

## 2023-03-13 DIAGNOSIS — G89.18 ACUTE POST-OPERATIVE PAIN: ICD-10-CM

## 2023-03-13 RX ORDER — OXYCODONE HYDROCHLORIDE 5 MG/1
5 TABLET ORAL
Qty: 20 TABLET | Refills: 0 | Status: SHIPPED | OUTPATIENT
Start: 2023-03-13 | End: 2023-03-16

## 2023-03-13 NOTE — PROGRESS NOTES
Patient has scheduled surgery on March 13, 2023 at [address] [place] [address], procedure is right foot fifth metatarsal nonunion repair with open reduction internal fixation and bone grafting, postoperative medications are sent to pharmacy on record which is [facility] in [place], [place]. Medication sent are oxycodone 5 mg 1 tab p.o. every 4-6 hours as needed pain, #20 tabs, no refills.

## 2023-03-22 DIAGNOSIS — F41.9 ANXIETY: ICD-10-CM

## 2023-03-22 RX ORDER — ALPRAZOLAM 0.5 MG/1
0.5 TABLET ORAL
Qty: 30 TABLET | Refills: 0 | Status: SHIPPED | OUTPATIENT
Start: 2023-03-22

## 2023-03-29 ENCOUNTER — OFFICE VISIT (OUTPATIENT)
Dept: ORTHOPEDIC SURGERY | Age: 68
End: 2023-03-29
Payer: MEDICARE

## 2023-03-29 DIAGNOSIS — Z09 SURGICAL FOLLOW-UP CARE: Primary | ICD-10-CM

## 2023-03-29 DIAGNOSIS — S99.191G CLOSED FRACTURE OF BASE OF FIFTH METATARSAL BONE OF RIGHT FOOT AT METAPHYSEAL-DIAPHYSEAL JUNCTION WITH DELAYED HEALING, SUBSEQUENT ENCOUNTER: ICD-10-CM

## 2023-03-29 DIAGNOSIS — M79.671 MECHANICAL FOOT PAIN, RIGHT: ICD-10-CM

## 2023-03-29 PROCEDURE — 99024 POSTOP FOLLOW-UP VISIT: CPT | Performed by: ORTHOPAEDIC SURGERY

## 2023-03-29 NOTE — LETTER
4/3/2023    Patient: Carrie Dominguez   YOB: 1955   Date of Visit: 3/29/2023     [facility] Gann [address] [facility]  [address]  [facility]  Via In Basket    Dear Gatito Hernandez MD,      Thank you for referring Mr. Dawit Aguirre to [facility] for evaluation. My notes for this consultation are attached. If you have questions, please do not hesitate to call me. I look forward to following your patient along with you.       Sincerely,    Robert yL MD
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
## 2023-04-05 RX ORDER — LISINOPRIL 10 MG/1
TABLET ORAL
Qty: 90 TABLET | Refills: 1 | Status: SHIPPED
Start: 2023-04-05

## 2023-04-26 ENCOUNTER — OFFICE VISIT (OUTPATIENT)
Dept: ORTHOPEDIC SURGERY | Age: 68
End: 2023-04-26

## 2023-04-26 VITALS — WEIGHT: 158 LBS | BODY MASS INDEX: 20.94 KG/M2 | HEIGHT: 73 IN

## 2023-04-26 DIAGNOSIS — S99.191G CLOSED FRACTURE OF BASE OF FIFTH METATARSAL BONE OF RIGHT FOOT AT METAPHYSEAL-DIAPHYSEAL JUNCTION WITH DELAYED HEALING, SUBSEQUENT ENCOUNTER: ICD-10-CM

## 2023-04-26 DIAGNOSIS — Z09 SURGICAL FOLLOW-UP CARE: Primary | ICD-10-CM

## 2023-04-26 RX ORDER — CEPHALEXIN 500 MG/1
500 CAPSULE ORAL 4 TIMES DAILY
Qty: 20 CAPSULE | Refills: 0 | Status: SHIPPED | OUTPATIENT
Start: 2023-04-26

## 2023-04-28 ENCOUNTER — OFFICE VISIT (OUTPATIENT)
Dept: INTERNAL MEDICINE CLINIC | Age: 68
End: 2023-04-28

## 2023-04-28 VITALS
SYSTOLIC BLOOD PRESSURE: 149 MMHG | DIASTOLIC BLOOD PRESSURE: 78 MMHG | RESPIRATION RATE: 16 BRPM | HEART RATE: 74 BPM | BODY MASS INDEX: 21.6 KG/M2 | WEIGHT: 163 LBS | HEIGHT: 73 IN | OXYGEN SATURATION: 98 % | TEMPERATURE: 98 F

## 2023-04-28 DIAGNOSIS — Z51.81 MEDICATION MONITORING ENCOUNTER: ICD-10-CM

## 2023-04-28 DIAGNOSIS — I48.91 ATRIAL FIBRILLATION, UNSPECIFIED TYPE (HCC): ICD-10-CM

## 2023-04-28 DIAGNOSIS — F41.9 ANXIETY: Primary | ICD-10-CM

## 2023-04-28 DIAGNOSIS — K21.9 GERD WITHOUT ESOPHAGITIS: ICD-10-CM

## 2023-04-28 DIAGNOSIS — Z23 ENCOUNTER FOR IMMUNIZATION: ICD-10-CM

## 2023-04-28 LAB
AMPHETAMINE QL URINE POC: NEGATIVE
BARBITURATES UR POC: NEGATIVE
BENZODIAZEPINES UR POC: NEGATIVE
COCAINE QL URINE POC: NEGATIVE
LOT EXP DATE POC: NORMAL
LOT NUMBER POC: NORMAL
MARIJUANA (THC) QL URINE POC: NEGATIVE
MDMA/ECSTASY UR POC: NEGATIVE
METHADONE QL URINE POC: 0
METHAMPHETAMINE QL URINE POC: NEGATIVE
NTI OTHER MICRO TRANSPORT 977598: 0
OPIATES QL URINE POC: NEGATIVE
OXYCODONE UR POC: NEGATIVE
VALID INTERNAL CONTROL?: YES

## 2023-04-28 RX ORDER — BUSPIRONE HYDROCHLORIDE 5 MG/1
5 TABLET ORAL 2 TIMES DAILY
Qty: 60 TABLET | Refills: 1 | Status: SHIPPED | OUTPATIENT
Start: 2023-04-28

## 2023-04-28 NOTE — PROGRESS NOTES
HISTORY OF PRESENT ILLNESS  Angelo Ramirez is a 78 y.o. male. HPI  Seen for med check and review he has anxiety notes about twice a week associates it mostly with stress to do with work notes it happens in the evenings when he is anticipating the work the next day has been using half to 1 Xanax at these times. Does take Suboxone and has been faithful with this and we discussed that there are safer options than benzodiazepines particularly given concerns about long-term memory effects with benzodiazepines. Discussed options and will start BuSpar on a standing basis to suppress anxiety discussed side effects possible. GERD well-controlled on the Protonix 40 no breakthrough symptoms no bleeding    Hypertension has been generally controlled on the lisinopril 10 no cough no chest pain does get testosterone shots regularly and is followed by urology for this. Review of Systems   Constitutional:  Negative for chills, fever and weight loss. Respiratory:  Negative for cough, shortness of breath and wheezing. Cardiovascular:  Negative for chest pain, palpitations, orthopnea, leg swelling and PND. Gastrointestinal:  Negative for abdominal pain, diarrhea, heartburn and nausea. Musculoskeletal:  Positive for joint pain. Negative for myalgias. Neurological:  Negative for dizziness and headaches. Psychiatric/Behavioral:  Negative for depression. The patient is nervous/anxious. The patient does not have insomnia. Physical Exam  Vitals and nursing note reviewed. Constitutional:       Appearance: He is well-developed. HENT:      Head: Normocephalic and atraumatic. Neck:      Thyroid: No thyromegaly. Vascular: No carotid bruit. Cardiovascular:      Rate and Rhythm: Normal rate and regular rhythm. Heart sounds: Normal heart sounds. Pulmonary:      Effort: Pulmonary effort is normal. No respiratory distress. Breath sounds: Normal breath sounds. No wheezing or rales.    Musculoskeletal: Cervical back: Normal range of motion and neck supple. Neurological:      Mental Status: He is alert and oriented to person, place, and time. Psychiatric:         Behavior: Behavior normal.       ASSESSMENT and PLAN  Diagnoses and all orders for this visit:    1. Anxiety  -     busPIRone (BUSPAR) 5 mg tablet; Take 1 Tablet by mouth two (2) times a day. Please take a dose with dinner and a dose at bedtime    2. Medication monitoring encounter  -     AMB QUINTON ALEELIA ICUP DX DRUG SCREEN 10    3. Atrial fibrillation, unspecified type (HCC)-no episodes recently and not on doac    4.  Encounter for immunization  -     PNEUMOCOCCAL, PPSV23, (AGE 2 YRS+), SC/IM    5. GERD without esophagitis-stable on med      the following changes in treatment are made: stop xanax and use buspar bid for  prevention of anxiety  He notes currently has trouble with sleep and I am  hopeful this will help  over time  Appt in 2 mo  Cont the ppi 40 mg dose for now consider dec dose at next visit

## 2023-05-02 RX ORDER — TADALAFIL 10 MG/1
TABLET ORAL
Qty: 8 TABLET | Refills: 0 | Status: SHIPPED | OUTPATIENT
Start: 2023-05-02

## 2023-05-08 DIAGNOSIS — K21.9 GASTRO-ESOPHAGEAL REFLUX DISEASE WITHOUT ESOPHAGITIS: ICD-10-CM

## 2023-05-08 RX ORDER — PANTOPRAZOLE SODIUM 40 MG/1
TABLET, DELAYED RELEASE ORAL
Qty: 90 TABLET | Refills: 1 | Status: SHIPPED | OUTPATIENT
Start: 2023-05-08

## 2023-05-19 ENCOUNTER — HOSPITAL ENCOUNTER (OUTPATIENT)
Facility: HOSPITAL | Age: 68
End: 2023-05-19
Payer: MEDICARE

## 2023-05-19 DIAGNOSIS — M25.552 PAIN OF LEFT HIP: ICD-10-CM

## 2023-05-19 PROCEDURE — 73502 X-RAY EXAM HIP UNI 2-3 VIEWS: CPT

## 2023-05-26 ENCOUNTER — APPOINTMENT (OUTPATIENT)
Facility: HOSPITAL | Age: 68
End: 2023-05-26
Payer: MEDICARE

## 2023-05-26 ENCOUNTER — HOSPITAL ENCOUNTER (EMERGENCY)
Facility: HOSPITAL | Age: 68
Discharge: HOME | End: 2023-05-26
Attending: EMERGENCY MEDICINE
Payer: MEDICARE

## 2023-05-26 VITALS
TEMPERATURE: 97.9 F | OXYGEN SATURATION: 97 % | RESPIRATION RATE: 18 BRPM | HEIGHT: 73 IN | SYSTOLIC BLOOD PRESSURE: 114 MMHG | BODY MASS INDEX: 21.53 KG/M2 | WEIGHT: 162.48 LBS | HEART RATE: 78 BPM | DIASTOLIC BLOOD PRESSURE: 51 MMHG

## 2023-05-26 DIAGNOSIS — S86.812A STRAIN OF LEFT CALF MUSCLE: ICD-10-CM

## 2023-05-26 DIAGNOSIS — M79.89 LEFT LEG SWELLING: Primary | ICD-10-CM

## 2023-05-26 DIAGNOSIS — M79.662 PAIN OF LEFT CALF: ICD-10-CM

## 2023-05-26 DIAGNOSIS — R79.89 ELEVATED D-DIMER: ICD-10-CM

## 2023-05-26 LAB
D DIMER PPP FEU-MCNC: 1.39 MG/L FEU (ref 0–0.65)
ECHO BSA: 1.95 M2

## 2023-05-26 PROCEDURE — 99284 EMERGENCY DEPT VISIT MOD MDM: CPT

## 2023-05-26 PROCEDURE — 36415 COLL VENOUS BLD VENIPUNCTURE: CPT

## 2023-05-26 PROCEDURE — 93971 EXTREMITY STUDY: CPT

## 2023-05-26 PROCEDURE — 85379 FIBRIN DEGRADATION QUANT: CPT

## 2023-05-26 RX ORDER — M-VIT,TX,IRON,MINS/CALC/FOLIC 27MG-0.4MG
1 TABLET ORAL 2 TIMES DAILY
COMMUNITY

## 2023-05-26 ASSESSMENT — ENCOUNTER SYMPTOMS: SHORTNESS OF BREATH: 0

## 2023-05-26 ASSESSMENT — PAIN DESCRIPTION - LOCATION: LOCATION: LEG

## 2023-05-26 ASSESSMENT — PAIN DESCRIPTION - DESCRIPTORS: DESCRIPTORS: SORE

## 2023-05-26 ASSESSMENT — LIFESTYLE VARIABLES
HOW MANY STANDARD DRINKS CONTAINING ALCOHOL DO YOU HAVE ON A TYPICAL DAY: PATIENT DOES NOT DRINK
HOW OFTEN DO YOU HAVE A DRINK CONTAINING ALCOHOL: NEVER

## 2023-05-26 ASSESSMENT — PAIN SCALES - GENERAL: PAINLEVEL_OUTOF10: 2

## 2023-05-26 ASSESSMENT — PAIN - FUNCTIONAL ASSESSMENT: PAIN_FUNCTIONAL_ASSESSMENT: 0-10

## 2023-05-26 ASSESSMENT — PAIN DESCRIPTION - ORIENTATION: ORIENTATION: LEFT

## 2023-05-26 NOTE — ED TRIAGE NOTES
Pt ambulates to treatment area he states that for the past 2 weeks he has had left calf swelling and pain. He states that after working on a large compressor he thought he may have strained the leg that has gotten worse over time.   Denies any SOB or chest pain

## 2023-05-26 NOTE — ED NOTES
Pt returned from ultrasound. Resting on stretcher. Denies needs/pain.      Tracy Mcdaniels RN  05/26/23 [id number]

## 2023-05-26 NOTE — ED PROVIDER NOTES
Data Reviewed: labs and notes. Labs: ordered. Decision-making details documented in ED Course. Radiology: ordered. Decision-making details documented in ED Course. REASSESSMENT     ED Course as of 05/26/23 1150   Fri May 26, 2023   1045 D-dimer elevated at 1.39 [JH]   1150 Venous duplex negative for DVT but suggestive of muscle injury [JH]      ED Course User Index  [JH] Claude Cano PA-C           CONSULTS:  None    PROCEDURES:  Unless otherwise noted below, none     Procedures      FINAL IMPRESSION      1. Left leg swelling    2. Elevated d-dimer    3. Pain of left calf    4.  Strain of left calf muscle          DISPOSITION/PLAN   DISPOSITION Decision To Discharge 05/26/2023 11:43:05 AM      PATIENT REFERRED TO:  Medhat Ty MD  [address] [place] [address]  [address] [place] [phone]    Schedule an appointment as soon as possible for a visit in 1 week      [address] [place] [address] [address]  [phone]  Go to   If symptoms worsen      DISCHARGE MEDICATIONS:  New Prescriptions    No medications on file         (Please note that portions of this note were completed with a voice recognition program.  Efforts were made to edit the dictations but occasionally words are mis-transcribed.)    Claude Cano PA-C (electronically signed)  Emergency Attending Physician / Physician Assistant / Nurse Practitioner             Claude Cano PA-C  05/26/23 1150

## 2023-05-26 NOTE — ED NOTES
Pt discharged to home at this time. All discharge instructions provided to patient. Pt verbalized understanding of all instructions. IV removed. No distress noted at time of discharge.       Brandi Hu RN  05/26/23 0150

## 2023-05-26 NOTE — DISCHARGE INSTRUCTIONS
You have been seen today for left calf pain and swelling. Your labs were elevated, suggesting inflammation or blood clot. However your ultrasound today was negative for blood clot. Because your blood test was elevated we would like for you to return to the emergency department in 1 week to have a repeat ultrasound performed on your left calf. You may also have this performed at your primary care office, but the test should be completed within 7 to 14 days from now. We suspect that you have torn a muscle in your left calf, based off of the results of your ultrasound. You may utilize warm compresses, compression stockings and ibuprofen/Advil over-the-counter for management of this pain. You can take Ibuprofen/Advil 600mg up to 3 times per day for pain as needed.

## 2023-06-21 ENCOUNTER — OFFICE VISIT (OUTPATIENT)
Age: 68
End: 2023-06-21
Payer: MEDICARE

## 2023-06-21 VITALS
BODY MASS INDEX: 21.87 KG/M2 | HEIGHT: 73 IN | OXYGEN SATURATION: 98 % | HEART RATE: 62 BPM | WEIGHT: 165 LBS | DIASTOLIC BLOOD PRESSURE: 70 MMHG | SYSTOLIC BLOOD PRESSURE: 138 MMHG

## 2023-06-21 DIAGNOSIS — R06.02 SOB (SHORTNESS OF BREATH): Primary | ICD-10-CM

## 2023-06-21 DIAGNOSIS — I48.91 ATRIAL FIBRILLATION, UNSPECIFIED TYPE (HCC): ICD-10-CM

## 2023-06-21 DIAGNOSIS — R06.02 SHORTNESS OF BREATH: ICD-10-CM

## 2023-06-21 DIAGNOSIS — I25.750 ATHEROSCLEROSIS OF NATIVE CORONARY ARTERY OF TRANSPLANTED HEART WITH UNSTABLE ANGINA (HCC): ICD-10-CM

## 2023-06-21 DIAGNOSIS — I25.10 ATHEROSCLEROSIS OF NATIVE CORONARY ARTERY OF NATIVE HEART WITHOUT ANGINA PECTORIS: ICD-10-CM

## 2023-06-21 PROCEDURE — 99213 OFFICE O/P EST LOW 20 MIN: CPT | Performed by: SPECIALIST

## 2023-06-21 NOTE — PROGRESS NOTES
Chief Complaint   Patient presents with    Coronary Artery Disease    Cholesterol Problem    Hypertension     Vitals:    23 1317   BP: 138/70   Site: Left Upper Arm   Position: Sitting   Cuff Size: Medium Adult   Pulse: 62   SpO2: 98%   Weight: 165 lb (74.8 kg)   Height: 6' 1\" (1.854 m)      /70 (Site: Left Upper Arm, Position: Sitting, Cuff Size: Medium Adult)   Pulse 62   Ht 6' 1\" (1.854 m)   Wt 165 lb (74.8 kg)   SpO2 98%   BMI 21.77 kg/m²      Chest pain             NO  SOB                       NO  Swelling                 NO  Dizziness               NO  Recent hospital     NO  Refills                    NO         Covid vaccination  YES  Covid                     NO  NAME Aydin Webb IV         1955      MRN    [id number]      LAST OFFICE APPOINTMENT: 2022     DIAGNOSIS    ICD-10-CM    1. SOB (shortness of breath)  R06.02       2. Atherosclerosis of native coronary artery of transplanted heart with unstable angina (HCC)  I25.750       3. Shortness of breath  R06.02       4. Atrial fibrillation, unspecified type ([facility] [address]  I48.91       5. Atherosclerosis of native coronary artery of native heart without angina pectoris  I25.10           HOME MEDICATION  Current Outpatient Medications   Medication Sig    Multiple Vitamins-Minerals (THERAPEUTIC MULTIVITAMIN-MINERALS) tablet Take 1 tablet by mouth 2 times daily    pantoprazole (PROTONIX) 40 MG tablet TAKE 1 TABLET BY MOUTH EVERY DAY    acetaminophen (TYLENOL) 500 MG tablet Take 2 tablets by mouth every 6 hours as needed    ALPRAZolam (XANAX) 0.5 MG tablet Take 1 tablet by mouth 2 times daily as needed. aspirin 81 MG EC tablet Take 1 tablet by mouth daily    atorvastatin (LIPITOR) 40 MG tablet Take 1 tablet by mouth daily    buprenorphine-naloxone (SUBOXONE) 8-2 MG FILM SL film Place 1 Film under the tongue.     famotidine (PEPCID) 20 MG tablet Take 1 tablet by mouth daily as needed    ibuprofen (ADVIL;MOTRIN) 800 MG tablet Take 1

## 2023-06-28 DIAGNOSIS — F41.9 ANXIETY DISORDER, UNSPECIFIED: ICD-10-CM

## 2023-06-28 RX ORDER — BUSPIRONE HYDROCHLORIDE 5 MG/1
TABLET ORAL
Qty: 60 TABLET | Refills: 0 | Status: SHIPPED | OUTPATIENT
Start: 2023-06-28

## 2023-07-02 NOTE — ANESTHESIA PREPROCEDURE EVALUATION
Anesthetic History   No history of anesthetic complications            Review of Systems / Medical History  Patient summary reviewed and pertinent labs reviewed    Pulmonary  Within defined limits                 Neuro/Psych         Psychiatric history    Comments: anxiety Cardiovascular    Hypertension              Exercise tolerance: >4 METS     GI/Hepatic/Renal     GERD: well controlled           Endo/Other        Arthritis     Other Findings   Comments: Chronic pain           Physical Exam    Airway  Mallampati: II  TM Distance: 4 - 6 cm  Neck ROM: normal range of motion   Mouth opening: Normal     Cardiovascular    Rhythm: regular  Rate: normal         Dental  No notable dental hx       Pulmonary  Breath sounds clear to auscultation               Abdominal         Other Findings            Anesthetic Plan    ASA: 2  Anesthesia type: general      Post-op pain plan if not by surgeon: peripheral nerve block continuous      Anesthetic plan and risks discussed with: Patient and Spouse
DVT

## 2023-07-10 RX ORDER — TADALAFIL 10 MG/1
TABLET ORAL
Qty: 8 TABLET | Refills: 0 | Status: SHIPPED | OUTPATIENT
Start: 2023-07-10

## 2023-08-14 DIAGNOSIS — F41.9 ANXIETY DISORDER, UNSPECIFIED: ICD-10-CM

## 2023-08-15 ENCOUNTER — TELEPHONE (OUTPATIENT)
Age: 68
End: 2023-08-15

## 2023-08-15 DIAGNOSIS — F41.9 ANXIETY DISORDER, UNSPECIFIED TYPE: Primary | ICD-10-CM

## 2023-08-15 RX ORDER — ALPRAZOLAM 0.5 MG/1
TABLET ORAL
Qty: 15 TABLET | Refills: 0 | Status: SHIPPED | OUTPATIENT
Start: 2023-08-15 | End: 2023-09-05

## 2023-08-15 RX ORDER — ALPRAZOLAM 0.5 MG/1
TABLET ORAL
Qty: 30 TABLET | Refills: 0 | OUTPATIENT
Start: 2023-08-15

## 2023-08-15 NOTE — TELEPHONE ENCOUNTER
Patient is requesting a Rx refill on medication below. He has an appointment on 9/5/23 but wanted to put refill in before appointment.     ALPRAZolam Magalie Wild) 0.5 MG tablet  [facility]/pharmacy #[id number] [place] Dancer, [address] [facility]  [phone] - F [phone]

## 2023-08-16 ENCOUNTER — OFFICE VISIT (OUTPATIENT)
Age: 68
End: 2023-08-16

## 2023-08-16 ENCOUNTER — TELEPHONE (OUTPATIENT)
Age: 68
End: 2023-08-16

## 2023-08-16 VITALS
WEIGHT: 164.2 LBS | OXYGEN SATURATION: 96 % | BODY MASS INDEX: 21.66 KG/M2 | HEART RATE: 91 BPM | RESPIRATION RATE: 18 BRPM | SYSTOLIC BLOOD PRESSURE: 147 MMHG | TEMPERATURE: 98 F | DIASTOLIC BLOOD PRESSURE: 75 MMHG

## 2023-08-16 DIAGNOSIS — J06.9 VIRAL URI: Primary | ICD-10-CM

## 2023-08-16 LAB
STREP PYOGENES DNA, POC: NEGATIVE
VALID INTERNAL CONTROL, POC: YES

## 2023-08-16 RX ORDER — BACITRACIN 500 [USP'U]/G
500 OINTMENT OPHTHALMIC DAILY
COMMUNITY
Start: 2023-08-11 | End: 2023-08-20

## 2023-08-16 RX ORDER — OFLOXACIN 3 MG/ML
2 SOLUTION/ DROPS OPHTHALMIC DAILY
COMMUNITY
Start: 2023-08-08 | End: 2023-08-20

## 2023-08-16 ASSESSMENT — ENCOUNTER SYMPTOMS
SORE THROAT: 1
ALLERGIC/IMMUNOLOGIC NEGATIVE: 1
GASTROINTESTINAL NEGATIVE: 1
EYES NEGATIVE: 1
RHINORRHEA: 1
RESPIRATORY NEGATIVE: 1

## 2023-08-16 NOTE — TELEPHONE ENCOUNTER
Called and spoke with patient, Informed him that the provider is treating him for a viral URI, and she would be opening to send in a cough medication that would help and encourage patient to start symptomatic management, salt water gargles, Tylenol, Advil OTC, increase fluid intake, vitamin C and zinc OTC, Chloraseptic spray, Chloraseptic lozenges OTC. Patient declined having cough medication sent to pharmacy and will try symptomatic management. Patient understood, No further questions or concerns.

## 2023-08-16 NOTE — TELEPHONE ENCOUNTER
Please call inform patient, strep test is negative, we can offer prescription cough medication for symptomatic relief. viral URI takes 1week to 10 days to resolve. Patient to start symptomatic management, salt water gargles, Tylenol, Advil OTC, increase fluid intake, vitamin C and zinc OTC, Chloraseptic spray, Chloraseptic lozenges OTC. If patient wants to get tested for COVID and flu we can offer that testing today to make sure he does not have COVID and flu.   Thanks

## 2023-08-20 ENCOUNTER — HOSPITAL ENCOUNTER (EMERGENCY)
Facility: HOSPITAL | Age: 68
Discharge: HOME | End: 2023-08-20
Attending: EMERGENCY MEDICINE
Payer: MEDICARE

## 2023-08-20 VITALS
SYSTOLIC BLOOD PRESSURE: 154 MMHG | OXYGEN SATURATION: 99 % | HEART RATE: 71 BPM | WEIGHT: 156.53 LBS | RESPIRATION RATE: 17 BRPM | DIASTOLIC BLOOD PRESSURE: 79 MMHG | BODY MASS INDEX: 20.75 KG/M2 | TEMPERATURE: 98.1 F | HEIGHT: 73 IN

## 2023-08-20 DIAGNOSIS — J40 BRONCHITIS: ICD-10-CM

## 2023-08-20 DIAGNOSIS — J06.9 VIRAL URI WITH COUGH: Primary | ICD-10-CM

## 2023-08-20 PROCEDURE — 99282 EMERGENCY DEPT VISIT SF MDM: CPT

## 2023-08-20 ASSESSMENT — ENCOUNTER SYMPTOMS
COUGH: 1
EYES NEGATIVE: 1
SORE THROAT: 1
RHINORRHEA: 1
GASTROINTESTINAL NEGATIVE: 1

## 2023-08-20 NOTE — DISCHARGE INSTRUCTIONS
You were seen in the emergency department for flulike symptoms. Although an exact cause of your symptoms was not identified, the most likely cause is a viral URI. Please take ibuprofen, Tylenol, DayQuil, NyQuil, Mucinex, and other over-the-counter medications, in addition to hydrating as much as possible. Please follow-up with your PCP or return to the emergency department if you experience a worsening of symptoms or any new symptoms that are concerning to you.

## 2023-08-20 NOTE — ED TRIAGE NOTES
Pt presents to ED with c/o productive cough and sore throat with fevers since 8/16/2023. Pt is currently afebrile. Pt appears in NAD.

## 2023-09-05 ENCOUNTER — OFFICE VISIT (OUTPATIENT)
Age: 68
End: 2023-09-05
Payer: MEDICARE

## 2023-09-05 VITALS
RESPIRATION RATE: 16 BRPM | HEIGHT: 73 IN | OXYGEN SATURATION: 96 % | DIASTOLIC BLOOD PRESSURE: 71 MMHG | HEART RATE: 85 BPM | WEIGHT: 160.8 LBS | BODY MASS INDEX: 21.31 KG/M2 | SYSTOLIC BLOOD PRESSURE: 133 MMHG | TEMPERATURE: 98.1 F

## 2023-09-05 DIAGNOSIS — F41.8 ANXIETY ABOUT HEALTH: ICD-10-CM

## 2023-09-05 DIAGNOSIS — J40 BRONCHITIS: Primary | ICD-10-CM

## 2023-09-05 DIAGNOSIS — Z51.81 MEDICATION MONITORING ENCOUNTER: ICD-10-CM

## 2023-09-05 LAB
AMPHETAMINE, URINE, POC: NEGATIVE
BARBITURATES, URINE, POC: NEGATIVE
BENZODIAZEPINES, URINE, POC: NORMAL
COCAINE, URINE, POC: NEGATIVE
LOT EXP DATE, POC: NORMAL
Lab: NORMAL
MARIJUANA (THC), URINE, POC: NEGATIVE
MDMA/ECSTASY, URINE, POC: NEGATIVE
METHADONE, URINE, POC: NEGATIVE
METHAMPHETAMINE, URINE, POC: NEGATIVE
NTI OTHER MICRO TRANSPORT: 0
OPIATES, URINE, POC: NEGATIVE
OXYCODONE, URINE, POC: NEGATIVE
VALID INTERNAL CONTROL, POC: YES

## 2023-09-05 PROCEDURE — 99213 OFFICE O/P EST LOW 20 MIN: CPT | Performed by: INTERNAL MEDICINE

## 2023-09-05 PROCEDURE — 3075F SYST BP GE 130 - 139MM HG: CPT | Performed by: INTERNAL MEDICINE

## 2023-09-05 PROCEDURE — 3078F DIAST BP <80 MM HG: CPT | Performed by: INTERNAL MEDICINE

## 2023-09-05 PROCEDURE — 1036F TOBACCO NON-USER: CPT | Performed by: INTERNAL MEDICINE

## 2023-09-05 PROCEDURE — 1123F ACP DISCUSS/DSCN MKR DOCD: CPT | Performed by: INTERNAL MEDICINE

## 2023-09-05 PROCEDURE — G8420 CALC BMI NORM PARAMETERS: HCPCS | Performed by: INTERNAL MEDICINE

## 2023-09-05 PROCEDURE — G8427 DOCREV CUR MEDS BY ELIG CLIN: HCPCS | Performed by: INTERNAL MEDICINE

## 2023-09-05 PROCEDURE — 3017F COLORECTAL CA SCREEN DOC REV: CPT | Performed by: INTERNAL MEDICINE

## 2023-09-05 PROCEDURE — PBSHW AMB POC ALERE ICUP DX DRUG SCREEN 10: Performed by: INTERNAL MEDICINE

## 2023-09-05 PROCEDURE — 80305 DRUG TEST PRSMV DIR OPT OBS: CPT | Performed by: INTERNAL MEDICINE

## 2023-09-05 RX ORDER — AZITHROMYCIN 250 MG/1
250 TABLET, FILM COATED ORAL SEE ADMIN INSTRUCTIONS
Qty: 6 TABLET | Refills: 0 | Status: SHIPPED | OUTPATIENT
Start: 2023-09-05 | End: 2023-09-10

## 2023-09-05 RX ORDER — PANTOPRAZOLE SODIUM 40 MG/1
40 TABLET, DELAYED RELEASE ORAL DAILY
COMMUNITY

## 2023-09-05 ASSESSMENT — PATIENT HEALTH QUESTIONNAIRE - PHQ9
2. FEELING DOWN, DEPRESSED OR HOPELESS: 0
SUM OF ALL RESPONSES TO PHQ QUESTIONS 1-9: 0
1. LITTLE INTEREST OR PLEASURE IN DOING THINGS: 0
SUM OF ALL RESPONSES TO PHQ9 QUESTIONS 1 & 2: 0
SUM OF ALL RESPONSES TO PHQ QUESTIONS 1-9: 0

## 2023-09-05 NOTE — PROGRESS NOTES
Mahesh Valera IV (:  1955) is a 78 y.o. male,Established patient, here for evaluation of the following chief complaint(s):  Medication Refill and Anxiety         ASSESSMENT/PLAN:  1. Bronchitis-suspect post viral  Cover with abx  Seek care if fever or sob  -     azithromycin (ZITHROMAX) 250 MG tablet; Take 1 tablet by mouth See Admin Instructions for 5 days 500mg on day 1 followed by 250mg on days 2 - 5, Disp-6 tablet, R-0Normal  2. Medication monitoring encounter-reviewed and appropriate  -     AMB POC ALERE ICUP DX DRUG SCREEN 10  3. Anxiety about health  Offered daily med for prevention  Declines  Will make the 15 xanax last at least 2 mo which is until 10/15/23    No follow-ups on file. Subjective   SUBJECTIVE/OBJECTIVE:  Medication Refill    Anxiety        Pain with 2 concerns 2 to 3 weeks ago he had what was likely a viral illness while at a wedding very sore throat fatigue exhaustion the symptoms improved but now he has had a cough productive of green phlegm particularly in the mornings. No hemoptysis. No pleuritic pain. No fevers or chills. Anxiety we did refill the Xanax 0.5 mg 15 tabs on August 15. He reports he still has 10 tabs left. Did not like the BuSpar. Cannot recall what it did but felt that he did not tolerate it. Discussed need to limit use of Xanax because of long-term risks with dementia. Discussed 15 tablets should last 2 months. Discussed that if this is not feasible should really consider alternative options for prevention of anxiety. Review of Systems       Objective   Physical Exam  Constitutional:       Appearance: Normal appearance. HENT:      Head: Normocephalic and atraumatic. Right Ear: Tympanic membrane normal.      Left Ear: Tympanic membrane normal.   Cardiovascular:      Rate and Rhythm: Normal rate and regular rhythm. Pulmonary:      Effort: Pulmonary effort is normal.      Breath sounds: Normal breath sounds. No wheezing or rhonchi.

## 2023-09-06 NOTE — TELEPHONE ENCOUNTER
Patient request refill going to [facility] for a 30 day supply   Patient has an appointment scheduled for 07/07/20 - thank you
not at present time

## 2023-09-13 RX ORDER — ATORVASTATIN CALCIUM 40 MG/1
40 TABLET, FILM COATED ORAL DAILY
Qty: 90 TABLET | Refills: 1 | Status: SHIPPED | OUTPATIENT
Start: 2023-09-13

## 2023-09-25 DIAGNOSIS — E29.1 HYPOGONADISM, MALE: Primary | ICD-10-CM

## 2023-09-25 RX ORDER — TADALAFIL 10 MG/1
10 TABLET ORAL DAILY PRN
Qty: 8 TABLET | Refills: 0 | Status: SHIPPED | OUTPATIENT
Start: 2023-09-25

## 2023-10-16 RX ORDER — LISINOPRIL 10 MG/1
10 TABLET ORAL DAILY
Qty: 90 TABLET | Refills: 0 | Status: SHIPPED | OUTPATIENT
Start: 2023-10-16

## 2023-10-24 ENCOUNTER — TELEPHONE (OUTPATIENT)
Age: 68
End: 2023-10-24

## 2023-10-24 ENCOUNTER — OFFICE VISIT (OUTPATIENT)
Age: 68
End: 2023-10-24
Payer: MEDICARE

## 2023-10-24 VITALS
WEIGHT: 166.7 LBS | HEIGHT: 73 IN | HEART RATE: 75 BPM | RESPIRATION RATE: 16 BRPM | TEMPERATURE: 98 F | SYSTOLIC BLOOD PRESSURE: 136 MMHG | DIASTOLIC BLOOD PRESSURE: 72 MMHG | BODY MASS INDEX: 22.09 KG/M2 | OXYGEN SATURATION: 98 %

## 2023-10-24 DIAGNOSIS — R05.2 SUBACUTE COUGH: Primary | ICD-10-CM

## 2023-10-24 DIAGNOSIS — I10 HTN, GOAL BELOW 130/80: ICD-10-CM

## 2023-10-24 LAB
LOT EXPIRE DATE: NORMAL
LOT KIT NUMBER: NORMAL
SARS-COV-2, POC: NORMAL
VALID INTERNAL CONTROL: YES
VENDOR AND KIT NAME POC: NORMAL

## 2023-10-24 PROCEDURE — 99213 OFFICE O/P EST LOW 20 MIN: CPT | Performed by: INTERNAL MEDICINE

## 2023-10-24 PROCEDURE — G8428 CUR MEDS NOT DOCUMENT: HCPCS | Performed by: INTERNAL MEDICINE

## 2023-10-24 PROCEDURE — G8484 FLU IMMUNIZE NO ADMIN: HCPCS | Performed by: INTERNAL MEDICINE

## 2023-10-24 PROCEDURE — 3075F SYST BP GE 130 - 139MM HG: CPT | Performed by: INTERNAL MEDICINE

## 2023-10-24 PROCEDURE — 1123F ACP DISCUSS/DSCN MKR DOCD: CPT | Performed by: INTERNAL MEDICINE

## 2023-10-24 PROCEDURE — 87426 SARSCOV CORONAVIRUS AG IA: CPT | Performed by: INTERNAL MEDICINE

## 2023-10-24 PROCEDURE — 3017F COLORECTAL CA SCREEN DOC REV: CPT | Performed by: INTERNAL MEDICINE

## 2023-10-24 PROCEDURE — PBSHW AMB POC SARS-COV-2: Performed by: INTERNAL MEDICINE

## 2023-10-24 PROCEDURE — G8420 CALC BMI NORM PARAMETERS: HCPCS | Performed by: INTERNAL MEDICINE

## 2023-10-24 PROCEDURE — 3078F DIAST BP <80 MM HG: CPT | Performed by: INTERNAL MEDICINE

## 2023-10-24 PROCEDURE — 1036F TOBACCO NON-USER: CPT | Performed by: INTERNAL MEDICINE

## 2023-10-24 RX ORDER — FLUTICASONE PROPIONATE 50 MCG
2 SPRAY, SUSPENSION (ML) NASAL DAILY
Qty: 48 G | Refills: 1 | Status: SHIPPED | OUTPATIENT
Start: 2023-10-24

## 2023-10-24 RX ORDER — AZITHROMYCIN 250 MG/1
250 TABLET, FILM COATED ORAL SEE ADMIN INSTRUCTIONS
Qty: 6 TABLET | Refills: 0 | Status: SHIPPED | OUTPATIENT
Start: 2023-10-24 | End: 2023-10-29

## 2023-10-24 ASSESSMENT — PATIENT HEALTH QUESTIONNAIRE - PHQ9
SUM OF ALL RESPONSES TO PHQ9 QUESTIONS 1 & 2: 0
2. FEELING DOWN, DEPRESSED OR HOPELESS: 0
SUM OF ALL RESPONSES TO PHQ QUESTIONS 1-9: 0
1. LITTLE INTEREST OR PLEASURE IN DOING THINGS: 0

## 2023-10-24 ASSESSMENT — ENCOUNTER SYMPTOMS: COUGH: 1

## 2023-10-24 NOTE — PROGRESS NOTES
Jon Levy IV (:  1955) is a 80 y.o. male,Established patient, here for evaluation of the following chief complaint(s):  Cough (Cough; congestion; started around a month ago; already taken z-pack)         ASSESSMENT/PLAN:  1. Subacute cough  -     AMB POC SARS-COV-2 neg  Treat empirically with zpak and flonase  If persistent consider change from ace  Consider imaging of chest  See me if not improved in 10 days  -     azithromycin (ZITHROMAX) 250 MG tablet; Take 1 tablet by mouth See Admin Instructions for 5 days 500mg on day 1 followed by 250mg on days 2 - 5, Disp-6 tablet, R-0Normal  -     fluticasone (FLONASE) 50 MCG/ACT nasal spray; 2 sprays by Each Nostril route daily, Disp-48 g, R-1Normal  2. HTN, goal below 130/80      No follow-ups on file. Subjective   SUBJECTIVE/OBJECTIVE:  Cough       was treated for likely postviral bronchitis with Z-Micha felt better for over a month however in the last 3 days started to have some cough and congestion with thick mucus no fevers or chills no home COVID tests no pleuritic pain no tobacco use. Does take an ACE inhibitor chronically but certainly had at least a month without cough    Review of Systems   Respiratory:  Positive for cough. Objective   Physical Exam  Constitutional:       Appearance: Normal appearance. HENT:      Head: Normocephalic and atraumatic. Right Ear: Tympanic membrane and ear canal normal.      Left Ear: Tympanic membrane and ear canal normal.   Cardiovascular:      Rate and Rhythm: Normal rate and regular rhythm. Pulmonary:      Effort: Pulmonary effort is normal.      Breath sounds: Normal breath sounds. No wheezing or rhonchi. Musculoskeletal:      Right lower leg: No edema. Left lower leg: No edema. Neurological:      General: No focal deficit present. Mental Status: He is alert and oriented to person, place, and time.    Psychiatric:         Behavior: Behavior normal.

## 2023-10-24 NOTE — TELEPHONE ENCOUNTER
----- Message from Logan Denn[place], [place] sent at 10/24/2023 10:02 AM EDT -----  Subject: Message to Provider    QUESTIONS  Information for Provider? Patient calling in asking for a refill on a zpak   he took back in Sept, He cleared up his congestion and cough back in   September but it has come back , Current symptoms are cough and   congestion. He is just asking for the same medication that helped prior. If he needs an appt please reach out.  ---------------------------------------------------------------------------  --------------  [address] [place]  [phone]; OK to leave message on voicemail,Do not leave any message,   patient will call back for answer  ---------------------------------------------------------------------------  --------------  SCRIPT ANSWERS  Relationship to Patient?  Self

## 2023-10-24 NOTE — TELEPHONE ENCOUNTER
Pt advised he needs an office visit so he can be examined to determine the best treatment plan. Pt agreed & scheduled for today at 3:00. Pt was thankful for the visit.

## 2023-10-27 ENCOUNTER — TELEPHONE (OUTPATIENT)
Age: 68
End: 2023-10-27

## 2023-10-27 NOTE — TELEPHONE ENCOUNTER
Pt states he stared with chest pressure this am - feels like several books are sitting on his chest. Similar symptom prior to stent but not as intense. He also states he got short winded when he tried to do an activity. Recently started on antibiotics for congestion. Asked him to go to ER to be evaluated. Pt verbalized understanding.

## 2023-11-08 DIAGNOSIS — K21.9 GASTRO-ESOPHAGEAL REFLUX DISEASE WITHOUT ESOPHAGITIS: ICD-10-CM

## 2023-11-08 RX ORDER — PANTOPRAZOLE SODIUM 40 MG/1
40 TABLET, DELAYED RELEASE ORAL DAILY
Qty: 90 TABLET | Refills: 1 | Status: SHIPPED | OUTPATIENT
Start: 2023-11-08

## 2023-11-17 ENCOUNTER — HOSPITAL ENCOUNTER (OUTPATIENT)
Facility: HOSPITAL | Age: 68
End: 2023-11-17
Payer: MEDICARE

## 2023-11-17 DIAGNOSIS — Z96.652 STATUS POST TOTAL KNEE REPLACEMENT, LEFT: ICD-10-CM

## 2023-11-17 DIAGNOSIS — M25.562 CHRONIC PAIN OF LEFT KNEE: ICD-10-CM

## 2023-11-17 DIAGNOSIS — G89.29 CHRONIC PAIN OF LEFT KNEE: ICD-10-CM

## 2023-11-17 DIAGNOSIS — M25.462 EFFUSION OF LEFT KNEE JOINT: ICD-10-CM

## 2023-11-17 PROCEDURE — 73700 CT LOWER EXTREMITY W/O DYE: CPT

## 2023-11-27 DIAGNOSIS — E29.1 HYPOGONADISM, MALE: ICD-10-CM

## 2023-11-27 RX ORDER — TADALAFIL 10 MG/1
10 TABLET ORAL DAILY PRN
Qty: 8 TABLET | Refills: 3 | Status: SHIPPED | OUTPATIENT
Start: 2023-11-27 | End: 2023-12-03

## 2023-11-30 ENCOUNTER — PATIENT MESSAGE (OUTPATIENT)
Age: 68
End: 2023-11-30

## 2023-11-30 DIAGNOSIS — F41.9 ANXIETY: Primary | ICD-10-CM

## 2023-11-30 RX ORDER — ALPRAZOLAM 0.5 MG/1
0.5 TABLET ORAL NIGHTLY PRN
Qty: 30 TABLET | Refills: 0 | Status: SHIPPED | OUTPATIENT
Start: 2023-11-30 | End: 2023-12-30

## 2023-11-30 NOTE — TELEPHONE ENCOUNTER
From: Ines Martin IV  To: Dr. Abigail Hsieh: 11/30/2023 9:50 AM EST  Subject: refill to different pharmacy    please send a refill to [facility] for generic xanax. [address] [place] [address] Rd [phone]. In process of transferring all meds to this pharmacy.  Thank you

## 2023-12-03 ENCOUNTER — HOSPITAL ENCOUNTER (EMERGENCY)
Facility: HOSPITAL | Age: 68
Discharge: HOME | End: 2023-12-03
Attending: EMERGENCY MEDICINE
Payer: MEDICARE

## 2023-12-03 VITALS
DIASTOLIC BLOOD PRESSURE: 77 MMHG | TEMPERATURE: 98.6 F | WEIGHT: 163.14 LBS | HEART RATE: 80 BPM | BODY MASS INDEX: 21.62 KG/M2 | HEIGHT: 73 IN | OXYGEN SATURATION: 98 % | RESPIRATION RATE: 17 BRPM | SYSTOLIC BLOOD PRESSURE: 129 MMHG

## 2023-12-03 DIAGNOSIS — H81.12 BENIGN PAROXYSMAL POSITIONAL VERTIGO OF LEFT EAR: Primary | ICD-10-CM

## 2023-12-03 DIAGNOSIS — R07.89 ATYPICAL CHEST PAIN: ICD-10-CM

## 2023-12-03 LAB
ALBUMIN SERPL-MCNC: 3.6 G/DL (ref 3.5–5)
ALBUMIN/GLOB SERPL: 1.2 (ref 1.1–2.2)
ALP SERPL-CCNC: 66 U/L (ref 45–117)
ALT SERPL-CCNC: 27 U/L (ref 12–78)
ANION GAP SERPL CALC-SCNC: 4 MMOL/L (ref 5–15)
AST SERPL-CCNC: 19 U/L (ref 15–37)
BASOPHILS # BLD: 0 K/UL (ref 0–0.1)
BASOPHILS NFR BLD: 0 % (ref 0–1)
BILIRUB SERPL-MCNC: 0.7 MG/DL (ref 0.2–1)
BUN SERPL-MCNC: 12 MG/DL (ref 6–20)
BUN/CREAT SERPL: 11 (ref 12–20)
CALCIUM SERPL-MCNC: 8.8 MG/DL (ref 8.5–10.1)
CHLORIDE SERPL-SCNC: 103 MMOL/L (ref 97–108)
CO2 SERPL-SCNC: 29 MMOL/L (ref 21–32)
CREAT SERPL-MCNC: 1.08 MG/DL (ref 0.7–1.3)
DIFFERENTIAL METHOD BLD: NORMAL
EKG ATRIAL RATE: 89 BPM
EKG DIAGNOSIS: NORMAL
EKG P AXIS: 65 DEGREES
EKG P-R INTERVAL: 156 MS
EKG Q-T INTERVAL: 334 MS
EKG QRS DURATION: 98 MS
EKG QTC CALCULATION (BAZETT): 406 MS
EKG R AXIS: 48 DEGREES
EKG T AXIS: 27 DEGREES
EKG VENTRICULAR RATE: 89 BPM
EOSINOPHIL # BLD: 0.2 K/UL (ref 0–0.4)
EOSINOPHIL NFR BLD: 3 % (ref 0–7)
ERYTHROCYTE [DISTWIDTH] IN BLOOD BY AUTOMATED COUNT: 12.8 % (ref 11.5–14.5)
GLOBULIN SER CALC-MCNC: 3 G/DL (ref 2–4)
GLUCOSE SERPL-MCNC: 114 MG/DL (ref 65–100)
HCT VFR BLD AUTO: 44.3 % (ref 36.6–50.3)
HGB BLD-MCNC: 15.1 G/DL (ref 12.1–17)
IMM GRANULOCYTES # BLD AUTO: 0 K/UL (ref 0–0.04)
IMM GRANULOCYTES NFR BLD AUTO: 0 % (ref 0–0.5)
LYMPHOCYTES # BLD: 1.1 K/UL (ref 0.8–3.5)
LYMPHOCYTES NFR BLD: 17 % (ref 12–49)
MAGNESIUM SERPL-MCNC: 1.8 MG/DL (ref 1.6–2.4)
MCH RBC QN AUTO: 31 PG (ref 26–34)
MCHC RBC AUTO-ENTMCNC: 34.1 G/DL (ref 30–36.5)
MCV RBC AUTO: 91 FL (ref 80–99)
MONOCYTES # BLD: 0.6 K/UL (ref 0–1)
MONOCYTES NFR BLD: 9 % (ref 5–13)
NEUTS SEG # BLD: 4.4 K/UL (ref 1.8–8)
NEUTS SEG NFR BLD: 70 % (ref 32–75)
NRBC # BLD: 0 K/UL (ref 0–0.01)
NRBC BLD-RTO: 0 PER 100 WBC
PLATELET # BLD AUTO: 188 K/UL (ref 150–400)
PMV BLD AUTO: 9.5 FL (ref 8.9–12.9)
POTASSIUM SERPL-SCNC: 4.1 MMOL/L (ref 3.5–5.1)
PROT SERPL-MCNC: 6.6 G/DL (ref 6.4–8.2)
RBC # BLD AUTO: 4.87 M/UL (ref 4.1–5.7)
SODIUM SERPL-SCNC: 136 MMOL/L (ref 136–145)
TROPONIN I SERPL HS-MCNC: 5 NG/L (ref 0–76)
WBC # BLD AUTO: 6.2 K/UL (ref 4.1–11.1)

## 2023-12-03 PROCEDURE — 99284 EMERGENCY DEPT VISIT MOD MDM: CPT

## 2023-12-03 PROCEDURE — 83735 ASSAY OF MAGNESIUM: CPT

## 2023-12-03 PROCEDURE — 93010 ELECTROCARDIOGRAM REPORT: CPT | Performed by: SPECIALIST

## 2023-12-03 PROCEDURE — 84484 ASSAY OF TROPONIN QUANT: CPT

## 2023-12-03 PROCEDURE — 85025 COMPLETE CBC W/AUTO DIFF WBC: CPT

## 2023-12-03 PROCEDURE — 80053 COMPREHEN METABOLIC PANEL: CPT

## 2023-12-03 PROCEDURE — 36415 COLL VENOUS BLD VENIPUNCTURE: CPT

## 2023-12-03 PROCEDURE — 93005 ELECTROCARDIOGRAM TRACING: CPT | Performed by: EMERGENCY MEDICINE

## 2023-12-03 RX ORDER — MECLIZINE HYDROCHLORIDE 25 MG/1
25 TABLET ORAL 3 TIMES DAILY PRN
Qty: 20 TABLET | Refills: 0 | Status: SHIPPED | OUTPATIENT
Start: 2023-12-03 | End: 2023-12-13

## 2023-12-03 ASSESSMENT — PAIN DESCRIPTION - FREQUENCY: FREQUENCY: INTERMITTENT

## 2023-12-03 ASSESSMENT — PAIN - FUNCTIONAL ASSESSMENT
PAIN_FUNCTIONAL_ASSESSMENT: NONE - DENIES PAIN
PAIN_FUNCTIONAL_ASSESSMENT: ACTIVITIES ARE NOT PREVENTED
PAIN_FUNCTIONAL_ASSESSMENT: 0-10

## 2023-12-03 ASSESSMENT — PAIN DESCRIPTION - LOCATION: LOCATION: CHEST

## 2023-12-03 ASSESSMENT — PAIN SCALES - GENERAL
PAINLEVEL_OUTOF10: 1
PAINLEVEL_OUTOF10: 0

## 2023-12-03 ASSESSMENT — PAIN DESCRIPTION - ORIENTATION: ORIENTATION: ANTERIOR

## 2023-12-03 ASSESSMENT — PAIN DESCRIPTION - PAIN TYPE: TYPE: ACUTE PAIN

## 2023-12-03 ASSESSMENT — PAIN DESCRIPTION - ONSET: ONSET: ON-GOING

## 2023-12-03 ASSESSMENT — PAIN DESCRIPTION - DESCRIPTORS: DESCRIPTORS: PRESSURE

## 2023-12-03 NOTE — ED TRIAGE NOTES
Pt presents to ED with c/o 2-3 day hx of dizziness. Pt states today he awoke with chest pressure. Pt states some dyspnea but appears in NAD. Pt states he hd PTCA two years ago at [facility].

## 2023-12-03 NOTE — ED NOTES
Patient discharged to home with family, with verbalized understanding of discharge instructions and prescriptions sent to pharmacy for . Patient ambulatory out of ED with family, with a steady gait.       Daily, Morris Flores RN  12/03/23 0192

## 2023-12-06 ENCOUNTER — TRANSCRIBE ORDERS (OUTPATIENT)
Facility: HOSPITAL | Age: 68
End: 2023-12-06

## 2023-12-06 DIAGNOSIS — Z96.652 S/P TKR (TOTAL KNEE REPLACEMENT), LEFT: Primary | ICD-10-CM

## 2023-12-11 ENCOUNTER — HOSPITAL ENCOUNTER (OUTPATIENT)
Facility: HOSPITAL | Age: 68
Discharge: HOME | End: 2023-12-14
Attending: ORTHOPAEDIC SURGERY
Payer: MEDICARE

## 2023-12-11 DIAGNOSIS — Z96.652 S/P TKR (TOTAL KNEE REPLACEMENT), LEFT: ICD-10-CM

## 2023-12-11 PROCEDURE — 3430000000 HC RX DIAGNOSTIC RADIOPHARMACEUTICAL: Performed by: ORTHOPAEDIC SURGERY

## 2023-12-11 PROCEDURE — 78300 BONE IMAGING LIMITED AREA: CPT

## 2023-12-11 PROCEDURE — A9503 TC99M MEDRONATE: HCPCS | Performed by: ORTHOPAEDIC SURGERY

## 2023-12-11 RX ORDER — TC 99M MEDRONATE 20 MG/10ML
21.9 INJECTION, POWDER, LYOPHILIZED, FOR SOLUTION INTRAVENOUS ONCE
Status: COMPLETED | OUTPATIENT
Start: 2023-12-11 | End: 2023-12-11

## 2023-12-11 RX ADMIN — TC 99M MEDRONATE 21.9 MILLICURIE: 20 INJECTION, POWDER, LYOPHILIZED, FOR SOLUTION INTRAVENOUS at 11:05

## 2023-12-19 DIAGNOSIS — R06.02 SOB (SHORTNESS OF BREATH): ICD-10-CM

## 2023-12-19 DIAGNOSIS — I25.750 ATHEROSCLEROSIS OF NATIVE CORONARY ARTERY OF TRANSPLANTED HEART WITH UNSTABLE ANGINA (HCC): ICD-10-CM

## 2023-12-19 DIAGNOSIS — I48.91 ATRIAL FIBRILLATION, UNSPECIFIED TYPE (HCC): ICD-10-CM

## 2023-12-19 DIAGNOSIS — I25.10 ATHEROSCLEROSIS OF NATIVE CORONARY ARTERY OF NATIVE HEART WITHOUT ANGINA PECTORIS: ICD-10-CM

## 2023-12-19 DIAGNOSIS — R06.02 SHORTNESS OF BREATH: ICD-10-CM

## 2023-12-19 LAB
ALBUMIN SERPL-MCNC: 3.7 G/DL (ref 3.5–5)
ALBUMIN/GLOB SERPL: 1.4 (ref 1.1–2.2)
ALP SERPL-CCNC: 67 U/L (ref 45–117)
ALT SERPL-CCNC: 21 U/L (ref 12–78)
AST SERPL-CCNC: 20 U/L (ref 15–37)
BILIRUB DIRECT SERPL-MCNC: 0.2 MG/DL (ref 0–0.2)
BILIRUB SERPL-MCNC: 0.7 MG/DL (ref 0.2–1)
CHOLEST SERPL-MCNC: 123 MG/DL
GLOBULIN SER CALC-MCNC: 2.6 G/DL (ref 2–4)
HDLC SERPL-MCNC: 59 MG/DL
HDLC SERPL: 2.1 (ref 0–5)
LDLC SERPL CALC-MCNC: 53.6 MG/DL (ref 0–100)
PROT SERPL-MCNC: 6.3 G/DL (ref 6.4–8.2)
TRIGL SERPL-MCNC: 52 MG/DL
VLDLC SERPL CALC-MCNC: 10.4 MG/DL

## 2024-01-11 RX ORDER — LISINOPRIL 10 MG/1
10 TABLET ORAL DAILY
Qty: 90 TABLET | Refills: 3 | Status: SHIPPED | OUTPATIENT
Start: 2024-01-11

## 2024-01-11 NOTE — TELEPHONE ENCOUNTER
Per verbal order from Dr. Barboza  Last appt: 12/21/2023     Future Appointments   Date Time Provider Department Center   1/18/2024  1:45 PM Marita Alamo MD [facility] AMB   7/3/2024  1:00 PM Anand Barboza MD [facility] AMB       Requested Prescriptions     Signed Prescriptions Disp Refills    lisinopril (PRINIVIL;ZESTRIL) 10 MG tablet 90 tablet 3     Sig: TAKE 1 TABLET BY MOUTH EVERY DAY     Authorizing Provider: ANAND BARBOZA     Ordering User: ELISSA LÓPEZ

## 2024-02-21 PROBLEM — I21.9 ACUTE MYOCARDIAL INFARCTION, UNSPECIFIED (HCC): Status: RESOLVED | Noted: 2021-11-09 | Resolved: 2024-02-21

## 2024-02-21 PROBLEM — I21.3 ST ELEVATION (STEMI) MYOCARDIAL INFARCTION OF UNSPECIFIED SITE (HCC): Status: RESOLVED | Noted: 2021-11-09 | Resolved: 2024-02-21

## 2024-02-21 PROBLEM — I25.2 HISTORY OF ACUTE MYOCARDIAL INFARCTION: Status: ACTIVE | Noted: 2021-11-09

## 2024-02-27 NOTE — PROGRESS NOTES
PT DAILY TREATMENT NOTE 2-15    Patient Name: Kelly Lind  Date:2017  : 1955  [x]  Patient  Verified  Payor: [facility] / Plan: Tania Riggs / Product Type: PPO /    In time:945a  Out time:1040a  Total Treatment Time (min): 55  Visit #: 3     Treatment Area: Right shoulder pain [M25.511]    SUBJECTIVE  Pain Level (0-10 scale): 1  Any medication changes, allergies to medications, adverse drug reactions, diagnosis change, or new procedure performed?: [x] No    [] Yes (see summary sheet for update)  Subjective functional status/changes:   [] No changes reported  Patient reports he has been feeling good. Patient states he fell asleep in the chair the other day and had intense pain anterior shoulder. OBJECTIVE    Modality rationale: decrease edema, decrease inflammation and decrease pain to improve the patients ability to reach overhead. Min Type Additional Details    [] Estim: []Att   []Unatt        []TENS instruct                  []IFC  []Premod   []NMES                     []Other:  []w/US   []w/ice   []w/heat  Position:  Location:    []  Traction: [] Cervical       []Lumbar                       [] Prone          []Supine                       []Intermittent   []Continuous Lbs:  [] before manual  [] after manual  []w/heat    []  Ultrasound: []Continuous   [] Pulsed at:                            []1MHz   []3MHz Location:  W/cm2:    []  Paraffin         Location:  []w/heat    []  Ice     []  Heat  []  Ice massage Position:  Location:    []  Laser  []  Other: Position:  Location:   15 [x]  Vasopneumatic Device Pressure:       [x] lo [] med [] hi   Temperature: 34   [x] Skin assessment post-treatment:  [x]intact []redness- no adverse reaction    []redness - adverse reaction:     30 min Therapeutic Exercise:  [x] See flow sheet :   Rationale: increase ROM to improve the patients ability to reach overhead.     10 min Manual Therapy:  Shoulder PROM: flexion, abduction, ER Rationale: decrease pain, increase ROM, increase tissue extensibility and decrease trigger points  to improve the patients ability to reach overhead. With   [] TE   [] TA   [] neuro   [] other: Patient Education: [x] Review HEP    [] Progressed/Changed HEP based on:   [] positioning   [] body mechanics   [] transfers   [] heat/ice application    [] other:      Other Objective/Functional Measures:      Pain Level (0-10 scale) post treatment: 0    ASSESSMENT/Changes in Function:     Patient will continue to benefit from skilled PT services to modify and progress therapeutic interventions, address functional mobility deficits, address ROM deficits, address strength deficits, analyze and address soft tissue restrictions, analyze and cue movement patterns, analyze and modify body mechanics/ergonomics and assess and modify postural abnormalities to attain remaining goals. []  See Plan of Care  []  See progress note/recertification  []  See Discharge Summary         Progress towards goals / Updated goals:  Patient continues to demonstrate improve PROM and overall tolerance to therapeutic exercises within protocols. Patient requires verbal cues to ensure no muscle activation or guarding with PROM and is making good progress towards goals.     PLAN  [x]  Upgrade activities as tolerated     [x]  Continue plan of care  []  Update interventions per flow sheet       []  Discharge due to:_  []  Other:_      Ami Johnson PTA   7/18/2017  5:08 PM Family Edgardo/Family

## 2024-02-28 DIAGNOSIS — K21.9 GASTRO-ESOPHAGEAL REFLUX DISEASE WITHOUT ESOPHAGITIS: ICD-10-CM

## 2024-02-28 RX ORDER — ATORVASTATIN CALCIUM 40 MG/1
40 TABLET, FILM COATED ORAL DAILY
Qty: 90 TABLET | Refills: 1 | Status: SHIPPED | OUTPATIENT
Start: 2024-02-28

## 2024-02-28 RX ORDER — PANTOPRAZOLE SODIUM 40 MG/1
40 TABLET, DELAYED RELEASE ORAL DAILY
Qty: 90 TABLET | Refills: 1 | Status: SHIPPED | OUTPATIENT
Start: 2024-02-28

## 2024-02-28 NOTE — TELEPHONE ENCOUNTER
Chief Complaint   Patient presents with    Medication Refill       Requested Prescriptions     Pending Prescriptions Disp Refills    pantoprazole (PROTONIX) 40 MG tablet [Pharmacy Med Name: PANTOPRAZOLE SOD DR 40 MG TAB] 90 tablet 1     Sig: TAKE 1 TABLET BY MOUTH EVERY DAY       Allergies:  Allergies   Allergen Reactions    Codeine Rash     Over 40 years ago         Last visit with clinic:  10/24/2023   Next visit with clinic: Visit date not found     Last visit with this provider: 10/24/2023   Next Visit with this provider: Visit date not found    Signed by Chris Negro [facility]  02/28/24  3:50 PM

## 2024-04-05 ENCOUNTER — HOSPITAL ENCOUNTER (OUTPATIENT)
Facility: HOSPITAL | Age: 69
End: 2024-04-05
Attending: ORTHOPAEDIC SURGERY
Payer: MEDICARE

## 2024-04-05 DIAGNOSIS — Z96.652 STATUS POST TOTAL KNEE REPLACEMENT, LEFT: ICD-10-CM

## 2024-04-05 PROCEDURE — 73700 CT LOWER EXTREMITY W/O DYE: CPT

## 2024-05-01 ENCOUNTER — OFFICE VISIT (OUTPATIENT)
Age: 69
End: 2024-05-01
Payer: MEDICARE

## 2024-05-01 VITALS
HEART RATE: 73 BPM | BODY MASS INDEX: 22.53 KG/M2 | WEIGHT: 170 LBS | SYSTOLIC BLOOD PRESSURE: 143 MMHG | TEMPERATURE: 98.1 F | RESPIRATION RATE: 16 BRPM | HEIGHT: 73 IN | DIASTOLIC BLOOD PRESSURE: 75 MMHG | OXYGEN SATURATION: 98 %

## 2024-05-01 DIAGNOSIS — M62.830 SPASM OF LEFT TRAPEZIUS MUSCLE: Primary | ICD-10-CM

## 2024-05-01 DIAGNOSIS — I10 HTN, GOAL BELOW 130/80: ICD-10-CM

## 2024-05-01 PROCEDURE — 99213 OFFICE O/P EST LOW 20 MIN: CPT | Performed by: INTERNAL MEDICINE

## 2024-05-01 PROCEDURE — 3017F COLORECTAL CA SCREEN DOC REV: CPT | Performed by: INTERNAL MEDICINE

## 2024-05-01 PROCEDURE — 1123F ACP DISCUSS/DSCN MKR DOCD: CPT | Performed by: INTERNAL MEDICINE

## 2024-05-01 PROCEDURE — 3078F DIAST BP <80 MM HG: CPT | Performed by: INTERNAL MEDICINE

## 2024-05-01 PROCEDURE — G8427 DOCREV CUR MEDS BY ELIG CLIN: HCPCS | Performed by: INTERNAL MEDICINE

## 2024-05-01 PROCEDURE — G8420 CALC BMI NORM PARAMETERS: HCPCS | Performed by: INTERNAL MEDICINE

## 2024-05-01 PROCEDURE — 3077F SYST BP >= 140 MM HG: CPT | Performed by: INTERNAL MEDICINE

## 2024-05-01 PROCEDURE — 1036F TOBACCO NON-USER: CPT | Performed by: INTERNAL MEDICINE

## 2024-05-01 RX ORDER — METHYLPREDNISOLONE 4 MG/1
TABLET ORAL
Qty: 1 KIT | Refills: 0 | Status: SHIPPED | OUTPATIENT
Start: 2024-05-01 | End: 2024-05-07

## 2024-05-01 SDOH — ECONOMIC STABILITY: FOOD INSECURITY: WITHIN THE PAST 12 MONTHS, THE FOOD YOU BOUGHT JUST DIDN'T LAST AND YOU DIDN'T HAVE MONEY TO GET MORE.: NEVER TRUE

## 2024-05-01 SDOH — ECONOMIC STABILITY: INCOME INSECURITY: HOW HARD IS IT FOR YOU TO PAY FOR THE VERY BASICS LIKE FOOD, HOUSING, MEDICAL CARE, AND HEATING?: NOT HARD AT ALL

## 2024-05-01 SDOH — ECONOMIC STABILITY: FOOD INSECURITY: WITHIN THE PAST 12 MONTHS, YOU WORRIED THAT YOUR FOOD WOULD RUN OUT BEFORE YOU GOT MONEY TO BUY MORE.: NEVER TRUE

## 2024-05-01 SDOH — ECONOMIC STABILITY: HOUSING INSECURITY
IN THE LAST 12 MONTHS, WAS THERE A TIME WHEN YOU DID NOT HAVE A STEADY PLACE TO SLEEP OR SLEPT IN A SHELTER (INCLUDING NOW)?: NO

## 2024-05-01 ASSESSMENT — PATIENT HEALTH QUESTIONNAIRE - PHQ9
SUM OF ALL RESPONSES TO PHQ QUESTIONS 1-9: 0
SUM OF ALL RESPONSES TO PHQ9 QUESTIONS 1 & 2: 0
2. FEELING DOWN, DEPRESSED OR HOPELESS: NOT AT ALL
SUM OF ALL RESPONSES TO PHQ QUESTIONS 1-9: 0
1. LITTLE INTEREST OR PLEASURE IN DOING THINGS: NOT AT ALL

## 2024-05-01 NOTE — PROGRESS NOTES
Tio Watkins IV (:  1955) is a 68 y.o. male,Established patient, here for evaluation of the following chief complaint(s):  Shoulder Pain (Started 2 weeks ago; Left shoulder pain; patient states the pain is constant; patient states sometimes the pain radiates down his left arm)      Assessment & Plan   1. Spasm of left trapezius muscle  -     External Referral To Physical Therapy  -     methylPREDNISolone (MEDROL DOSEPACK) 4 MG tablet; Take by mouth., Disp-1 kit, R-0Normal  2. HTN, goal below 130/80      No follow-ups on file.       Subjective   Shoulder Pain   Notes a week and a half of discomfort in left upper back radiates to his shoulder and upper arm with some numbness and burning no weakness in fingers.  Notes he tends to carry his 20 pound toolbox and left hand because he feels weaker on the right side.  No dyspnea on exertion no chest pain and symptoms are not worse with activity.  No trauma to the neck or shoulder.  Review of Systems       Objective   Physical Exam  Constitutional:       Appearance: Normal appearance.   HENT:      Head: Normocephalic and atraumatic.   Cardiovascular:      Rate and Rhythm: Normal rate and regular rhythm.   Pulmonary:      Effort: Pulmonary effort is normal.      Breath sounds: Normal breath sounds. No wheezing or rhonchi.   Musculoskeletal:         General: Normal range of motion.      Right lower leg: No edema.      Left lower leg: No edema.      Comments:  intact  No rashes  Tender with palpable spasm left  trapezius and subscapularis    Neurological:      General: No focal deficit present.      Mental Status: He is alert and oriented to person, place, and time.   Psychiatric:         Behavior: Behavior normal.              An electronic signature was used to authenticate this note.    --Bonnie Hugo MD

## 2024-06-14 ENCOUNTER — APPOINTMENT (OUTPATIENT)
Facility: HOSPITAL | Age: 69
End: 2024-06-14
Payer: MEDICARE

## 2024-06-14 ENCOUNTER — HOSPITAL ENCOUNTER (EMERGENCY)
Facility: HOSPITAL | Age: 69
Discharge: HOME | End: 2024-06-14
Attending: EMERGENCY MEDICINE
Payer: MEDICARE

## 2024-06-14 VITALS
SYSTOLIC BLOOD PRESSURE: 158 MMHG | RESPIRATION RATE: 18 BRPM | HEIGHT: 73 IN | TEMPERATURE: 97.8 F | OXYGEN SATURATION: 100 % | HEART RATE: 70 BPM | BODY MASS INDEX: 22.26 KG/M2 | WEIGHT: 168 LBS | DIASTOLIC BLOOD PRESSURE: 76 MMHG

## 2024-06-14 DIAGNOSIS — M25.552 LEFT HIP PAIN: Primary | ICD-10-CM

## 2024-06-14 PROCEDURE — 6370000000 HC RX 637 (ALT 250 FOR IP): Performed by: EMERGENCY MEDICINE

## 2024-06-14 PROCEDURE — 99284 EMERGENCY DEPT VISIT MOD MDM: CPT

## 2024-06-14 PROCEDURE — 96372 THER/PROPH/DIAG INJ SC/IM: CPT

## 2024-06-14 PROCEDURE — 6360000002 HC RX W HCPCS: Performed by: EMERGENCY MEDICINE

## 2024-06-14 PROCEDURE — 73502 X-RAY EXAM HIP UNI 2-3 VIEWS: CPT

## 2024-06-14 RX ORDER — PREDNISONE 20 MG/1
40 TABLET ORAL
Status: COMPLETED | OUTPATIENT
Start: 2024-06-14 | End: 2024-06-14

## 2024-06-14 RX ORDER — KETOROLAC TROMETHAMINE 30 MG/ML
30 INJECTION, SOLUTION INTRAMUSCULAR; INTRAVENOUS ONCE
Status: COMPLETED | OUTPATIENT
Start: 2024-06-14 | End: 2024-06-14

## 2024-06-14 RX ORDER — NAPROXEN 500 MG/1
500 TABLET ORAL EVERY 12 HOURS PRN
Qty: 20 TABLET | Refills: 0 | Status: SHIPPED | OUTPATIENT
Start: 2024-06-14

## 2024-06-14 RX ORDER — CYCLOBENZAPRINE HCL 5 MG
5 TABLET ORAL 2 TIMES DAILY PRN
Qty: 20 TABLET | Refills: 0 | Status: SHIPPED | OUTPATIENT
Start: 2024-06-14 | End: 2024-06-24

## 2024-06-14 RX ORDER — PREDNISONE 50 MG/1
50 TABLET ORAL DAILY
Qty: 5 TABLET | Refills: 0 | Status: SHIPPED | OUTPATIENT
Start: 2024-06-14 | End: 2024-06-19

## 2024-06-14 RX ADMIN — KETOROLAC TROMETHAMINE 30 MG: 30 INJECTION, SOLUTION INTRAMUSCULAR at 16:03

## 2024-06-14 RX ADMIN — PREDNISONE 40 MG: 20 TABLET ORAL at 19:08

## 2024-06-14 ASSESSMENT — PAIN SCALES - GENERAL
PAINLEVEL_OUTOF10: 7
PAINLEVEL_OUTOF10: 7

## 2024-06-14 ASSESSMENT — PAIN DESCRIPTION - LOCATION
LOCATION: HIP
LOCATION: HIP

## 2024-06-14 ASSESSMENT — PAIN DESCRIPTION - DESCRIPTORS
DESCRIPTORS: ACHING
DESCRIPTORS: SHOOTING;ACHING

## 2024-06-14 ASSESSMENT — PAIN - FUNCTIONAL ASSESSMENT: PAIN_FUNCTIONAL_ASSESSMENT: ACTIVITIES ARE NOT PREVENTED

## 2024-06-14 ASSESSMENT — PAIN DESCRIPTION - ORIENTATION
ORIENTATION: LEFT
ORIENTATION: LEFT

## 2024-06-14 NOTE — ED NOTES
Patient medicated as ordered by provider, tolerating well. Resting on stretcher, family at bedside.

## 2024-06-14 NOTE — ED PROVIDER NOTES
ARTHROSCOPY Left 2015    x 5    KNEE ARTHROSCOPY Right 2014    ORTHOPEDIC SURGERY Right 2015    ankle surgery    ORTHOPEDIC SURGERY  1990    right wrist    OTHER SURGICAL HISTORY Right 2023    Screw placement on right foot    ROTATOR CUFF REPAIR Left 2018    ROTATOR CUFF REPAIR Right 2017    SHOULDER ARTHROSCOPY Right 2007    wire and screw    TOTAL KNEE ARTHROPLASTY Right     TOTAL KNEE ARTHROPLASTY Left 01/18/2023    TOTAL KNEE ARTHROPLASTY Left 01/2023    WRIST FRACTURE SURGERY Right          CURRENT MEDICATIONS       Previous Medications    ACETAMINOPHEN (TYLENOL) 500 MG TABLET    Take 2 tablets by mouth every 6 hours as needed    ASPIRIN 81 MG EC TABLET    Take 1 tablet by mouth daily    ATORVASTATIN (LIPITOR) 40 MG TABLET    TAKE 1 TABLET BY MOUTH EVERY DAY    BUPRENORPHINE-NALOXONE (SUBOXONE) 8-2 MG FILM SL FILM    Place 1 Film under the tongue.    IBUPROFEN (ADVIL;MOTRIN) 800 MG TABLET    Take 1 tablet by mouth every 6 hours as needed    LISINOPRIL (PRINIVIL;ZESTRIL) 10 MG TABLET    TAKE 1 TABLET BY MOUTH EVERY DAY    MULTIPLE VITAMINS-MINERALS (THERAPEUTIC MULTIVITAMIN-MINERALS) TABLET    Take 1 tablet by mouth 2 times daily    PANTOPRAZOLE (PROTONIX) 40 MG TABLET    TAKE 1 TABLET BY MOUTH EVERY DAY    TESTOSTERONE CYPIONATE (DEPOTESTOTERONE CYPIONATE) 200 MG/ML INJECTION    Inject 0.4 mLs into the muscle Twice a Week.       ALLERGIES     Codeine    FAMILY HISTORY       Family History   Problem Relation Age of Onset    Diabetes Sister     Heart Disease Sister         afib    Heart Failure Mother     Heart Disease Brother     No Known Problems Brother     Jasmeet Hypertherm Neg Hx     Coronary Art Dis Father         stents    Heart Disease Father 52        CABG x5          SOCIAL HISTORY       Social History     Socioeconomic History    Marital status:      Spouse name: None    Number of children: None    Years of education: None    Highest education level: None   Tobacco Use    Smoking status: Never

## 2024-06-14 NOTE — ED TRIAGE NOTES
Patient arrives to treatment room with cc left 7/10 hip/buttocks sharp shooting pain for the past three days that does not radiate. No known injury or trauma.     Patient has been taking ibuprofen for his pain, last dose at 1400, without relief.

## 2024-06-17 ENCOUNTER — OFFICE VISIT (OUTPATIENT)
Age: 69
End: 2024-06-17
Payer: MEDICARE

## 2024-06-17 VITALS
WEIGHT: 167.2 LBS | SYSTOLIC BLOOD PRESSURE: 149 MMHG | OXYGEN SATURATION: 97 % | TEMPERATURE: 98 F | RESPIRATION RATE: 18 BRPM | DIASTOLIC BLOOD PRESSURE: 72 MMHG | HEIGHT: 73 IN | HEART RATE: 85 BPM | BODY MASS INDEX: 22.16 KG/M2

## 2024-06-17 DIAGNOSIS — K29.80 DUODENITIS: ICD-10-CM

## 2024-06-17 DIAGNOSIS — I25.10 CORONARY ARTERY DISEASE INVOLVING NATIVE CORONARY ARTERY OF NATIVE HEART WITHOUT ANGINA PECTORIS: ICD-10-CM

## 2024-06-17 DIAGNOSIS — M54.50 RECURRENT LOW BACK PAIN: Primary | ICD-10-CM

## 2024-06-17 DIAGNOSIS — E78.5 DYSLIPIDEMIA, GOAL LDL BELOW 70: ICD-10-CM

## 2024-06-17 DIAGNOSIS — I10 HTN (HYPERTENSION), BENIGN: ICD-10-CM

## 2024-06-17 PROBLEM — I48.91 ATRIAL FIBRILLATION, UNSPECIFIED TYPE (HCC): Status: RESOLVED | Noted: 2023-04-28 | Resolved: 2024-06-17

## 2024-06-17 PROBLEM — R10.13 EPIGASTRIC PAIN: Status: RESOLVED | Noted: 2021-08-17 | Resolved: 2024-06-17

## 2024-06-17 LAB
ALBUMIN SERPL-MCNC: 4 G/DL (ref 3.5–5)
ALBUMIN/GLOB SERPL: 1.6 (ref 1.1–2.2)
ALP SERPL-CCNC: 56 U/L (ref 45–117)
ALT SERPL-CCNC: 22 U/L (ref 12–78)
ANION GAP SERPL CALC-SCNC: 4 MMOL/L (ref 5–15)
AST SERPL-CCNC: 14 U/L (ref 15–37)
BILIRUB SERPL-MCNC: 0.6 MG/DL (ref 0.2–1)
BUN SERPL-MCNC: 35 MG/DL (ref 6–20)
BUN/CREAT SERPL: 31 (ref 12–20)
CALCIUM SERPL-MCNC: 9.6 MG/DL (ref 8.5–10.1)
CHLORIDE SERPL-SCNC: 106 MMOL/L (ref 97–108)
CHOLEST SERPL-MCNC: 140 MG/DL
CO2 SERPL-SCNC: 30 MMOL/L (ref 21–32)
CREAT SERPL-MCNC: 1.12 MG/DL (ref 0.7–1.3)
GLOBULIN SER CALC-MCNC: 2.5 G/DL (ref 2–4)
GLUCOSE SERPL-MCNC: 108 MG/DL (ref 65–100)
HDLC SERPL-MCNC: 64 MG/DL
HDLC SERPL: 2.2 (ref 0–5)
LDLC SERPL CALC-MCNC: 59.8 MG/DL (ref 0–100)
POTASSIUM SERPL-SCNC: 4.1 MMOL/L (ref 3.5–5.1)
PROT SERPL-MCNC: 6.5 G/DL (ref 6.4–8.2)
SODIUM SERPL-SCNC: 140 MMOL/L (ref 136–145)
TRIGL SERPL-MCNC: 81 MG/DL
VLDLC SERPL CALC-MCNC: 16.2 MG/DL

## 2024-06-17 PROCEDURE — 1123F ACP DISCUSS/DSCN MKR DOCD: CPT | Performed by: INTERNAL MEDICINE

## 2024-06-17 PROCEDURE — G8420 CALC BMI NORM PARAMETERS: HCPCS | Performed by: INTERNAL MEDICINE

## 2024-06-17 PROCEDURE — 3078F DIAST BP <80 MM HG: CPT | Performed by: INTERNAL MEDICINE

## 2024-06-17 PROCEDURE — 1036F TOBACCO NON-USER: CPT | Performed by: INTERNAL MEDICINE

## 2024-06-17 PROCEDURE — G8427 DOCREV CUR MEDS BY ELIG CLIN: HCPCS | Performed by: INTERNAL MEDICINE

## 2024-06-17 PROCEDURE — 99214 OFFICE O/P EST MOD 30 MIN: CPT | Performed by: INTERNAL MEDICINE

## 2024-06-17 PROCEDURE — 3077F SYST BP >= 140 MM HG: CPT | Performed by: INTERNAL MEDICINE

## 2024-06-17 PROCEDURE — 3017F COLORECTAL CA SCREEN DOC REV: CPT | Performed by: INTERNAL MEDICINE

## 2024-06-17 NOTE — PROGRESS NOTES
cardiology his LDL is very well-controlled on atorvastatin 40 recheck levels  Review of Systems       Objective   Physical Exam  Constitutional:       Appearance: Normal appearance.   HENT:      Head: Normocephalic and atraumatic.   Cardiovascular:      Rate and Rhythm: Normal rate and regular rhythm.   Pulmonary:      Effort: Pulmonary effort is normal.      Breath sounds: Normal breath sounds. No wheezing or rhonchi.   Musculoskeletal:      Right lower leg: No edema.      Left lower leg: No edema.      Comments: No focal vertebral point pain  Slr is negative  Mildly tender over left si joint   Neurological:      General: No focal deficit present.      Mental Status: He is alert and oriented to person, place, and time.   Psychiatric:         Behavior: Behavior normal.              An electronic signature was used to authenticate this note.    --Bonnie Hugo MD

## 2024-07-19 DIAGNOSIS — F51.01 PRIMARY INSOMNIA: Primary | ICD-10-CM

## 2024-07-19 RX ORDER — ALPRAZOLAM 0.5 MG/1
0.5 TABLET ORAL NIGHTLY PRN
Qty: 30 TABLET | Refills: 1 | Status: SHIPPED | OUTPATIENT
Start: 2024-07-19 | End: 2024-10-17

## 2024-07-29 ENCOUNTER — OFFICE VISIT (OUTPATIENT)
Age: 69
End: 2024-07-29
Payer: MEDICARE

## 2024-07-29 VITALS
BODY MASS INDEX: 21.74 KG/M2 | DIASTOLIC BLOOD PRESSURE: 70 MMHG | HEIGHT: 73 IN | SYSTOLIC BLOOD PRESSURE: 130 MMHG | OXYGEN SATURATION: 95 % | WEIGHT: 164 LBS | HEART RATE: 75 BPM

## 2024-07-29 DIAGNOSIS — R06.02 SHORTNESS OF BREATH: ICD-10-CM

## 2024-07-29 DIAGNOSIS — I25.118 CORONARY ARTERY DISEASE OF NATIVE ARTERY OF NATIVE HEART WITH STABLE ANGINA PECTORIS (HCC): Primary | ICD-10-CM

## 2024-07-29 DIAGNOSIS — I10 HTN (HYPERTENSION), BENIGN: ICD-10-CM

## 2024-07-29 PROCEDURE — G8420 CALC BMI NORM PARAMETERS: HCPCS | Performed by: SPECIALIST

## 2024-07-29 PROCEDURE — 3075F SYST BP GE 130 - 139MM HG: CPT | Performed by: SPECIALIST

## 2024-07-29 PROCEDURE — 3017F COLORECTAL CA SCREEN DOC REV: CPT | Performed by: SPECIALIST

## 2024-07-29 PROCEDURE — 1123F ACP DISCUSS/DSCN MKR DOCD: CPT | Performed by: SPECIALIST

## 2024-07-29 PROCEDURE — 3078F DIAST BP <80 MM HG: CPT | Performed by: SPECIALIST

## 2024-07-29 PROCEDURE — 99214 OFFICE O/P EST MOD 30 MIN: CPT | Performed by: SPECIALIST

## 2024-07-29 PROCEDURE — 1036F TOBACCO NON-USER: CPT | Performed by: SPECIALIST

## 2024-07-29 PROCEDURE — G8427 DOCREV CUR MEDS BY ELIG CLIN: HCPCS | Performed by: SPECIALIST

## 2024-08-30 DIAGNOSIS — K21.9 GASTRO-ESOPHAGEAL REFLUX DISEASE WITHOUT ESOPHAGITIS: ICD-10-CM

## 2024-08-30 RX ORDER — PANTOPRAZOLE SODIUM 40 MG/1
40 TABLET, DELAYED RELEASE ORAL DAILY
Qty: 90 TABLET | Refills: 1 | Status: SHIPPED | OUTPATIENT
Start: 2024-08-30

## 2024-09-19 ENCOUNTER — ANCILLARY PROCEDURE (OUTPATIENT)
Age: 69
End: 2024-09-19
Payer: MEDICARE

## 2024-09-19 VITALS — BODY MASS INDEX: 21.74 KG/M2 | HEIGHT: 73 IN | WEIGHT: 164 LBS

## 2024-09-19 DIAGNOSIS — R06.02 SHORTNESS OF BREATH: ICD-10-CM

## 2024-09-19 DIAGNOSIS — I10 HTN (HYPERTENSION), BENIGN: ICD-10-CM

## 2024-09-19 DIAGNOSIS — I25.118 CORONARY ARTERY DISEASE OF NATIVE ARTERY OF NATIVE HEART WITH STABLE ANGINA PECTORIS (HCC): ICD-10-CM

## 2024-09-19 PROCEDURE — 93351 STRESS TTE COMPLETE: CPT | Performed by: SPECIALIST

## 2024-09-22 LAB
ECHO BSA: 1.96 M2
ECHO LV EF PHYSICIAN: 60 %
STRESS ANGINA INDEX: 0
STRESS BASELINE DIAS BP: 80 MMHG
STRESS BASELINE HR: 67 BPM
STRESS BASELINE SYS BP: 142 MMHG
STRESS ESTIMATED WORKLOAD: 10 METS
STRESS EXERCISE DUR MIN: 7 MIN
STRESS EXERCISE DUR SEC: 31 SEC
STRESS O2 SAT PEAK: 98 %
STRESS O2 SAT REST: 100 %
STRESS PEAK DIAS BP: 80 MMHG
STRESS PEAK SYS BP: 162 MMHG
STRESS PERCENT HR ACHIEVED: 101 %
STRESS POST PEAK HR: 153 BPM
STRESS RATE PRESSURE PRODUCT: NORMAL BPM*MMHG
STRESS SR DUKE TREADMILL SCORE: 8
STRESS ST DEPRESSION: 0 MM
STRESS TARGET HR: 152 BPM

## 2024-09-22 PROCEDURE — 93351 STRESS TTE COMPLETE: CPT | Performed by: SPECIALIST

## 2024-09-23 RX ORDER — ATORVASTATIN CALCIUM 40 MG/1
40 TABLET, FILM COATED ORAL DAILY
Qty: 90 TABLET | Refills: 3 | Status: SHIPPED | OUTPATIENT
Start: 2024-09-23

## 2024-10-15 DIAGNOSIS — F51.01 PRIMARY INSOMNIA: ICD-10-CM

## 2024-10-15 RX ORDER — ALPRAZOLAM 0.5 MG
TABLET ORAL
Qty: 30 TABLET | Refills: 0 | Status: SHIPPED | OUTPATIENT
Start: 2024-10-15 | End: 2024-11-14

## 2024-10-15 RX ORDER — LISINOPRIL 10 MG/1
10 TABLET ORAL DAILY
Qty: 90 TABLET | Refills: 3 | Status: SHIPPED | OUTPATIENT
Start: 2024-10-15

## 2024-10-24 DIAGNOSIS — R05.2 SUBACUTE COUGH: Primary | ICD-10-CM

## 2024-10-24 RX ORDER — FLUTICASONE PROPIONATE 50 MCG
SPRAY, SUSPENSION (ML) NASAL
Qty: 16 G | Refills: 4 | Status: SHIPPED | OUTPATIENT
Start: 2024-10-24

## 2024-11-12 ENCOUNTER — HOSPITAL ENCOUNTER (OUTPATIENT)
Facility: HOSPITAL | Age: 69
Discharge: HOME | End: 2024-11-15
Attending: ORTHOPAEDIC SURGERY
Payer: MEDICARE

## 2024-11-12 DIAGNOSIS — S92.354K CLOSED NONDISPLACED FRACTURE OF FIFTH METATARSAL BONE OF RIGHT FOOT WITH NONUNION: ICD-10-CM

## 2024-11-12 PROCEDURE — 73700 CT LOWER EXTREMITY W/O DYE: CPT

## 2024-11-18 ENCOUNTER — TELEPHONE (OUTPATIENT)
Facility: CLINIC | Age: 69
End: 2024-11-18

## 2024-11-18 NOTE — TELEPHONE ENCOUNTER
Incoming call from patient on the nurse triage line with red flag complaint of hearing his heartbeat in his left ear. This comes & goes & has been an issue for 3-4 months. He denies chest pain, sob & dizziness. He denies pain in his ear as well. Patient scheduled for Thursday Nov 21st at 9:15 but will call back if appt needs to be rescheduled.

## 2025-02-14 ENCOUNTER — HOSPITAL ENCOUNTER (OUTPATIENT)
Facility: HOSPITAL | Age: 70
Discharge: HOME | End: 2025-02-17
Attending: ORTHOPAEDIC SURGERY
Payer: MEDICARE

## 2025-02-14 DIAGNOSIS — Z96.652 HISTORY OF TOTAL LEFT KNEE REPLACEMENT: ICD-10-CM

## 2025-02-14 PROCEDURE — A9503 TC99M MEDRONATE: HCPCS | Performed by: ORTHOPAEDIC SURGERY

## 2025-02-14 PROCEDURE — 3430000000 HC RX DIAGNOSTIC RADIOPHARMACEUTICAL: Performed by: ORTHOPAEDIC SURGERY

## 2025-02-14 PROCEDURE — 78315 BONE IMAGING 3 PHASE: CPT

## 2025-02-14 RX ORDER — TC 99M MEDRONATE 20 MG/10ML
24 INJECTION, POWDER, LYOPHILIZED, FOR SOLUTION INTRAVENOUS
Status: COMPLETED | OUTPATIENT
Start: 2025-02-14 | End: 2025-02-14

## 2025-02-14 RX ADMIN — TC 99M MEDRONATE 24 MILLICURIE: 20 INJECTION, POWDER, LYOPHILIZED, FOR SOLUTION INTRAVENOUS at 12:00

## 2025-03-20 DIAGNOSIS — K21.9 GASTRO-ESOPHAGEAL REFLUX DISEASE WITHOUT ESOPHAGITIS: ICD-10-CM

## 2025-03-20 RX ORDER — PANTOPRAZOLE SODIUM 40 MG/1
40 TABLET, DELAYED RELEASE ORAL DAILY
Qty: 90 TABLET | Refills: 0 | Status: SHIPPED | OUTPATIENT
Start: 2025-03-20

## 2025-04-28 ENCOUNTER — PATIENT MESSAGE (OUTPATIENT)
Age: 70
End: 2025-04-28

## 2025-05-23 DIAGNOSIS — F41.9 ANXIETY: Primary | ICD-10-CM

## 2025-05-23 RX ORDER — ALPRAZOLAM 0.5 MG
0.5 TABLET ORAL NIGHTLY PRN
COMMUNITY
End: 2025-05-23 | Stop reason: SDUPTHER

## 2025-05-23 RX ORDER — ALPRAZOLAM 0.5 MG
0.5 TABLET ORAL NIGHTLY PRN
Qty: 30 TABLET | Refills: 0 | Status: SHIPPED | OUTPATIENT
Start: 2025-05-23 | End: 2025-06-22

## 2025-05-23 NOTE — TELEPHONE ENCOUNTER
Patient sent [employer] message requesting Xanax refill.    Last visit 6/17/24  Follow up not scheduled     No labs done within the past 6 months on file.

## 2025-06-13 ENCOUNTER — OFFICE VISIT (OUTPATIENT)
Facility: CLINIC | Age: 70
End: 2025-06-13
Payer: MEDICARE

## 2025-06-13 VITALS
HEART RATE: 83 BPM | RESPIRATION RATE: 16 BRPM | OXYGEN SATURATION: 98 % | BODY MASS INDEX: 21.45 KG/M2 | TEMPERATURE: 98.1 F | WEIGHT: 162.6 LBS | DIASTOLIC BLOOD PRESSURE: 74 MMHG | SYSTOLIC BLOOD PRESSURE: 131 MMHG

## 2025-06-13 DIAGNOSIS — E78.5 DYSLIPIDEMIA, GOAL LDL BELOW 70: ICD-10-CM

## 2025-06-13 DIAGNOSIS — F41.9 ANXIETY: ICD-10-CM

## 2025-06-13 DIAGNOSIS — E29.1 HYPOGONADISM, MALE: ICD-10-CM

## 2025-06-13 DIAGNOSIS — K21.9 GASTRO-ESOPHAGEAL REFLUX DISEASE WITHOUT ESOPHAGITIS: ICD-10-CM

## 2025-06-13 DIAGNOSIS — Z12.5 PROSTATE CANCER SCREENING: ICD-10-CM

## 2025-06-13 DIAGNOSIS — S69.91XA HAND INJURY, RIGHT, INITIAL ENCOUNTER: ICD-10-CM

## 2025-06-13 DIAGNOSIS — I10 HTN, GOAL BELOW 130/80: Primary | ICD-10-CM

## 2025-06-13 DIAGNOSIS — I25.10 CORONARY ARTERY DISEASE INVOLVING NATIVE CORONARY ARTERY OF NATIVE HEART WITHOUT ANGINA PECTORIS: ICD-10-CM

## 2025-06-13 PROCEDURE — 99214 OFFICE O/P EST MOD 30 MIN: CPT | Performed by: INTERNAL MEDICINE

## 2025-06-13 RX ORDER — PANTOPRAZOLE SODIUM 40 MG/1
40 TABLET, DELAYED RELEASE ORAL DAILY
Qty: 90 TABLET | Refills: 3 | Status: SHIPPED | OUTPATIENT
Start: 2025-06-13

## 2025-06-13 RX ORDER — ATORVASTATIN CALCIUM 40 MG/1
40 TABLET, FILM COATED ORAL DAILY
Qty: 90 TABLET | Refills: 3 | Status: SHIPPED | OUTPATIENT
Start: 2025-06-13

## 2025-06-13 NOTE — PROGRESS NOTES
Chief Complaint   Patient presents with    Follow-up     1. Have you been to the ER, urgent care clinic since your last visit?  Hospitalized since your last visit?No    2. Have you seen or consulted any other health care providers [facility] since your last visit?  Include any pap smears or colon screening. No    
Normocephalic and atraumatic.   Cardiovascular:      Rate and Rhythm: Normal rate and regular rhythm.   Pulmonary:      Effort: Pulmonary effort is normal.      Breath sounds: Normal breath sounds. No wheezing or rhonchi.   Musculoskeletal:      Right lower leg: No edema.      Left lower leg: No edema.   Skin:     Comments: Right fifth digit with irregular laceration not bleeding and no sign of infection   Neurological:      General: No focal deficit present.      Mental Status: He is alert and oriented to person, place, and time.   Psychiatric:         Behavior: Behavior normal.              An electronic signature was used to authenticate this note.    --Bonnie Hugo MD

## 2025-06-14 LAB
ALBUMIN SERPL-MCNC: 4 G/DL (ref 3.5–5)
ALBUMIN/GLOB SERPL: 1.7 (ref 1.1–2.2)
ALP SERPL-CCNC: 61 U/L (ref 45–117)
ALT SERPL-CCNC: 30 U/L (ref 12–78)
ANION GAP SERPL CALC-SCNC: 3 MMOL/L (ref 2–12)
AST SERPL-CCNC: 23 U/L (ref 15–37)
BILIRUB SERPL-MCNC: 1 MG/DL (ref 0.2–1)
BUN SERPL-MCNC: 21 MG/DL (ref 6–20)
BUN/CREAT SERPL: 16 (ref 12–20)
CALCIUM SERPL-MCNC: 8.8 MG/DL (ref 8.5–10.1)
CHLORIDE SERPL-SCNC: 102 MMOL/L (ref 97–108)
CHOLEST SERPL-MCNC: 104 MG/DL
CO2 SERPL-SCNC: 31 MMOL/L (ref 21–32)
CREAT SERPL-MCNC: 1.3 MG/DL (ref 0.7–1.3)
GLOBULIN SER CALC-MCNC: 2.3 G/DL (ref 2–4)
GLUCOSE SERPL-MCNC: 100 MG/DL (ref 65–100)
HDLC SERPL-MCNC: 51 MG/DL
HDLC SERPL: 2 (ref 0–5)
LDLC SERPL CALC-MCNC: 24.6 MG/DL (ref 0–100)
POTASSIUM SERPL-SCNC: 4.6 MMOL/L (ref 3.5–5.1)
PROT SERPL-MCNC: 6.3 G/DL (ref 6.4–8.2)
SODIUM SERPL-SCNC: 136 MMOL/L (ref 136–145)
TRIGL SERPL-MCNC: 142 MG/DL
TSH SERPL DL<=0.05 MIU/L-ACNC: 2.58 UIU/ML (ref 0.36–3.74)
VLDLC SERPL CALC-MCNC: 28.4 MG/DL

## 2025-06-15 ENCOUNTER — RESULTS FOLLOW-UP (OUTPATIENT)
Facility: CLINIC | Age: 70
End: 2025-06-15

## 2025-07-25 ENCOUNTER — OFFICE VISIT (OUTPATIENT)
Facility: CLINIC | Age: 70
End: 2025-07-25
Payer: MEDICARE

## 2025-07-25 VITALS
RESPIRATION RATE: 16 BRPM | TEMPERATURE: 97.9 F | HEIGHT: 73 IN | BODY MASS INDEX: 21.87 KG/M2 | HEART RATE: 74 BPM | SYSTOLIC BLOOD PRESSURE: 124 MMHG | OXYGEN SATURATION: 98 % | WEIGHT: 165 LBS | DIASTOLIC BLOOD PRESSURE: 70 MMHG

## 2025-07-25 DIAGNOSIS — I10 HTN, GOAL BELOW 130/80: ICD-10-CM

## 2025-07-25 DIAGNOSIS — M54.50 RIGHT-SIDED LOW BACK PAIN WITHOUT SCIATICA, UNSPECIFIED CHRONICITY: Primary | ICD-10-CM

## 2025-07-25 PROCEDURE — 1123F ACP DISCUSS/DSCN MKR DOCD: CPT | Performed by: INTERNAL MEDICINE

## 2025-07-25 PROCEDURE — 99214 OFFICE O/P EST MOD 30 MIN: CPT | Performed by: INTERNAL MEDICINE

## 2025-07-25 PROCEDURE — G8420 CALC BMI NORM PARAMETERS: HCPCS | Performed by: INTERNAL MEDICINE

## 2025-07-25 PROCEDURE — 1159F MED LIST DOCD IN RCRD: CPT | Performed by: INTERNAL MEDICINE

## 2025-07-25 PROCEDURE — 1036F TOBACCO NON-USER: CPT | Performed by: INTERNAL MEDICINE

## 2025-07-25 PROCEDURE — G8427 DOCREV CUR MEDS BY ELIG CLIN: HCPCS | Performed by: INTERNAL MEDICINE

## 2025-07-25 PROCEDURE — 3017F COLORECTAL CA SCREEN DOC REV: CPT | Performed by: INTERNAL MEDICINE

## 2025-07-25 PROCEDURE — 1125F AMNT PAIN NOTED PAIN PRSNT: CPT | Performed by: INTERNAL MEDICINE

## 2025-07-25 PROCEDURE — 1160F RVW MEDS BY RX/DR IN RCRD: CPT | Performed by: INTERNAL MEDICINE

## 2025-07-25 PROCEDURE — 3078F DIAST BP <80 MM HG: CPT | Performed by: INTERNAL MEDICINE

## 2025-07-25 PROCEDURE — 3074F SYST BP LT 130 MM HG: CPT | Performed by: INTERNAL MEDICINE

## 2025-07-25 SDOH — ECONOMIC STABILITY: FOOD INSECURITY: WITHIN THE PAST 12 MONTHS, THE FOOD YOU BOUGHT JUST DIDN'T LAST AND YOU DIDN'T HAVE MONEY TO GET MORE.: NEVER TRUE

## 2025-07-25 SDOH — ECONOMIC STABILITY: FOOD INSECURITY: WITHIN THE PAST 12 MONTHS, YOU WORRIED THAT YOUR FOOD WOULD RUN OUT BEFORE YOU GOT MONEY TO BUY MORE.: NEVER TRUE

## 2025-07-25 ASSESSMENT — PATIENT HEALTH QUESTIONNAIRE - PHQ9
1. LITTLE INTEREST OR PLEASURE IN DOING THINGS: NOT AT ALL
SUM OF ALL RESPONSES TO PHQ QUESTIONS 1-9: 0
2. FEELING DOWN, DEPRESSED OR HOPELESS: NOT AT ALL
SUM OF ALL RESPONSES TO PHQ QUESTIONS 1-9: 0

## 2025-07-25 ASSESSMENT — ENCOUNTER SYMPTOMS: BACK PAIN: 1

## 2025-07-25 NOTE — PROGRESS NOTES
Tio Watkins IV (:  1955) is a 69 y.o. male,Established patient, here for evaluation of the following chief complaint(s):  Back Pain (Back pain; ongoing for awhile; lower right side back pain; pain level at it's worse 8/10; increasing pain when working and bending over per patient )         Assessment & Plan  Right-sided low back pain without sciatica, unspecified chronicity   Present for a month  No alarm sxs no falls  Worse with bending over work equipment for 3 hours  Advised tyelnol 2 qam   Advil prn  Add tizanidine qhs  Refer for pt  See me if not improving    Orders:  •  tiZANidine (ZANAFLEX) 4 MG tablet; Take 1 tablet by mouth nightly as needed (spasm and pain)  •  External Referral To Physical Therapy    HTN, goal below 130/80   Stable on meds           No follow-ups on file.       Subjective   Back Pain  Seen with right-sided low back pain.  Currently level 2 but can flare to a level 8.  Present for 3 or 4 weeks.  No falls.  No fevers.  No hematuria.  No rashes.  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  No weakness in leg.  No radiating symptoms.  He has taken some as needed Advil and has used a topical CBD cream.  Review of Systems   Musculoskeletal:  Positive for back pain.        Objective   Physical Exam  Constitutional:       Appearance: Normal appearance.   HENT:      Head: Normocephalic and atraumatic.   Cardiovascular:      Rate and Rhythm: Normal rate and regular rhythm.   Pulmonary:      Effort: Pulmonary effort is normal.      Breath sounds: Normal breath sounds. No wheezing or rhonchi.   Musculoskeletal:         General: No swelling, deformity or signs of injury.      Right lower leg: No edema.      Left lower leg: No edema.      Comments: No vertebral point pain  Palpable spasm right low back muscle  Slr is negative  No weakness in legs   Neurological:      General: No focal deficit present.

## 2025-08-01 ENCOUNTER — OFFICE VISIT (OUTPATIENT)
Age: 70
End: 2025-08-01
Payer: MEDICARE

## 2025-08-01 VITALS
DIASTOLIC BLOOD PRESSURE: 60 MMHG | HEART RATE: 56 BPM | SYSTOLIC BLOOD PRESSURE: 118 MMHG | WEIGHT: 170 LBS | BODY MASS INDEX: 22.53 KG/M2 | HEIGHT: 73 IN

## 2025-08-01 DIAGNOSIS — I25.10 CORONARY ARTERY DISEASE INVOLVING NATIVE CORONARY ARTERY OF NATIVE HEART WITHOUT ANGINA PECTORIS: ICD-10-CM

## 2025-08-01 DIAGNOSIS — I10 HTN (HYPERTENSION), BENIGN: Primary | ICD-10-CM

## 2025-08-01 DIAGNOSIS — E78.5 DYSLIPIDEMIA: ICD-10-CM

## 2025-08-01 PROCEDURE — 99214 OFFICE O/P EST MOD 30 MIN: CPT | Performed by: SPECIALIST

## 2025-08-01 PROCEDURE — 1036F TOBACCO NON-USER: CPT | Performed by: SPECIALIST

## 2025-08-01 PROCEDURE — 3074F SYST BP LT 130 MM HG: CPT | Performed by: SPECIALIST

## 2025-08-01 PROCEDURE — 1159F MED LIST DOCD IN RCRD: CPT | Performed by: SPECIALIST

## 2025-08-01 PROCEDURE — 1160F RVW MEDS BY RX/DR IN RCRD: CPT | Performed by: SPECIALIST

## 2025-08-01 PROCEDURE — G8420 CALC BMI NORM PARAMETERS: HCPCS | Performed by: SPECIALIST

## 2025-08-01 PROCEDURE — 93005 ELECTROCARDIOGRAM TRACING: CPT | Performed by: SPECIALIST

## 2025-08-01 PROCEDURE — 93010 ELECTROCARDIOGRAM REPORT: CPT | Performed by: SPECIALIST

## 2025-08-01 PROCEDURE — G8427 DOCREV CUR MEDS BY ELIG CLIN: HCPCS | Performed by: SPECIALIST

## 2025-08-01 PROCEDURE — 1123F ACP DISCUSS/DSCN MKR DOCD: CPT | Performed by: SPECIALIST

## 2025-08-01 PROCEDURE — 3078F DIAST BP <80 MM HG: CPT | Performed by: SPECIALIST

## 2025-08-01 PROCEDURE — 3017F COLORECTAL CA SCREEN DOC REV: CPT | Performed by: SPECIALIST

## 2025-08-01 NOTE — PROGRESS NOTES
Christiana Villa MD. [facility]          Patient: Tio Watkins IV  : 1955      Today's Date: 2025        HISTORY OF PRESENT ILLNESS:     History of Present Illness:  He is doing OK overall - but some SOB at times (may have had a cold then).     Very active - climbs ladders and carries AC units.  No CP.          PAST MEDICAL HISTORY:     Past Medical History:   Diagnosis Date    Acute myocardial infarction, unspecified ([facility]) 2021    Arthritis     CAD (coronary artery disease) 2021    PC/stent mid LAD     Chronic back pain     s/p surgery    Fractures, stress 10/10/2022    4 stress fxt dr dorantes    GERD (gastroesophageal reflux disease)     H/O colonoscopy 2020 5 year repeat    Hiatal hernia 2022    egd dr lopez     Hyperlipidemia     Hypertension     Hypogonadism, male 2013    total 97, free 1.8  13    Non-ST elevation myocardial infarction (NSTEMI) ([facility]) 2021    Panic attacks     ST elevation (STEMI) myocardial infarction of unspecified site ([facility]) 2021       Past Surgical History:   Procedure Laterality Date    ANESTH,SURGERY OF SHOULDER      BLEPHAROPLASTY      CARDIAC CATHETERIZATION  ?    normal    CARDIAC CATHETERIZATION  2021    stent placement    COLONOSCOPY N/A 2018    COLONOSCOPY performed by Seven Lopez MD at [facility] ENDOSCOPY    COLONOSCOPY      Dr. Maria?    FOOT/TOES SURGERY PROC UNLISTED      cord repair in right foot    FRACTURE SURGERY Right     clavicle repair    KNEE ARTHROSCOPY Right 2019    KNEE ARTHROSCOPY Left 2015    x 5    KNEE ARTHROSCOPY Right 2014    ORTHOPEDIC SURGERY Right 2015    ankle surgery    ORTHOPEDIC SURGERY  1990    right wrist    OTHER SURGICAL HISTORY Right     Screw placement on right foot    ROTATOR CUFF REPAIR Left     ROTATOR CUFF REPAIR Right 2017    SHOULDER ARTHROSCOPY Right     wire and screw    TOTAL KNEE ARTHROPLASTY Right     TOTAL KNEE ARTHROPLASTY Left 2023    TOTAL

## 2025-08-01 NOTE — PATIENT INSTRUCTIONS
drinks like soda.  The Mediterranean diet may also include red wine with your meal--1 glass each day for women and up to 2 glasses a day for men.  Tips for eating at home  Use herbs, spices, garlic, lemon zest, and citrus juice instead of salt to add flavor to foods.  Add avocado slices to your sandwich instead of arana.  Have fish for lunch or dinner instead of red meat. Brush the fish with olive oil, and broil or grill it.  Sprinkle your salad with seeds or nuts instead of cheese.  Cook with olive or canola oil instead of butter or oils that are high in saturated fat.  Switch from 2% milk or whole milk to 1% or fat-free milk.  Dip raw vegetables in a vinaigrette dressing or hummus instead of dips made from mayonnaise or sour cream.  Have a piece of fruit for dessert instead of a piece of cake. Try baked apples, or have some dried fruit.  Tips for eating out  Try broiled, grilled, baked, or poached fish instead of having it fried or breaded.  Ask your  to have your meals prepared with olive oil instead of butter.  Order dishes made with marinara sauce or sauces made from olive oil. Avoid sauces made from cream or mayonnaise.  Choose whole-grain breads, whole wheat pasta and pizza crust, brown rice, beans, and lentils.  Cut back on butter or margarine on bread. Instead, you can dip your bread in a small amount of olive oil.  Ask for a side salad or grilled vegetables instead of french fries or chips.  Where can you learn more?  Go to https://www.[employer].net/patientEd and enter O407 to learn more about \"Learning About the Mediterranean Diet.\"  Current as of: October 7, 2024  Content Version: 14.5  © 9487-0874 [employer].   Care instructions adapted under license by [employer]. If you have questions about a medical condition or this instruction, always ask your healthcare professional. [employer] disclaims any warranty or liability for your use of this information.

## 2025-08-01 NOTE — PROGRESS NOTES
Chief Complaint   Patient presents with    Coronary Artery Disease    SOB    Hypertension     Vitals:    08/01/25 1135   BP: 118/60   BP Site: Left Upper Arm   Patient Position: Sitting   Pulse: 56   Weight: 77.1 kg (170 lb)   Height: 1.854 m (6' 1\")       Chest pain NO     ER, urgent care, or hospitalized outside of [facility] since your last visit?  NO     Refills NO

## 2025-08-04 DIAGNOSIS — F41.9 ANXIETY: ICD-10-CM

## 2025-08-04 RX ORDER — ALPRAZOLAM 0.5 MG
0.5 TABLET ORAL NIGHTLY PRN
Qty: 25 TABLET | Refills: 0 | Status: SHIPPED | OUTPATIENT
Start: 2025-08-04 | End: 2025-09-03

## 2025-08-05 ENCOUNTER — HOSPITAL ENCOUNTER (EMERGENCY)
Facility: HOSPITAL | Age: 70
Discharge: HOME | End: 2025-08-05
Attending: EMERGENCY MEDICINE
Payer: MEDICARE

## 2025-08-05 ENCOUNTER — APPOINTMENT (OUTPATIENT)
Facility: HOSPITAL | Age: 70
End: 2025-08-05
Payer: MEDICARE

## 2025-08-05 VITALS
BODY MASS INDEX: 22.53 KG/M2 | SYSTOLIC BLOOD PRESSURE: 143 MMHG | HEART RATE: 78 BPM | OXYGEN SATURATION: 97 % | RESPIRATION RATE: 18 BRPM | WEIGHT: 170 LBS | HEIGHT: 73 IN | TEMPERATURE: 98.4 F | DIASTOLIC BLOOD PRESSURE: 75 MMHG

## 2025-08-05 DIAGNOSIS — R10.13 ABDOMINAL PAIN, EPIGASTRIC: ICD-10-CM

## 2025-08-05 DIAGNOSIS — R06.02 SHORTNESS OF BREATH: Primary | ICD-10-CM

## 2025-08-05 LAB
ALBUMIN SERPL-MCNC: 3.8 G/DL (ref 3.5–5.2)
ALBUMIN/GLOB SERPL: 1.5 (ref 1.1–2.2)
ALP SERPL-CCNC: 60 U/L (ref 40–129)
ALT SERPL-CCNC: 17 U/L (ref 10–50)
ANION GAP SERPL CALC-SCNC: 11 MMOL/L (ref 2–14)
AST SERPL-CCNC: 21 U/L (ref 10–50)
BASOPHILS # BLD: 0.02 K/UL (ref 0–0.1)
BASOPHILS NFR BLD: 0.3 % (ref 0–1)
BILIRUB SERPL-MCNC: 0.6 MG/DL (ref 0–1.2)
BUN SERPL-MCNC: 23 MG/DL (ref 8–23)
BUN/CREAT SERPL: 20 (ref 12–20)
CALCIUM SERPL-MCNC: 9.4 MG/DL (ref 8.8–10.2)
CHLORIDE SERPL-SCNC: 103 MMOL/L (ref 98–107)
CO2 SERPL-SCNC: 25 MMOL/L (ref 20–29)
COMMENT:: NORMAL
CREAT SERPL-MCNC: 1.15 MG/DL (ref 0.7–1.2)
D DIMER PPP FEU-MCNC: 0.39 MG/L FEU (ref 0–0.65)
DIFFERENTIAL METHOD BLD: NORMAL
EOSINOPHIL # BLD: 0.13 K/UL (ref 0–0.4)
EOSINOPHIL NFR BLD: 2.2 % (ref 0–7)
ERYTHROCYTE [DISTWIDTH] IN BLOOD BY AUTOMATED COUNT: 12.6 % (ref 11.5–14.5)
GLOBULIN SER CALC-MCNC: 2.5 G/DL (ref 2–4)
GLUCOSE SERPL-MCNC: 87 MG/DL (ref 65–100)
HCT VFR BLD AUTO: 43.7 % (ref 36.6–50.3)
HGB BLD-MCNC: 14.7 G/DL (ref 12.1–17)
IMM GRANULOCYTES # BLD AUTO: 0.01 K/UL (ref 0–0.04)
IMM GRANULOCYTES NFR BLD AUTO: 0.2 % (ref 0–0.5)
LIPASE SERPL-CCNC: 31 U/L (ref 13–60)
LYMPHOCYTES # BLD: 1.01 K/UL (ref 0.8–3.5)
LYMPHOCYTES NFR BLD: 17.1 % (ref 12–49)
MCH RBC QN AUTO: 30.9 PG (ref 26–34)
MCHC RBC AUTO-ENTMCNC: 33.6 G/DL (ref 30–36.5)
MCV RBC AUTO: 91.8 FL (ref 80–99)
MONOCYTES # BLD: 0.5 K/UL (ref 0–1)
MONOCYTES NFR BLD: 8.5 % (ref 5–13)
NEUTS SEG # BLD: 4.24 K/UL (ref 1.8–8)
NEUTS SEG NFR BLD: 71.7 % (ref 32–75)
NRBC # BLD: 0 K/UL (ref 0–0.01)
NRBC BLD-RTO: 0 PER 100 WBC
PLATELET # BLD AUTO: 185 K/UL (ref 150–400)
PMV BLD AUTO: 9.8 FL (ref 8.9–12.9)
POTASSIUM SERPL-SCNC: 3.9 MMOL/L (ref 3.5–5.1)
PROT SERPL-MCNC: 6.3 G/DL (ref 6.4–8.3)
RBC # BLD AUTO: 4.76 M/UL (ref 4.1–5.7)
SODIUM SERPL-SCNC: 139 MMOL/L (ref 136–145)
SPECIMEN HOLD: NORMAL
TROPONIN T SERPL HS-MCNC: 10.1 NG/L (ref 0–22)
TROPONIN T SERPL HS-MCNC: 9 NG/L (ref 0–22)
WBC # BLD AUTO: 5.9 K/UL (ref 4.1–11.1)

## 2025-08-05 PROCEDURE — 99285 EMERGENCY DEPT VISIT HI MDM: CPT

## 2025-08-05 PROCEDURE — 83690 ASSAY OF LIPASE: CPT

## 2025-08-05 PROCEDURE — 94761 N-INVAS EAR/PLS OXIMETRY MLT: CPT

## 2025-08-05 PROCEDURE — 36415 COLL VENOUS BLD VENIPUNCTURE: CPT

## 2025-08-05 PROCEDURE — 84484 ASSAY OF TROPONIN QUANT: CPT

## 2025-08-05 PROCEDURE — 85025 COMPLETE CBC W/AUTO DIFF WBC: CPT

## 2025-08-05 PROCEDURE — 71045 X-RAY EXAM CHEST 1 VIEW: CPT

## 2025-08-05 PROCEDURE — 85379 FIBRIN DEGRADATION QUANT: CPT

## 2025-08-05 PROCEDURE — 80053 COMPREHEN METABOLIC PANEL: CPT

## 2025-08-05 ASSESSMENT — PAIN - FUNCTIONAL ASSESSMENT: PAIN_FUNCTIONAL_ASSESSMENT: NONE - DENIES PAIN

## 2025-08-07 ENCOUNTER — HOSPITAL ENCOUNTER (OUTPATIENT)
Facility: HOSPITAL | Age: 70
Setting detail: RECURRING SERIES
Discharge: HOME | End: 2025-08-10
Payer: MEDICARE

## 2025-08-07 PROCEDURE — 97161 PT EVAL LOW COMPLEX 20 MIN: CPT | Performed by: PHYSICAL THERAPIST

## 2025-08-07 PROCEDURE — 97110 THERAPEUTIC EXERCISES: CPT | Performed by: PHYSICAL THERAPIST

## 2025-08-07 PROCEDURE — 97535 SELF CARE MNGMENT TRAINING: CPT | Performed by: PHYSICAL THERAPIST

## 2025-08-12 ENCOUNTER — APPOINTMENT (OUTPATIENT)
Facility: HOSPITAL | Age: 70
End: 2025-08-12
Payer: MEDICARE

## 2025-08-12 ENCOUNTER — HOSPITAL ENCOUNTER (OUTPATIENT)
Facility: HOSPITAL | Age: 70
Setting detail: RECURRING SERIES
Discharge: HOME | End: 2025-08-15
Payer: MEDICARE

## 2025-08-12 PROCEDURE — 97110 THERAPEUTIC EXERCISES: CPT | Performed by: PHYSICAL THERAPIST

## 2025-08-18 ENCOUNTER — HOSPITAL ENCOUNTER (OUTPATIENT)
Facility: HOSPITAL | Age: 70
Setting detail: RECURRING SERIES
Discharge: HOME | End: 2025-08-21
Payer: MEDICARE

## 2025-08-18 PROCEDURE — 97110 THERAPEUTIC EXERCISES: CPT | Performed by: PHYSICAL THERAPIST

## 2025-08-21 ENCOUNTER — OFFICE VISIT (OUTPATIENT)
Age: 70
End: 2025-08-21

## 2025-08-21 VITALS
HEART RATE: 75 BPM | RESPIRATION RATE: 16 BRPM | BODY MASS INDEX: 23.11 KG/M2 | WEIGHT: 174.4 LBS | SYSTOLIC BLOOD PRESSURE: 153 MMHG | DIASTOLIC BLOOD PRESSURE: 76 MMHG | OXYGEN SATURATION: 99 % | HEIGHT: 73 IN

## 2025-08-21 DIAGNOSIS — M43.10 ISTHMIC SPONDYLOLISTHESIS: Primary | ICD-10-CM

## 2025-08-21 DIAGNOSIS — M54.16 LUMBAR RADICULOPATHY: ICD-10-CM

## 2025-08-27 ENCOUNTER — HOSPITAL ENCOUNTER (OUTPATIENT)
Facility: HOSPITAL | Age: 70
Setting detail: RECURRING SERIES
Discharge: HOME | End: 2025-08-30
Payer: MEDICARE

## 2025-08-27 PROCEDURE — 97535 SELF CARE MNGMENT TRAINING: CPT | Performed by: PHYSICAL THERAPIST

## 2025-08-27 PROCEDURE — 97110 THERAPEUTIC EXERCISES: CPT | Performed by: PHYSICAL THERAPIST

## 2025-08-29 ENCOUNTER — HOSPITAL ENCOUNTER (OUTPATIENT)
Facility: HOSPITAL | Age: 70
Discharge: HOME | End: 2025-09-01
Attending: STUDENT IN AN ORGANIZED HEALTH CARE EDUCATION/TRAINING PROGRAM
Payer: MEDICARE

## 2025-08-29 DIAGNOSIS — M43.10 ISTHMIC SPONDYLOLISTHESIS: ICD-10-CM

## 2025-08-29 DIAGNOSIS — M54.16 LUMBAR RADICULOPATHY: ICD-10-CM

## 2025-08-29 PROCEDURE — 72148 MRI LUMBAR SPINE W/O DYE: CPT

## 2025-09-03 ENCOUNTER — HOSPITAL ENCOUNTER (OUTPATIENT)
Facility: HOSPITAL | Age: 70
Setting detail: RECURRING SERIES
Discharge: HOME | End: 2025-09-06
Payer: MEDICARE

## 2025-09-03 PROCEDURE — 97110 THERAPEUTIC EXERCISES: CPT | Performed by: PHYSICAL THERAPIST

## (undated) DEVICE — 4-PORT MANIFOLD: Brand: NEPTUNE 2

## (undated) DEVICE — DEVON™ KNEE AND BODY STRAP 60" X 3" (1.5 M X 7.6 CM): Brand: DEVON

## (undated) DEVICE — DRESSING ANTIMIC FOAM OPTIFOAM POSTOP ADH 4X14 IN

## (undated) DEVICE — CATH GUID COR JL4.0 6FR 100CM -- LAUNCHER

## (undated) DEVICE — SMOKE EVACUATION PENCIL: Brand: VALLEYLAB

## (undated) DEVICE — GUIDE CATH CONVEY 5FR JL3.5 -- 39228-661

## (undated) DEVICE — SOLIDIFIER MEDC 1200ML -- CONVERT TO 356117

## (undated) DEVICE — BLADE SURG SAW STD S STL OSC W/ SERR EDGE DISP

## (undated) DEVICE — SYR 5ML 1/5 GRAD LL NSAF LF --

## (undated) DEVICE — CATH IV AUTOGRD BC BLU 22GA 25 -- INSYTE

## (undated) DEVICE — TAPE,CLOTH/SILK,CURAD,3"X10YD,LF,40/CS: Brand: CURAD

## (undated) DEVICE — SUTURE ETHLN SZ 4-0 L18IN NONABSORBABLE BLK L19MM PS-2 3/8 1667H

## (undated) DEVICE — REM POLYHESIVE ADULT PATIENT RETURN ELECTRODE: Brand: VALLEYLAB

## (undated) DEVICE — 3M™ STERI-DRAPE™ U-DRAPE 1015: Brand: STERI-DRAPE™

## (undated) DEVICE — STERILE POLYISOPRENE POWDER-FREE SURGICAL GLOVES WITH EMOLLIENT COATING: Brand: PROTEXIS

## (undated) DEVICE — STERILE POLYISOPRENE POWDER-FREE SURGICAL GLOVES: Brand: PROTEXIS

## (undated) DEVICE — TR BAND RADIAL ARTERY COMPRESSION DEVICE: Brand: TR BAND

## (undated) DEVICE — PINNACLE INTRODUCER SHEATH: Brand: PINNACLE

## (undated) DEVICE — MEDI-VAC NON-CONDUCTIVE SUCTION TUBING: Brand: CARDINAL HEALTH

## (undated) DEVICE — TOTAL JOINT-SFMC: Brand: MEDLINE INDUSTRIES, INC.

## (undated) DEVICE — CATH IV AUTOGRD BC PNK 20GA 25 -- INSYTE

## (undated) DEVICE — CATH GUID COR JL3.5 6FR 100CM -- LAUNCHER

## (undated) DEVICE — RADIFOCUS GLIDEWIRE: Brand: GLIDEWIRE

## (undated) DEVICE — SYRINGE MED 30ML STD CLR PLAS LUERLOCK TIP N CTRL DISP

## (undated) DEVICE — NDL PRT INJ NSAF BLNT 18GX1.5 --

## (undated) DEVICE — ELECTRODE BLDE L4IN NONINSULATED EDGE

## (undated) DEVICE — RUNTHROUGH NS EXTRA FLOPPY PTCA GUIDEWIRE: Brand: RUNTHROUGH

## (undated) DEVICE — ELECTRODE,RADIOTRANSLUCENT,FOAM,3PK: Brand: MEDLINE

## (undated) DEVICE — KIT PROC KNE TRACKING PK/1 -- VIZADISC MAKO

## (undated) DEVICE — KIT INSTR GUID FLX DRL BIT OBT FOR 2.9MM SFT SUT ANCHR

## (undated) DEVICE — DUAL IRRIGATION ADAPTOR

## (undated) DEVICE — DRAPE,REIN 53X77,STERILE: Brand: MEDLINE

## (undated) DEVICE — SET GRAV CK VLV NEEDLESS ST 3 GANGED 4WAY STPCOCK HI FLO 10

## (undated) DEVICE — BAG BELONG PT PERS CLEAR HANDL

## (undated) DEVICE — Device: Brand: EAGLE EYE PLATINUM RX DIGITAL IVUS CATHETER

## (undated) DEVICE — CATH ANGI BLLN DIL 2.5X08MM -- NC EUPHORA

## (undated) DEVICE — SUPER TURBOVAC 90 INTEGRATED CABLE WAND ICW: Brand: COBLATION

## (undated) DEVICE — GUIDE CATH CONVEY 5FR JR4 -- 39228-686

## (undated) DEVICE — ABDOMINAL PAD: Brand: DERMACEA

## (undated) DEVICE — GRASPER ENDOSCP L15CM 45DEG BLU LO PROF SHRP TIP SUT RETRV

## (undated) DEVICE — KIT SHLDR TRAC CUST

## (undated) DEVICE — SUT ETHLN 3-0 18IN PS1 BLK --

## (undated) DEVICE — FORCEPS BX L240CM JAW DIA2.8MM L CAP W/ NDL MIC MESH TOOTH

## (undated) DEVICE — SUPPORT WRST AD W3.5XL9IN DIA14.5IN ART SFT ADJ HK AND LOOP

## (undated) DEVICE — CANN NASAL O2 CAPNOGRAPHY AD -- FILTERLINE

## (undated) DEVICE — LIGHT HANDLE: Brand: DEVON

## (undated) DEVICE — DRESSING FOAM W4XL12IN AG SIL ADH ANTIMIC POSTOP OPTIFOAM

## (undated) DEVICE — DRAPE,EXTREMITY,89X128,STERILE: Brand: MEDLINE

## (undated) DEVICE — KENDALL SCD EXPRESS SLEEVES, KNEE LENGTH, MEDIUM: Brand: KENDALL SCD

## (undated) DEVICE — Device

## (undated) DEVICE — GOWN,BREATHABLE,IMP SLV 3XL AURORA: Brand: MEDLINE

## (undated) DEVICE — SET ADMIN 16ML TBNG L100IN 2 Y INJ SITE IV PIGGY BK DISP

## (undated) DEVICE — SOLUTION IRRIG 3000ML LAC R FLX CONT

## (undated) DEVICE — NC TREK CORONARY DILATATION CATHETER 3.0 MM X 8 MM / RAPID-EXCHANGE: Brand: NC TREK

## (undated) DEVICE — GLIDESHEATH SLENDER STAINLESS STEEL KIT: Brand: GLIDESHEATH SLENDER

## (undated) DEVICE — ADULT SPO2 SENSOR: Brand: NELLCOR

## (undated) DEVICE — NON-ADHERENT STRIPS,OIL EMULSION: Brand: CURITY

## (undated) DEVICE — HOOD: Brand: FLYTE

## (undated) DEVICE — GLOVE SURG SZ 85 L12IN FNGR THK79MIL GRN LTX FREE

## (undated) DEVICE — SYR LR LCK 1ML GRAD NSAF 30ML --

## (undated) DEVICE — 1200 GUARD II KIT W/5MM TUBE W/O VAC TUBE: Brand: GUARDIAN

## (undated) DEVICE — GOWN,SIRUS,NONRNF,SETINSLV,2XL,18/CS: Brand: MEDLINE

## (undated) DEVICE — KIT COLON W/ 1.1OZ LUB AND 2 END

## (undated) DEVICE — 3M™ IOBAN™ 2 ANTIMICROBIAL INCISE DRAPE 6648EZ: Brand: IOBAN™ 2

## (undated) DEVICE — SPONGE GZ W4XL4IN COT 12 PLY TYP VII WVN C FLD DSGN STERILE

## (undated) DEVICE — BLUNTFILL: Brand: MONOJECT

## (undated) DEVICE — 1010 S-DRAPE TOWEL DRAPE 10/BX: Brand: STERI-DRAPE™

## (undated) DEVICE — BURR 4.5 HELICUT LG MOUTH DSPL DYO PWR: Brand: DYONICS / HELICUT

## (undated) DEVICE — SPONGE GZ W4XL4IN COT 12 PLY TYP VII WVN C FLD DSGN

## (undated) DEVICE — Device: Brand: ASAHI SION BLUE

## (undated) DEVICE — HEART CATH-SFMC: Brand: MEDLINE INDUSTRIES, INC.

## (undated) DEVICE — SUTURE MCRYL SZ 3-0 L27IN ABSRB UD L24MM PS-1 3/8 CIR PRIM Y936H

## (undated) DEVICE — DEVICE INFL 20ML 30ATM DGT FLD DISPNS SYR W ACCESSPLUS BLU

## (undated) DEVICE — SUTURE VCRL SZ 2-0 L36IN ABSRB UD L36MM CT-1 1/2 CIR J945H

## (undated) DEVICE — ARTHROSCOPY RICHMOND-LF: Brand: MEDLINE INDUSTRIES, INC.

## (undated) DEVICE — DRAPE KT RIO 1-PC W/POCKETS -- MAKO

## (undated) DEVICE — 4.5 FULL RADIUS BONECUTTER BLADES,                                    YELLOW, 8000 MAXIMUM RPM, PACKAGED 6                                    PER BOX, STERILE

## (undated) DEVICE — BITEBLOCK ENDOSCP 60FR MAXI WHT POLYETH STURDY W/ VELC WVN

## (undated) DEVICE — DYONICS 25 INFLOW TUBE SET, 3 PER BOX

## (undated) DEVICE — BANDAGE COMPR W6INXL10YD ST M E WHITE/BEIGE

## (undated) DEVICE — NEEDLE HYPO 18GA L1.5IN PNK S STL HUB POLYPR SHLD REG BVL

## (undated) DEVICE — CANNULA CUSH AD W/ 14FT TBG

## (undated) DEVICE — CATH GUID COR EB40 6FR 100CM -- LAUNCHER

## (undated) DEVICE — (D)PREP SKN CHLRAPRP APPL 26ML -- CONVERT TO ITEM 371833

## (undated) DEVICE — ASPIRATOR FLR L72IN SUCT TB W/ 1 DETACH FISH STK PUSH HNDL

## (undated) DEVICE — COVER LT HNDL PLAS RIG 1 PER PK

## (undated) DEVICE — DRAPE,U/ SHT,SPLIT,PLAS,STERIL: Brand: MEDLINE

## (undated) DEVICE — HANDPIECE SET WITH COAXIAL HIGH FLOW TIP AND SUCTION TUBE: Brand: INTERPULSE

## (undated) DEVICE — PREP KIT PEEL PTCH POVIDONE IOD

## (undated) DEVICE — INFECTION CONTROL KIT SYS

## (undated) DEVICE — SYRINGE MED 10ML RED POLYCARB BRL FIX M LUER CONN FLAT GRP

## (undated) DEVICE — GAUZE SPONGES,12 PLY: Brand: CURITY

## (undated) DEVICE — NDL FLTR TIP 5 MIC 18GX1.5IN --

## (undated) DEVICE — BOOT ORTH XL KNEE GRN TYVEK HI CVR SKID RESIST HEAT SEAL E

## (undated) DEVICE — XN CAL 1X3.0ML: Brand: XN CAL

## (undated) DEVICE — PADDING CST 6IN STERILE --

## (undated) DEVICE — 3M™ IOBAN™ 2 ANTIMICROBIAL INCISE DRAPE 6650EZ: Brand: IOBAN™ 2

## (undated) DEVICE — BASIN EMSIS 16OZ GRAPHITE PLAS KID SHP MOLD GRAD FOR ORAL

## (undated) DEVICE — STRAP,POSITIONING,KNEE/BODY,FOAM,4X60": Brand: MEDLINE

## (undated) DEVICE — UNIT THER COMB CRYO W/ PD SHLDR W/ TUBE PWR OPERATED W/ BD

## (undated) DEVICE — CANNULA ARTHSCP L7CM ID8.25MM TRNSLUC THRD FLX W/ NO SQUIRT

## (undated) DEVICE — CONTAINER SPEC 20 ML LID NEUT BUFF FORMALIN 10 % POLYPR STS

## (undated) DEVICE — CATH GUID COR EB35 6FR 100CM -- LAUNCHER

## (undated) DEVICE — MARKER,SKIN,WI/RULER AND LABELS: Brand: MEDLINE

## (undated) DEVICE — CURITY NON-ADHERENT STRIPS: Brand: CURITY

## (undated) DEVICE — ZIMMER® STERILE DISPOSABLE TOURNIQUET CUFF WITH PROTECTIVE SLEEVE AND PLC, DUAL PORT, SINGLE BLADDER, 34 IN. (86 CM)

## (undated) DEVICE — GLOVE SURG SZ 9 L12IN FNGR THK79MIL GRN LTX FREE

## (undated) DEVICE — TRAP SUC MUCOUS 70ML -- MEDICHOICE MEDLINE

## (undated) DEVICE — STRYKER PERFORMANCE SERIES SAGITTAL BLADE: Brand: STRYKER PERFORMANCE SERIES

## (undated) DEVICE — Z CONVERTED USE 2271148 CONNECTOR TBNG POLYPR 5IN1 TOUGH SHATTERPROOF FOR 5-11MM TB

## (undated) DEVICE — RUBBERBAND FASTENING W0.25XL3.5IN 5 PER PK

## (undated) DEVICE — SIMPLICITY FLUFF UNDERPAD 23X36, MODERATE: Brand: SIMPLICITY

## (undated) DEVICE — Device: Brand: JELCO

## (undated) DEVICE — SOLUTION IRRIG 3000ML 0.9% SOD CHL FLX CONT 0797208] ICU MEDICAL INC]

## (undated) DEVICE — KIT INT FIX FEM TIB CKPT MAKOPLASTY

## (undated) DEVICE — CATHETER BLLN 142CM  SPRINTER LEGEND RX 2MM X 12MM GW

## (undated) DEVICE — 3M™ CUROS™ DISINFECTING CAP FOR NEEDLELESS CONNECTORS 270/CARTON 20 CARTONS/CASE CFF1-270: Brand: CUROS™

## (undated) DEVICE — SYR 3ML LL TIP 1/10ML GRAD --

## (undated) DEVICE — SUTURE STRATAFIX SYMMETRIC SZ 1 L18IN ABSRB VLT CT1 L36CM SXPP1A404

## (undated) DEVICE — GLOVE SURG SZ 85 L12IN FNGR ORTHO 126MIL CRM LTX FREE

## (undated) DEVICE — PACK,SHOULDER,DRAPE,POUCH: Brand: MEDLINE

## (undated) DEVICE — SUTURE VCRL + SZ 1-0 L36IN ABSRB UD CTX 1/2 CIR TAPR PNT VCP977H

## (undated) DEVICE — KENDALL RADIOLUCENT FOAM MONITORING ELECTRODE -RECTANGULAR SHAPE: Brand: KENDALL

## (undated) DEVICE — 3.5 MM FULL RADIUS STRAIGHT                                    BLADES, POWER/EP-1, BEIGE, PACKAGED                                    6 PER BOX, STERILE

## (undated) DEVICE — COVER,MAYO STAND,STERILE: Brand: MEDLINE

## (undated) DEVICE — DERMABOND SKIN ADH 0.7ML -- DERMABOND ADVANCED 12/BX

## (undated) DEVICE — SUT ETHLN 3-0 18IN PS2 BLK --

## (undated) DEVICE — CONTAINER,SPECIMEN,3OZ,OR STRL: Brand: MEDLINE

## (undated) DEVICE — KIT DRP FOR RIO ROBOTIC ARM ASST SYS

## (undated) DEVICE — DRAPE,ARTHRO,W/POUCH,STERILE: Brand: MEDLINE

## (undated) DEVICE — BAG SPEC BIOHZRD 10 X 10 IN --

## (undated) DEVICE — KIT CKPT FEM TIB MAKOPLSTY -- STRL MAKO

## (undated) DEVICE — 5.5 CM ACROMIOBLASTER STRAIGHT                                    BURRS, POWER/EP-1, BRICK RED, 8000                                    MAXIMUM RPM, PACKAGED 6 PER BOX, STERILE

## (undated) DEVICE — INTRODUCER SHTH 5 FRX30 CM 7 CM W/ NIT WIRE REG MICRO-STICK

## (undated) DEVICE — SNARE ENDOSCP M L240CM W27MM SHTH DIA2.4MM CHN 2.8MM OVL

## (undated) DEVICE — CATHETER BLLN SPRINTER LEGEND RX 142CM L12MM DIA2.5MM GWIRE 0.022IN 8ATM

## (undated) DEVICE — SOLUTION IRRIG 3000ML 0.9% SOD CHL USP UROMATIC PLAS CONT

## (undated) DEVICE — COPILOT BLEEDBACK CONTROL VALVE: Brand: COPILOT

## (undated) DEVICE — HI-TORQUE VERSACORE MODIFIED J GUIDE WIRE SYSTEM 145 CM: Brand: HI-TORQUE VERSACORE

## (undated) DEVICE — ANGIO-SEAL VIP VASCULAR CLOSURE DEVICE: Brand: ANGIO-SEAL